# Patient Record
Sex: MALE | Race: WHITE | Employment: UNEMPLOYED | URBAN - METROPOLITAN AREA
[De-identification: names, ages, dates, MRNs, and addresses within clinical notes are randomized per-mention and may not be internally consistent; named-entity substitution may affect disease eponyms.]

---

## 2017-01-05 ENCOUNTER — TELEPHONE (OUTPATIENT)
Dept: FAMILY MEDICINE CLINIC | Age: 57
End: 2017-01-05

## 2017-01-05 ENCOUNTER — OFFICE VISIT (OUTPATIENT)
Dept: FAMILY MEDICINE CLINIC | Age: 57
End: 2017-01-05

## 2017-01-05 VITALS
DIASTOLIC BLOOD PRESSURE: 65 MMHG | HEART RATE: 61 BPM | BODY MASS INDEX: 35.49 KG/M2 | OXYGEN SATURATION: 99 % | WEIGHT: 262 LBS | HEIGHT: 72 IN | SYSTOLIC BLOOD PRESSURE: 111 MMHG | RESPIRATION RATE: 18 BRPM | TEMPERATURE: 98.3 F

## 2017-01-05 DIAGNOSIS — R73.03 PREDIABETES: ICD-10-CM

## 2017-01-05 DIAGNOSIS — F10.10 ALCOHOL ABUSE: ICD-10-CM

## 2017-01-05 DIAGNOSIS — M25.512 CHRONIC LEFT SHOULDER PAIN: ICD-10-CM

## 2017-01-05 DIAGNOSIS — G89.29 CHRONIC LEFT SHOULDER PAIN: ICD-10-CM

## 2017-01-05 DIAGNOSIS — G47.00 INSOMNIA, UNSPECIFIED TYPE: ICD-10-CM

## 2017-01-05 DIAGNOSIS — E29.1 HYPOGONADISM, MALE: ICD-10-CM

## 2017-01-05 DIAGNOSIS — I85.10 SECONDARY ESOPHAGEAL VARICES WITHOUT BLEEDING (HCC): ICD-10-CM

## 2017-01-05 DIAGNOSIS — E87.6 HYPOKALEMIA: ICD-10-CM

## 2017-01-05 DIAGNOSIS — F10.230 ALCOHOL DEPENDENCE WITH UNCOMPLICATED WITHDRAWAL (HCC): Primary | ICD-10-CM

## 2017-01-05 DIAGNOSIS — L29.9 ITCHY SKIN: ICD-10-CM

## 2017-01-05 RX ORDER — DISULFIRAM 500 MG/1
500 TABLET ORAL DAILY
Qty: 30 TAB | Refills: 0 | Status: SHIPPED | OUTPATIENT
Start: 2017-01-05 | End: 2017-04-06

## 2017-01-05 RX ORDER — TESTOSTERONE CYPIONATE 200 MG/ML
400 INJECTION INTRAMUSCULAR ONCE
Qty: 2 ML | Refills: 0
Start: 2017-01-05 | End: 2017-01-05

## 2017-01-05 RX ORDER — LACTULOSE 10 G/15ML
SOLUTION ORAL; RECTAL
COMMUNITY
Start: 2016-12-27 | End: 2017-05-17 | Stop reason: ALTCHOICE

## 2017-01-05 RX ORDER — TRAZODONE HYDROCHLORIDE 300 MG/1
300 TABLET ORAL
Qty: 90 TAB | Refills: 3 | Status: SHIPPED | OUTPATIENT
Start: 2017-01-05 | End: 2017-10-03 | Stop reason: SDUPTHER

## 2017-01-05 RX ORDER — CYCLOBENZAPRINE HCL 10 MG
TABLET ORAL
Qty: 90 TAB | Refills: 1 | Status: SHIPPED | OUTPATIENT
Start: 2017-01-05 | End: 2017-06-06 | Stop reason: SDUPTHER

## 2017-01-05 NOTE — MR AVS SNAPSHOT
Visit Information Date & Time Provider Department Dept. Phone Encounter #  
 1/5/2017  9:45 AM Treva Beard NP [facility] [phone] [id number] Follow-up Instructions Return in about 1 month (around 2/5/2017) for follow up. Upcoming Health Maintenance Date Due Pneumococcal 19-64 Highest Risk (2 of 3 - PCV13) 6/1/2012 COLONOSCOPY 6/3/2021 DTaP/Tdap/Td series (2 - Td) 9/5/2022 Allergies as of 1/5/2017  Review Complete On: 1/5/2017 By: Treva Beard NP Severity Noted Reaction Type Reactions Onion Medium 06/02/2011   Side Effect Nausea and Vomiting Statins-hmg-coa Reductase Inhibitors  07/05/2016    Unknown (comments) Current Immunizations  Reviewed on 6/28/2016 Name Date Hep A Vaccine (Adult) 6/28/2016, 12/4/2015 Hep B Vaccine (Adult) 6/28/2016, 9/8/2015, 7/8/2015 Influenza Vaccine (Quad) PF 10/24/2016 12:01 PM, 11/5/2015 Pneumococcal Vaccine (Unspecified Type) 6/1/2011  8:25 PM  
 TDAP Vaccine 9/5/2012  3:11 PM  
  
 Not reviewed this visit You Were Diagnosed With   
  
 Codes Comments Alcohol dependence with uncomplicated withdrawal ([facility]    -  Primary ICD-10-CM: O92.568 ICD-9-CM: 303.90, 291.81 Alcohol abuse     ICD-10-CM: F10.10 ICD-9-CM: 305.00 Secondary esophageal varices without bleeding (HCC)     ICD-10-CM: I85.10 ICD-9-CM: 456.21 Insomnia, unspecified type     ICD-10-CM: G47.00 ICD-9-CM: 780.52 Chronic left shoulder pain     ICD-10-CM: M25.512, G89.29 ICD-9-CM: 719.41, 338.29 Hypokalemia     ICD-10-CM: E87.6 ICD-9-CM: 276.8 Itchy skin     ICD-10-CM: L29.9 ICD-9-CM: 698.9 Prediabetes     ICD-10-CM: R73.03 
ICD-9-CM: 790.29 Vitals BP Pulse Temp Resp Height(growth percentile) Weight(growth percentile) 111/65 (BP 1 Location: Right arm, BP Patient Position: Sitting) 61 98.3 °F (36.8 °C) (Oral) 18 6' (1.829 m) 262 lb (118.8 kg) SpO2 BMI Smoking Status 99% 35.53 kg/m2 Former Smoker BMI and BSA Data Body Mass Index Body Surface Area 35.53 kg/m 2 2.46 m 2 Preferred Pharmacy Pharmacy Name Phone [facility] [address] [place] [address] - [address] [place] [address] [address] [phone] Your Updated Medication List  
  
   
This list is accurate as of: 1/5/17 10:53 AM.  Always use your most recent med list.  
  
  
  
  
 allopurinol 300 mg tablet Commonly known as:  Vasquez Better Take 1 Tab by mouth two (2) times a day. cyclobenzaprine 10 mg tablet Commonly known as:  FLEXERIL  
TAKE 1 TABLET BY MOUTH THREE TIMES DAILY as needed. furosemide 20 mg tablet Commonly known as:  LASIX Take 2 tabs by mouth in the morning and 1 tab in the evening for fluid retention. * lactulose 10 gram/15 mL solution Commonly known as:  Andrew Frost Take 10 g by mouth two (2) times a day. * lactulose 10 gram/15 mL solution Commonly known as:  Andrew Frost LORazepam 1 mg tablet Commonly known as:  ATIVAN  
TAKE 1 TABLET TWICE DAILY  ( MAXIMUM DAILY AMOUNT 2MG) omeprazole 40 mg capsule Commonly known as:  PRILOSEC Take 1 Cap by mouth two (2) times a day. potassium chloride SA 10 mEq capsule Commonly known as:  MICRO-K  
TAKE 1 CAPSULE EVERY DAY  
  
 traZODone 300 mg tablet Commonly known as:  Colton Bump Take 1 Tab by mouth nightly. venlafaxine- mg capsule Commonly known as:  EFFEXOR-XR Take 1 Cap by mouth daily. * Notice: This list has 2 medication(s) that are the same as other medications prescribed for you. Read the directions carefully, and ask your doctor or other care provider to review them with you. Prescriptions Sent to Pharmacy Refills  
 traZODone (DESYREL) 300 mg tablet 3 Sig: Take 1 Tab by mouth nightly. Class: Normal  
 Pharmacy: [address] [place] [address], [facility] Ph #: [phone]  Route: Oral  
 cyclobenzaprine (FLEXERIL) 10 mg tablet 1 Sig: TAKE 1 TABLET BY MOUTH THREE TIMES DAILY as needed. Class: Normal  
 Pharmacy: [address] [facility], [address] [facility] #: [phone] Follow-up Instructions Return in about 1 month (around 2/5/2017) for follow up. Referral Information Referral ID Referred By Referred To  
  
 [id number] Debbie HERNANDEZ Kayla Darlin [address] , [address] [place] [address] Phone: [phone] Fax: [phone] Visits Status Start Date End Date 1 New Request 1/5/17 1/5/18 If your referral has a status of pending review or denied, additional information will be sent to support the outcome of this decision. Patient Instructions When antabuse is taken concomitantly with alcohol, there is an increase in serum acetaldehyde levels. High acetaldehyde causes uncomfortable symptoms including flushing, throbbing in head and neck, nausea, vomiting, diaphoresis, thirst, palpitations, chest pain, dyspnea, hyperventilation, tachycardia, syncope, weakness, blurred vision, confusion, vertigo, and hypotension. Disulfiram (By mouth) Disulfiram (dye-SUL-fi-siomara) Used as part of a treatment plan for problem drinking. Creates an unpleasant reaction when drinking alcohol, which reduces the desire to drink. This medicine is part of a recovery program that includes medical supervision and counseling. Brand Name(s): Antabuse There may be other brand names for this medicine. When This Medicine Should Not Be Used: You should not use this medicine if you have had an allergic reaction to disulfiram or to thiuram chemicals used in pesticides and rubber manufacturing. You should not use disulfiram if you have recently used metronidazole (Flagyl®) or paraldehyde (Naraylann Doyne), or if you have severe heart disease or mental illness.  Do not start taking disulfiram for at least 12 hours after drinking alcohol or swallowing any product that contains alcohol (such as cough or cold medicines, tonics, mouthwash, food sauces, vinegar, etc). How to Use This Medicine:  
Tablet · Your doctor will tell you how much of this medicine to take and how often. Do not take more medicine or take it more often than your doctor tells you to. · Take your medicine in the morning unless your doctor tells you otherwise. If this medicine makes you sleepy, you may take it at bedtime. If a dose is missed: · If you miss a dose or forget to take your medicine, take it as soon as you can. If you are more than 12 hours late, wait until it is time for your next dose to take the medicine and skip the missed dose. · Do not use extra medicine to make up for a missed dose. How to Store and Dispose of This Medicine: · Store the medicine in its original container at room temperature, away from heat, moisture, and direct light. · Keep all medicine out of the reach of children and never share your medicine with anyone. Drugs and Foods to Avoid: Ask your doctor or pharmacist before using any other medicine, including over-the-counter medicines, vitamins, and herbal products. · DO NOT DRINK ALCOHOL WHILE YOU ARE USING THIS MEDICINE. EVEN SMALL AMOUNTS OF ALCOHOL CAN PRODUCE REACTIONS, WHICH CAN BE LIFE-THREATENING. · Make sure your doctor knows if you are also using blood thinners (Coumadin®), isoniazid (Rifamate®, Rifater®), phenytoin (Dilantin®), fosphenytoin (Cerebyx®), mephenytoin (Mesantoin®), or any medicines that make you sleepy (such as sleeping pills, cold and allergy medicine, narcotic pain killers, or sedatives). Warnings While Using This Medicine: · Make sure your doctor knows if you are pregnant or breastfeeding, or if you have heart disease, kidney disease, liver disease, diabetes, epilepsy, thyroid problems, or mental illness, or if you are allergic to rubber or latex.  
· If you consume alcohol while using disulfiram, you will have a reaction that can include: throbbing pain in the head and neck, trouble breathing, nausea and vomiting, sweating, flushing, thirst, chest pain, a fast or pounding heartbeat, lightheadedness or fainting, weakness, blurred vision, confusion, or dizziness. This reaction can last for 30 minutes to several hours. The more alcohol you consume, the worse a reaction will be. A severe reaction can result in death. · You may still have an alcohol reaction if you consume alcohol for up to 2 weeks after you stop taking disulfiram. 
· Make sure any doctor or dentist who treats you knows that you are using this medicine. You should carry an identification card or wear a medical alert bracelet to let others know you are taking disulfiram. 
· Your doctor will need to check your progress at regular visits while you are using this medicine. Be sure to keep all appointments. · This medicine may make you dizzy or drowsy. Avoid driving, using machines, or doing anything else that could be dangerous if you are not alert. Possible Side Effects While Using This Medicine:  
Call your doctor right away if you notice any of these side effects: · Blurred vision · Numbness or tingling in the hands, legs, or feet · Severe stomach pain, especially in the upper abdomen · Skin rash or itching · Yellow eyes or skin If you notice these less serious side effects, talk with your doctor: · Problems having sex · Tiredness or drowsiness · Unusual or unpleasant taste in your mouth If you notice other side effects that you think are caused by this medicine, tell your doctor. Call your doctor for medical advice about side effects. You may report side effects to FDA at 4-887-FDA-3768 © 2016 [address] is for End User's use only and may not be sold, redistributed or otherwise used for commercial purposes. The above information is an  only.  It is not intended as medical advice for individual conditions or treatments. Talk to your doctor, nurse or pharmacist before following any medical regimen to see if it is safe and effective for you. Introducing [facility]! Dear Cher Walker: Thank you for requesting a [employer] account. Our records indicate that you already have an active [employer]wak account. You can access your account anytime at [email or web address] Did you know that you can access your hospital and ER discharge instructions at any time in Dakwak? You can also review all of your test results from your hospital stay or ER visit. Additional Information If you have questions, please visit the Frequently Asked Questions section of the [employer]wak website at [email or web address] Remember, Dakwak is NOT to be used for urgent needs. For medical emergencies, dial 911. Now available from your iPhone and Android! Please provide this summary of care documentation to your next provider. Your primary care clinician is listed as Tye Santiago. If you have any questions after today's visit, please call [phone].

## 2017-01-05 NOTE — PROGRESS NOTES
Chief Complaint   Patient presents with    Shoulder Pain    Referral Request    Injection     Patient in office today for shoulder and knee pain ;would like a referral to see Manuel Whitman. Would also like to have labs drawn from hospital visit. 1. Have you been to the ER, urgent care clinic since your last visit? Hospitalized since your last visit? Yes [facility]    2. Have you seen or consulted any other health care providers outside of the [address] [place] [address] since your last visit? Include any pap smears or colon screening.  No

## 2017-01-05 NOTE — PROGRESS NOTES
Chief Complaint   Patient presents with    Shoulder Pain    Referral Request    Injection     Patient in office today for shoulder and knee pain; would like a referral to see Eli More. Pt also requesting to have labs drawn from hospital visit. 1. Have you been to the ER, urgent care clinic since your last visit? Hospitalized since your last visit? Yes Where: [facility]    2. Have you seen or consulted any other health care providers outside of the [address] [place] [address] since your last visit? Include any pap smears or colon screening. No    Pt was seen at [facility].   Received EGD and had varices tied off. Also had a paracentesis. ED recommended he see ortho for his left shoulder pain. Also having issues with his knees locking. Also c/o difficulty sleeping. Taking the trazodone nightly (150 mg). Was previously on 3 150 mg tabs nightly. Will take an extra tab nightly here and there which does help. Would like his muscle relaxer refilled for as needed muscle aches and tension. Using sparingly as needed. Has not had any etoh since his hospitalization 2 weeks ago. Wife is concerned about what will happen when she returns to work and he is not being supervised. Also c/o generalized itchiness. Will wipe his body with rubbing alcohol. Requesting monthly T injection. Denies any other concerns at this time. Chief Complaint   Patient presents with    Shoulder Pain    Referral Request    Injection     he is a 49y.o. year old male who presents for evalution. Reviewed PmHx, RxHx, FmHx, SocHx, AllgHx and updated and dated in the chart.     Review of Systems - negative except as listed above in the HPI    Objective:     Vitals:    01/05/17 1003   BP: 111/65   Pulse: 61   Resp: 18   Temp: 98.3 °F (36.8 °C)   TempSrc: Oral   SpO2: 99%   Weight: 262 lb (118.8 kg)   Height: 6' (1.829 m)     Physical Examination: General appearance - alert, well appearing, and in no distress  Eyes - pupils equal and reactive, extraocular eye movements intact  Ears - bilateral TM's and external ear canals normal  Nose - normal and patent, no erythema, discharge or polyps  Mouth - mucous membranes moist, pharynx normal without lesions  Neck - supple, no significant adenopathy  Chest - clear to auscultation, no wheezes, rales or rhonchi, symmetric air entry  Heart - normal rate, regular rhythm, normal S1, S2, no murmurs  Abdomen - soft, nontender, distended but improved compared to previous exams  Extremities - peripheral pulses normal, trace ankle edema bilaterally, no clubbing or cyanosis  Skin - normal coloration and turgor, no rashes, no suspicious skin lesions noted    Assessment/ Plan:   Mundo Ashby was seen today for shoulder pain, referral request and injection. Diagnoses and all orders for this visit:    Alcohol dependence with uncomplicated withdrawal ([address] / Alcohol abuse  -     AMMONIA  -     disulfiram (ANTABUSE) 500 mg tablet; Take 1 Tab by mouth daily. Start antabuse to help pt continue with motivation to abstain from etoh abuse. Reviewed SEs/ADRS of medication. Discussed sx that occur with ingesting etoh. Reviewed that medication can stay in system for up to 14 days. Continue to follow up with hepatologist/GI as advised. Secondary esophageal varices without bleeding (HCC)  -     CBC WITH AUTOMATED DIFF  Will deviate plan based on findings. Insomnia, unspecified type  -     traZODone (DESYREL) 300 mg tablet; Take 1 Tab by mouth nightly. Increase rx to 300 mg nightly. Chronic left shoulder pain  -     REFERRAL TO ORTHODONTICS  Referral to Dr. Martell Rock for further evaluation of sx. Hypokalemia  -     METABOLIC PANEL, COMPREHENSIVE  Will deviate plan based on findings. Itchy skin  Enc pt to continue use of OTCs like benadryl. Discouraged use of rubbing alcohol. Discussed that this sx would likely improve with abstaining from etoh consumption.    Prediabetes  -     HEMOGLOBIN A1C WITH EAG  Will notify results and deviate plan based on findings. Hypogonadism, male  -     testosterone cypionate (DEPOTESTOTERONE CYPIONATE) 200 mg/mL injection; 2 mL by IntraMUSCular route once for 1 dose. Max Daily Amount: 400 mg.  -     AK INJ TESTOSTERONE CYPIONATE () - Qty - 200  -     THER/PROPH/DIAG INJECTION, SUBCUT/IM  Given. Other orders  -     cyclobenzaprine (FLEXERIL) 10 mg tablet; TAKE 1 TABLET BY MOUTH THREE TIMES DAILY as needed. Continue using sparingly as needed. Follow-up Disposition:  Return in about 1 month (around 2/5/2017) for follow up. I have discussed the diagnosis with the patient and the intended plan as seen in the above orders. The patient has received an after-visit summary and questions were answered concerning future plans. Medication Side Effects and Warnings were discussed with patient: yes  Patient Labs were reviewed and or requested: yes  Patient Past Records were reviewed and or requested  yes  Patient / Caregiver Understanding of treatment plan was verbalized during office visit YES    JASON Pizarro    Patient Instructions   When antabuse is taken concomitantly with alcohol, there is an increase in serum acetaldehyde levels. High acetaldehyde causes uncomfortable symptoms including flushing, throbbing in head and neck, nausea, vomiting, diaphoresis, thirst, palpitations, chest pain, dyspnea, hyperventilation, tachycardia, syncope, weakness, blurred vision, confusion, vertigo, and hypotension. Disulfiram (By mouth)   Disulfiram (dye-SUL-fi-siomara)  Used as part of a treatment plan for problem drinking. Creates an unpleasant reaction when drinking alcohol, which reduces the desire to drink. This medicine is part of a recovery program that includes medical supervision and counseling. Brand Name(s): Antabuse   There may be other brand names for this medicine. When This Medicine Should Not Be Used:    You should not use this medicine if you have had an allergic reaction to disulfiram or to thiuram chemicals used in pesticides and rubber manufacturing. You should not use disulfiram if you have recently used metronidazole (Flagyl®) or paraldehyde (Michiel Anahy), or if you have severe heart disease or mental illness. Do not start taking disulfiram for at least 12 hours after drinking alcohol or swallowing any product that contains alcohol (such as cough or cold medicines, tonics, mouthwash, food sauces, vinegar, etc). How to Use This Medicine:   Tablet  · Your doctor will tell you how much of this medicine to take and how often. Do not take more medicine or take it more often than your doctor tells you to. · Take your medicine in the morning unless your doctor tells you otherwise. If this medicine makes you sleepy, you may take it at bedtime. If a dose is missed:   · If you miss a dose or forget to take your medicine, take it as soon as you can. If you are more than 12 hours late, wait until it is time for your next dose to take the medicine and skip the missed dose. · Do not use extra medicine to make up for a missed dose. How to Store and Dispose of This Medicine:   · Store the medicine in its original container at room temperature, away from heat, moisture, and direct light. · Keep all medicine out of the reach of children and never share your medicine with anyone. Drugs and Foods to Avoid:   Ask your doctor or pharmacist before using any other medicine, including over-the-counter medicines, vitamins, and herbal products. · DO NOT DRINK ALCOHOL WHILE YOU ARE USING THIS MEDICINE. EVEN SMALL AMOUNTS OF ALCOHOL CAN PRODUCE REACTIONS, WHICH CAN BE LIFE-THREATENING.   · Make sure your doctor knows if you are also using blood thinners (Coumadin®), isoniazid (Rifamate®, Rifater®), phenytoin (Dilantin®), fosphenytoin (Cerebyx®), mephenytoin (Mesantoin®), or any medicines that make you sleepy (such as sleeping pills, cold and allergy medicine, narcotic pain killers, or sedatives). Warnings While Using This Medicine:   · Make sure your doctor knows if you are pregnant or breastfeeding, or if you have heart disease, kidney disease, liver disease, diabetes, epilepsy, thyroid problems, or mental illness, or if you are allergic to rubber or latex. · If you consume alcohol while using disulfiram, you will have a reaction that can include: throbbing pain in the head and neck, trouble breathing, nausea and vomiting, sweating, flushing, thirst, chest pain, a fast or pounding heartbeat, lightheadedness or fainting, weakness, blurred vision, confusion, or dizziness. This reaction can last for 30 minutes to several hours. The more alcohol you consume, the worse a reaction will be. A severe reaction can result in death. · You may still have an alcohol reaction if you consume alcohol for up to 2 weeks after you stop taking disulfiram.  · Make sure any doctor or dentist who treats you knows that you are using this medicine. You should carry an identification card or wear a medical alert bracelet to let others know you are taking disulfiram.  · Your doctor will need to check your progress at regular visits while you are using this medicine. Be sure to keep all appointments. · This medicine may make you dizzy or drowsy. Avoid driving, using machines, or doing anything else that could be dangerous if you are not alert. Possible Side Effects While Using This Medicine:   Call your doctor right away if you notice any of these side effects:  · Blurred vision  · Numbness or tingling in the hands, legs, or feet  · Severe stomach pain, especially in the upper abdomen  · Skin rash or itching  · Yellow eyes or skin  If you notice these less serious side effects, talk with your doctor:   · Problems having sex  · Tiredness or drowsiness  · Unusual or unpleasant taste in your mouth  If you notice other side effects that you think are caused by this medicine, tell your doctor.    Call your doctor for medical advice about side effects. You may report side effects to FDA at 4-003-MVC-6085  © 2016 [address] is for End User's use only and may not be sold, redistributed or otherwise used for commercial purposes. The above information is an  only. It is not intended as medical advice for individual conditions or treatments. Talk to your doctor, nurse or pharmacist before following any medical regimen to see if it is safe and effective for you.

## 2017-01-05 NOTE — PATIENT INSTRUCTIONS
When antabuse is taken concomitantly with alcohol, there is an increase in serum acetaldehyde levels. High acetaldehyde causes uncomfortable symptoms including flushing, throbbing in head and neck, nausea, vomiting, diaphoresis, thirst, palpitations, chest pain, dyspnea, hyperventilation, tachycardia, syncope, weakness, blurred vision, confusion, vertigo, and hypotension. Disulfiram (By mouth)   Disulfiram (dye-SUL-fi-siomara)  Used as part of a treatment plan for problem drinking. Creates an unpleasant reaction when drinking alcohol, which reduces the desire to drink. This medicine is part of a recovery program that includes medical supervision and counseling. Brand Name(s): Antabuse   There may be other brand names for this medicine. When This Medicine Should Not Be Used: You should not use this medicine if you have had an allergic reaction to disulfiram or to thiuram chemicals used in pesticides and rubber manufacturing. You should not use disulfiram if you have recently used metronidazole (Flagyl®) or paraldehyde (Randalyn Cornet), or if you have severe heart disease or mental illness. Do not start taking disulfiram for at least 12 hours after drinking alcohol or swallowing any product that contains alcohol (such as cough or cold medicines, tonics, mouthwash, food sauces, vinegar, etc). How to Use This Medicine:   Tablet  · Your doctor will tell you how much of this medicine to take and how often. Do not take more medicine or take it more often than your doctor tells you to. · Take your medicine in the morning unless your doctor tells you otherwise. If this medicine makes you sleepy, you may take it at bedtime. If a dose is missed:   · If you miss a dose or forget to take your medicine, take it as soon as you can. If you are more than 12 hours late, wait until it is time for your next dose to take the medicine and skip the missed dose. · Do not use extra medicine to make up for a missed dose.   How to Store and Dispose of This Medicine:   · Store the medicine in its original container at room temperature, away from heat, moisture, and direct light. · Keep all medicine out of the reach of children and never share your medicine with anyone. Drugs and Foods to Avoid:   Ask your doctor or pharmacist before using any other medicine, including over-the-counter medicines, vitamins, and herbal products. · DO NOT DRINK ALCOHOL WHILE YOU ARE USING THIS MEDICINE. EVEN SMALL AMOUNTS OF ALCOHOL CAN PRODUCE REACTIONS, WHICH CAN BE LIFE-THREATENING. · Make sure your doctor knows if you are also using blood thinners (Coumadin®), isoniazid (Rifamate®, Rifater®), phenytoin (Dilantin®), fosphenytoin (Cerebyx®), mephenytoin (Mesantoin®), or any medicines that make you sleepy (such as sleeping pills, cold and allergy medicine, narcotic pain killers, or sedatives). Warnings While Using This Medicine:   · Make sure your doctor knows if you are pregnant or breastfeeding, or if you have heart disease, kidney disease, liver disease, diabetes, epilepsy, thyroid problems, or mental illness, or if you are allergic to rubber or latex. · If you consume alcohol while using disulfiram, you will have a reaction that can include: throbbing pain in the head and neck, trouble breathing, nausea and vomiting, sweating, flushing, thirst, chest pain, a fast or pounding heartbeat, lightheadedness or fainting, weakness, blurred vision, confusion, or dizziness. This reaction can last for 30 minutes to several hours. The more alcohol you consume, the worse a reaction will be. A severe reaction can result in death. · You may still have an alcohol reaction if you consume alcohol for up to 2 weeks after you stop taking disulfiram.  · Make sure any doctor or dentist who treats you knows that you are using this medicine.  You should carry an identification card or wear a medical alert bracelet to let others know you are taking disulfiram.  · Your doctor will need to check your progress at regular visits while you are using this medicine. Be sure to keep all appointments. · This medicine may make you dizzy or drowsy. Avoid driving, using machines, or doing anything else that could be dangerous if you are not alert. Possible Side Effects While Using This Medicine:   Call your doctor right away if you notice any of these side effects:  · Blurred vision  · Numbness or tingling in the hands, legs, or feet  · Severe stomach pain, especially in the upper abdomen  · Skin rash or itching  · Yellow eyes or skin  If you notice these less serious side effects, talk with your doctor:   · Problems having sex  · Tiredness or drowsiness  · Unusual or unpleasant taste in your mouth  If you notice other side effects that you think are caused by this medicine, tell your doctor. Call your doctor for medical advice about side effects. You may report side effects to FDA at 3-114-FDA-7905  © 2016 [address] is for End User's use only and may not be sold, redistributed or otherwise used for commercial purposes. The above information is an  only. It is not intended as medical advice for individual conditions or treatments. Talk to your doctor, nurse or pharmacist before following any medical regimen to see if it is safe and effective for you.

## 2017-01-06 LAB
ALBUMIN SERPL-MCNC: 3.3 G/DL (ref 3.5–5.5)
ALBUMIN/GLOB SERPL: 0.9 {RATIO} (ref 1.1–2.5)
ALP SERPL-CCNC: 118 IU/L (ref 39–117)
ALT SERPL-CCNC: 12 IU/L (ref 0–44)
AMMONIA PLAS-MCNC: 70 UG/DL (ref 27–102)
AST SERPL-CCNC: 33 IU/L (ref 0–40)
BASOPHILS # BLD AUTO: 0 X10E3/UL (ref 0–0.2)
BASOPHILS NFR BLD AUTO: 1 %
BILIRUB SERPL-MCNC: 1 MG/DL (ref 0–1.2)
BUN SERPL-MCNC: 6 MG/DL (ref 6–24)
BUN/CREAT SERPL: 7 (ref 9–20)
CALCIUM SERPL-MCNC: 8.6 MG/DL (ref 8.7–10.2)
CHLORIDE SERPL-SCNC: 99 MMOL/L (ref 96–106)
CO2 SERPL-SCNC: 22 MMOL/L (ref 18–29)
CREAT SERPL-MCNC: 0.89 MG/DL (ref 0.76–1.27)
EOSINOPHIL # BLD AUTO: 0.1 X10E3/UL (ref 0–0.4)
EOSINOPHIL NFR BLD AUTO: 3 %
ERYTHROCYTE [DISTWIDTH] IN BLOOD BY AUTOMATED COUNT: 20.3 % (ref 12.3–15.4)
EST. AVERAGE GLUCOSE BLD GHB EST-MCNC: 126 MG/DL
GLOBULIN SER CALC-MCNC: 3.5 G/DL (ref 1.5–4.5)
GLUCOSE SERPL-MCNC: 139 MG/DL (ref 65–99)
HBA1C MFR BLD: 6 % (ref 4.8–5.6)
HCT VFR BLD AUTO: 29.3 % (ref 37.5–51)
HGB BLD-MCNC: 8.9 G/DL (ref 12.6–17.7)
IMM GRANULOCYTES # BLD: 0 X10E3/UL (ref 0–0.1)
IMM GRANULOCYTES NFR BLD: 1 %
LYMPHOCYTES # BLD AUTO: 0.6 X10E3/UL (ref 0.7–3.1)
LYMPHOCYTES NFR BLD AUTO: 16 %
MCH RBC QN AUTO: 21.3 PG (ref 26.6–33)
MCHC RBC AUTO-ENTMCNC: 30.4 G/DL (ref 31.5–35.7)
MCV RBC AUTO: 70 FL (ref 79–97)
MONOCYTES # BLD AUTO: 0.6 X10E3/UL (ref 0.1–0.9)
MONOCYTES NFR BLD AUTO: 15 %
NEUTROPHILS # BLD AUTO: 2.5 X10E3/UL (ref 1.4–7)
NEUTROPHILS NFR BLD AUTO: 64 %
PLATELET # BLD AUTO: 144 X10E3/UL (ref 150–379)
POTASSIUM SERPL-SCNC: 3.8 MMOL/L (ref 3.5–5.2)
PROT SERPL-MCNC: 6.8 G/DL (ref 6–8.5)
RBC # BLD AUTO: 4.17 X10E6/UL (ref 4.14–5.8)
SODIUM SERPL-SCNC: 134 MMOL/L (ref 134–144)
WBC # BLD AUTO: 3.8 X10E3/UL (ref 3.4–10.8)

## 2017-01-06 NOTE — PROGRESS NOTES
Please notify pt that ammonia levels are stable. Should continue to be this way as long as he continues to abstain from etoh consumption.

## 2017-01-06 NOTE — PROGRESS NOTES
Please notify pt the followin. Prediabetes is very well controlled. Continue with efforts to follow a low carb diet. 2. Potassium is stable. 3. CBC looks good, anemia normalizing after his recent hospitalization. All in all his labs look the best they have in some time from his abstinence from alcohol. Enc pt to continue with those efforts. I recommend we repeat labs in 1 month. Ammonia results still pending, will notify when they return.

## 2017-02-14 DIAGNOSIS — R60.0 BILATERAL LOWER EXTREMITY EDEMA: ICD-10-CM

## 2017-02-14 RX ORDER — FUROSEMIDE 20 MG/1
TABLET ORAL
Qty: 270 TAB | Refills: 2 | Status: SHIPPED | OUTPATIENT
Start: 2017-02-14 | End: 2017-07-11 | Stop reason: SDUPTHER

## 2017-03-21 ENCOUNTER — TELEPHONE (OUTPATIENT)
Dept: FAMILY MEDICINE CLINIC | Age: 57
End: 2017-03-21

## 2017-03-21 NOTE — TELEPHONE ENCOUNTER
This is not something that I can order. If pt thinks he needs paracentesis, enc to go to ED for further evaluation.

## 2017-03-21 NOTE — TELEPHONE ENCOUNTER
Patient requesting an order to [facility] to have fluid drawn off stomach. He has cirrhosis.       He may be reached at [phone]

## 2017-03-22 NOTE — TELEPHONE ENCOUNTER
Patient checking on referral to have fluid removed from his abdomen. Told patient per message from Soy Ledesma NP  that would need to go ED. He may call the doctor that did it last time or go to ER.

## 2017-03-31 ENCOUNTER — TELEPHONE (OUTPATIENT)
Dept: FAMILY MEDICINE CLINIC | Age: 57
End: 2017-03-31

## 2017-03-31 ENCOUNTER — DOCUMENTATION ONLY (OUTPATIENT)
Dept: FAMILY MEDICINE CLINIC | Age: 57
End: 2017-03-31

## 2017-03-31 NOTE — TELEPHONE ENCOUNTER
= [facility].  I had Ivory Bonilla look him up and it looks like he is having a Paracentesis done and results were called into us in error.

## 2017-04-06 ENCOUNTER — OFFICE VISIT (OUTPATIENT)
Dept: FAMILY MEDICINE CLINIC | Age: 57
End: 2017-04-06

## 2017-04-06 VITALS
RESPIRATION RATE: 18 BRPM | OXYGEN SATURATION: 97 % | HEIGHT: 72 IN | TEMPERATURE: 98.2 F | WEIGHT: 294 LBS | BODY MASS INDEX: 39.82 KG/M2 | SYSTOLIC BLOOD PRESSURE: 137 MMHG | DIASTOLIC BLOOD PRESSURE: 82 MMHG | HEART RATE: 100 BPM

## 2017-04-06 DIAGNOSIS — M62.08 DIASTASIS RECTI: ICD-10-CM

## 2017-04-06 DIAGNOSIS — D50.9 IRON DEFICIENCY ANEMIA, UNSPECIFIED IRON DEFICIENCY ANEMIA TYPE: ICD-10-CM

## 2017-04-06 DIAGNOSIS — I10 ESSENTIAL HYPERTENSION: Chronic | ICD-10-CM

## 2017-04-06 DIAGNOSIS — K70.31 ALCOHOLIC CIRRHOSIS OF LIVER WITH ASCITES (HCC): ICD-10-CM

## 2017-04-06 DIAGNOSIS — Z00.00 ROUTINE GENERAL MEDICAL EXAMINATION AT A HEALTH CARE FACILITY: Primary | ICD-10-CM

## 2017-04-06 DIAGNOSIS — E87.1 HYPONATREMIA: ICD-10-CM

## 2017-04-06 DIAGNOSIS — R07.9 CHEST PAIN, EXERTIONAL: ICD-10-CM

## 2017-04-06 DIAGNOSIS — E29.1 HYPOGONADISM, MALE: ICD-10-CM

## 2017-04-06 DIAGNOSIS — Z13.31 SCREENING FOR DEPRESSION: ICD-10-CM

## 2017-04-06 DIAGNOSIS — F33.0 MILD EPISODE OF RECURRENT MAJOR DEPRESSIVE DISORDER (HCC): ICD-10-CM

## 2017-04-06 DIAGNOSIS — Z13.39 SCREENING FOR ALCOHOLISM: ICD-10-CM

## 2017-04-06 RX ORDER — SPIRONOLACTONE 25 MG/1
25 TABLET ORAL 2 TIMES DAILY
COMMUNITY
End: 2017-04-06 | Stop reason: SDUPTHER

## 2017-04-06 RX ORDER — TESTOSTERONE CYPIONATE 200 MG/ML
400 INJECTION INTRAMUSCULAR ONCE
Qty: 2 ML | Refills: 0
Start: 2017-04-06 | End: 2017-04-06

## 2017-04-06 RX ORDER — SPIRONOLACTONE 25 MG/1
25 TABLET ORAL 2 TIMES DAILY
Qty: 180 TAB | Refills: 1 | Status: SHIPPED | OUTPATIENT
Start: 2017-04-06 | End: 2017-10-29 | Stop reason: SDUPTHER

## 2017-04-06 NOTE — MR AVS SNAPSHOT
Visit Information Date & Time Provider Department Dept. Phone Encounter #  
 4/6/2017  8:00 AM Tamika Shah NP [address] [facility] [phone] [id number] Upcoming Health Maintenance Date Due  
 MEDICARE YEARLY EXAM 6/12/1980 Pneumococcal 19-64 Highest Risk (2 of 3 - PCV13) 6/1/2012 COLONOSCOPY 6/3/2021 DTaP/Tdap/Td series (2 - Td) 9/5/2022 Allergies as of 4/6/2017  Review Complete On: 4/6/2017 By: Tamika Shah NP Severity Noted Reaction Type Reactions Onion Medium 06/02/2011   Side Effect Nausea and Vomiting Statins-hmg-coa Reductase Inhibitors  07/05/2016    Unknown (comments) Current Immunizations  Reviewed on 6/28/2016 Name Date Hep A Vaccine (Adult) 6/28/2016, 12/4/2015 Hep B Vaccine (Adult) 6/28/2016, 9/8/2015, 7/8/2015 Influenza Vaccine (Quad) PF 10/24/2016 12:01 PM, 11/5/2015 Pneumococcal Vaccine (Unspecified Type) 6/1/2011  8:25 PM  
 TDAP Vaccine 9/5/2012  3:11 PM  
  
 Not reviewed this visit You Were Diagnosed With   
  
 Codes Comments Routine general medical examination at a health care facility    -  Primary ICD-10-CM: Z00.00 ICD-9-CM: V70.0 Screening for alcoholism     ICD-10-CM: Z13.89 ICD-9-CM: V79.1 Screening for depression     ICD-10-CM: Z13.89 ICD-9-CM: V79.0 Chest pain, exertional     ICD-10-CM: R07.9 ICD-9-CM: 786.50 Diastasis recti     ICD-10-CM: M62.08 
ICD-9-CM: 728.84 Alcoholic cirrhosis of liver with ascites ([facility]     ICD-10-CM: K70.31 ICD-9-CM: 571.2 Essential hypertension     ICD-10-CM: I10 
ICD-9-CM: 401.9 Mild episode of recurrent major depressive disorder (HCC)     ICD-10-CM: F33.0 ICD-9-CM: 296.31 Hyponatremia     ICD-10-CM: E87.1 ICD-9-CM: 276.1 Iron deficiency anemia, unspecified iron deficiency anemia type     ICD-10-CM: D50.9 ICD-9-CM: 280.9 Hypogonadism, male     ICD-10-CM: E29.1 ICD-9-CM: 257.2 Vitals  BP Pulse Temp Resp Height(growth percentile) Weight(growth percentile) 137/82 (BP 1 Location: Right arm, BP Patient Position: Sitting) 100 98.2 °F (36.8 °C) (Oral) 18 6' (1.829 m) 294 lb (133.4 kg) SpO2 BMI Smoking Status 97% 39.87 kg/m2 Former Smoker Vitals History BMI and BSA Data Body Mass Index Body Surface Area  
 39.87 kg/m 2 2.6 m 2 Preferred Pharmacy [facility] Name Phone [facility] [place] [id number] [address] [place] [address] [phone] Your Updated Medication List  
  
   
This list is accurate as of: 4/6/17  9:22 AM.  Always use your most recent med list.  
  
  
  
  
 allopurinol 300 mg tablet Commonly known as:  Rosalinda Ankit Take 1 Tab by mouth two (2) times a day. cyclobenzaprine 10 mg tablet Commonly known as:  FLEXERIL  
TAKE 1 TABLET BY MOUTH THREE TIMES DAILY as needed. furosemide 20 mg tablet Commonly known as:  LASIX TAKE 2 TABLETS IN THE MORNING AND TAKE 1 TABLET IN THE EVENING FOR FLUID RETENTION  
  
 * lactulose 10 gram/15 mL solution Commonly known as:  Berenice Hiss Take 10 g by mouth two (2) times a day. * lactulose 10 gram/15 mL solution Commonly known as:  Berenice Jimenezs LORazepam 1 mg tablet Commonly known as:  ATIVAN  
TAKE 1 TABLET TWICE DAILY  ( MAXIMUM DAILY AMOUNT 2MG) omeprazole 40 mg capsule Commonly known as:  PRILOSEC Take 1 Cap by mouth two (2) times a day. potassium chloride SA 10 mEq capsule Commonly known as:  MICRO-K  
TAKE 1 CAPSULE EVERY DAY  
  
 spironolactone 25 mg tablet Commonly known as:  ALDACTONE Take 1 Tab by mouth two (2) times a day. testosterone cypionate 200 mg/mL injection Commonly known as:  DEPOTESTOTERONE CYPIONATE 2 mL by IntraMUSCular route once for 1 dose. Max Daily Amount: 400 mg.  
  
 traZODone 300 mg tablet Commonly known as:  Divehi Never Take 1 Tab by mouth nightly. venlafaxine- mg capsule Commonly known as:  EFFEXOR-XR Take 1 Cap by mouth daily. * Notice: This list has 2 medication(s) that are the same as other medications prescribed for you. Read the directions carefully, and ask your doctor or other care provider to review them with you. Prescriptions Sent to Pharmacy Refills  
 spironolactone (ALDACTONE) 25 mg tablet 1 Sig: Take 1 Tab by mouth two (2) times a day. Class: Normal  
 Pharmacy: [address] [place] [address], [facility] [address] [place] [address] #: [phone] Route: Oral  
  
We Performed the Following CBC WITH AUTOMATED DIFF [57986 CPT(R)] [address] [[id number] HCPCS] IRON PROFILE [id number] CPT(R)] LIPID PANEL [50235 CPT(R)] METABOLIC PANEL, COMPREHENSIVE [44328 CPT(R)] [place] INJ TESTOSTERONE CYPIONATE [ HCPCS] [place] THER/PROPH/DIAG INJECTION, SUBCUT/IM [id number] CPT(R)] PROSTATE SPECIFIC AG (PSA) [id number] CPT(R)] REFERRAL TO CARDIOLOGY [MWH33 Custom] TESTOSTERONE, FREE & TOTAL [35389 CPT(R)] Referral Information Referral ID Referred By Referred To  
  
 [id number] Isabel Najera MD   
   [facility] [address], [address] [place] [address] Phone: [phone] Fax: [phone] Visits Status Start Date End Date 1 New Request 4/6/17 4/6/18 If your referral has a status of pending review or denied, additional information will be sent to support the outcome of this decision. Introducing [facility] HE[facility]! Dear Samantha Fierro: Thank you for requesting a [employer] account. Our records indicate that you already have an active [employer] account. You can access your account anytime at [email or web address] Did you know that you can access your hospital and ER discharge instructions at any time in [employer]? You can also review all of your test results from your hospital stay or ER visit. Additional Information If you have questions, please visit the Frequently Asked Questions section of the [employer] website at [email or web address] Remember, [employer] Information is NOT to be used for urgent needs. For medical emergencies, dial 911. Now available from your iPhone and Android! Please provide this summary of care documentation to your next provider. Your primary care clinician is listed as Nereida Maldonado. If you have any questions after today's visit, please call [phone].

## 2017-04-06 NOTE — PROGRESS NOTES
Chief Complaint   Patient presents with    Hernia (Non Specific)     Patient in office today for possible eval of hernia and referral is needed; denies pain at time of visit. 1. Have you been to the ER, urgent care clinic since your last visit? Hospitalized since your last visit? Yes [facility]    2. Have you seen or consulted any other health care providers outside of the [address] [place] [address] since your last visit? Include any pap smears or colon screening. No    When pt had last paracentesis, NP told him that he has a large hernia. Diastesis recti. Pt also reports chest pain with exertion. Denies any associated sob and dyspnea. Will last for a few seconds after he rests. Has not seen a cardiologist in many years. Had a stress test at [facility] around this time. Due for a lipid chest.   Poor endurance, has been in and out of the hospital many months. Has gained another 30 lbs since last OV. Denies any other concerns at this time. This is a Subsequent Medicare Annual Wellness Visit providing Personalized Prevention Plan Services (PPPS) (Performed 12 months after initial AWV and PPPS )    I have reviewed the patient's medical history in detail and updated the computerized patient record. History     Past Medical History:   Diagnosis Date    Alcohol abuse 6/1/2011    Anemia NEC     Cirrhosis of liver (HCC)     Gastric ulcer, unspecified as acute or chronic, without mention of hemorrhage, perforation, or obstruction     GERD (gastroesophageal reflux disease)     Hypertension     Liver disease     cirrosis    Pneumonia     PUD (peptic ulcer disease)       Past Surgical History:   Procedure Laterality Date    HX HEENT      wisdom teeth     Current Outpatient Prescriptions   Medication Sig Dispense Refill    spironolactone (ALDACTONE) 25 mg tablet Take 1 Tab by mouth two (2) times a day.  180 Tab 1    testosterone cypionate (DEPOTESTOTERONE CYPIONATE) 200 mg/mL injection 2 mL by IntraMUSCular route once for 1 dose. Max Daily Amount: 400 mg. 2 mL 0    furosemide (LASIX) 20 mg tablet TAKE 2 TABLETS IN THE MORNING AND TAKE 1 TABLET IN THE EVENING FOR FLUID RETENTION 270 Tab 2    lactulose (CHRONULAC) 10 gram/15 mL solution       traZODone (DESYREL) 300 mg tablet Take 1 Tab by mouth nightly. 90 Tab 3    cyclobenzaprine (FLEXERIL) 10 mg tablet TAKE 1 TABLET BY MOUTH THREE TIMES DAILY as needed. 90 Tab 1    LORazepam (ATIVAN) 1 mg tablet TAKE 1 TABLET TWICE DAILY  ( MAXIMUM DAILY AMOUNT 2MG) 180 Tab 1    omeprazole (PRILOSEC) 40 mg capsule Take 1 Cap by mouth two (2) times a day. 180 Cap 3    potassium chloride SA (MICRO-K) 10 mEq capsule TAKE 1 CAPSULE EVERY DAY 90 Cap 3    allopurinol (ZYLOPRIM) 300 mg tablet Take 1 Tab by mouth two (2) times a day. 180 Tab 3    venlafaxine-SR (EFFEXOR-XR) 150 mg capsule Take 1 Cap by mouth daily. 90 Cap 3    lactulose (CHRONULAC) 10 gram/15 mL solution Take 10 g by mouth two (2) times a day.        Allergies   Allergen Reactions    Onion Nausea and Vomiting    Statins-Hmg-Coa Reductase Inhibitors Unknown (comments)     Family History   Problem Relation Age of Onset    Asthma Mother     Other Father      history of fall multiple injuries    Hypertension Brother     Cancer Paternal Grandmother      uncertain if colon or stomach    Cancer Paternal Grandfather      stomach cancer- dx mid [de-identified]     Social History   Substance Use Topics    Smoking status: Former Smoker     Quit date: 6/28/1985    Smokeless tobacco: Current User     Types: Chew      Comment: 1 can per day     Alcohol use 0.0 oz/week      Comment: daily, has tried to stop a few times unsuccessfully     Patient Active Problem List   Diagnosis Code    HTN (hypertension) I10    Anemia, unspecified D64.9    Gout M10.9    GI bleed K92.2    Chest pain, unspecified R07.9    Alcohol dependence with withdrawal ([facility] F10.239    Depression F32.9    Anxiety F41.9    [facility] F33.9    Hypogonadism, male E29.1    Thrombocytopenia ([facility] D69.6    Cirrhosis ([facility] K74.60    Splenomegaly R16.1    Alcohol abuse F10.10    Prediabetes R73.03    Esophageal varices without bleeding (HCC) I85.00    Hepatic cirrhosis (HCC) K74.60    Iron deficiency anemia D50.9    Hyponatremia E87.1    Gallbladder calculus without cholecystitis K80.20       Depression Risk Factor Screening:   No flowsheet data found. Alcohol Risk Factor Screening: On any occasion during the past 3 months, have you had more than 4 drinks containing alcohol? Yes    Do you average more than 14 drinks per week? Yes    Pt has active diagnosis of alcoholism and hepatic cirrhosis. Functional Ability and Level of Safety:     Hearing Loss   none    Activities of Daily Living   Self-care. Requires assistance with: no ADLs    Fall Risk   No flowsheet data found. Abuse Screen   Patient is not abused    Review of Systems   A comprehensive review of systems was negative except for that written in the HPI. Physical Examination     Evaluation of Cognitive Function:  Mood/affect:  neutral, happy  Appearance: casually dressed and overweight  Family member/caregiver input: No caregiver present today.       Objective:     Vitals:    04/06/17 0826   BP: 137/82   Pulse: 100   Resp: 18   Temp: 98.2 °F (36.8 °C)   TempSrc: Oral   SpO2: 97%   Weight: 294 lb (133.4 kg)   Height: 6' (1.829 m)     General appearance - alert, well appearing, and in no distress  Eyes - pupils equal and reactive, extraocular eye movements intact  Ears - bilateral TM's and external ear canals normal  Nose - normal and patent, no erythema, discharge or polyps and normal nontender sinuses  Mouth - mucous membranes moist, pharynx normal without lesions  Neck - supple, no significant adenopathy, carotids upstroke normal bilaterally, no bruits, thyroid exam: thyroid is normal in size without nodules or tenderness  Chest - clear to auscultation, no wheezes, rales or rhonchi, symmetric air entry  Heart - normal rate, regular rhythm, normal S1, S2, no murmurs  Abdomen - soft, nontender, distended from ascites, prominent diastasis recti present when pt moves from laying flat to sitting up - soft, reducible with no overlying skin changes  bowel sounds normal  Extremities - peripheral pulses normal, no ankle edema, no clubbing or cyanosis  Skin - normal coloration and turgor, no rashes, no suspicious skin lesions noted    Patient Care Team:  Terrell Blizzard, NP as PCP - General (Nurse Practitioner)  Ashlyn Fowler as Ambulatory Care Navigator  Terrell Blizzard, NP as Nurse Practitioner  Amada Benitez MD (Hematology and Oncology)    Assessment/ Plan:     Alberto Desai was seen today for hernia (non specific). Diagnoses and all orders for this visit:    Routine general medical examination at a health care facility  Annual medicare wellness exam complete. Screening for alcoholism  Pt has history of alcoholism. History of multiple unsuccessful attempts at quitting. Screening for depression  -     Depression Screen Annual  Stable on meds. Chest pain, exertional  -     REFERRAL TO CARDIOLOGY  Enc pt to est care with cardiology for further evaluation. Reviewed acute / worsening s/sx that warrant more immediate medical attention and pt verbalized understanding of this. Diastasis recti  Reassurance provided that no intervention should be needed for this problem. Continue to monitor. Alcoholic cirrhosis of liver with ascites ([facility] [address]  Continue to follow up with GI as advised. Essential hypertension  -     spironolactone (ALDACTONE) 25 mg tablet; Take 1 Tab by mouth two (2) times a day. -     LIPID PANEL  Refilled rx. BP looks good today. History of hypotension at previous visits. Mild episode of recurrent major depressive disorder (HCC)  Stable on meds. Hyponatremia  -     METABOLIC PANEL, COMPREHENSIVE  Updating sodium levels.    Iron deficiency anemia, unspecified iron deficiency anemia type  -     CBC WITH AUTOMATED DIFF  -     IRON PROFILE  Will notify results and deviate plan based on findings. Hypogonadism, male  -     TESTOSTERONE, FREE & TOTAL  -     PSA - PROSTATE SPECIFIC AG  -     testosterone cypionate (DEPOTESTOTERONE CYPIONATE) 200 mg/mL injection; 2 mL by IntraMUSCular route once for 1 dose. Max Daily Amount: 400 mg.  -     CO INJ TESTOSTERONE CYPIONATE () - Qty - 200  -     THER/PROPH/DIAG INJECTION, SUBCUT/IM  Given following labs. Will notify results and deviate plan based on findings. Follow-up Disposition:  Return if symptoms worsen or fail to improve.     JASON Britt

## 2017-04-06 NOTE — PROGRESS NOTES
Chief Complaint   Patient presents with    Hernia (Non Specific)     Patient in office today for possible eval of hernia and referral is needed; denies pain at time of visit. 1. Have you been to the ER, urgent care clinic since your last visit? Hospitalized since your last visit? Yes [facility]    2. Have you seen or consulted any other health care providers outside of the [facility] since your last visit? Include any pap smears or colon screening.  No

## 2017-04-08 LAB
ALBUMIN SERPL-MCNC: 3.5 G/DL (ref 3.5–5.5)
ALBUMIN/GLOB SERPL: 0.9 {RATIO} (ref 1.2–2.2)
ALP SERPL-CCNC: 114 IU/L (ref 39–117)
ALT SERPL-CCNC: 15 IU/L (ref 0–44)
AST SERPL-CCNC: 37 IU/L (ref 0–40)
BASOPHILS # BLD AUTO: 0.1 X10E3/UL (ref 0–0.2)
BASOPHILS NFR BLD AUTO: 2 %
BILIRUB SERPL-MCNC: 1.1 MG/DL (ref 0–1.2)
BUN SERPL-MCNC: 6 MG/DL (ref 6–24)
BUN/CREAT SERPL: 9 (ref 9–20)
CALCIUM SERPL-MCNC: 8.6 MG/DL (ref 8.7–10.2)
CHLORIDE SERPL-SCNC: 100 MMOL/L (ref 96–106)
CHOLEST SERPL-MCNC: 132 MG/DL (ref 100–199)
CO2 SERPL-SCNC: 22 MMOL/L (ref 18–29)
COMMENT: ABNORMAL
CREAT SERPL-MCNC: 0.64 MG/DL (ref 0.76–1.27)
EOSINOPHIL # BLD AUTO: 0.1 X10E3/UL (ref 0–0.4)
EOSINOPHIL NFR BLD AUTO: 2 %
ERYTHROCYTE [DISTWIDTH] IN BLOOD BY AUTOMATED COUNT: 19.6 % (ref 12.3–15.4)
GLOBULIN SER CALC-MCNC: 4 G/DL (ref 1.5–4.5)
GLUCOSE SERPL-MCNC: 155 MG/DL (ref 65–99)
HCT VFR BLD AUTO: 26.4 % (ref 37.5–51)
HDLC SERPL-MCNC: 41 MG/DL
HGB BLD-MCNC: 7.5 G/DL (ref 12.6–17.7)
IMM GRANULOCYTES # BLD: 0 X10E3/UL (ref 0–0.1)
IMM GRANULOCYTES NFR BLD: 0 %
INTERPRETATION, 910389: NORMAL
IRON SATN MFR SERPL: 4 % (ref 15–55)
IRON SERPL-MCNC: 16 UG/DL (ref 38–169)
LDLC SERPL CALC-MCNC: 77 MG/DL (ref 0–99)
LYMPHOCYTES # BLD AUTO: 0.5 X10E3/UL (ref 0.7–3.1)
LYMPHOCYTES NFR BLD AUTO: 19 %
MCH RBC QN AUTO: 19.2 PG (ref 26.6–33)
MCHC RBC AUTO-ENTMCNC: 28.4 G/DL (ref 31.5–35.7)
MCV RBC AUTO: 68 FL (ref 79–97)
MONOCYTES # BLD AUTO: 0.6 X10E3/UL (ref 0.1–0.9)
MONOCYTES NFR BLD AUTO: 20 %
MORPHOLOGY BLD-IMP: ABNORMAL
NEUTROPHILS # BLD AUTO: 1.6 X10E3/UL (ref 1.4–7)
NEUTROPHILS NFR BLD AUTO: 57 %
PLATELET # BLD AUTO: 69 X10E3/UL (ref 150–379)
POTASSIUM SERPL-SCNC: 4 MMOL/L (ref 3.5–5.2)
PROT SERPL-MCNC: 7.5 G/DL (ref 6–8.5)
PSA SERPL-MCNC: <0.1 NG/ML (ref 0–4)
RBC # BLD AUTO: 3.9 X10E6/UL (ref 4.14–5.8)
SODIUM SERPL-SCNC: 137 MMOL/L (ref 134–144)
TESTOST FREE SERPL-MCNC: 6.6 PG/ML (ref 7.2–24)
TESTOST SERPL-MCNC: 213 NG/DL (ref 348–1197)
TIBC SERPL-MCNC: 381 UG/DL (ref 250–450)
TRIGL SERPL-MCNC: 72 MG/DL (ref 0–149)
UIBC SERPL-MCNC: 365 UG/DL (ref 111–343)
VLDLC SERPL CALC-MCNC: 14 MG/DL (ref 5–40)
WBC # BLD AUTO: 2.8 X10E3/UL (ref 3.4–10.8)

## 2017-04-10 NOTE — PROGRESS NOTES
Spoke with pt stated he understood; has no f/u appt with ; will call office to schedule f/u appt; pt does take iron supplement.

## 2017-04-10 NOTE — PROGRESS NOTES
Please notify pt the followin. His hemoglobin is quite low at 7.5. I would like pt to follow up this week to repeat to make sure it is no actively dropping. When is his next follow up appt with Dr. Narendra Otero. Platelets remain low. 2. Iron levels are low. Has he previously been on iron supplementation for this? 3. Cholesterol looks good. All other labs are stable for patient. Enc pt to follow up this week to repeat CBC.

## 2017-04-18 ENCOUNTER — OFFICE VISIT (OUTPATIENT)
Dept: CARDIOLOGY CLINIC | Age: 57
End: 2017-04-18

## 2017-04-18 VITALS
BODY MASS INDEX: 40.5 KG/M2 | HEIGHT: 72 IN | OXYGEN SATURATION: 94 % | SYSTOLIC BLOOD PRESSURE: 124 MMHG | DIASTOLIC BLOOD PRESSURE: 61 MMHG | WEIGHT: 299 LBS | RESPIRATION RATE: 19 BRPM | HEART RATE: 84 BPM

## 2017-04-18 DIAGNOSIS — R07.89 CHEST DISCOMFORT: ICD-10-CM

## 2017-04-18 DIAGNOSIS — I10 ESSENTIAL HYPERTENSION: Primary | Chronic | ICD-10-CM

## 2017-04-18 DIAGNOSIS — R06.02 SOB (SHORTNESS OF BREATH): ICD-10-CM

## 2017-04-18 NOTE — PROGRESS NOTES
LAST OFFICE VISIT : Visit date not found      No diagnosis found. Mukesh Washington is a 49 y.o. male new patient. Cardiac risk factors: male gender, hypertension. I have personally obtained the history from the patient. HISTORY OF PRESENTING ILLNESS      He has seen a cardiologist in the past. He states this was 14 years ago. He has noted chest discomfort with exertional actiivity. He will be SOB and will have chest discomfort.h has no noctual chest pain. He notes sxs have been going on for 2 mo. He notes the sxs are getting worse. He has no PND or orthopnea. He has not been tested for SHARONDA and will not wear CPAP.    He has been anemic with Hgb of 7.5     ACTIVE PROBLEM LIST     Patient Active Problem List    Diagnosis Date Noted    Gallbladder calculus without cholecystitis 07/21/2016    Hyponatremia 07/05/2016    Iron deficiency anemia 03/03/2016    Esophageal varices without bleeding ([address] 03/01/2016    Hepatic cirrhosis ([address] 03/01/2016    Prediabetes 01/11/2016    Thrombocytopenia ([address] 10/17/2013    Cirrhosis ([address] 10/17/2013    Splenomegaly 10/17/2013    Alcohol abuse 10/17/2013    Hypogonadism, male 10/10/2013    Recurrent major depressive disorder ([address] 08/11/2013    Alcohol dependence with withdrawal ([address] 07/25/2013    Depression 07/25/2013    Anxiety 07/25/2013    GI bleed 06/28/2011    Chest pain, unspecified 06/28/2011    Gout 06/27/2011    HTN (hypertension) 06/01/2011    Anemia, unspecified 06/01/2011           PAST MEDICAL HISTORY     Past Medical History:   Diagnosis Date    Alcohol abuse 6/1/2011    Anemia NEC     Cirrhosis of liver ([address]     Gastric ulcer, unspecified as acute or chronic, without mention of hemorrhage, perforation, or obstruction     GERD (gastroesophageal reflux disease)     Hypertension     Liver disease     cirrosis    Pneumonia     PUD (peptic ulcer disease)            PAST SURGICAL HISTORY     Past Surgical History: Procedure Laterality Date    HX HEENT      wisdom teeth          ALLERGIES     Allergies   Allergen Reactions    Onion Nausea and Vomiting    Statins-Hmg-Coa Reductase Inhibitors Unknown (comments)          FAMILY HISTORY     Family History   Problem Relation Age of Onset    Asthma Mother     Other Father      history of fall multiple injuries    Hypertension Brother     Cancer Paternal Grandmother      uncertain if colon or stomach    Cancer Paternal Grandfather      stomach cancer- dx mid [de-identified]    negative for cardiac disease       SOCIAL HISTORY     Social History     Social History    Marital status:      Spouse name: N/A    Number of children: N/A    Years of education: N/A     Occupational History    Landscaping      Social History Main Topics    Smoking status: Former Smoker     Quit date: 6/28/1985    Smokeless tobacco: Current User     Types: Chew      Comment: 1 can per day     Alcohol use 0.0 oz/week      Comment: daily, has tried to stop a few times unsuccessfully    Drug use: No    Sexual activity: Yes     Partners: Female     Other Topics Concern    Not on file     Social History Narrative         MEDICATIONS     Current Outpatient Prescriptions   Medication Sig    spironolactone (ALDACTONE) 25 mg tablet Take 1 Tab by mouth two (2) times a day.  furosemide (LASIX) 20 mg tablet TAKE 2 TABLETS IN THE MORNING AND TAKE 1 TABLET IN THE EVENING FOR FLUID RETENTION    lactulose (CHRONULAC) 10 gram/15 mL solution     traZODone (DESYREL) 300 mg tablet Take 1 Tab by mouth nightly.  cyclobenzaprine (FLEXERIL) 10 mg tablet TAKE 1 TABLET BY MOUTH THREE TIMES DAILY as needed.  LORazepam (ATIVAN) 1 mg tablet TAKE 1 TABLET TWICE DAILY  ( MAXIMUM DAILY AMOUNT 2MG)    omeprazole (PRILOSEC) 40 mg capsule Take 1 Cap by mouth two (2) times a day.     potassium chloride SA (MICRO-K) 10 mEq capsule TAKE 1 CAPSULE EVERY DAY    allopurinol (ZYLOPRIM) 300 mg tablet Take 1 Tab by mouth two (2) times a day.  venlafaxine-SR (EFFEXOR-XR) 150 mg capsule Take 1 Cap by mouth daily.  lactulose (CHRONULAC) 10 gram/15 mL solution Take 10 g by mouth two (2) times a day. No current facility-administered medications for this visit. I have reviewed the nurses notes, vitals, problem list, allergy list, medical history, family, social history and medications. REVIEW OF SYMPTOMS      General: Pt denies excessive weight gain or loss. Pt is able to conduct ADL's  HEENT: Denies blurred vision, headaches, hearing loss, epistaxis and difficulty swallowing. Respiratory: Denies cough, congestion, shortness of breath, MAZA, wheezing or stridor. Cardiovascular: Denies precordial pain, palpitations, edema or PND  Gastrointestinal: Denies poor appetite, indigestion, abdominal pain or blood in stool  Genitourinary: Denies hematuria, dysuria, increased urinary frequency  Musculoskeletal: Denies joint pain or swelling from muscles or joints  Neurologic: Denies tremor, paresthesias, headache, or sensory motor disturbance  Psychiatric: Denies confusion, insomnia, depression  Integumentray: Denies rash, itching or ulcers. Hematologic: Denies easy bruising, bleeding     PHYSICAL EXAMINATION      Visit Vitals    /61 (BP 1 Location: Left arm, BP Patient Position: Sitting)    Pulse 84    Resp 19    Ht 6' (1.829 m)    Wt 299 lb (135.6 kg)    SpO2 94%    BMI 40.55 kg/m2       There were no vitals filed for this visit. General: Well developed, in no acute distress. HEENT: No jaundice, oral mucosa moist, no oral ulcers  Neck: Supple, no stiffness, no lymphadenopathy, supple  Heart:  Normal S1/S2 negative S3 or S4. Regular, no murmur, gallop or rub, no jugular venous distention  Respiratory: Clear bilaterally x 4, no wheezing or rales  Abdomen:   protuberant with changes of ascites  Extremities:  + edema, normal cap refill, no cyanosis.   Musculoskeletal: No clubbing, no deformities  Neuro: A&Ox3, speech clear, gait stable, cooperative, no focal neurologic deficits  Skin: Skin color is normal. No rashes or lesions. Non diaphoretic, moist.          EKG:   (4/18/17): NSR with poor R wave progression. DIAGNOSTIC DATA     1. Lipids  4/6/17- , HDL 41, LDL 77, TG 72       LABORATORY DATA            Lab Results   Component Value Date/Time    WBC 2.8 04/06/2017 08:54 AM    Hemoglobin (POC) 14.3 03/13/2014 08:16 PM    HGB 7.5 04/06/2017 08:54 AM    Hematocrit (POC) 42 03/13/2014 08:16 PM    HCT 26.4 04/06/2017 08:54 AM    PLATELET 69 69/94/9810 08:54 AM    MCV 68 04/06/2017 08:54 AM      Lab Results   Component Value Date/Time    Sodium 137 04/06/2017 08:54 AM    Potassium 4.0 04/06/2017 08:54 AM    Chloride 100 04/06/2017 08:54 AM    CO2 22 04/06/2017 08:54 AM    Anion gap 8 10/18/2016 11:51 AM    Glucose 155 04/06/2017 08:54 AM    BUN 6 04/06/2017 08:54 AM    Creatinine 0.64 04/06/2017 08:54 AM    BUN/Creatinine ratio 9 04/06/2017 08:54 AM    GFR est  04/06/2017 08:54 AM    GFR est non- 04/06/2017 08:54 AM    Calcium 8.6 04/06/2017 08:54 AM    Bilirubin, total 1.1 04/06/2017 08:54 AM    AST (SGOT) 37 04/06/2017 08:54 AM    Alk. phosphatase 114 04/06/2017 08:54 AM    Protein, total 7.5 04/06/2017 08:54 AM    Albumin 3.5 04/06/2017 08:54 AM    Globulin 4.2 10/18/2016 11:51 AM    A-G Ratio 0.9 04/06/2017 08:54 AM    ALT (SGPT) 15 04/06/2017 08:54 AM           ASSESSMENT/RECOMMENDATIONS:.      1. Chest discomfort  -etiology is most likely from anemia  -will do echo  -I will not do stress testing until Hgb up to at least 9  -I will talk with Pat Avina about having him see a hematologist  2. Hx of ETOH   -he is still driniking beer  -he may have developed a CM so will order echo  -he states he does ont have SHARONDA  3. HTN  BP is good    No orders of the defined types were placed in this encounter.        Follow-up Disposition: Not on File      I have discussed the diagnosis with  Annetta Miranda. and the intended plan as seen in the above orders. Questions were answered concerning future plans. I have discussed medication side effects and warnings with the patient as well. Thank you,  Nancy Tamayo NP for involving me in the care of  Neo Valera. . Please do not hesitate to contact me for further questions/concerns. This note was written by osmin Salinas, as dictated by Lauren Hardy MD.      Vinita Mae. MD Vaishnav[place], [facility] [address]      [address]      [phone] / [phone] Fax

## 2017-04-18 NOTE — PROGRESS NOTES
Visit Vitals    /61 (BP 1 Location: Left arm, BP Patient Position: Sitting)    Pulse 84    Resp 19    Ht 6' (1.829 m)    Wt 299 lb (135.6 kg)    SpO2 94%    BMI 40.55 kg/m2               SOB   SHARP PAIN IN CHEST THAT LAST ABOUT A MINUTE OR TWO.

## 2017-05-01 ENCOUNTER — TELEPHONE (OUTPATIENT)
Dept: FAMILY MEDICINE CLINIC | Age: 57
End: 2017-05-01

## 2017-05-01 DIAGNOSIS — D64.9 ANEMIA, UNSPECIFIED: Primary | ICD-10-CM

## 2017-05-01 NOTE — TELEPHONE ENCOUNTER
----- Message from [employer] sent at 5/1/2017  1:28 PM EDT -----  Regarding: Samuel/Referral  Pts wife Magdalena Villela is requesting a referral.Pt has an appointment to see Dr Rocío Sol on 5/17/17 at 1:00pm for low hemoglobin. Ms Leni Melissa number is [phone] and Dr Ray phone number is [phone].

## 2017-05-01 NOTE — TELEPHONE ENCOUNTER
Amrik Monzon could you put a doctor to doctor referral in [facility]. I did a referral sheet and gave it to Hortensia.

## 2017-05-05 ENCOUNTER — CLINICAL SUPPORT (OUTPATIENT)
Dept: CARDIOLOGY CLINIC | Age: 57
End: 2017-05-05

## 2017-05-05 DIAGNOSIS — R06.02 SOB (SHORTNESS OF BREATH): ICD-10-CM

## 2017-05-05 DIAGNOSIS — I10 ESSENTIAL HYPERTENSION: Chronic | ICD-10-CM

## 2017-05-05 DIAGNOSIS — R07.9 CHEST PAIN, UNSPECIFIED TYPE: Primary | ICD-10-CM

## 2017-05-17 ENCOUNTER — HOSPITAL ENCOUNTER (OUTPATIENT)
Dept: INFUSION THERAPY | Age: 57
Discharge: HOME | End: 2017-05-17
Payer: MEDICARE

## 2017-05-17 ENCOUNTER — TELEPHONE (OUTPATIENT)
Dept: ONCOLOGY | Age: 57
End: 2017-05-17

## 2017-05-17 ENCOUNTER — OFFICE VISIT (OUTPATIENT)
Dept: ONCOLOGY | Age: 57
End: 2017-05-17

## 2017-05-17 VITALS
SYSTOLIC BLOOD PRESSURE: 119 MMHG | RESPIRATION RATE: 20 BRPM | HEIGHT: 72 IN | WEIGHT: 290 LBS | TEMPERATURE: 98.7 F | HEART RATE: 78 BPM | DIASTOLIC BLOOD PRESSURE: 58 MMHG | OXYGEN SATURATION: 94 % | BODY MASS INDEX: 39.28 KG/M2

## 2017-05-17 VITALS
TEMPERATURE: 98.4 F | DIASTOLIC BLOOD PRESSURE: 71 MMHG | RESPIRATION RATE: 16 BRPM | HEART RATE: 89 BPM | SYSTOLIC BLOOD PRESSURE: 126 MMHG

## 2017-05-17 DIAGNOSIS — K70.31 ALCOHOLIC CIRRHOSIS OF LIVER WITH ASCITES (HCC): ICD-10-CM

## 2017-05-17 DIAGNOSIS — D69.6 THROMBOCYTOPENIA (HCC): ICD-10-CM

## 2017-05-17 DIAGNOSIS — D64.9 ANEMIA, UNSPECIFIED TYPE: Primary | ICD-10-CM

## 2017-05-17 LAB
BASOPHILS # BLD AUTO: 0.1 K/UL (ref 0–0.1)
BASOPHILS # BLD: 3 % (ref 0–1)
DIFFERENTIAL METHOD BLD: ABNORMAL
EOSINOPHIL # BLD: 0.1 K/UL (ref 0–0.4)
EOSINOPHIL NFR BLD: 2 % (ref 0–7)
ERYTHROCYTE [DISTWIDTH] IN BLOOD BY AUTOMATED COUNT: 20.9 % (ref 11.5–14.5)
FERRITIN SERPL-MCNC: 10 NG/ML (ref 26–388)
FOLATE SERPL-MCNC: 11.7 NG/ML (ref 5–21)
HAPTOGLOB SERPL-MCNC: 83 MG/DL (ref 30–200)
HCT VFR BLD AUTO: 26.4 % (ref 36.6–50.3)
HGB BLD-MCNC: 7.3 G/DL (ref 12.1–17)
IRON SATN MFR SERPL: 3 % (ref 20–50)
IRON SERPL-MCNC: 9 UG/DL (ref 35–150)
LDH SERPL L TO P-CCNC: 172 U/L (ref 85–241)
LYMPHOCYTES # BLD AUTO: 22 % (ref 12–49)
LYMPHOCYTES # BLD: 0.7 K/UL (ref 0.8–3.5)
MCH RBC QN AUTO: 17.8 PG (ref 26–34)
MCHC RBC AUTO-ENTMCNC: 27.7 G/DL (ref 30–36.5)
MCV RBC AUTO: 64.5 FL (ref 80–99)
MONOCYTES # BLD: 0.6 K/UL (ref 0–1)
MONOCYTES NFR BLD AUTO: 19 % (ref 5–13)
NEUTS SEG # BLD: 1.7 K/UL (ref 1.8–8)
NEUTS SEG NFR BLD AUTO: 54 % (ref 32–75)
NRBC # BLD: 0.02 K/UL (ref 0–0.01)
NRBC BLD-RTO: 0.8 PER 100 WBC
PLATELET # BLD AUTO: 74 K/UL (ref 150–400)
RBC # BLD AUTO: 4.09 M/UL (ref 4.1–5.7)
RBC MORPH BLD: ABNORMAL
RETICS/RBC NFR AUTO: 1.3 % (ref 0.7–2.1)
TIBC SERPL-MCNC: 360 UG/DL (ref 250–450)
VIT B12 SERPL-MCNC: 619 PG/ML (ref 211–911)
WBC # BLD AUTO: 3.2 K/UL (ref 4.1–11.1)
WBC NRBC COR # BLD: ABNORMAL 10*3/UL

## 2017-05-17 PROCEDURE — 82746 ASSAY OF FOLIC ACID SERUM: CPT | Performed by: INTERNAL MEDICINE

## 2017-05-17 PROCEDURE — 36415 COLL VENOUS BLD VENIPUNCTURE: CPT | Performed by: INTERNAL MEDICINE

## 2017-05-17 PROCEDURE — 83540 ASSAY OF IRON: CPT | Performed by: INTERNAL MEDICINE

## 2017-05-17 PROCEDURE — 85025 COMPLETE CBC W/AUTO DIFF WBC: CPT | Performed by: INTERNAL MEDICINE

## 2017-05-17 PROCEDURE — 85045 AUTOMATED RETICULOCYTE COUNT: CPT | Performed by: INTERNAL MEDICINE

## 2017-05-17 PROCEDURE — 82728 ASSAY OF FERRITIN: CPT | Performed by: INTERNAL MEDICINE

## 2017-05-17 PROCEDURE — 83615 LACTATE (LD) (LDH) ENZYME: CPT | Performed by: INTERNAL MEDICINE

## 2017-05-17 PROCEDURE — 83010 ASSAY OF HAPTOGLOBIN QUANT: CPT | Performed by: INTERNAL MEDICINE

## 2017-05-17 PROCEDURE — 82607 VITAMIN B-12: CPT | Performed by: INTERNAL MEDICINE

## 2017-05-17 NOTE — PROGRESS NOTES
[id number] [facility] at [address] [place] [address]  [phone]    Hematology / Oncology Consult    Reason for Visit:   Neo Pino is a 49 y.o. male who is seen in consultation at the request of [facility] evaluation of anemia. History of Present Illness:   Neo Pino is a pleasant 49 y.o. male who presents today for evaluation of anemia. I saw him previously, about 4 years ago, for evaluation of thrombocytopenia which we attributed to ETOH and cirrhosis. More recently he has been having a progressive microcytic anemia. He reports that he continues to drink, about 7 beer per day. No longer drinking vodka. He follows with Dr. Ba Sarmiento. Paracentesis done last week. He denies bleeding. No melena. He does note that last year he was admitted to [facility] for variceal bleeding. He reports a fairly recent EGD in the past 6 months with varices treated again at that time. He reports severe fatigue, sleeping much of the day. He reports pica for ice. He is having some chest pain, following cardiology for this. He reports not tolerating iron pills due to GI toxicity.       Past Medical History:   Diagnosis Date    Alcohol abuse 6/1/2011    Anemia NEC     Cirrhosis of liver (HCC)     Gastric ulcer, unspecified as acute or chronic, without mention of hemorrhage, perforation, or obstruction     GERD (gastroesophageal reflux disease)     Hypertension     Liver disease     cirrosis    Pneumonia     PUD (peptic ulcer disease)       Past Surgical History:   Procedure Laterality Date    HX HEENT      wisdom teeth      Social History   Substance Use Topics    Smoking status: Former Smoker     Quit date: 6/28/1985    Smokeless tobacco: Current User     Types: Chew      Comment: 1 can per day     Alcohol use 0.0 oz/week      Comment: daily, has tried to stop a few times unsuccessfully      Family History   Problem Relation Age of Onset   Danielaganna Duverney Asthma Mother    Ev Duverney Other Father history of fall multiple injuries    Hypertension Brother     Cancer Paternal Grandmother      uncertain if colon or stomach    Cancer Paternal Grandfather      stomach cancer- dx mid [de-identified]     Current Outpatient Prescriptions   Medication Sig    spironolactone (ALDACTONE) 25 mg tablet Take 1 Tab by mouth two (2) times a day.  furosemide (LASIX) 20 mg tablet TAKE 2 TABLETS IN THE MORNING AND TAKE 1 TABLET IN THE EVENING FOR FLUID RETENTION    traZODone (DESYREL) 300 mg tablet Take 1 Tab by mouth nightly.  cyclobenzaprine (FLEXERIL) 10 mg tablet TAKE 1 TABLET BY MOUTH THREE TIMES DAILY as needed.  LORazepam (ATIVAN) 1 mg tablet TAKE 1 TABLET TWICE DAILY  ( MAXIMUM DAILY AMOUNT 2MG)    omeprazole (PRILOSEC) 40 mg capsule Take 1 Cap by mouth two (2) times a day.  potassium chloride SA (MICRO-K) 10 mEq capsule TAKE 1 CAPSULE EVERY DAY    allopurinol (ZYLOPRIM) 300 mg tablet Take 1 Tab by mouth two (2) times a day.  venlafaxine-SR (EFFEXOR-XR) 150 mg capsule Take 1 Cap by mouth daily.  lactulose (CHRONULAC) 10 gram/15 mL solution Take 10 g by mouth two (2) times a day. No current facility-administered medications for this visit. Allergies   Allergen Reactions    Onion Nausea and Vomiting    Statins-Hmg-Coa Reductase Inhibitors Unknown (comments)        Review of Systems: A complete review of systems was obtained, negative except as described above.     Physical Exam:     Visit Vitals    /58 (BP 1 Location: Right arm, BP Patient Position: Sitting)    Pulse 78    Temp 98.7 °F (37.1 °C) (Temporal)    Resp 20    Ht 6' (1.829 m)    Wt 290 lb (131.5 kg)    SpO2 94%    BMI 39.33 kg/m2     General: No distress  Eyes: PERRLA, anicteric sclerae  HENT: Atraumatic, OP clear  Neck: Supple  Lymphatic: No cervical, supraclavicular, or inguinal adenopathy  Respiratory: CTAB, normal respiratory effort  CV: Normal rate, regular rhythm, no murmurs, no peripheral edema  GI: Soft, nontender, distended with ascites, no masses, no hepatomegaly, no splenomegaly  Skin: No rashes, ecchymoses, or petechiae. Normal temperature, turgor, and texture. Psych: Alert, oriented, appropriate affect, normal judgment/insight    Results:     Lab Results   Component Value Date/Time    WBC 2.8 04/06/2017 08:54 AM    HGB 7.5 04/06/2017 08:54 AM    HCT 26.4 04/06/2017 08:54 AM    PLATELET 69 31/94/2453 08:54 AM    MCV 68 04/06/2017 08:54 AM    ABS. NEUTROPHILS 1.6 04/06/2017 08:54 AM    Hemoglobin (POC) 14.3 03/13/2014 08:16 PM    Hematocrit (POC) 42 03/13/2014 08:16 PM     Lab Results   Component Value Date/Time    Sodium 137 04/06/2017 08:54 AM    Potassium 4.0 04/06/2017 08:54 AM    Chloride 100 04/06/2017 08:54 AM    CO2 22 04/06/2017 08:54 AM    Glucose 155 04/06/2017 08:54 AM    BUN 6 04/06/2017 08:54 AM    Creatinine 0.64 04/06/2017 08:54 AM    GFR est  04/06/2017 08:54 AM    GFR est non- 04/06/2017 08:54 AM    Calcium 8.6 04/06/2017 08:54 AM    Sodium (POC) 141 03/13/2014 08:16 PM    Potassium (POC) 3.1 03/13/2014 08:16 PM    Chloride (POC) 102 03/13/2014 08:16 PM    Glucose (POC) 118 07/07/2016 07:11 AM    BUN (POC) <3 03/13/2014 08:16 PM    Creatinine (POC) 1.0 03/13/2014 08:16 PM    Calcium, ionized (POC) 1.06 03/13/2014 08:16 PM     Lab Results   Component Value Date/Time    Bilirubin, total 1.1 04/06/2017 08:54 AM    ALT (SGPT) 15 04/06/2017 08:54 AM    AST (SGOT) 37 04/06/2017 08:54 AM    Alk.  phosphatase 114 04/06/2017 08:54 AM    Protein, total 7.5 04/06/2017 08:54 AM    Albumin 3.5 04/06/2017 08:54 AM    Globulin 4.2 10/18/2016 11:51 AM     Lab Results   Component Value Date/Time    Iron % saturation 4 04/06/2017 08:54 AM    Iron 16 04/06/2017 08:54 AM    TIBC 381 04/06/2017 08:54 AM    Ferritin 34 07/05/2016 10:35 PM    Vitamin B12 637 10/16/2013 03:15 PM    Folate 7.1 10/16/2013 03:15 PM    TSH 1.520 03/01/2016 10:07 AM    OTILIO, Direct None Detected 06/02/2011 04:08 AM    Lipase 206 10/18/2016 11:51 AM    HIV 1/2 Interpretation NONREACTIVE 10/16/2013 03:15 PM     Lab Results   Component Value Date/Time    INR 1.3 10/18/2016 11:51 AM    aPTT 35 06/28/2016 12:09 PM    D-dimer 0.67 10/16/2013 03:50 PM    Fibrinogen 180 10/16/2013 03:15 PM         Assessment:   1) Anemia, microcytic  Likely iron deficiency, perhaps from occult GI bleeding given his history of cirrhosis and variceal bleeding. I will check some labs to further evaluate. He cannot tolerate iron pills, so will likely need IV iron if we confirm iron deficiency. He will need follow up with GI for consideration of repeat EGD as well. 2) Thrombocytopenia  Chronic, secondary to cirrhosis and etoh. Monitor. 3) Alcoholic cirrhosis  I counseled him on the importance of quitting etoh, which is ultimately the cause of most of his medical problems. He is not interested in quitting. Plan:     · Labs today  · Pending labs, will bring him back for IV iron  · Labs in 2 months: CBC, iron profile, ferritin    I appreciate the opportunity to participate in Mr. Micah Aranda Jr.'s care.     Signed By: Nahum Byers MD     May 17, 2017

## 2017-05-17 NOTE — MR AVS SNAPSHOT
Visit Information Date & Time Provider Department Dept. Phone Encounter #  
 5/17/2017  1:00 PM Chrys Simmonds, MD [facility] at [address] [place] [address] [id number] Follow-up Instructions Return in about 2 months (around 7/17/2017) for charles Ray fu. Your Appointments 6/16/2017  3:20 PM  
ESTABLISHED PATIENT with Marquis Femi MD  
[facility] ([facility] SCHEDULING) Appt Note: 6 week follow up  
 [address] [address]  
[phone]  
  
   
 [address] [place], [place] [id number]  
  
    
 7/18/2017  9:30 AM  
ESTABLISHED PATIENT with Chrys Simmonds, MD [facility] at [facility] Appt Note: 2 mo fu, Cory [address] [facility] [address] [facility] [address]  
[phone]  
  
   
 [address] [facility], [address] [facility] Upcoming Health Maintenance Date Due Pneumococcal 19-64 Highest Risk (2 of 3 - PCV13) 6/1/2012 INFLUENZA AGE 9 TO ADULT 8/1/2017 MEDICARE YEARLY EXAM 4/7/2018 COLONOSCOPY 6/3/2021 DTaP/Tdap/Td series (2 - Td) 9/5/2022 Allergies as of 5/17/2017  Review Complete On: 5/17/2017 By: Chrys Simmonds, MD  
  
 Severity Noted Reaction Type Reactions Onion Medium 06/02/2011   Side Effect Nausea and Vomiting Statins-hmg-coa Reductase Inhibitors  07/05/2016    Unknown (comments) Current Immunizations  Reviewed on 6/28/2016 Name Date Hep A Vaccine (Adult) 6/28/2016, 12/4/2015 Hep B Vaccine (Adult) 6/28/2016, 9/8/2015, 7/8/2015 Influenza Vaccine (Quad) PF 10/24/2016 12:01 PM, 11/5/2015 Pneumococcal Vaccine (Unspecified Type) 6/1/2011  8:25 PM  
 TDAP Vaccine 9/5/2012  3:11 PM  
  
 Not reviewed this visit You Were Diagnosed With   
  
 Codes Comments Anemia, unspecified type    -  Primary ICD-10-CM: D64.9 ICD-9-CM: 279. 9 Thrombocytopenia ([facility]     ICD-10-CM: D69.6 ICD-9-CM: 287.5 Alcoholic cirrhosis of liver with ascites ([facility]     ICD-10-CM: K70.31 ICD-9-CM: 571.2 Vitals BP Pulse Temp Resp Height(growth percentile) 119/58 (BP 1 Location: Right arm, BP Patient Position: Sitting) 78 98.7 °F (37.1 °C) (Temporal) 20 6' (1.829 m) Weight(growth percentile) SpO2 BMI Smoking Status 290 lb (131.5 kg) 94% 39.33 kg/m2 Former Smoker BMI and BSA Data Body Mass Index Body Surface Area  
 39.33 kg/m 2 2.58 m 2 Preferred Pharmacy Pharmacy Name Phone [facility] [address] [phone] Your Updated Medication List  
  
   
This list is accurate as of: 5/17/17  1:36 PM.  Always use your most recent med list.  
  
  
  
  
 allopurinol 300 mg tablet Commonly known as:  Joellyn Atul Take 1 Tab by mouth two (2) times a day. cyclobenzaprine 10 mg tablet Commonly known as:  FLEXERIL  
TAKE 1 TABLET BY MOUTH THREE TIMES DAILY as needed. furosemide 20 mg tablet Commonly known as:  LASIX TAKE 2 TABLETS IN THE MORNING AND TAKE 1 TABLET IN THE EVENING FOR FLUID RETENTION  
  
 lactulose 10 gram/15 mL solution Commonly known as:  Merlinda Guevara Take 10 g by mouth two (2) times a day. LORazepam 1 mg tablet Commonly known as:  ATIVAN  
TAKE 1 TABLET TWICE DAILY  ( MAXIMUM DAILY AMOUNT 2MG) omeprazole 40 mg capsule Commonly known as:  PRILOSEC Take 1 Cap by mouth two (2) times a day. potassium chloride SA 10 mEq capsule Commonly known as:  MICRO-K  
TAKE 1 CAPSULE EVERY DAY  
  
 spironolactone 25 mg tablet Commonly known as:  ALDACTONE Take 1 Tab by mouth two (2) times a day. traZODone 300 mg tablet Commonly known as:  Gerald North Take 1 Tab by mouth nightly. venlafaxine- mg capsule Commonly known as:  EFFEXOR-XR Take 1 Cap by mouth daily. We Performed the Following CBC W/O DIFF [08279 CPT(R)] FERRITIN [66083 CPT(R)] IRON PROFILE [id number] CPT(R)] Follow-up Instructions Return in about 2 months (around 7/17/2017) for charles Ray. Introducing [facility]! Dear Hailey Felix: Thank you for requesting a [employer] account. Our records indicate that you already have an active [employer] account. You can access your account anytime at [email or web address] Did you know that you can access your hospital and ER discharge instructions at any time in [employer]? You can also review all of your test results from your hospital stay or ER visit. Additional Information If you have questions, please visit the Frequently Asked Questions section of the [employer] website at [email or web address] Remember, [employer] is NOT to be used for urgent needs. For medical emergencies, dial 911. Now available from your iPhone and Android! Please provide this summary of care documentation to your next provider. Your primary care clinician is listed as Starla Menjivar. If you have any questions after today's visit, please call [phone].

## 2017-05-17 NOTE — PROGRESS NOTES
Blood pressure 126/71, pulse 89, temperature 98.4 °F (36.9 °C), resp. rate 16. Pt arrived at 1350,labs drawn peripherally from left [facility]. Pt tolerated well and left at 1358.

## 2017-05-18 NOTE — TELEPHONE ENCOUNTER
05/18/17- Informed patient  that labs confirm iron deficiency, as  suspected. Needs to be set up in \[facility]\"" for injectafer x2 doses. Phone call transferred to  to scheduled. No further questions or concerns.

## 2017-05-18 NOTE — TELEPHONE ENCOUNTER
[address] Arely Aden  Medical Oncology at [address] [place] [address]    Notify patient that labs confirm iron deficiency, as suspected. Get him set up in [address] [place] [address] for injectafer x2 doses.

## 2017-05-23 ENCOUNTER — TELEPHONE (OUTPATIENT)
Dept: ONCOLOGY | Age: 57
End: 2017-05-23

## 2017-05-26 RX ORDER — SODIUM CHLORIDE 9 MG/ML
25 INJECTION, SOLUTION INTRAVENOUS CONTINUOUS
Status: DISPENSED | OUTPATIENT
Start: 2017-06-01 | End: 2017-06-01

## 2017-06-01 ENCOUNTER — HOSPITAL ENCOUNTER (OUTPATIENT)
Dept: INFUSION THERAPY | Age: 57
Discharge: HOME | End: 2017-06-01
Payer: MEDICARE

## 2017-06-01 VITALS
HEART RATE: 84 BPM | RESPIRATION RATE: 16 BRPM | OXYGEN SATURATION: 94 % | TEMPERATURE: 97.6 F | DIASTOLIC BLOOD PRESSURE: 77 MMHG | SYSTOLIC BLOOD PRESSURE: 138 MMHG

## 2017-06-01 PROCEDURE — 96374 THER/PROPH/DIAG INJ IV PUSH: CPT

## 2017-06-01 PROCEDURE — 96365 THER/PROPH/DIAG IV INF INIT: CPT

## 2017-06-01 PROCEDURE — 74011250636 HC RX REV CODE- 250/636: Performed by: INTERNAL MEDICINE

## 2017-06-01 RX ORDER — SODIUM CHLORIDE 9 MG/ML
25 INJECTION, SOLUTION INTRAVENOUS AS NEEDED
Status: DISPENSED | OUTPATIENT
Start: 2017-06-01 | End: 2017-06-02

## 2017-06-01 RX ORDER — SODIUM CHLORIDE 0.9 % (FLUSH) 0.9 %
5-10 SYRINGE (ML) INJECTION AS NEEDED
Status: ACTIVE | OUTPATIENT
Start: 2017-06-01 | End: 2017-06-02

## 2017-06-01 RX ADMIN — SODIUM CHLORIDE 25 ML/HR: 900 INJECTION, SOLUTION INTRAVENOUS at 12:17

## 2017-06-01 RX ADMIN — FERRIC CARBOXYMALTOSE INJECTION 750 MG: 50 INJECTION, SOLUTION INTRAVENOUS at 12:17

## 2017-06-01 NOTE — PROGRESS NOTES
Outpatient Infusion Center Short Visit Progress Note    1100 Pt admit to [facility] for injectofer ambulatory in stable condition. Assessment completed. No new concerns voiced. Visit Vitals    /89    Pulse 88    Temp 97.4 °F (36.3 °C)    Resp 18    SpO2 94%       PIV with positive blood return. Medications:  Injectofer ivp     RN reviewed iron d/c instructions with pat, pt verbalized understanding. Pt for 30 minute post treatment observation. 1315 Pt tolerated treatment well. D/c home ambulatory in no distress.  Pt aware of next appointment scheduled for 6/9/17 @ 1 pm.

## 2017-06-01 NOTE — PROGRESS NOTES
Problem: Knowledge Deficit  Goal: *Verbalizes understanding of procedures and medications  Outcome: Progressing Towards Goal  Iron D/c instructions

## 2017-06-01 NOTE — PROGRESS NOTES
OUTPATIENT INFUSION CENTER    DISCHARGE INSTRUCTIONS FOR:    IRON INFUSIONS - INCLUDING VENOFER, FERRLECIT, AND INFED    You should continue to take your usual home medications unless otherwise instructed by your physician. Drink plenty of fluids and eat your usual diet. All medications have the potential to cause side effects. Your physician can instruct you regarding any necessary treatment for side effects. Some possible side effects of iron infusions may include the following:     - Urinary changes;   - Mild muscle cramping;   - Mild nausea, stomach pain, diarrhea or constipation;   - Mild skin itching, mild pain at IV site. Signs/Symptoms of an allergic reaction may require immediate medical attention. These may include one or more of the following:       Skin redness, itching, swelling, blistering, weeping, crusting, rash or hives. Wheezing, chest tightness, cough, or shortness of breath;   Swelling of the face, eyelids, lips, tongue, or throat;  Severe headache, seizures or tremor;  Stuffy nose, runny nose, sneezing;   Red (bloodshot), itchy, swollen, or watery eyes;  Stomach  pain, nausea, vomiting, diarrhea or bloody diarrhea; Chest pain or tightness, increased heart rate, palpitations, changes in blood pressure which can cause dizziness, unusual feelings of weakness or fatigue;  Back ache or pain around waist;  Painful urination, increase or decrease in amount of urine, blood in urine. Contact your physicians office with any questions or concerns regarding your treatment.     Jayesh Self., Signature: _____________________________________ 6/1/2017  Zoe Elizabeth RN

## 2017-06-06 DIAGNOSIS — M10.9 GOUT: ICD-10-CM

## 2017-06-06 DIAGNOSIS — F32.A DEPRESSION, UNSPECIFIED DEPRESSION TYPE: ICD-10-CM

## 2017-06-06 DIAGNOSIS — F41.9 ANXIETY: ICD-10-CM

## 2017-06-06 RX ORDER — VENLAFAXINE HYDROCHLORIDE 150 MG/1
CAPSULE, EXTENDED RELEASE ORAL
Qty: 90 CAP | Refills: 3 | Status: SHIPPED | OUTPATIENT
Start: 2017-06-06 | End: 2018-04-06 | Stop reason: SDUPTHER

## 2017-06-06 RX ORDER — ALLOPURINOL 300 MG/1
TABLET ORAL
Qty: 180 TAB | Refills: 3 | Status: SHIPPED | OUTPATIENT
Start: 2017-06-06 | End: 2018-04-06 | Stop reason: SDUPTHER

## 2017-06-06 RX ORDER — SODIUM CHLORIDE 9 MG/ML
25 INJECTION, SOLUTION INTRAVENOUS CONTINUOUS
Status: DISPENSED | OUTPATIENT
Start: 2017-06-09 | End: 2017-06-10

## 2017-06-06 RX ORDER — CYCLOBENZAPRINE HCL 10 MG
TABLET ORAL
Qty: 90 TAB | Refills: 1 | Status: SHIPPED | OUTPATIENT
Start: 2017-06-06 | End: 2018-04-27 | Stop reason: SDUPTHER

## 2017-06-08 ENCOUNTER — APPOINTMENT (OUTPATIENT)
Dept: INFUSION THERAPY | Age: 57
End: 2017-06-08
Payer: MEDICARE

## 2017-06-08 ENCOUNTER — TELEPHONE (OUTPATIENT)
Dept: FAMILY MEDICINE CLINIC | Age: 57
End: 2017-06-08

## 2017-06-08 RX ORDER — LORAZEPAM 1 MG/1
TABLET ORAL
Qty: 180 TAB | Refills: 0 | Status: SHIPPED | OUTPATIENT
Start: 2017-06-08 | End: 2017-08-15 | Stop reason: SDUPTHER

## 2017-06-08 NOTE — TELEPHONE ENCOUNTER
Pt's wife Vimal Das called requesting refill ativan be sent into [facility] mail order  Pt has apox a week worth left  YK#[phone]

## 2017-06-09 ENCOUNTER — HOSPITAL ENCOUNTER (OUTPATIENT)
Dept: INFUSION THERAPY | Age: 57
Discharge: HOME | End: 2017-06-09
Payer: MEDICARE

## 2017-06-09 VITALS
WEIGHT: 283 LBS | RESPIRATION RATE: 16 BRPM | BODY MASS INDEX: 38.38 KG/M2 | TEMPERATURE: 97.1 F | DIASTOLIC BLOOD PRESSURE: 85 MMHG | SYSTOLIC BLOOD PRESSURE: 163 MMHG | HEART RATE: 90 BPM

## 2017-06-09 PROCEDURE — 74011250636 HC RX REV CODE- 250/636: Performed by: INTERNAL MEDICINE

## 2017-06-09 PROCEDURE — 96365 THER/PROPH/DIAG IV INF INIT: CPT

## 2017-06-09 RX ADMIN — SODIUM CHLORIDE 25 ML/HR: 900 INJECTION, SOLUTION INTRAVENOUS at 13:00

## 2017-06-09 RX ADMIN — FERRIC CARBOXYMALTOSE INJECTION 750 MG: 50 INJECTION, SOLUTION INTRAVENOUS at 13:19

## 2017-06-09 NOTE — PROGRESS NOTES
[facility] VISIT NOTE    2292  Pt arrived at [facility] ambulatory and in no distress for Injectafer. Assessment completed, pt has no concern. with no difficulty. Positive blood return noted and labs drawn. Medications received: Injectafer    Tolerated treatment well, no adverse reaction noted. Port de-accessed and flushed per protocol. Positive blood return noted. Patient Vitals for the past 12 hrs:   Temp Pulse Resp BP   06/09/17 1410 97.1 °F (36.2 °C) 90 16 163/85   06/09/17 1340 97.1 °F (36.2 °C) 86 16 143/80   06/09/17 1320 97.8 °F (36.6 °C) 90 16 129/81   06/09/17 1248 98.1 °F (36.7 °C) 67 16 148/86   1410  D/C'd from [facility] ambulatory and in no distress accompanied by wife.

## 2017-07-10 ENCOUNTER — TELEPHONE (OUTPATIENT)
Dept: ONCOLOGY | Age: 57
End: 2017-07-10

## 2017-07-10 NOTE — TELEPHONE ENCOUNTER
[employer]  Medical Oncology at [facility]    07/10/17- Phone call placed to pt to remind pt to have labs drawn prior to his follow up appointment with . Pt verbalized understanding.

## 2017-07-11 ENCOUNTER — OFFICE VISIT (OUTPATIENT)
Dept: FAMILY MEDICINE CLINIC | Age: 57
End: 2017-07-11

## 2017-07-11 VITALS
BODY MASS INDEX: 35.62 KG/M2 | HEIGHT: 72 IN | WEIGHT: 263 LBS | DIASTOLIC BLOOD PRESSURE: 72 MMHG | HEART RATE: 80 BPM | SYSTOLIC BLOOD PRESSURE: 134 MMHG | RESPIRATION RATE: 18 BRPM | TEMPERATURE: 98.1 F | OXYGEN SATURATION: 98 %

## 2017-07-11 DIAGNOSIS — E29.1 MALE HYPOGONADISM: ICD-10-CM

## 2017-07-11 DIAGNOSIS — K70.31 ALCOHOLIC CIRRHOSIS OF LIVER WITH ASCITES (HCC): Primary | ICD-10-CM

## 2017-07-11 DIAGNOSIS — I85.10 ESOPHAGEAL VARICES IN ALCOHOLIC CIRRHOSIS (HCC): ICD-10-CM

## 2017-07-11 DIAGNOSIS — K70.30 ESOPHAGEAL VARICES IN ALCOHOLIC CIRRHOSIS (HCC): ICD-10-CM

## 2017-07-11 DIAGNOSIS — D64.9 ANEMIA, UNSPECIFIED: ICD-10-CM

## 2017-07-11 DIAGNOSIS — R60.0 BILATERAL LOWER EXTREMITY EDEMA: ICD-10-CM

## 2017-07-11 DIAGNOSIS — D69.6 THROMBOCYTOPENIA (HCC): ICD-10-CM

## 2017-07-11 RX ORDER — TESTOSTERONE CYPIONATE 200 MG/ML
400 INJECTION INTRAMUSCULAR ONCE
Qty: 2 ML | Refills: 0
Start: 2017-07-11 | End: 2017-07-11

## 2017-07-11 RX ORDER — FUROSEMIDE 20 MG/1
TABLET ORAL
Qty: 360 TAB | Refills: 3
Start: 2017-07-11 | End: 2017-08-15 | Stop reason: SDUPTHER

## 2017-07-11 NOTE — PROGRESS NOTES
Chief Complaint   Patient presents with    Injection     T injection    Labs     Patient in office today for T injection and labs;pt needs PT/INR,PTT, Activated,AFP,Tumor Marker,and Comp metabolic panel drawn for Dr. Litzy Chaidez, need cbc w/o diff., iron profile, and ferritin drawn for . 1. Have you been to the ER, urgent care clinic since your last visit? Hospitalized since your last visit? No    2. Have you seen or consulted any other health care providers outside of the [address] [place] [address] since your last visit? Include any pap smears or colon screening.  No

## 2017-07-11 NOTE — PROGRESS NOTES
Chief Complaint   Patient presents with    Injection     T injection    Labs     1. Have you been to the ER, urgent care clinic since your last visit? Hospitalized since your last visit? No    2. Have you seen or consulted any other health care providers outside of the [address] [place] [address] since your last visit? Include any pap smears or colon screening. No    Patient in office today for T injection and labs; Pt needs PT/INR, PTT, Activated, AFP, Tumor Marker, and Comp metabolic panel drawn for Dr. Romell Heimlich. Pt needs cbc w/o diff., iron profile, and ferritin drawn for . There are concerns that pt could have small varices that could be slowly bleeding. Has received 2 iron infusion. Pt has been without etoh for the last 2 weeks. Pt weaned himself off slowly. Pt is scheduled to have an endoscopy and colonoscopy later this week. Denies any cp, sob, and dyspnea. Denies ha or dizziness. Denies n/v/c/d. Denies any urinary sx. BP looks good. Pt has not had a paracentesis in some time. Pt has been taking 2 lasix in the morning 1 in the afternoon and 1 at night. This has helped with fluid retention. Electrolytes have been stable even with increasing dose of medication. Denies any other concerns at this time. Chief Complaint   Patient presents with    Injection     T injection    Labs     he is a 56y.o. year old male who presents for evalution. Reviewed PmHx, RxHx, FmHx, SocHx, AllgHx and updated and dated in the chart.     Review of Systems - negative except as listed above in the HPI    Objective:     Vitals:    07/11/17 0917   BP: 134/72   Pulse: 80   Resp: 18   Temp: 98.1 °F (36.7 °C)   TempSrc: Oral   SpO2: 98%   Weight: 263 lb (119.3 kg)   Height: 6' (1.829 m)     Physical Examination: General appearance - alert, well appearing, and in no distress  Mental status - alert, oriented to person, place, and time, normal mood, behavior, speech, dress, motor activity, and thought processes; jittery, restless leg but redirectable  Eyes - pupils equal and reactive, extraocular eye movements intact  Ears - bilateral TM's and external ear canals normal  Nose - normal and patent, no erythema, discharge or polyps and normal nontender sinuses  Mouth - mucous membranes moist, pharynx normal without lesions  Neck - supple, no significant adenopathy, carotids upstroke normal bilaterally, no bruits, thyroid exam: thyroid is normal in size without nodules or tenderness  Chest - clear to auscultation, no wheezes, rales or rhonchi, symmetric air entry  Heart - normal rate, regular rhythm, normal S1, S2, no murmurs  Abdomen - distended from ascites but nontender  Extremities - peripheral pulses normal, no ankle edema, no clubbing or cyanosis  Skin - normal coloration and turgor, no rashes, no suspicious skin lesions noted    Assessment/ Plan:   Jennifer Tobias was seen today for injection and labs. Diagnoses and all orders for this visit:    Alcoholic cirrhosis of liver with ascites (HCC)  -     PROTHROMBIN TIME + INR  -     PTT  -     AFP, TUMOR MARKER  -     METABOLIC PANEL, COMPREHENSIVE  -     furosemide (LASIX) 20 mg tablet; Take 2 tabs in the morning, 1 tab in the afternoon, and one tab in the evening for fluid retention. Will forward results to Dr. Nazanin Pandey for review. Commended pt on sobriety! Esophageal varices in alcoholic cirrhosis (HCC)  Continue with plan to have EGD and colonoscopy later this week. Anemia, unspecified / Thrombocytopenia (HCC)  -     CBC W/O DIFF  -     IRON PROFILE  -     FERRITIN  Will forward results to Dr. Mark Oquendo for review. Male hypogonadism  -     testosterone cypionate (DEPOTESTOTERONE CYPIONATE) 200 mg/mL injection; 2 mL by IntraMUSCular route once for 1 dose. Max Daily Amount: 400 mg.  -     OR INJ TESTOSTERONE CYPIONATE () - Qty - 200  -     THER/PROPH/DIAG INJECTION, SUBCUT/IM  Given.  Last T and PSA in April were normal.   Bilateral lower extremity edema  -     furosemide (LASIX) 20 mg tablet; Take 2 tabs in the morning, 1 tab in the afternoon, and one tab in the evening for fluid retention. Continue taking as directed. Updating lytes today. Follow-up Disposition:  Return in about 3 months (around 10/11/2017). I have discussed the diagnosis with the patient and the intended plan as seen in the above orders. The patient has received an after-visit summary and questions were answered concerning future plans. Medication Side Effects and Warnings were discussed with patient: yes  Patient Labs were reviewed and or requested: yes  Patient Past Records were reviewed and or requested  yes  Patient / Caregiver Understanding of treatment plan was verbalized during office visit YES    JASON Goode    There are no Patient Instructions on file for this visit.

## 2017-07-11 NOTE — MR AVS SNAPSHOT
Visit Information Date & Time Provider Department Dept. Phone Encounter #  
 7/11/2017  9:15 AM Collette Libel, NP [facility] [phone] [id number] Follow-up Instructions Return in about 3 months (around 10/11/2017). Your Appointments 7/18/2017  9:30 AM  
[facility] [address] [place] [address] Appt Note: 2 mo kavon Natancarolyne [address] [facility], [address] [place] [address] 99 [id number]  
[phone]  
  
   
 [address] [place] [address], [address] [place] [address] [address] Upcoming Health Maintenance Date Due INFLUENZA AGE 9 TO ADULT 8/1/2017 MEDICARE YEARLY EXAM 4/7/2018 COLONOSCOPY 6/3/2021 DTaP/Tdap/Td series (2 - Td) 9/5/2022 Allergies as of 7/11/2017  Review Complete On: 7/11/2017 By: Collette Libel, NP Severity Noted Reaction Type Reactions Onion Medium 06/02/2011   Side Effect Nausea and Vomiting Statins-hmg-coa Reductase Inhibitors  07/05/2016    Unknown (comments) Current Immunizations  Reviewed on 6/1/2017 Name Date Hep A Vaccine (Adult) 6/28/2016, 12/4/2015 Hep B Vaccine (Adult) 6/28/2016, 9/8/2015, 7/8/2015 Influenza Vaccine (Quad) PF 10/24/2016 12:01 PM, 11/5/2015 Pneumococcal Vaccine (Unspecified Type) 6/1/2011  8:25 PM  
 TDAP Vaccine 9/5/2012  3:11 PM  
  
 Not reviewed this visit You Were Diagnosed With   
  
 Codes Comments Alcoholic cirrhosis of liver with ascites ([facility]    -  Primary ICD-10-CM: K70.31 ICD-9-CM: 571.2 Esophageal varices in alcoholic cirrhosis (HCC)     ICD-10-CM: K70.30, I85.10 ICD-9-CM: 571.2, 456.21 Anemia, unspecified     ICD-10-CM: D64.9 ICD-9-CM: 099. 9 Thrombocytopenia ([facility]     ICD-10-CM: D69.6 ICD-9-CM: 287.5 Male hypogonadism     ICD-10-CM: E29.1 ICD-9-CM: 257.2 Bilateral lower extremity edema     ICD-10-CM: R60.0 ICD-9-CM: 821. 3 Vitals  BP Pulse Temp Resp Height(growth percentile) Weight(growth percentile) 134/72 (BP 1 Location: Right arm, BP Patient Position: Sitting) 80 98.1 °F (36.7 °C) (Oral) 18 6' (1.829 m) 263 lb (119.3 kg) SpO2 BMI Smoking Status 98% 35.67 kg/m2 Former Smoker Vitals History BMI and BSA Data Body Mass Index Body Surface Area  
 35.67 kg/m 2 2.46 m 2 Preferred Pharmacy Pharmacy Name Phone [facility] [address] [place] [address] 1631 [facility] [address] [place] [address] [phone] Your Updated Medication List  
  
   
This list is accurate as of: 7/11/17  9:49 AM.  Always use your most recent med list.  
  
  
  
  
 allopurinol 300 mg tablet Commonly known as:  ZYLOPRIM  
TAKE 1 TABLET TWICE DAILY  
  
 cyclobenzaprine 10 mg tablet Commonly known as:  FLEXERIL  
TAKE 1 TABLET THREE TIMES DAILY AS NEEDED  
  
 furosemide 20 mg tablet Commonly known as:  LASIX Take 2 tabs in the morning, 1 tab in the afternoon, and one tab in the evening for fluid retention. lactulose 10 gram/15 mL solution Commonly known as:  Loletha Dapper Take 10 g by mouth two (2) times a day. LORazepam 1 mg tablet Commonly known as:  ATIVAN  
TAKE 1 TABLET TWICE DAILY  ( MAXIMUM DAILY AMOUNT 2MG) omeprazole 40 mg capsule Commonly known as:  PRILOSEC Take 1 Cap by mouth two (2) times a day. potassium chloride SA 10 mEq capsule Commonly known as:  MICRO-K  
TAKE 1 CAPSULE EVERY DAY  
  
 spironolactone 25 mg tablet Commonly known as:  ALDACTONE Take 1 Tab by mouth two (2) times a day. testosterone cypionate 200 mg/mL injection Commonly known as:  DEPOTESTOTERONE CYPIONATE 2 mL by IntraMUSCular route once for 1 dose. Max Daily Amount: 400 mg.  
  
 traZODone 300 mg tablet Commonly known as:  [place] Take 1 Tab by mouth nightly. venlafaxine- mg capsule Commonly known as:  EFFEXOR-XR  
TAKE 1 CAPSULE EVERY DAY We Performed the Following   
 AFP, TUMOR MARKER [51178 CPT(R)] CBC W/O DIFF [08769 CPT(R)] FERRITIN [65379 CPT(R)] IRON PROFILE [id number] CPT(R)] METABOLIC PANEL, COMPREHENSIVE [02966 CPT(R)] NM INJ TESTOSTERONE CYPIONATE [ [facility]] NM THER/PROPH/DIAG INJECTION, SUBCUT/IM [id number] CPT(R)] PROTHROMBIN TIME + INR [15846 CPT(R)] PTT [id number] CPT(R)] Follow-up Instructions Return in about 3 months (around 10/11/2017). Introducing [facility] SERVICES! Dear Wilfrid Loges: Thank you for requesting a Biophytis account. Our records indicate that you already have an active Biophytis account. You can access your account anytime at [email or web address] Did you know that you can access your hospital and ER discharge instructions at any time in Biophytis? You can also review all of your test results from your hospital stay or ER visit. Additional Information If you have questions, please visit the Frequently Asked Questions section of the Biophytis website at [email or web address] Remember, Biophytis is NOT to be used for urgent needs. For medical emergencies, dial 911. Now available from your iPhone and Android! Please provide this summary of care documentation to your next provider. Your primary care clinician is listed as Faby Combs. If you have any questions after today's visit, please call [phone].

## 2017-07-12 LAB
AFP-TM SERPL-MCNC: 5.3 NG/ML (ref 0–8.3)
ALBUMIN SERPL-MCNC: 3.5 G/DL (ref 3.5–5.5)
ALBUMIN/GLOB SERPL: 0.9 {RATIO} (ref 1.2–2.2)
ALP SERPL-CCNC: 106 IU/L (ref 39–117)
ALT SERPL-CCNC: 15 IU/L (ref 0–44)
APTT PPP: 33 SEC (ref 24–33)
AST SERPL-CCNC: 40 IU/L (ref 0–40)
BILIRUB SERPL-MCNC: 1.3 MG/DL (ref 0–1.2)
BUN SERPL-MCNC: 3 MG/DL (ref 6–24)
BUN/CREAT SERPL: 4 (ref 9–20)
CALCIUM SERPL-MCNC: 8.8 MG/DL (ref 8.7–10.2)
CHLORIDE SERPL-SCNC: 101 MMOL/L (ref 96–106)
CO2 SERPL-SCNC: 25 MMOL/L (ref 18–29)
CREAT SERPL-MCNC: 0.68 MG/DL (ref 0.76–1.27)
ERYTHROCYTE [DISTWIDTH] IN BLOOD BY AUTOMATED COUNT: 27.2 % (ref 12.3–15.4)
FERRITIN SERPL-MCNC: 96 NG/ML (ref 30–400)
GLOBULIN SER CALC-MCNC: 3.9 G/DL (ref 1.5–4.5)
GLUCOSE SERPL-MCNC: 111 MG/DL (ref 65–99)
HCT VFR BLD AUTO: 39.8 % (ref 37.5–51)
HGB BLD-MCNC: 12.2 G/DL (ref 12.6–17.7)
INR PPP: 1.3 (ref 0.8–1.2)
IRON SATN MFR SERPL: 18 % (ref 15–55)
IRON SERPL-MCNC: 51 UG/DL (ref 38–169)
MCH RBC QN AUTO: 23.4 PG (ref 26.6–33)
MCHC RBC AUTO-ENTMCNC: 30.7 G/DL (ref 31.5–35.7)
MCV RBC AUTO: 76 FL (ref 79–97)
MORPHOLOGY BLD-IMP: ABNORMAL
PLATELET # BLD AUTO: 95 X10E3/UL (ref 150–379)
POTASSIUM SERPL-SCNC: 4.3 MMOL/L (ref 3.5–5.2)
PROT SERPL-MCNC: 7.4 G/DL (ref 6–8.5)
PROTHROMBIN TIME: 13.7 SEC (ref 9.1–12)
RBC # BLD AUTO: 5.22 X10E6/UL (ref 4.14–5.8)
SODIUM SERPL-SCNC: 137 MMOL/L (ref 134–144)
TIBC SERPL-MCNC: 279 UG/DL (ref 250–450)
UIBC SERPL-MCNC: 228 UG/DL (ref 111–343)
WBC # BLD AUTO: 4.4 X10E3/UL (ref 3.4–10.8)

## 2017-07-13 NOTE — PROGRESS NOTES
Good morning Dr. Racheal Gutierrez,     Attached are the results of some blood work you ordered for mutual patient Dee Garcia. There is additional blood work included that was ordered by his GI doctor Dr. Hawa Joya. He is scheduled to have endoscopy and colonoscopy sometime in the next week. Please let me know if you have any questions or concerns regarding these results.      Garrison Hamm

## 2017-07-13 NOTE — PROGRESS NOTES
Please notify pt that lab results have been forwarded to Dr. Aamir Howard and will be faxed to Dr. Marguerite Ratliff for review. I will defer to them for interpretation. Hgb and iron levels are stable. Enc to continue with efforts to abstain from drinking etoh.

## 2017-07-18 ENCOUNTER — OFFICE VISIT (OUTPATIENT)
Dept: ONCOLOGY | Age: 57
End: 2017-07-18

## 2017-07-18 VITALS
DIASTOLIC BLOOD PRESSURE: 80 MMHG | OXYGEN SATURATION: 98 % | RESPIRATION RATE: 20 BRPM | SYSTOLIC BLOOD PRESSURE: 143 MMHG | BODY MASS INDEX: 35.65 KG/M2 | HEIGHT: 72 IN | HEART RATE: 78 BPM | WEIGHT: 263.2 LBS

## 2017-07-18 DIAGNOSIS — D50.9 IRON DEFICIENCY ANEMIA, UNSPECIFIED IRON DEFICIENCY ANEMIA TYPE: Primary | ICD-10-CM

## 2017-07-18 DIAGNOSIS — R16.1 SPLENOMEGALY: ICD-10-CM

## 2017-07-18 DIAGNOSIS — D69.6 THROMBOCYTOPENIA (HCC): ICD-10-CM

## 2017-07-18 NOTE — MR AVS SNAPSHOT
Visit Information Date & Time Provider Department Dept. Phone Encounter #  
 7/18/2017  9:30 AM Jaguar Salazar MD [facility] at [address] [place] [address] Rd [id number] Follow-up Instructions Return in about 6 months (around 1/18/2018) for meredith Ray deficiency fu. Upcoming Health Maintenance Date Due INFLUENZA AGE 9 TO ADULT 8/1/2017 MEDICARE YEARLY EXAM 4/7/2018 COLONOSCOPY 6/3/2021 DTaP/Tdap/Td series (2 - Td) 9/5/2022 Allergies as of 7/18/2017  Review Complete On: 7/18/2017 By: Nikhil Patella, LPN Severity Noted Reaction Type Reactions Onion Medium 06/02/2011   Side Effect Nausea and Vomiting Statins-hmg-coa Reductase Inhibitors  07/05/2016    Unknown (comments) Current Immunizations  Reviewed on 6/1/2017 Name Date Hep A Vaccine (Adult) 6/28/2016, 12/4/2015 Hep B Vaccine (Adult) 6/28/2016, 9/8/2015, 7/8/2015 Influenza Vaccine (Quad) PF 10/24/2016 12:01 PM, 11/5/2015 Pneumococcal Vaccine (Unspecified Type) 6/1/2011  8:25 PM  
 TDAP Vaccine 9/5/2012  3:11 PM  
  
 Not reviewed this visit You Were Diagnosed With   
  
 Codes Comments Iron deficiency anemia, unspecified iron deficiency anemia type    -  Primary ICD-10-CM: D50.9 ICD-9-CM: 280.9 Splenomegaly     ICD-10-CM: R16.1 ICD-9-CM: 393. 2 Thrombocytopenia ([facility]     ICD-10-CM: D69.6 ICD-9-CM: 287.5 Vitals BP Pulse Resp Height(growth percentile) Weight(growth percentile) SpO2  
 143/80 (BP 1 Location: Right arm, BP Patient Position: Sitting) 78 20 6' (1.829 m) 263 lb 3.2 oz (119.4 kg) 98% BMI Smoking Status 35.7 kg/m2 Former Smoker Vitals History BMI and BSA Data Body Mass Index Body Surface Area 35.7 kg/m 2 2.46 m 2 Preferred Pharmacy Pharmacy Name Phone [facility] [address] [facility] [address] [phone] Your Updated Medication List  
  
   
This list is accurate as of: 7/18/17 10:20 AM.  Always use your most recent med list.  
  
  
  
  
 allopurinol 300 mg tablet Commonly known as:  ZYLOPRIM  
TAKE 1 TABLET TWICE DAILY  
  
 cyclobenzaprine 10 mg tablet Commonly known as:  FLEXERIL  
TAKE 1 TABLET THREE TIMES DAILY AS NEEDED  
  
 furosemide 20 mg tablet Commonly known as:  LASIX Take 2 tabs in the morning, 1 tab in the afternoon, and one tab in the evening for fluid retention. lactulose 10 gram/15 mL solution Commonly known as:  [place] Take 10 g by mouth two (2) times a day. LORazepam 1 mg tablet Commonly known as:  ATIVAN  
TAKE 1 TABLET TWICE DAILY  ( MAXIMUM DAILY AMOUNT 2MG) omeprazole 40 mg capsule Commonly known as:  PRILOSEC Take 1 Cap by mouth two (2) times a day. potassium chloride SA 10 mEq capsule Commonly known as:  MICRO-K  
TAKE 1 CAPSULE EVERY DAY  
  
 spironolactone 25 mg tablet Commonly known as:  ALDACTONE Take 1 Tab by mouth two (2) times a day. traZODone 300 mg tablet Commonly known as:  Rayma Medal Take 1 Tab by mouth nightly. venlafaxine- mg capsule Commonly known as:  EFFEXOR-XR  
TAKE 1 CAPSULE EVERY DAY We Performed the Following CBC W/O DIFF [13991 CPT(R)] FERRITIN [23516 CPT(R)] IRON PROFILE [id number] CPT(R)] Follow-up Instructions Return in about 6 months (around 1/18/2018) for Cory iron deficiency fu. Patient Instructions Iron-Rich Diet: Care Instructions Your Care Instructions Your body needs iron to make hemoglobin. Hemoglobin is a substance in red blood cells that carries oxygen from the lungs to cells all through your body. If you do not get enough iron, your body makes fewer and smaller red blood cells. As a result, your body's cells may not get enough oxygen. Adult men need 8 milligrams of iron a day; adult women need 18 milligrams of iron a day.  After menopause, women need 8 milligrams of iron a day. A pregnant woman needs 27 milligrams of iron a day. Infants and young children have higher iron needs relative to their size than other age groups. People who have lost blood because of ulcers or heavy menstrual periods may become very low in iron and may develop anemia. Most people can get the iron their bodies need by eating enough of certain iron-rich foods. Your doctor may recommend that you take an iron supplement along with eating an iron-rich diet. Follow-up care is a key part of your treatment and safety. Be sure to make and go to all appointments, and call your doctor if you are having problems. Its also a good idea to know your test results and keep a list of the medicines you take. How can you care for yourself at home? · Make iron-rich foods a part of your daily diet. Iron-rich foods include: ¨ All meats, such as chicken, beef, lamb, pork, fish, and shellfish. Liver is especially high in iron. ¨ Leafy green vegetables. ¨ Raisins, peas, beans, lentils, barley, and eggs. ¨ Iron-fortified breakfast cereals. · Eat foods with vitamin C along with iron-rich foods. Vitamin C helps you absorb more iron from food. Drink a glass of orange juice or another citrus juice with your food. · Eat meat and vegetables or grains together. The iron in meat helps your body absorb the iron in other foods. Where can you learn more? Go to http://bhavana-rosio.info/. Enter [id number] in the search box to learn more about \"Iron-Rich Diet: Care Instructions. \" Current as of: July 26, 2016 Content Version: 11.3 © 4059-5333 [employer]. Care instructions adapted under license by [employer] (which disclaims liability or warranty for this information). If you have questions about a medical condition or this instruction, always ask your healthcare professional. [address] any warranty or liability for your use of this information.  
 
 
 
  
Introducing NetTalon! Dear Jennifer Yungty: Thank you for requesting a Leatt account. Our records indicate that you already have an active Leatt account. You can access your account anytime at [email or web address] Did you know that you can access your hospital and ER discharge instructions at any time in Leatt? You can also review all of your test results from your hospital stay or ER visit. Additional Information If you have questions, please visit the Frequently Asked Questions section of the Leatt website at [email or web address]/. Remember, Leatt is NOT to be used for urgent needs. For medical emergencies, dial 911. Now available from your iPhone and Android! Please provide this summary of care documentation to your next provider. Your primary care clinician is listed as Keiry Busch. If you have any questions after today's visit, please call [phone].

## 2017-07-18 NOTE — PROGRESS NOTES
[address] [facility] ([place], [place]) Oncology at [facility]  [phone]    Hematology / Oncology Followup    Reason for Visit:   Rena Conte is a 56 y.o. male who is seen for follow up of anemia. History of Present Illness:   He reports feeling much better after IV iron. He has also quit drinking ETOH which he feels has helped as well. Improved energy. No bleeding. He saw Dr. Lara Song last week and had a colonoscopy and EGD. PAST HISTORY: The following sections were reviewed and updated in the EMR as appropriate: PMH, SH, FH, Medications, Allergies. Allergies   Allergen Reactions    Onion Nausea and Vomiting    Statins-Hmg-Coa Reductase Inhibitors Unknown (comments)      Review of Systems: A complete review of systems was obtained, reviewed, and scanned into the EMR. Pertinent findings reviewed above. Physical Exam:     Visit Vitals    /80 (BP 1 Location: Right arm, BP Patient Position: Sitting)    Pulse 78    Resp 20    Ht 6' (1.829 m)    Wt 263 lb 3.2 oz (119.4 kg)    SpO2 98%    BMI 35.7 kg/m2     General: No distress  Eyes: PERRLA, anicteric sclerae  HENT: Atraumatic, OP clear  Neck: Supple  Lymphatic: No cervical, supraclavicular, or inguinal adenopathy  Respiratory: CTAB, normal respiratory effort  CV: Normal rate, regular rhythm, no murmurs, no peripheral edema  GI: Soft, nontender, distended with ascites, no masses, no hepatomegaly, no splenomegaly  Skin: No rashes, ecchymoses, or petechiae. Normal temperature, turgor, and texture. Psych: Alert, oriented, appropriate affect, normal judgment/insight    Results:     Lab Results   Component Value Date/Time    WBC 4.4 07/11/2017 09:45 AM    HGB 12.2 07/11/2017 09:45 AM    HCT 39.8 07/11/2017 09:45 AM    PLATELET 95 85/35/8232 09:45 AM    MCV 76 07/11/2017 09:45 AM    ABS.  NEUTROPHILS 1.7 05/17/2017 01:54 PM    Hemoglobin (POC) 14.3 03/13/2014 08:16 PM    Hematocrit (POC) 42 03/13/2014 08:16 PM     Lab Results Component Value Date/Time    Sodium 137 07/11/2017 09:45 AM    Potassium 4.3 07/11/2017 09:45 AM    Chloride 101 07/11/2017 09:45 AM    CO2 25 07/11/2017 09:45 AM    Glucose 111 07/11/2017 09:45 AM    BUN 3 07/11/2017 09:45 AM    Creatinine 0.68 07/11/2017 09:45 AM    GFR est  07/11/2017 09:45 AM    GFR est non- 07/11/2017 09:45 AM    Calcium 8.8 07/11/2017 09:45 AM    Sodium (POC) 141 03/13/2014 08:16 PM    Potassium (POC) 3.1 03/13/2014 08:16 PM    Chloride (POC) 102 03/13/2014 08:16 PM    Glucose (POC) 118 07/07/2016 07:11 AM    BUN (POC) <3 03/13/2014 08:16 PM    Creatinine (POC) 1.0 03/13/2014 08:16 PM    Calcium, ionized (POC) 1.06 03/13/2014 08:16 PM     Lab Results   Component Value Date/Time    Bilirubin, total 1.3 07/11/2017 09:45 AM    ALT (SGPT) 15 07/11/2017 09:45 AM    AST (SGOT) 40 07/11/2017 09:45 AM    Alk.  phosphatase 106 07/11/2017 09:45 AM    Protein, total 7.4 07/11/2017 09:45 AM    Albumin 3.5 07/11/2017 09:45 AM    Globulin 4.2 10/18/2016 11:51 AM     Lab Results   Component Value Date/Time    Reticulocyte count 1.3 05/17/2017 01:54 PM    Iron % saturation 18 07/11/2017 09:45 AM    TIBC 279 07/11/2017 09:45 AM    Ferritin 96 07/11/2017 09:45 AM    Vitamin B12 619 05/17/2017 01:54 PM    Folate 11.7 05/17/2017 01:54 PM    Haptoglobin 83 05/17/2017 01:54 PM     05/17/2017 01:54 PM    TSH 1.520 03/01/2016 10:07 AM    OTILIO, Direct None Detected 06/02/2011 04:08 AM    Lipase 206 10/18/2016 11:51 AM    HIV 1/2 Interpretation NONREACTIVE 10/16/2013 03:15 PM     Lab Results   Component Value Date/Time    INR 1.3 07/11/2017 09:45 AM    aPTT 33 07/11/2017 09:45 AM    D-dimer 0.67 10/16/2013 03:50 PM    Fibrinogen 180 10/16/2013 03:15 PM     HGB (g/dL)   Date Value   07/11/2017 12.2 (L)   05/17/2017 7.3 (L)   04/06/2017 7.5 (L)   01/05/2017 8.9 (L)   10/18/2016 8.2 (L)   07/21/2016 9.7 (L)   07/14/2016 9.1 (L)   07/07/2016 10.3 (L)   07/06/2016 9.1 (L)   07/05/2016 9.3 (L)   06/28/2016 10.8 (L)   03/01/2016 10.7 (L)   01/07/2016 10.7 (L)   09/08/2015 12.0 (L)   07/08/2015 12.7   08/14/2014 14.1   07/16/2014 12.9   07/02/2014 13.5   05/16/2014 11.4 (L)   04/16/2014 13.5   03/13/2014 13.3   01/03/2014 13.6   10/16/2013 14.0   10/10/2013 12.9   09/30/2013 13.6   08/09/2013 14.7   07/24/2013 15.1   02/18/2013 16.1   04/18/2012 15.6   06/30/2011 8.6 (L)   06/29/2011 8.7 (L)   06/29/2011 7.2 (L)   06/28/2011 6.3 (LL)   06/27/2011 6.2 (LL)   06/03/2011 9.3 (L)   06/02/2011 10.2 (L)   06/01/2011 10.9 (L)           Assessment:   1) Iron deficiency anemia  He is now s/p injectafer x2 doses with improvement in his HGB and iron levels. He could not tolerate oral iron previously. I provided him with information on a high iron diet. We will monitor his levels and consider additional IV iron in the future if needed. The cause of his iron deficiency is presumably intermittent variceal bleeding related to his cirrhosis. He had a recent EGD with Dr. Samra Miranda (7/2017). 2) Thrombocytopenia  Chronic, secondary to cirrhosis and etoh. Monitor. 3) Alcoholic cirrhosis  He has quite drinking etoh for the past several weeks. I congratulated him on this.     Plan:     · Follow up with GI as scheduled  · Labs in 6 months: CBC, iron profile, ferritin  · Return to see me in 6 months      Signed By: Kristin Borden MD     July 18, 2017

## 2017-07-18 NOTE — PATIENT INSTRUCTIONS
Iron-Rich Diet: Care Instructions  Your Care Instructions  Your body needs iron to make hemoglobin. Hemoglobin is a substance in red blood cells that carries oxygen from the lungs to cells all through your body. If you do not get enough iron, your body makes fewer and smaller red blood cells. As a result, your body's cells may not get enough oxygen. Adult men need 8 milligrams of iron a day; adult women need 18 milligrams of iron a day. After menopause, women need 8 milligrams of iron a day. A pregnant woman needs 27 milligrams of iron a day. Infants and young children have higher iron needs relative to their size than other age groups. People who have lost blood because of ulcers or heavy menstrual periods may become very low in iron and may develop anemia. Most people can get the iron their bodies need by eating enough of certain iron-rich foods. Your doctor may recommend that you take an iron supplement along with eating an iron-rich diet. Follow-up care is a key part of your treatment and safety. Be sure to make and go to all appointments, and call your doctor if you are having problems. Its also a good idea to know your test results and keep a list of the medicines you take. How can you care for yourself at home? · Make iron-rich foods a part of your daily diet. Iron-rich foods include:  ¨ All meats, such as chicken, beef, lamb, pork, fish, and shellfish. Liver is especially high in iron. ¨ Leafy green vegetables. ¨ Raisins, peas, beans, lentils, barley, and eggs. ¨ Iron-fortified breakfast cereals. · Eat foods with vitamin C along with iron-rich foods. Vitamin C helps you absorb more iron from food. Drink a glass of orange juice or another citrus juice with your food. · Eat meat and vegetables or grains together. The iron in meat helps your body absorb the iron in other foods. Where can you learn more? Go to http://bhavana-rosio.info/.   Enter [id number] in the search box to learn more about \"Iron-Rich Diet: Care Instructions. \"  Current as of: July 26, 2016  Content Version: 11.3  © 3892-9063 [employer]. Care instructions adapted under license by [employer] (which disclaims liability or warranty for this information). If you have questions about a medical condition or this instruction, always ask your healthcare professional. [address] any warranty or liability for your use of this information.

## 2017-08-15 DIAGNOSIS — R60.0 BILATERAL LOWER EXTREMITY EDEMA: ICD-10-CM

## 2017-08-15 DIAGNOSIS — K70.31 ALCOHOLIC CIRRHOSIS OF LIVER WITH ASCITES (HCC): ICD-10-CM

## 2017-08-15 RX ORDER — FUROSEMIDE 20 MG/1
TABLET ORAL
Qty: 270 TAB | Refills: 2 | Status: SHIPPED | OUTPATIENT
Start: 2017-08-15 | End: 2018-03-30 | Stop reason: DRUGHIGH

## 2017-08-15 RX ORDER — POTASSIUM CHLORIDE 750 MG/1
CAPSULE, EXTENDED RELEASE ORAL
Qty: 90 CAP | Refills: 3 | Status: SHIPPED | OUTPATIENT
Start: 2017-08-15 | End: 2018-01-01 | Stop reason: SDUPTHER

## 2017-08-15 RX ORDER — LORAZEPAM 1 MG/1
TABLET ORAL
Qty: 180 TAB | Refills: 0 | Status: SHIPPED | OUTPATIENT
Start: 2017-08-15 | End: 2017-10-03 | Stop reason: SDUPTHER

## 2017-08-15 NOTE — TELEPHONE ENCOUNTER
Can you please fax order for Ativan to [facility]?

## 2017-09-08 ENCOUNTER — OFFICE VISIT (OUTPATIENT)
Dept: FAMILY MEDICINE CLINIC | Age: 57
End: 2017-09-08

## 2017-09-08 VITALS
RESPIRATION RATE: 18 BRPM | DIASTOLIC BLOOD PRESSURE: 87 MMHG | HEIGHT: 72 IN | HEART RATE: 87 BPM | WEIGHT: 273 LBS | OXYGEN SATURATION: 98 % | SYSTOLIC BLOOD PRESSURE: 145 MMHG | TEMPERATURE: 98.5 F | BODY MASS INDEX: 36.98 KG/M2

## 2017-09-08 DIAGNOSIS — F33.9 RECURRENT MAJOR DEPRESSIVE DISORDER, REMISSION STATUS UNSPECIFIED (HCC): ICD-10-CM

## 2017-09-08 DIAGNOSIS — E29.1 HYPOGONADISM, MALE: ICD-10-CM

## 2017-09-08 DIAGNOSIS — R73.03 PREDIABETES: ICD-10-CM

## 2017-09-08 DIAGNOSIS — K70.31 ALCOHOLIC CIRRHOSIS OF LIVER WITH ASCITES (HCC): Primary | ICD-10-CM

## 2017-09-08 DIAGNOSIS — E53.8 VITAMIN B12 DEFICIENCY: ICD-10-CM

## 2017-09-08 DIAGNOSIS — I10 ESSENTIAL HYPERTENSION: Chronic | ICD-10-CM

## 2017-09-08 DIAGNOSIS — D69.6 THROMBOCYTOPENIA (HCC): ICD-10-CM

## 2017-09-08 DIAGNOSIS — D50.9 IRON DEFICIENCY ANEMIA, UNSPECIFIED IRON DEFICIENCY ANEMIA TYPE: ICD-10-CM

## 2017-09-08 DIAGNOSIS — Z23 ENCOUNTER FOR IMMUNIZATION: ICD-10-CM

## 2017-09-08 DIAGNOSIS — R73.9 BLOOD GLUCOSE ELEVATED: ICD-10-CM

## 2017-09-08 DIAGNOSIS — E55.9 VITAMIN D DEFICIENCY: ICD-10-CM

## 2017-09-08 RX ORDER — TESTOSTERONE CYPIONATE 200 MG/ML
400 INJECTION INTRAMUSCULAR ONCE
Qty: 2 ML | Refills: 0
Start: 2017-09-08 | End: 2017-09-08

## 2017-09-08 NOTE — PROGRESS NOTES
Chief Complaint   Patient presents with    Hypertension    Cirrhosis Of Liver    Injection     Testosterone     Labs     Patient in office today for f/u and labs; would like flu and T injection. 1. Have you been to the ER, urgent care clinic since your last visit? Hospitalized since your last visit? No    2. Have you seen or consulted any other health care providers outside of the [address] [place] [address] since your last visit? Include any pap smears or colon screening. No    Pt has been off of etoh for the last 4 months. Has good days and bad days. Drinking non alcoholic beer on occasion. Denies any cp, sob, and dyspnea. Pt has been having problems with increased weakness. Falling more. Pt states that he has felt off balance. Feels like equilibrium is off. Pt also c/o feeling tired all the time. Pt had 2 iron infusions this summer. Felt a lot better after receiving. Taking his fluids pills daily as prescribed. Has not had a paracentesis since stopping etoh. Has a follow up appt with Dr. Karen Grhaam in December. Health Maintenance Due   Topic Date Due    INFLUENZA AGE 9 TO ADULT  08/01/2017     Denies any other concerns at this time. Chief Complaint   Patient presents with    Hypertension    Cirrhosis Of Liver    Injection     Testosterone     Labs     he is a 56y.o. year old male who presents for evalution. Reviewed PmHx, RxHx, FmHx, SocHx, AllgHx and updated and dated in the chart.     Review of Systems - negative except as listed above in the HPI    Objective:     Vitals:    09/08/17 0908   BP: 145/87   Pulse: 87   Resp: 18   Temp: 98.5 °F (36.9 °C)   TempSrc: Oral   SpO2: 98%   Weight: 273 lb (123.8 kg)   Height: 6' (1.829 m)     Physical Examination: General appearance - alert, well appearing, and in no distress  Mental status - alert, oriented to person, place, and time, normal mood, behavior, speech, dress, motor activity, and thought processes  Eyes - pupils equal and reactive, extraocular eye movements intact  Ears - bilateral TM's and external ear canals normal  Nose - normal and patent, no erythema, discharge or polyps  Mouth - mucous membranes moist, pharynx normal without lesions  Neck - supple, no significant adenopathy, carotids upstroke normal bilaterally, no bruits, thyroid exam: thyroid is normal in size without nodules or tenderness  Chest - clear to auscultation, no wheezes, rales or rhonchi, symmetric air entry  Heart - normal rate, regular rhythm, normal S1, S2, no murmurs  Abdomen - soft, nontender, nondistended, no masses or organomegaly  bowel sounds normal  Extremities - peripheral pulses normal, no ankle edema, no clubbing or cyanosis  Skin - normal coloration and turgor, no rashes, no suspicious skin lesions noted    Assessment/ Plan:   Diagnoses and all orders for this visit:    1. Alcoholic cirrhosis of liver with ascites ([facility] [address]  Continue with efforts to abstain from etoh and continue to follow up with GI as advised. 2. Essential hypertension  -     LIPID PANEL  -     METABOLIC PANEL, COMPREHENSIVE  BP at goal on med regimen. 3. Recurrent major depressive disorder, remission status unspecified (HCC)  Stable on med regimen. 4. Hypogonadism, male  -     testosterone cypionate (DEPOTESTOTERONE CYPIONATE) 200 mg/mL injection; 2 mL by IntraMUSCular route once for 1 dose. Max Daily Amount: 400 mg.  -     [place] INJ TESTOSTERONE CYPIONATE () - Qty - 200  -     THER/PROPH/DIAG INJECTION, SUBCUT/IM  -     TSH 3RD GENERATION  -     TESTOSTERONE, FREE & TOTAL  -     PROSTATE SPECIFIC AG  Given. Continue to follow up monthly for injection. 5. Thrombocytopenia ([facility] [address]    6. Prediabetes / 7. Blood glucose elevated  -     LIPID PANEL  -     HEMOGLOBIN A1C WITH EAG  Will notify results and deviate plan based on findings. Continue med regimen as prescribed and continue with efforts to follow a low carb diet.    8. Iron deficiency anemia, unspecified iron deficiency anemia type  -     CBC WITH AUTOMATED DIFF  -     IRON PROFILE  -     FERRITIN  Updating iron and CBC. Not currently taking an iron supplement at this time. 9. Vitamin D deficiency  -     VITAMIN D, 25 HYDROXY  Will notify results and deviate plan based on findings. 10. Vitamin B12 deficiency  -     VITAMIN B12  Will notify results and deviate plan based on findings. 11. Encounter for immunization  -     Influenza virus vaccine (QUADRIVALENT PRES FREE SYRINGE) IM ([id number])  Given. Follow-up Disposition:  Return in about 1 month (around 10/8/2017). I have discussed the diagnosis with the patient and the intended plan as seen in the above orders. The patient has received an after-visit summary and questions were answered concerning future plans. Medication Side Effects and Warnings were discussed with patient: yes  Patient Labs were reviewed and or requested: yes  Patient Past Records were reviewed and or requested  yes  Patient / Caregiver Understanding of treatment plan was verbalized during office visit YES    JASON Devlin    There are no Patient Instructions on file for this visit.

## 2017-09-08 NOTE — MR AVS SNAPSHOT
Visit Information Date & Time Provider Department Dept. Phone Encounter #  
 9/8/2017  9:00 AM Prieto Cartagena NP [address] [facility] [phone] [id number] Follow-up Instructions Return in about 1 month (around 10/8/2017). Your Appointments 1/18/2018  1:30 PM  
ESTABLISHED PATIENT with Johnnie Marks MD  
[place] [facility] at [address] [facility]) Appt Note: [place], iron deficiency fu.  
 [address] [facility], [address] [facility] [address]  
[phone]  
  
   
 [facility], [address] Upcoming Health Maintenance Date Due INFLUENZA AGE 9 TO ADULT 8/1/2017 MEDICARE YEARLY EXAM 4/7/2018 COLONOSCOPY 7/13/2022 DTaP/Tdap/Td series (2 - Td) 9/5/2022 Allergies as of 9/8/2017  Review Complete On: 9/8/2017 By: Prieto Cartagena NP Severity Noted Reaction Type Reactions Onion Medium 06/02/2011   Side Effect Nausea and Vomiting Statins-hmg-coa Reductase Inhibitors  07/05/2016    Unknown (comments) Current Immunizations  Reviewed on 6/1/2017 Name Date Hep A Vaccine (Adult) 6/28/2016, 12/4/2015 Hep B Vaccine (Adult) 6/28/2016, 9/8/2015, 7/8/2015 Influenza Vaccine (Quad) PF 9/8/2017  9:46 AM, 10/24/2016 12:01 PM, 11/5/2015 TDAP Vaccine 9/5/2012  3:11 PM  
 ZZZ-RETIRED (DO NOT USE) Pneumococcal Vaccine (Unspecified Type) 6/1/2011  8:25 PM  
  
 Not reviewed this visit You Were Diagnosed With   
  
 Codes Comments Encounter for immunization    -  Primary ICD-10-CM: Y84 ICD-9-CM: V03.89 Essential hypertension     ICD-10-CM: I10 
ICD-9-CM: 401.9 Recurrent major depressive disorder, remission status unspecified [facility]     ICD-10-CM: F33.9 ICD-9-CM: 296.30 Hypogonadism, male     ICD-10-CM: E29.1 ICD-9-CM: 257.2 Thrombocytopenia ([address]     ICD-10-CM: D69.6 ICD-9-CM: 287.5  Alcoholic cirrhosis of liver with ascites ([facility]     ICD-10-CM: K70.31 ICD-9-CM: 571.2 Prediabetes     ICD-10-CM: R73.03 
ICD-9-CM: 790.29 Blood glucose elevated     ICD-10-CM: R73.9 ICD-9-CM: 790.29 Iron deficiency anemia, unspecified iron deficiency anemia type     ICD-10-CM: D50.9 ICD-9-CM: 280.9 Vitamin D deficiency     ICD-10-CM: E55.9 ICD-9-CM: 268.9 Vitamin B12 deficiency     ICD-10-CM: E53.8 ICD-9-CM: 266.2 Vitals BP Pulse Temp Resp Height(growth percentile) Weight(growth percentile) 145/87 (BP 1 Location: Right arm, BP Patient Position: Sitting) 87 98.5 °F (36.9 °C) (Oral) 18 6' (1.829 m) 273 lb (123.8 kg) SpO2 BMI Smoking Status 98% 37.03 kg/m2 Former Smoker Vitals History BMI and BSA Data Body Mass Index Body Surface Area  
 37.03 kg/m 2 2.51 m 2 Preferred Pharmacy Pharmacy Name Phone [address] [phone] Your Updated Medication List  
  
   
This list is accurate as of: 9/8/17 10:00 AM.  Always use your most recent med list.  
  
  
  
  
 allopurinol 300 mg tablet Commonly known as:  ZYLOPRIM  
TAKE 1 TABLET TWICE DAILY  
  
 cyclobenzaprine 10 mg tablet Commonly known as:  FLEXERIL  
TAKE 1 TABLET THREE TIMES DAILY AS NEEDED  
  
 furosemide 20 mg tablet Commonly known as:  LASIX TAKE 2 TABLETS IN THE MORNING AND TAKE 1 TABLET IN THE EVENING FOR FLUID RETENTION  
  
 lactulose 10 gram/15 mL solution Commonly known as:  Venda Quill Take 10 g by mouth two (2) times a day. LORazepam 1 mg tablet Commonly known as:  ATIVAN  
TAKE 1 TABLET TWICE DAILY  (MAXIMUM DAILY DOSE  2MG) omeprazole 40 mg capsule Commonly known as:  PRILOSEC Take 1 Cap by mouth two (2) times a day. potassium chloride SA 10 mEq capsule Commonly known as:  MICRO-K  
TAKE 1 CAPSULE EVERY DAY  
  
 spironolactone 25 mg tablet Commonly known as:  ALDACTONE Take 1 Tab by mouth two (2) times a day.   
  
 testosterone cypionate 200 mg/mL injection Commonly known as:  DEPOTESTOTERONE CYPIONATE 2 mL by IntraMUSCular route once for 1 dose. Max Daily Amount: 400 mg.  
  
 traZODone 300 mg tablet Commonly known as:  Luis Ort Take 1 Tab by mouth nightly. venlafaxine- mg capsule Commonly known as:  EFFEXOR-XR  
TAKE 1 CAPSULE EVERY DAY We Performed the Following CBC WITH AUTOMATED DIFF [56803 CPT(R)] FERRITIN [57479 CPT(R)] HEMOGLOBIN A1C WITH EAG [83231 CPT(R)] INFLUENZA VIRUS VAC QUAD,SPLIT,PRESV FREE SYRINGE IM [id number] CPT(R)] IRON PROFILE [id number] CPT(R)] LIPID PANEL [30175 CPT(R)] METABOLIC PANEL, COMPREHENSIVE [91888 CPT(R)] FL INJ TESTOSTERONE CYPIONATE [ HCPCS] FL THER/PROPH/DIAG INJECTION, SUBCUT/IM [id number] CPT(R)] PSA, DIAGNOSTIC (PROSTATE SPECIFIC AG) C077746 CPT(R)] TESTOSTERONE, FREE & TOTAL [66451 CPT(R)] TSH 3RD GENERATION [94785 CPT(R)] VITAMIN B12 [id number] CPT(R)] VITAMIN D, 25 HYDROXY [id number] CPT(R)] Follow-up Instructions Return in about 1 month (around 10/8/2017). Introducing [facility]! Dear Conner Otoole: Thank you for requesting a [employer] account. Our records indicate that you already have an active [employer] account. You can access your account anytime at [email or web address]. [email or web address] Did you know that you can access your hospital and ER discharge instructions at any time in [employer]? You can also review all of your test results from your hospital stay or ER visit. Additional Information If you have questions, please visit the Frequently Asked Questions section of the [employer] website at [email or web address] Remember, [employer] is NOT to be used for urgent needs. For medical emergencies, dial 911. Now available from your iPhone and Android! Please provide this summary of care documentation to your next provider.  
  
  
 Your primary care clinician is listed as Alejandro Pearson. If you have any questions after today's visit, please call [phone].

## 2017-09-08 NOTE — PROGRESS NOTES
Chief Complaint   Patient presents with    Hypertension    Cirrhosis Of Liver    Injection     Testosterone     Labs     Patient in office today for f/u and labs; would like flu and T injection. 1. Have you been to the ER, urgent care clinic since your last visit? Hospitalized since your last visit? No    2. Have you seen or consulted any other health care providers outside of the [address] [place] [address] since your last visit? Include any pap smears or colon screening.  No

## 2017-09-09 LAB
25(OH)D3+25(OH)D2 SERPL-MCNC: 29 NG/ML (ref 30–100)
ALBUMIN SERPL-MCNC: 3.4 G/DL (ref 3.5–5.5)
ALBUMIN/GLOB SERPL: 0.9 {RATIO} (ref 1.2–2.2)
ALP SERPL-CCNC: 92 IU/L (ref 39–117)
ALT SERPL-CCNC: 18 IU/L (ref 0–44)
AST SERPL-CCNC: 28 IU/L (ref 0–40)
BASOPHILS # BLD AUTO: 0 X10E3/UL (ref 0–0.2)
BASOPHILS NFR BLD AUTO: 1 %
BILIRUB SERPL-MCNC: 1.1 MG/DL (ref 0–1.2)
BUN SERPL-MCNC: 5 MG/DL (ref 6–24)
BUN/CREAT SERPL: 8 (ref 9–20)
CALCIUM SERPL-MCNC: 8.5 MG/DL (ref 8.7–10.2)
CHLORIDE SERPL-SCNC: 101 MMOL/L (ref 96–106)
CHOLEST SERPL-MCNC: 116 MG/DL (ref 100–199)
CO2 SERPL-SCNC: 24 MMOL/L (ref 18–29)
CREAT SERPL-MCNC: 0.66 MG/DL (ref 0.76–1.27)
EOSINOPHIL # BLD AUTO: 0.1 X10E3/UL (ref 0–0.4)
EOSINOPHIL NFR BLD AUTO: 2 %
ERYTHROCYTE [DISTWIDTH] IN BLOOD BY AUTOMATED COUNT: 21.8 % (ref 12.3–15.4)
EST. AVERAGE GLUCOSE BLD GHB EST-MCNC: 111 MG/DL
FERRITIN SERPL-MCNC: 43 NG/ML (ref 30–400)
GLOBULIN SER CALC-MCNC: 3.6 G/DL (ref 1.5–4.5)
GLUCOSE SERPL-MCNC: 86 MG/DL (ref 65–99)
HBA1C MFR BLD: 5.5 % (ref 4.8–5.6)
HCT VFR BLD AUTO: 32.8 % (ref 37.5–51)
HDLC SERPL-MCNC: 40 MG/DL
HGB BLD-MCNC: 11.3 G/DL (ref 12.6–17.7)
IMM GRANULOCYTES # BLD: 0 X10E3/UL (ref 0–0.1)
IMM GRANULOCYTES NFR BLD: 0 %
INTERPRETATION, 910389: NORMAL
IRON SATN MFR SERPL: 14 % (ref 15–55)
IRON SERPL-MCNC: 37 UG/DL (ref 38–169)
LDLC SERPL CALC-MCNC: 64 MG/DL (ref 0–99)
LYMPHOCYTES # BLD AUTO: 0.8 X10E3/UL (ref 0.7–3.1)
LYMPHOCYTES NFR BLD AUTO: 18 %
MCH RBC QN AUTO: 27 PG (ref 26.6–33)
MCHC RBC AUTO-ENTMCNC: 34.5 G/DL (ref 31.5–35.7)
MCV RBC AUTO: 79 FL (ref 79–97)
MONOCYTES # BLD AUTO: 0.6 X10E3/UL (ref 0.1–0.9)
MONOCYTES NFR BLD AUTO: 13 %
MORPHOLOGY BLD-IMP: ABNORMAL
NEUTROPHILS # BLD AUTO: 3 X10E3/UL (ref 1.4–7)
NEUTROPHILS NFR BLD AUTO: 66 %
PLATELET # BLD AUTO: 86 X10E3/UL (ref 150–379)
POTASSIUM SERPL-SCNC: 4.2 MMOL/L (ref 3.5–5.2)
PROT SERPL-MCNC: 7 G/DL (ref 6–8.5)
PSA SERPL-MCNC: <0.1 NG/ML (ref 0–4)
RBC # BLD AUTO: 4.18 X10E6/UL (ref 4.14–5.8)
SODIUM SERPL-SCNC: 140 MMOL/L (ref 134–144)
TESTOST FREE SERPL-MCNC: 17.1 PG/ML (ref 7.2–24)
TESTOST SERPL-MCNC: 381 NG/DL (ref 264–916)
TIBC SERPL-MCNC: 272 UG/DL (ref 250–450)
TRIGL SERPL-MCNC: 61 MG/DL (ref 0–149)
TSH SERPL DL<=0.005 MIU/L-ACNC: 2.49 UIU/ML (ref 0.45–4.5)
UIBC SERPL-MCNC: 235 UG/DL (ref 111–343)
VIT B12 SERPL-MCNC: 601 PG/ML (ref 211–946)
VLDLC SERPL CALC-MCNC: 12 MG/DL (ref 5–40)
WBC # BLD AUTO: 4.6 X10E3/UL (ref 3.4–10.8)

## 2017-09-11 DIAGNOSIS — E55.9 VITAMIN D DEFICIENCY: Primary | ICD-10-CM

## 2017-09-11 RX ORDER — ERGOCALCIFEROL 1.25 MG/1
50000 CAPSULE ORAL
Qty: 12 CAP | Refills: 1 | Status: SHIPPED | OUTPATIENT
Start: 2017-09-11 | End: 2018-03-30

## 2017-09-11 NOTE — PROGRESS NOTES
Please notify pt the followin. Cholesterol looks great, continue to follow a heart healthy diet. 2. Prediabetes has resolved with his etoh cessation and other diet changes. 3. Liver and kidney function looks good. 4. Anemia is stable. Platelets remain low at 86. Iron levels are low, I recommend pt resume iron supplementation if he has not been taking it. If he needs a refill, let me know. 5. Vitamin d levels are low. Will send in rx for weekly vitamin D for pt to take. 6. Testosterone and prostate levels are stable. All other labs are normal. Continue plan of care. Continue with etoh cessation. Start weekly vitamin D. Let me know what he says about iron supplement. Follow up in 1 month for next T injection, sooner if needed.

## 2017-09-12 DIAGNOSIS — D50.9 IRON DEFICIENCY ANEMIA, UNSPECIFIED IRON DEFICIENCY ANEMIA TYPE: Primary | ICD-10-CM

## 2017-09-12 RX ORDER — FERROUS SULFATE 325(65) MG
325 TABLET, DELAYED RELEASE (ENTERIC COATED) ORAL DAILY
Qty: 90 TAB | Refills: 1 | Status: SHIPPED | OUTPATIENT
Start: 2017-09-12 | End: 2018-01-29

## 2017-09-25 ENCOUNTER — TELEPHONE (OUTPATIENT)
Dept: FAMILY MEDICINE CLINIC | Age: 57
End: 2017-09-25

## 2017-09-25 NOTE — TELEPHONE ENCOUNTER
I recommend he call his hematologist Dr. Audrey Gamble to see if he is a candidate for iron infusions.

## 2017-09-25 NOTE — TELEPHONE ENCOUNTER
Pt states iron supplements that were given is lov; states supplements are causing constipation and severe abdominal pain. Would like to know if iron tranfusions can be done or further recommendations. Pt states if tranfusions can be done would need a referral from provider. Cb # [phone].

## 2017-10-02 ENCOUNTER — TELEPHONE (OUTPATIENT)
Dept: ONCOLOGY | Age: 57
End: 2017-10-02

## 2017-10-02 NOTE — TELEPHONE ENCOUNTER
Pts wife sent an email request stating Arvind Shelley at [address] [facility] did blood work on my , Kanchan Walsh., recently. She started him on Vit. D and iron pills. You should be able to up his test results since [facility] in within the [facility] system. He is unable to tolerate the iron pills. Mary Jane Salazar asked that we contact your office to have Dr. Charan De Luna check his results to see if he would benefit from another infusion. His phone number is [phone].

## 2017-10-03 DIAGNOSIS — G47.00 INSOMNIA, UNSPECIFIED TYPE: ICD-10-CM

## 2017-10-03 RX ORDER — LORAZEPAM 1 MG/1
TABLET ORAL
Qty: 180 TAB | Refills: 1 | Status: SHIPPED | OUTPATIENT
Start: 2017-10-03 | End: 2018-04-06 | Stop reason: SDUPTHER

## 2017-10-03 RX ORDER — TRAZODONE HYDROCHLORIDE 300 MG/1
TABLET ORAL
Qty: 90 TAB | Refills: 3 | Status: SHIPPED | OUTPATIENT
Start: 2017-10-03 | End: 2018-01-29

## 2017-10-03 NOTE — TELEPHONE ENCOUNTER
[facility]  Medical Oncology at [address] [place] [address]    10/03/17- Informed patient that Dr. Yan Taylor is out of the office this week, but the NP will discuss lab results with him and whether or not patient would qualify for IV iron. We will call patient back next week regarding Dr. Jackie Alonso recommendation. He verbalized understanding, no further questions or concerns. 10/09/17- Per Dr. Yan Taylor, okay to order 2 more doses of injectafer. Patient needs to be seen by Dr. Yan Taylor prior to the first dose. He verbalized understanding. Will have the  schedule these appointments and contact patient. He requested for his wife to be contacted on her cell phone for scheduling. No further questions or concerns.

## 2017-10-05 DIAGNOSIS — K21.9 GASTROESOPHAGEAL REFLUX DISEASE WITHOUT ESOPHAGITIS: ICD-10-CM

## 2017-10-06 RX ORDER — OMEPRAZOLE 40 MG/1
CAPSULE, DELAYED RELEASE ORAL
Qty: 180 CAP | Refills: 3 | Status: SHIPPED | OUTPATIENT
Start: 2017-10-06 | End: 2018-04-06 | Stop reason: SDUPTHER

## 2017-10-10 ENCOUNTER — TELEPHONE (OUTPATIENT)
Dept: ONCOLOGY | Age: 57
End: 2017-10-10

## 2017-10-10 NOTE — TELEPHONE ENCOUNTER
Called patients wife, Jennifer Morris, to inform her of patients upcoming appointments. 10/18 at 2:45 with Dr. Tonia Mcclain and 3:00 in the infusion center. 10/25 at 2:00 in the infusion center.  Patients wife verbalized understanding and denied any further questions/concerns

## 2017-10-10 NOTE — TELEPHONE ENCOUNTER
Pts wife called and left a voicemail stating someone was supposed to call her with appointment information for pts appointments in the infusion center.  Call back number for Froy Caro is [phone]

## 2017-10-18 ENCOUNTER — HOSPITAL ENCOUNTER (OUTPATIENT)
Dept: INFUSION THERAPY | Age: 57
Discharge: HOME | End: 2017-10-18
Payer: MEDICARE

## 2017-10-18 ENCOUNTER — OFFICE VISIT (OUTPATIENT)
Dept: ONCOLOGY | Age: 57
End: 2017-10-18

## 2017-10-18 VITALS
HEIGHT: 72 IN | RESPIRATION RATE: 20 BRPM | HEART RATE: 87 BPM | SYSTOLIC BLOOD PRESSURE: 146 MMHG | BODY MASS INDEX: 38.33 KG/M2 | TEMPERATURE: 97.8 F | WEIGHT: 283 LBS | OXYGEN SATURATION: 98 % | DIASTOLIC BLOOD PRESSURE: 77 MMHG

## 2017-10-18 VITALS
HEART RATE: 85 BPM | TEMPERATURE: 98 F | SYSTOLIC BLOOD PRESSURE: 122 MMHG | RESPIRATION RATE: 18 BRPM | DIASTOLIC BLOOD PRESSURE: 67 MMHG

## 2017-10-18 DIAGNOSIS — D69.6 THROMBOCYTOPENIA (HCC): ICD-10-CM

## 2017-10-18 DIAGNOSIS — K70.31 ALCOHOLIC CIRRHOSIS OF LIVER WITH ASCITES (HCC): ICD-10-CM

## 2017-10-18 DIAGNOSIS — I85.10 SECONDARY ESOPHAGEAL VARICES WITHOUT BLEEDING (HCC): ICD-10-CM

## 2017-10-18 DIAGNOSIS — F10.11 HISTORY OF ALCOHOL ABUSE: ICD-10-CM

## 2017-10-18 DIAGNOSIS — D50.9 IRON DEFICIENCY ANEMIA, UNSPECIFIED IRON DEFICIENCY ANEMIA TYPE: Primary | ICD-10-CM

## 2017-10-18 PROCEDURE — 96374 THER/PROPH/DIAG INJ IV PUSH: CPT

## 2017-10-18 PROCEDURE — 74011250636 HC RX REV CODE- 250/636: Performed by: INTERNAL MEDICINE

## 2017-10-18 RX ADMIN — FERRIC CARBOXYMALTOSE INJECTION 750 MG: 50 INJECTION, SOLUTION INTRAVENOUS at 16:36

## 2017-10-18 NOTE — PROGRESS NOTES
[facility] VISIT NOTE    2690  Pt arrived at [facility] ambulatory and in no distress for Injectafer D1. Assessment completed, pt c/o back pain 5/10. PIV LAC 24 G  Positive blood return noted     Medications received: Injectafer IV    Tolerated treatment well, no adverse reaction noted. PIV removed gauze and band aid placed. noted.   Patient Vitals for the past 12 hrs:   Temp Pulse Resp BP   10/18/17 1700 98 °F (36.7 °C) 85 18 122/67   10/18/17 1611 98.8 °F (37.1 °C) 86 18 121/68     1700  D/C'd from [facility] ambulatory and in no distress accompanied by wife Next appointment is 10/25/17 at 200pm.

## 2017-10-18 NOTE — PATIENT INSTRUCTIONS
Iron-Rich Diet: Care Instructions  Your Care Instructions  Your body needs iron to make hemoglobin. Hemoglobin is a substance in red blood cells that carries oxygen from the lungs to cells all through your body. If you do not get enough iron, your body makes fewer and smaller red blood cells. As a result, your body's cells may not get enough oxygen. Adult men need 8 milligrams of iron a day; adult women need 18 milligrams of iron a day. After menopause, women need 8 milligrams of iron a day. A pregnant woman needs 27 milligrams of iron a day. Infants and young children have higher iron needs relative to their size than other age groups. People who have lost blood because of ulcers or heavy menstrual periods may become very low in iron and may develop anemia. Most people can get the iron their bodies need by eating enough of certain iron-rich foods. Your doctor may recommend that you take an iron supplement along with eating an iron-rich diet. Follow-up care is a key part of your treatment and safety. Be sure to make and go to all appointments, and call your doctor if you are having problems. Its also a good idea to know your test results and keep a list of the medicines you take. How can you care for yourself at home? · Make iron-rich foods a part of your daily diet. Iron-rich foods include:  ¨ All meats, such as chicken, beef, lamb, pork, fish, and shellfish. Liver is especially high in iron. ¨ Leafy green vegetables. ¨ Raisins, peas, beans, lentils, barley, and eggs. ¨ Iron-fortified breakfast cereals. · Eat foods with vitamin C along with iron-rich foods. Vitamin C helps you absorb more iron from food. Drink a glass of orange juice or another citrus juice with your food. · Eat meat and vegetables or grains together. The iron in meat helps your body absorb the iron in other foods. Where can you learn more? Go to http://bhavana-rosio.info/.   Enter [id number] in the search box to learn more about \"Iron-Rich Diet: Care Instructions. \"  Current as of: July 26, 2016  Content Version: 11.3  © 7631-2205 [employer]. Care instructions adapted under license by Xtone (which disclaims liability or warranty for this information). If you have questions about a medical condition or this instruction, always ask your healthcare professional. [address] any warranty or liability for your use of this information.

## 2017-10-18 NOTE — MR AVS SNAPSHOT
Visit Information Date & Time Provider Department Dept. Phone Encounter #  
 10/18/2017  2:45 PM [facility] at [address] [place] [address] [id number] Follow-up Instructions Return in 6 months (on 4/18/2018) for Raddin - anemia, thrombocytopenia f/u. Your Appointments 1/18/2018  1:30 PM  
ESTABLISHED PATIENT with MD Joao [facility] at [address]) Appt Note: Raddin, iron deficiency fu.  
 [address] [facility], [address] [facility] [address]  
[phone]  
  
   
 [address] [facility], [address] [place] [address]  
  
    
 4/18/2018  1:30 PM  
ESTABLISHED PATIENT with MD Joao [facility] at [address]) Appt Note: Raddin - anemia, thrombocytopenia f/u.  
 [address] [place] [address], [address]  
[phone] Upcoming Health Maintenance Date Due  
 MEDICARE YEARLY EXAM 4/7/2018 COLONOSCOPY 7/13/2022 DTaP/Tdap/Td series (2 - Td) 9/5/2022 Allergies as of 10/18/2017  Review Complete On: 10/18/2017 By: Timmy Mata NP Severity Noted Reaction Type Reactions Onion Medium 06/02/2011   Side Effect Nausea and Vomiting Statins-hmg-coa Reductase Inhibitors  07/05/2016    Unknown (comments) Current Immunizations  Reviewed on 6/1/2017 Name Date Hep A Vaccine (Adult) 6/28/2016, 12/4/2015 Hep B Vaccine (Adult) 6/28/2016, 9/8/2015, 7/8/2015 Influenza Vaccine (Quad) PF 9/8/2017  9:46 AM, 10/24/2016 12:01 PM, 11/5/2015 TDAP Vaccine 9/5/2012  3:11 PM  
 ZZZ-RETIRED (DO NOT USE) Pneumococcal Vaccine (Unspecified Type) 6/1/2011  8:25 PM  
  
 Not reviewed this visit You Were Diagnosed With   
  
 Codes Comments Th[facility]ocyt[facility]    -  Primary ICD-10-CM: D69.6 ICD-9-CM: 287.5 Alcoholic cirrhosis of liver with ascites ([address]     ICD-10-CM: K70.31 ICD-9-CM: 571.2 Iron deficiency anemia, unspecified iron deficiency anemia type     ICD-10-CM: D50.9 ICD-9-CM: 280.9 Vitals BP Pulse Temp Resp Height(growth percentile) 146/77 (BP 1 Location: Left arm, BP Patient Position: Sitting) 87 97.8 °F (36.6 °C) (Temporal) 20 6' (1.829 m) Weight(growth percentile) SpO2 BMI Smoking Status 283 lb (128.4 kg) 98% 38.38 kg/m2 Former Smoker Vitals History BMI and BSA Data Body Mass Index Body Surface Area  
 38.38 kg/m 2 2.55 m 2 Preferred Pharmacy Pharmacy Name Phone Mary  [address] [phone] Your Updated Medication List  
  
   
This list is accurate as of: 10/18/17  3:53 PM.  Always use your most recent med list.  
  
  
  
  
 allopurinol 300 mg tablet Commonly known as:  ZYLOPRIM  
TAKE 1 TABLET TWICE DAILY  
  
 cyclobenzaprine 10 mg tablet Commonly known as:  FLEXERIL  
TAKE 1 TABLET THREE TIMES DAILY AS NEEDED  
  
 ergocalciferol 50,000 unit capsule Commonly known as:  ERGOCALCIFEROL Take 1 Cap by mouth every seven (7) days. ferrous sulfate 325 mg (65 mg iron) EC tablet Commonly known as:  IRON Take 1 Tab by mouth daily. furosemide 20 mg tablet Commonly known as:  LASIX TAKE 2 TABLETS IN THE MORNING AND TAKE 1 TABLET IN THE EVENING FOR FLUID RETENTION  
  
 lactulose 10 gram/15 mL solution Commonly known as:  [address] [employer] Take 10 g by mouth two (2) times a day. LORazepam 1 mg tablet Commonly known as:  ATIVAN  
TAKE 1 TABLET TWICE DAILY (MAXIMUM DAILY AMOUNT 2MG) omeprazole 40 mg capsule Commonly known as:  PRILOSEC  
TAKE 1 CAPSULE TWICE DAILY potassium chloride SA 10 mEq capsule Commonly known as:  MICRO-K  
TAKE 1 CAPSULE EVERY DAY  
  
 spironolactone 25 mg tablet Commonly known as:  ALDACTONE Take 1 Tab by mouth two (2) times a day. traZODone 300 mg tablet Commonly known as:  DESYREL  
TAKE 1 TABLET EVERY NIGHT  
  
 venlafaxine- mg capsule Commonly known as:  EFFEXOR-XR  
TAKE 1 CAPSULE EVERY DAY We Performed the Following CBC WITH AUTOMATED DIFF [55231 CPT(R)] FERRITIN [41673 CPT(R)] IRON PROFILE [id number] CPT(R)] Follow-up Instructions Return in 6 months (on 4/18/2018) for Raddin - anemia, thrombocytopenia f/u. To-Do List   
 10/25/2017 2:00 PM  
  Appointment with Saray Soriano [address] at [place] [address] ([phone]) Patient Instructions Iron-Rich Diet: Care Instructions Your Care Instructions Your body needs iron to make hemoglobin. Hemoglobin is a substance in red blood cells that carries oxygen from the lungs to cells all through your body. If you do not get enough iron, your body makes fewer and smaller red blood cells. As a result, your body's cells may not get enough oxygen. Adult men need 8 milligrams of iron a day; adult women need 18 milligrams of iron a day. After menopause, women need 8 milligrams of iron a day. A pregnant woman needs 27 milligrams of iron a day. Infants and young children have higher iron needs relative to their size than other age groups. People who have lost blood because of ulcers or heavy menstrual periods may become very low in iron and may develop anemia. Most people can get the iron their bodies need by eating enough of certain iron-rich foods. Your doctor may recommend that you take an iron supplement along with eating an iron-rich diet. Follow-up care is a key part of your treatment and safety. Be sure to make and go to all appointments, and call your doctor if you are having problems. Its also a good idea to know your test results and keep a list of the medicines you take. How can you care for yourself at home? · Make iron-rich foods a part of your daily diet. Iron-rich foods include: ¨ All meats, such as chicken, beef, lamb, pork, fish, and shellfish. Liver is especially high in iron.  
¨ Leafy green vegetables. ¨ Raisins, peas, beans, lentils, barley, and eggs. ¨ Iron-fortified breakfast cereals. · Eat foods with vitamin C along with iron-rich foods. Vitamin C helps you absorb more iron from food. Drink a glass of orange juice or another citrus juice with your food. · Eat meat and vegetables or grains together. The iron in meat helps your body absorb the iron in other foods. Where can you learn more? Go to http://bhavana-rosio.info/. Enter [id number] in the search box to learn more about \"Iron-Rich Diet: Care Instructions. \" Current as of: July 26, 2016 Content Version: 11.3 © 6797-6710 [employer]. Care instructions adapted under license by Ginger Software (which disclaims liability or warranty for this information). If you have questions about a medical condition or this instruction, always ask your healthcare professional. [place] [address] any warranty or liability for your use of this information. Introducing [facility] & HEALTH SERVICES! Dear Melania Richter: Thank you for requesting a InfoReach account. Our records indicate that you have previously registered for a InfoReach account but its currently inactive. Please call our InfoReach support line at [phone]. Additional Information If you have questions, please visit the Frequently Asked Questions section of the InfoReach website at [email or web address]. [email or web address] Group/. Remember, InfoReach is NOT to be used for urgent needs. For medical emergencies, dial 911. Now available from your iPhone and Android! Please provide this summary of care documentation to your next provider. Your primary care clinician is listed as Smita Fowler. If you have any questions after today's visit, please call [phone].

## 2017-10-25 ENCOUNTER — HOSPITAL ENCOUNTER (OUTPATIENT)
Dept: INFUSION THERAPY | Age: 57
Discharge: HOME | End: 2017-10-25
Payer: MEDICARE

## 2017-10-25 VITALS
TEMPERATURE: 97.1 F | DIASTOLIC BLOOD PRESSURE: 78 MMHG | HEART RATE: 79 BPM | OXYGEN SATURATION: 99 % | RESPIRATION RATE: 18 BRPM | SYSTOLIC BLOOD PRESSURE: 140 MMHG

## 2017-10-25 PROCEDURE — 96365 THER/PROPH/DIAG IV INF INIT: CPT

## 2017-10-25 PROCEDURE — 74011250636 HC RX REV CODE- 250/636: Performed by: INTERNAL MEDICINE

## 2017-10-25 RX ORDER — SODIUM CHLORIDE 9 MG/ML
25 INJECTION, SOLUTION INTRAVENOUS AS NEEDED
Status: DISPENSED | OUTPATIENT
Start: 2017-10-25 | End: 2017-10-26

## 2017-10-25 RX ORDER — SODIUM CHLORIDE 0.9 % (FLUSH) 0.9 %
5-10 SYRINGE (ML) INJECTION AS NEEDED
Status: ACTIVE | OUTPATIENT
Start: 2017-10-25 | End: 2017-10-26

## 2017-10-25 RX ADMIN — Medication 10 ML: at 14:27

## 2017-10-25 RX ADMIN — FERRIC CARBOXYMALTOSE INJECTION 750 MG: 50 INJECTION, SOLUTION INTRAVENOUS at 15:03

## 2017-10-25 RX ADMIN — SODIUM CHLORIDE 25 ML/HR: 900 INJECTION, SOLUTION INTRAVENOUS at 14:27

## 2017-10-25 NOTE — PROGRESS NOTES
[facility] VISIT NOTE    1425  Pt arrived at [facility] ambulatory and in no distress for Injectafer Dose 2. Assessment completed, no new complaints voiced. No acute issues at present. PIV RAC 22 G  Positive blood return noted     Medications received:  NS IV  Injectafer IV    Tolerated treatment well, no adverse reaction noted. PIV removed gauze and coban placed. Patient Vitals for the past 12 hrs:   Temp Pulse Resp BP SpO2   10/25/17 1555 97.1 °F (36.2 °C) 79 18 140/78 99 %   10/25/17 1508 - 79 - 143/84 -   10/25/17 1427 97.8 °F (36.6 °C) 79 18 152/88 98 %     1555  Discharge instructions given along with when to seek medical attention and patient verbalizes understanding. D/C'd from [facility] ambulatory and in no distress. Patient to follow up with MD as scheduled.

## 2017-10-29 DIAGNOSIS — I10 ESSENTIAL HYPERTENSION: Chronic | ICD-10-CM

## 2017-10-30 RX ORDER — SPIRONOLACTONE 25 MG/1
TABLET ORAL
Qty: 180 TAB | Refills: 1 | Status: SHIPPED | OUTPATIENT
Start: 2017-10-30 | End: 2018-02-24 | Stop reason: SDUPTHER

## 2017-12-06 ENCOUNTER — OFFICE VISIT (OUTPATIENT)
Dept: FAMILY MEDICINE CLINIC | Age: 57
End: 2017-12-06

## 2017-12-06 VITALS
WEIGHT: 290 LBS | SYSTOLIC BLOOD PRESSURE: 138 MMHG | BODY MASS INDEX: 39.28 KG/M2 | OXYGEN SATURATION: 98 % | DIASTOLIC BLOOD PRESSURE: 86 MMHG | TEMPERATURE: 99.2 F | HEIGHT: 72 IN | RESPIRATION RATE: 20 BRPM | HEART RATE: 74 BPM

## 2017-12-06 DIAGNOSIS — N45.1 EPIDIDYMITIS: ICD-10-CM

## 2017-12-06 DIAGNOSIS — R19.05 PERIUMBILICAL MASS: Primary | ICD-10-CM

## 2017-12-06 DIAGNOSIS — N50.82 SCROTAL PAIN: ICD-10-CM

## 2017-12-06 DIAGNOSIS — K70.31 ALCOHOLIC CIRRHOSIS OF LIVER WITH ASCITES (HCC): ICD-10-CM

## 2017-12-06 LAB
INR BLD: 1.3
PT POC: 15.9 SECONDS
VALID INTERNAL CONTROL?: YES

## 2017-12-06 RX ORDER — LEVOFLOXACIN 500 MG/1
500 TABLET, FILM COATED ORAL DAILY
Qty: 10 TAB | Refills: 0 | Status: SHIPPED | OUTPATIENT
Start: 2017-12-06 | End: 2017-12-16

## 2017-12-06 NOTE — PROGRESS NOTES
Thomas Crump. is a 56 y.o. male   Chief Complaint   Patient presents with    Testicle Pain    Abdominal Pain    Pt states that yest morning when he woke up started doing his normal routine and the more he stood and walked around his testicles were hurting and sore. Pt states after sleeping and not having them hang states is feeling better. Pt wears boxers. Pt also reports having an umbilical hernia which does not bother him at all. Pt states yesterday he noticed a knot above his umbilical hernia that he can feel and this was the first time he noticed it but does not hurt. Pt reports that he has his abd drained every few months and feels like he needs to have this done again. Will order therapeutic paracentesis with IR given symptoms and hx.  he is a 56y.o. year old male who presents for evalution. Reviewed PmHx, RxHx, FmHx, SocHx, AllgHx and updated and dated in the chart. Review of Systems - negative except as listed above in the HPI    Objective:     Vitals:    12/06/17 1009   BP: 138/86   Pulse: 74   Resp: 20   Temp: 99.2 °F (37.3 °C)   TempSrc: Oral   SpO2: 98%   Weight: 290 lb (131.5 kg)   Height: 6' (1.829 m)       Current Outpatient Prescriptions   Medication Sig    levoFLOXacin (LEVAQUIN) 500 mg tablet Take 1 Tab by mouth daily for 10 days.  spironolactone (ALDACTONE) 25 mg tablet TAKE 1 TABLET TWICE DAILY    omeprazole (PRILOSEC) 40 mg capsule TAKE 1 CAPSULE TWICE DAILY    LORazepam (ATIVAN) 1 mg tablet TAKE 1 TABLET TWICE DAILY (MAXIMUM DAILY AMOUNT 2MG)    traZODone (DESYREL) 300 mg tablet TAKE 1 TABLET EVERY NIGHT    ferrous sulfate (IRON) 325 mg (65 mg iron) EC tablet Take 1 Tab by mouth daily.  ergocalciferol (ERGOCALCIFEROL) 50,000 unit capsule Take 1 Cap by mouth every seven (7) days.     potassium chloride SA (MICRO-K) 10 mEq capsule TAKE 1 CAPSULE EVERY DAY    furosemide (LASIX) 20 mg tablet TAKE 2 TABLETS IN THE MORNING AND TAKE 1 TABLET IN THE EVENING FOR FLUID RETENTION    cyclobenzaprine (FLEXERIL) 10 mg tablet TAKE 1 TABLET THREE TIMES DAILY AS NEEDED    venlafaxine-SR (EFFEXOR-XR) 150 mg capsule TAKE 1 CAPSULE EVERY DAY    allopurinol (ZYLOPRIM) 300 mg tablet TAKE 1 TABLET TWICE DAILY    lactulose (CHRONULAC) 10 gram/15 mL solution Take 10 g by mouth two (2) times a day. No current facility-administered medications for this visit. Physical Examination: General appearance - alert, well appearing, and in no distress  Eyes - pupils equal and reactive, extraocular eye movements intact  Chest - clear to auscultation, no wheezes, rales or rhonchi, symmetric air entry  Heart - normal rate, regular rhythm, normal S1, S2, no murmurs, rubs, clicks or gallops  Abdomen - palpable mass just superior to his umbilicus, + fluid wave and large abd   Male - ttp over b/l epidydmis, no palpable masses    Assessment/ Plan:   Diagnoses and all orders for this visit:    1. Periumbilical mass  -     US ABD LTD; Future    2. Epididymitis  -     levoFLOXacin (LEVAQUIN) 500 mg tablet; Take 1 Tab by mouth daily for 10 days. Supportive underwear  3. Scrotal pain  -     US SCROTUM/TESTICLES; Future    4. Alcoholic cirrhosis of liver with ascites (HCC)  -     US PARACENTESIS ABD W IMAGING; Future  -     AMB POC PT/INR       Follow-up Disposition:  Return if symptoms worsen or fail to improve. I have discussed the diagnosis with the patient and the intended plan as seen in the above orders. The patient has received an after-visit summary and questions were answered concerning future plans. Pt conveyed understanding of plan.     Medication Side Effects and Warnings were discussed with patient      Joaquina Packer DO

## 2017-12-06 NOTE — MR AVS SNAPSHOT
Visit Information Date & Time Provider Department Dept. Phone Encounter #  
 12/6/2017  9:50 AM Miriam Mcgraw, Meghann Dave [phone] [id number] Follow-up Instructions Return if symptoms worsen or fail to improve. Your Appointments 4/18/2018  1:30 PM  
ESTABLISHED PATIENT with Annamarie Mathews MD  
[facility] at [facility]) Appt Note: Raddin - anemia, thrombocytopenia f/u.  
 [address] [facility], [address] [address]  
[phone]  
  
   
 [address] [facility], [address] [facility] Upcoming Health Maintenance Date Due  
 MEDICARE YEARLY EXAM 4/7/2018 COLONOSCOPY 7/13/2022 DTaP/Tdap/Td series (2 - Td) 9/5/2022 Allergies as of 12/6/2017  Review Complete On: 12/6/2017 By: Adelfo Rosales LPN Severity Noted Reaction Type Reactions Onion Medium 06/02/2011   Side Effect Nausea and Vomiting Statins-hmg-coa Reductase Inhibitors  07/05/2016    Unknown (comments) Current Immunizations  Reviewed on 10/18/2017 Name Date Hep A Vaccine (Adult) 6/28/2016, 12/4/2015 Hep B Vaccine (Adult) 6/28/2016, 9/8/2015, 7/8/2015 Influenza Vaccine (Quad) PF 9/8/2017  9:46 AM, 10/24/2016 12:01 PM, 11/5/2015 TDAP Vaccine 9/5/2012  3:11 PM  
 ZZZ-RETIRED (DO NOT USE) Pneumococcal Vaccine (Unspecified Type) 6/1/2011  8:25 PM  
  
 Not reviewed this visit You Were Diagnosed With   
  
 Codes Comments Periumbilical mass    -  Primary ICD-10-CM: R19.05 
ICD-9-CM: 789.35 Epididymitis     ICD-10-CM: N45.1 ICD-9-CM: 604.90 Scrotal pain     ICD-10-CM: N50.82 ICD-9-CM: 608.9 Alcoholic cirrhosis of liver with ascites ([facility]     ICD-10-CM: K70.31 ICD-9-CM: 571.2 Vitals BP Pulse Temp Resp Height(growth percentile) Weight(growth percentile) 138/86 74 99.2 °F (37.3 °C) (Oral) 20 6' (1.829 m) 290 lb (131.5 kg) SpO2 BMI Smoking Status  98% 39.33 kg/m2 Former Smoker Vitals History BMI and BSA Data Body Mass Index Body Surface Area  
 39.33 kg/m 2 2.58 m 2 Preferred Pharmacy Pharmacy Name Phone [facility] [address] [facility] [phone] Your Updated Medication List  
  
   
This list is accurate as of: 12/6/17 10:40 AM.  Always use your most recent med list.  
  
  
  
  
 allopurinol 300 mg tablet Commonly known as:  ZYLOPRIM  
TAKE 1 TABLET TWICE DAILY  
  
 cyclobenzaprine 10 mg tablet Commonly known as:  FLEXERIL  
TAKE 1 TABLET THREE TIMES DAILY AS NEEDED  
  
 ergocalciferol 50,000 unit capsule Commonly known as:  ERGOCALCIFEROL Take 1 Cap by mouth every seven (7) days. ferrous sulfate 325 mg (65 mg iron) EC tablet Commonly known as:  IRON Take 1 Tab by mouth daily. furosemide 20 mg tablet Commonly known as:  LASIX TAKE 2 TABLETS IN THE MORNING AND TAKE 1 TABLET IN THE EVENING FOR FLUID RETENTION  
  
 lactulose 10 gram/15 mL solution Commonly known as:  Christie Anayeli Take 10 g by mouth two (2) times a day. levoFLOXacin 500 mg tablet Commonly known as:  Finas Jw Take 1 Tab by mouth daily for 10 days. LORazepam 1 mg tablet Commonly known as:  ATIVAN  
TAKE 1 TABLET TWICE DAILY (MAXIMUM DAILY AMOUNT 2MG) omeprazole 40 mg capsule Commonly known as:  PRILOSEC  
TAKE 1 CAPSULE TWICE DAILY potassium chloride SA 10 mEq capsule Commonly known as:  MICRO-K  
TAKE 1 CAPSULE EVERY DAY  
  
 spironolactone 25 mg tablet Commonly known as:  ALDACTONE  
TAKE 1 TABLET TWICE DAILY  
  
 traZODone 300 mg tablet Commonly known as:  DESYREL  
TAKE 1 TABLET EVERY NIGHT  
  
 venlafaxine- mg capsule Commonly known as:  EFFEXOR-XR  
TAKE 1 CAPSULE EVERY DAY Prescriptions Sent to Pharmacy Refills  
 levoFLOXacin (LEVAQUIN) 500 mg tablet 0  Sig: Take 1 Tab by mouth daily for 10 days. Class: Normal  
 Pharmacy: [facility], [address] [place] [address] [address] #: [phone] Route: Oral  
  
Follow-up Instructions Return if symptoms worsen or fail to improve. To-Do List   
 12/06/2017 Imaging:  US ABD LTD   
  
 12/06/2017 Imaging:  US PARACENTESIS ABD W IMAGING   
  
 12/06/2017 Imaging:  US SCROTUM/TESTICLES Patient Instructions Ascites: Care Instructions Your Care Instructions Ascites (say \"uh-SY-teez\") is a buildup of extra fluid in the belly. It can cause your belly to swell. It can also make it hard for you to breathe. Many diseases can cause ascites. But most people who get it have a liver problem. When the liver gets damaged, it can cause fluid to back up from the liver or from blood vessels. Then this fluid builds up in the belly. Your doctor may take a sample of the fluid in your belly with a thin needle. The sample is then tested to help find out the cause of the ascites. Treatment may include medicine. It may also include changing the way you eat so you don't eat a lot of salt. If your ascites is very bad and hard to treat, your doctor may need to use a needle to take out the fluid. Follow-up care is a key part of your treatment and safety. Be sure to make and go to all appointments, and call your doctor if you are having problems. It's also a good idea to know your test results and keep a list of the medicines you take. How can you care for yourself at home? · Be safe with medicines. Take your medicines exactly as prescribed. Call your doctor if you have any problems with your medicine. You will get more details on the specific medicines your doctor prescribes. · Eat low-salt foods, and don't add salt to your food. If you eat a lot of salt, it's harder to get rid of the extra fluid. Salt is in many prepared foods.  These include crow, canned foods, snack foods, sauces, and soups. Look for products that are low-sodium or have reduced salt. · Do not drink any alcohol until you are all better. Alcohol will damage the liver more. If your liver disease is caused by drinking alcohol, do not drink alcohol at all. Tell your doctor if you need help to quit. Counseling, support groups, and sometimes medicines can help you stay sober. · Talk to your doctor before you take any other medicines. These include over-the-counter medicines, vitamins, and herbal products. · Make sure your doctor knows all the medicines you take. Some medicines, such as acetaminophen (Tylenol), can make liver problems worse. When should you call for help? Call 911 anytime you think you may need emergency care. For example, call if: 
? · You have trouble breathing. ? · You vomit blood or what looks like coffee grounds. ?Call your doctor now or seek immediate medical care if: 
? · You have new or worse belly pain. ? · You have a fever. ? Watch closely for changes in your health, and be sure to contact your doctor if: 
? · You have any problems. ? · Your belly is getting bigger. ? · You are gaining weight. Where can you learn more? Go to http://bhavana-rosio.info/. Enter B970 in the search box to learn more about \"Ascites: Care Instructions. \" Current as of: May 12, 2017 Content Version: 11.4 © 3670-0456 Veloxum Corporation. Care instructions adapted under license by Fileboard (which disclaims liability or warranty for this information). If you have questions about a medical condition or this instruction, always ask your healthcare professional. [address] any warranty or liability for your use of this information. Introducing [facility] & HEALTH SERVICES! Dear Keisha Everett: Thank you for requesting a [employer] account. Our records indicate that you have previously registered for a [employer] account but its currently inactive.   Please call our Good Travel Software support line at [phone]. Additional Information If you have questions, please visit the Frequently Asked Questions section of the Good Travel Software website at [email or web address]. The Noun Project. [email or web address] (formerly [email or web address] Remember, Good Travel Software is NOT to be used for urgent needs. For medical emergencies, dial 911. Now available from your iPhone and Android! Please provide this summary of care documentation to your next provider. Your primary care clinician is listed as Anthony Clarke. If you have any questions after today's visit, please call [phone].

## 2017-12-06 NOTE — PATIENT INSTRUCTIONS
Ascites: Care Instructions  Your Care Instructions  Ascites (say \"uh-SY-teez\") is a buildup of extra fluid in the belly. It can cause your belly to swell. It can also make it hard for you to breathe. Many diseases can cause ascites. But most people who get it have a liver problem. When the liver gets damaged, it can cause fluid to back up from the liver or from blood vessels. Then this fluid builds up in the belly. Your doctor may take a sample of the fluid in your belly with a thin needle. The sample is then tested to help find out the cause of the ascites. Treatment may include medicine. It may also include changing the way you eat so you don't eat a lot of salt. If your ascites is very bad and hard to treat, your doctor may need to use a needle to take out the fluid. Follow-up care is a key part of your treatment and safety. Be sure to make and go to all appointments, and call your doctor if you are having problems. It's also a good idea to know your test results and keep a list of the medicines you take. How can you care for yourself at home? · Be safe with medicines. Take your medicines exactly as prescribed. Call your doctor if you have any problems with your medicine. You will get more details on the specific medicines your doctor prescribes. · Eat low-salt foods, and don't add salt to your food. If you eat a lot of salt, it's harder to get rid of the extra fluid. Salt is in many prepared foods. These include crow, canned foods, snack foods, sauces, and soups. Look for products that are low-sodium or have reduced salt. · Do not drink any alcohol until you are all better. Alcohol will damage the liver more. If your liver disease is caused by drinking alcohol, do not drink alcohol at all. Tell your doctor if you need help to quit. Counseling, support groups, and sometimes medicines can help you stay sober. · Talk to your doctor before you take any other medicines.  These include over-the-counter medicines, vitamins, and herbal products. · Make sure your doctor knows all the medicines you take. Some medicines, such as acetaminophen (Tylenol), can make liver problems worse. When should you call for help? Call 911 anytime you think you may need emergency care. For example, call if:  ? · You have trouble breathing. ? · You vomit blood or what looks like coffee grounds. ?Call your doctor now or seek immediate medical care if:  ? · You have new or worse belly pain. ? · You have a fever. ? Watch closely for changes in your health, and be sure to contact your doctor if:  ? · You have any problems. ? · Your belly is getting bigger. ? · You are gaining weight. Where can you learn more? Go to http://bhavana-rosio.info/. Enter B970 in the search box to learn more about \"Ascites: Care Instructions. \"  Current as of: May 12, 2017  Content Version: 11.4  © 2035-7063 [employer]. Care instructions adapted under license by [employer] (which disclaims liability or warranty for this information). If you have questions about a medical condition or this instruction, always ask your healthcare professional. [address] any warranty or liability for your use of this information.

## 2017-12-18 ENCOUNTER — HOSPITAL ENCOUNTER (OUTPATIENT)
Dept: ULTRASOUND IMAGING | Age: 57
Discharge: HOME | End: 2017-12-18
Attending: FAMILY MEDICINE
Payer: MEDICARE

## 2017-12-18 DIAGNOSIS — K70.31 ALCOHOLIC CIRRHOSIS OF LIVER WITH ASCITES (HCC): ICD-10-CM

## 2017-12-18 DIAGNOSIS — N50.82 SCROTAL PAIN: ICD-10-CM

## 2017-12-18 DIAGNOSIS — R19.05 PERIUMBILICAL MASS: ICD-10-CM

## 2017-12-18 PROCEDURE — 76870 US EXAM SCROTUM: CPT

## 2017-12-18 PROCEDURE — 76705 ECHO EXAM OF ABDOMEN: CPT

## 2017-12-18 NOTE — PROGRESS NOTES
0830 Patient brought to [facility] from waiting area and placed on stretcher and I.V #20 placed in right f/a. Dr. Linda Zepeda notifiied of of patient's readiness and ultrasound of patient's abdomen and testicles ordered. Testicular ultrasound done 1st. 2596 Patient brought to ultrasound and placed in a gown and placed on stretcher for procedure. Marisel,RRT performing ultrasound.

## 2017-12-18 NOTE — PROGRESS NOTES
1630 Patient completed ultrasound of abdomen and testicles and not enough fluid present in abdomen for paracentesis. Patient placed back in clothes and wife,Coby contacted via phone for return back to [address] [place] [address] Avenue for transport back home. Patient's wife made aware of lack of fluid present for scheduled U/S paracentesis. Patient escorted back to waiting area for ride. Patient smelled of alcohol with inappropriate questions, statements and actions during procedure.

## 2017-12-19 DIAGNOSIS — R19.00 ABDOMINAL MASS, UNSPECIFIED ABDOMINAL LOCATION: Primary | ICD-10-CM

## 2017-12-19 NOTE — PROGRESS NOTES
Not enough ascites to warrant drainage.   Pt does need a CT to further characterize lump by umbilicus, this has been ordered please have him schedule with

## 2017-12-28 ENCOUNTER — HOSPITAL ENCOUNTER (OUTPATIENT)
Dept: CT IMAGING | Age: 57
Discharge: HOME | End: 2017-12-28
Attending: FAMILY MEDICINE
Payer: MEDICARE

## 2017-12-28 DIAGNOSIS — C22.0 HEPATOCELLULAR CARCINOMA (HCC): Primary | ICD-10-CM

## 2017-12-28 DIAGNOSIS — R19.00 ABDOMINAL MASS, UNSPECIFIED ABDOMINAL LOCATION: ICD-10-CM

## 2017-12-28 PROCEDURE — 74011636320 HC RX REV CODE- 636/320: Performed by: RADIOLOGY

## 2017-12-28 PROCEDURE — 74177 CT ABD & PELVIS W/CONTRAST: CPT

## 2017-12-28 RX ADMIN — IOPAMIDOL 95 ML: 755 INJECTION, SOLUTION INTRAVENOUS at 08:58

## 2017-12-28 NOTE — PROGRESS NOTES
Umbilical mass is a small hernia. Pt does have a liver lesion that requires further imaging, I have ordered an MRI of the liver for him to get done.   Please have him call central scheduling to make an appt to have this done

## 2018-01-01 ENCOUNTER — TELEPHONE (OUTPATIENT)
Dept: FAMILY MEDICINE CLINIC | Age: 58
End: 2018-01-01

## 2018-01-01 ENCOUNTER — DOCUMENTATION ONLY (OUTPATIENT)
Dept: FAMILY MEDICINE CLINIC | Age: 58
End: 2018-01-01

## 2018-01-01 ENCOUNTER — HOSPITAL ENCOUNTER (INPATIENT)
Age: 58
LOS: 2 days | Discharge: HOME | DRG: 897 | End: 2018-12-01
Attending: EMERGENCY MEDICINE | Admitting: FAMILY MEDICINE
Payer: MEDICARE

## 2018-01-01 ENCOUNTER — OFFICE VISIT (OUTPATIENT)
Dept: FAMILY MEDICINE CLINIC | Age: 58
End: 2018-01-01

## 2018-01-01 ENCOUNTER — TELEPHONE (OUTPATIENT)
Dept: ONCOLOGY | Age: 58
End: 2018-01-01

## 2018-01-01 ENCOUNTER — OFFICE VISIT (OUTPATIENT)
Dept: ONCOLOGY | Age: 58
End: 2018-01-01

## 2018-01-01 VITALS
OXYGEN SATURATION: 94 % | HEART RATE: 97 BPM | HEIGHT: 72 IN | RESPIRATION RATE: 18 BRPM | DIASTOLIC BLOOD PRESSURE: 63 MMHG | SYSTOLIC BLOOD PRESSURE: 100 MMHG | BODY MASS INDEX: 37.52 KG/M2 | WEIGHT: 277 LBS | TEMPERATURE: 98.3 F

## 2018-01-01 VITALS
BODY MASS INDEX: 37.63 KG/M2 | SYSTOLIC BLOOD PRESSURE: 126 MMHG | DIASTOLIC BLOOD PRESSURE: 82 MMHG | WEIGHT: 277.8 LBS | OXYGEN SATURATION: 91 % | RESPIRATION RATE: 18 BRPM | HEART RATE: 75 BPM | HEIGHT: 72 IN | TEMPERATURE: 98.6 F

## 2018-01-01 VITALS
HEIGHT: 72 IN | SYSTOLIC BLOOD PRESSURE: 125 MMHG | WEIGHT: 260.2 LBS | RESPIRATION RATE: 18 BRPM | HEART RATE: 65 BPM | TEMPERATURE: 98.6 F | BODY MASS INDEX: 35.24 KG/M2 | DIASTOLIC BLOOD PRESSURE: 80 MMHG | OXYGEN SATURATION: 96 %

## 2018-01-01 VITALS
DIASTOLIC BLOOD PRESSURE: 78 MMHG | RESPIRATION RATE: 18 BRPM | OXYGEN SATURATION: 95 % | WEIGHT: 266 LBS | SYSTOLIC BLOOD PRESSURE: 118 MMHG | TEMPERATURE: 97.6 F | HEIGHT: 72 IN | HEART RATE: 86 BPM | BODY MASS INDEX: 36.03 KG/M2

## 2018-01-01 DIAGNOSIS — E29.1 HYPOGONADISM, MALE: ICD-10-CM

## 2018-01-01 DIAGNOSIS — K70.30 ESOPHAGEAL VARICES IN ALCOHOLIC CIRRHOSIS (HCC): ICD-10-CM

## 2018-01-01 DIAGNOSIS — D50.9 IRON DEFICIENCY ANEMIA, UNSPECIFIED IRON DEFICIENCY ANEMIA TYPE: ICD-10-CM

## 2018-01-01 DIAGNOSIS — D69.6 THROMBOCYTOPENIA (HCC): ICD-10-CM

## 2018-01-01 DIAGNOSIS — F10.230 ALCOHOL DEPENDENCE WITH UNCOMPLICATED WITHDRAWAL (HCC): ICD-10-CM

## 2018-01-01 DIAGNOSIS — N52.01 ERECTILE DYSFUNCTION DUE TO ARTERIAL INSUFFICIENCY: Primary | ICD-10-CM

## 2018-01-01 DIAGNOSIS — F10.930 ALCOHOL WITHDRAWAL SYNDROME WITHOUT COMPLICATION (HCC): Primary | ICD-10-CM

## 2018-01-01 DIAGNOSIS — K21.9 GASTROESOPHAGEAL REFLUX DISEASE WITHOUT ESOPHAGITIS: ICD-10-CM

## 2018-01-01 DIAGNOSIS — F41.9 ANXIETY AND DEPRESSION: Primary | ICD-10-CM

## 2018-01-01 DIAGNOSIS — F32.A ANXIETY AND DEPRESSION: Primary | ICD-10-CM

## 2018-01-01 DIAGNOSIS — I85.10 ESOPHAGEAL VARICES IN ALCOHOLIC CIRRHOSIS (HCC): ICD-10-CM

## 2018-01-01 DIAGNOSIS — D69.6 THROMBOCYTOPENIA (HCC): Primary | ICD-10-CM

## 2018-01-01 DIAGNOSIS — R26.9 GAIT DISTURBANCE: ICD-10-CM

## 2018-01-01 LAB
ALBUMIN SERPL-MCNC: 2.1 G/DL (ref 3.5–5)
ALBUMIN SERPL-MCNC: 2.2 G/DL (ref 3.5–5)
ALBUMIN SERPL-MCNC: 2.3 G/DL (ref 3.5–5)
ALBUMIN SERPL-MCNC: 2.9 G/DL (ref 3.5–5)
ALBUMIN/GLOB SERPL: 0.5 {RATIO} (ref 1.1–2.2)
ALP SERPL-CCNC: 118 U/L (ref 45–117)
ALP SERPL-CCNC: 123 U/L (ref 45–117)
ALP SERPL-CCNC: 128 U/L (ref 45–117)
ALP SERPL-CCNC: 161 U/L (ref 45–117)
ALT SERPL-CCNC: 29 U/L (ref 12–78)
ALT SERPL-CCNC: 31 U/L (ref 12–78)
ALT SERPL-CCNC: 33 U/L (ref 12–78)
ALT SERPL-CCNC: 38 U/L (ref 12–78)
AMMONIA PLAS-SCNC: 45 UMOL/L
AMPHET UR QL SCN: NEGATIVE
ANION GAP SERPL CALC-SCNC: 14 MMOL/L (ref 5–15)
ANION GAP SERPL CALC-SCNC: 6 MMOL/L (ref 5–15)
ANION GAP SERPL CALC-SCNC: 8 MMOL/L (ref 5–15)
ANION GAP SERPL CALC-SCNC: 9 MMOL/L (ref 5–15)
ARTERIAL PATENCY WRIST A: YES
AST SERPL-CCNC: 102 U/L (ref 15–37)
AST SERPL-CCNC: 106 U/L (ref 15–37)
AST SERPL-CCNC: 137 U/L (ref 15–37)
AST SERPL-CCNC: 90 U/L (ref 15–37)
ATRIAL RATE: 100 BPM
BARBITURATES UR QL SCN: NEGATIVE
BASE EXCESS BLDA CALC-SCNC: 3.1 MMOL/L
BASOPHILS # BLD: 0 K/UL (ref 0–0.1)
BASOPHILS NFR BLD: 1 % (ref 0–1)
BDY SITE: ABNORMAL
BENZODIAZ UR QL: NEGATIVE
BILIRUB SERPL-MCNC: 3.4 MG/DL (ref 0.2–1)
BILIRUB SERPL-MCNC: 3.7 MG/DL (ref 0.2–1)
BILIRUB SERPL-MCNC: 4.4 MG/DL (ref 0.2–1)
BILIRUB SERPL-MCNC: 5.4 MG/DL (ref 0.2–1)
BUN SERPL-MCNC: 4 MG/DL (ref 6–20)
BUN SERPL-MCNC: 4 MG/DL (ref 6–20)
BUN SERPL-MCNC: 5 MG/DL (ref 6–20)
BUN SERPL-MCNC: 7 MG/DL (ref 6–20)
BUN/CREAT SERPL: 10 (ref 12–20)
BUN/CREAT SERPL: 4 (ref 12–20)
BUN/CREAT SERPL: 5 (ref 12–20)
BUN/CREAT SERPL: 6 (ref 12–20)
CALCIUM SERPL-MCNC: 7.1 MG/DL (ref 8.5–10.1)
CALCIUM SERPL-MCNC: 7.2 MG/DL (ref 8.5–10.1)
CALCIUM SERPL-MCNC: 7.3 MG/DL (ref 8.5–10.1)
CALCIUM SERPL-MCNC: 8.2 MG/DL (ref 8.5–10.1)
CALCULATED P AXIS, ECG09: 26 DEGREES
CALCULATED R AXIS, ECG10: -34 DEGREES
CALCULATED T AXIS, ECG11: 42 DEGREES
CANNABINOIDS UR QL SCN: NEGATIVE
CHLORIDE SERPL-SCNC: 100 MMOL/L (ref 97–108)
CHLORIDE SERPL-SCNC: 101 MMOL/L (ref 97–108)
CHLORIDE SERPL-SCNC: 103 MMOL/L (ref 97–108)
CHLORIDE SERPL-SCNC: 98 MMOL/L (ref 97–108)
CO2 SERPL-SCNC: 27 MMOL/L (ref 21–32)
CO2 SERPL-SCNC: 27 MMOL/L (ref 21–32)
CO2 SERPL-SCNC: 28 MMOL/L (ref 21–32)
CO2 SERPL-SCNC: 30 MMOL/L (ref 21–32)
COCAINE UR QL SCN: NEGATIVE
COMMENT, HOLDF: NORMAL
COMMENT, HOLDF: NORMAL
CREAT SERPL-MCNC: 0.71 MG/DL (ref 0.7–1.3)
CREAT SERPL-MCNC: 0.76 MG/DL (ref 0.7–1.3)
CREAT SERPL-MCNC: 0.85 MG/DL (ref 0.7–1.3)
CREAT SERPL-MCNC: 0.92 MG/DL (ref 0.7–1.3)
DIAGNOSIS, 93000: NORMAL
DIFFERENTIAL METHOD BLD: ABNORMAL
DRUG SCRN COMMENT,DRGCM: NORMAL
EOSINOPHIL # BLD: 0 K/UL (ref 0–0.4)
EOSINOPHIL NFR BLD: 0 % (ref 0–7)
ERYTHROCYTE [DISTWIDTH] IN BLOOD BY AUTOMATED COUNT: 17.1 % (ref 11.5–14.5)
ERYTHROCYTE [DISTWIDTH] IN BLOOD BY AUTOMATED COUNT: 17.3 % (ref 11.5–14.5)
ERYTHROCYTE [DISTWIDTH] IN BLOOD BY AUTOMATED COUNT: 17.4 % (ref 11.5–14.5)
ERYTHROCYTE [DISTWIDTH] IN BLOOD BY AUTOMATED COUNT: 17.5 % (ref 11.5–14.5)
ERYTHROCYTE [DISTWIDTH] IN BLOOD BY AUTOMATED COUNT: 17.6 % (ref 11.5–14.5)
ETHANOL SERPL-MCNC: 10 MG/DL
FIO2 ON VENT: 21 %
FOLATE SERPL-MCNC: 4.9 NG/ML (ref 5–21)
GLOBULIN SER CALC-MCNC: 4.2 G/DL (ref 2–4)
GLOBULIN SER CALC-MCNC: 4.3 G/DL (ref 2–4)
GLOBULIN SER CALC-MCNC: 4.6 G/DL (ref 2–4)
GLOBULIN SER CALC-MCNC: 5.3 G/DL (ref 2–4)
GLUCOSE SERPL-MCNC: 104 MG/DL (ref 65–100)
GLUCOSE SERPL-MCNC: 146 MG/DL (ref 65–100)
GLUCOSE SERPL-MCNC: 174 MG/DL (ref 65–100)
GLUCOSE SERPL-MCNC: 91 MG/DL (ref 65–100)
HCO3 BLDA-SCNC: 24 MMOL/L (ref 22–26)
HCT VFR BLD AUTO: 35 % (ref 36.6–50.3)
HCT VFR BLD AUTO: 35 % (ref 36.6–50.3)
HCT VFR BLD AUTO: 37 % (ref 36.6–50.3)
HCT VFR BLD AUTO: 39.1 % (ref 36.6–50.3)
HCT VFR BLD AUTO: 44 % (ref 36.6–50.3)
HEMOCCULT STL QL: NEGATIVE
HGB BLD-MCNC: 11.7 G/DL (ref 12.1–17)
HGB BLD-MCNC: 11.8 G/DL (ref 12.1–17)
HGB BLD-MCNC: 12.4 G/DL (ref 12.1–17)
HGB BLD-MCNC: 12.9 G/DL (ref 12.1–17)
HGB BLD-MCNC: 14.6 G/DL (ref 12.1–17)
IMM GRANULOCYTES # BLD: 0 K/UL
IMM GRANULOCYTES NFR BLD AUTO: 0 %
INR PPP: 1.7 (ref 0.9–1.1)
LACTATE SERPL-SCNC: 1.4 MMOL/L (ref 0.4–2)
LDH SERPL L TO P-CCNC: 333 U/L (ref 85–241)
LIPASE SERPL-CCNC: 153 U/L (ref 73–393)
LYMPHOCYTES # BLD: 0.4 K/UL (ref 0.8–3.5)
LYMPHOCYTES NFR BLD: 9 % (ref 12–49)
MAGNESIUM SERPL-MCNC: 1.5 MG/DL (ref 1.6–2.4)
MAGNESIUM SERPL-MCNC: 1.6 MG/DL (ref 1.6–2.4)
MAGNESIUM SERPL-MCNC: 1.7 MG/DL (ref 1.6–2.4)
MCH RBC QN AUTO: 28.2 PG (ref 26–34)
MCH RBC QN AUTO: 28.9 PG (ref 26–34)
MCH RBC QN AUTO: 29.3 PG (ref 26–34)
MCH RBC QN AUTO: 29.4 PG (ref 26–34)
MCH RBC QN AUTO: 29.5 PG (ref 26–34)
MCHC RBC AUTO-ENTMCNC: 33 G/DL (ref 30–36.5)
MCHC RBC AUTO-ENTMCNC: 33.2 G/DL (ref 30–36.5)
MCHC RBC AUTO-ENTMCNC: 33.4 G/DL (ref 30–36.5)
MCHC RBC AUTO-ENTMCNC: 33.5 G/DL (ref 30–36.5)
MCHC RBC AUTO-ENTMCNC: 33.7 G/DL (ref 30–36.5)
MCV RBC AUTO: 84.9 FL (ref 80–99)
MCV RBC AUTO: 86.4 FL (ref 80–99)
MCV RBC AUTO: 87.1 FL (ref 80–99)
MCV RBC AUTO: 88.1 FL (ref 80–99)
MCV RBC AUTO: 88.7 FL (ref 80–99)
METAMYELOCYTES NFR BLD MANUAL: 1 %
METHADONE UR QL: NEGATIVE
MONOCYTES # BLD: 0.4 K/UL (ref 0–1)
MONOCYTES NFR BLD: 10 % (ref 5–13)
NEUTS BAND NFR BLD MANUAL: 3 % (ref 0–6)
NEUTS SEG # BLD: 3.2 K/UL (ref 1.8–8)
NEUTS SEG NFR BLD: 76 % (ref 32–75)
NRBC # BLD: 0 K/UL (ref 0–0.01)
NRBC BLD-RTO: 0 PER 100 WBC
OPIATES UR QL: NEGATIVE
P-R INTERVAL, ECG05: 142 MS
PCO2 BLDA: 27 MMHG (ref 35–45)
PCP UR QL: NEGATIVE
PH BLDA: 7.57 [PH] (ref 7.35–7.45)
PHOSPHATE SERPL-MCNC: 1.9 MG/DL (ref 2.6–4.7)
PHOSPHATE SERPL-MCNC: 1.9 MG/DL (ref 2.6–4.7)
PHOSPHATE SERPL-MCNC: 2.1 MG/DL (ref 2.6–4.7)
PLATELET # BLD AUTO: 36 K/UL (ref 150–400)
PLATELET # BLD AUTO: 39 K/UL (ref 150–400)
PLATELET # BLD AUTO: 43 K/UL (ref 150–400)
PLATELET # BLD AUTO: 45 K/UL (ref 150–400)
PLATELET # BLD AUTO: 58 K/UL (ref 150–400)
PMV BLD AUTO: 10.3 FL (ref 8.9–12.9)
PMV BLD AUTO: 10.8 FL (ref 8.9–12.9)
PMV BLD AUTO: 11.4 FL (ref 8.9–12.9)
PMV BLD AUTO: ABNORMAL FL (ref 8.9–12.9)
PO2 BLDA: 77 MMHG (ref 80–100)
POTASSIUM SERPL-SCNC: 2.6 MMOL/L (ref 3.5–5.1)
POTASSIUM SERPL-SCNC: 3 MMOL/L (ref 3.5–5.1)
POTASSIUM SERPL-SCNC: 3 MMOL/L (ref 3.5–5.1)
POTASSIUM SERPL-SCNC: 3.3 MMOL/L (ref 3.5–5.1)
PROT SERPL-MCNC: 6.3 G/DL (ref 6.4–8.2)
PROT SERPL-MCNC: 6.5 G/DL (ref 6.4–8.2)
PROT SERPL-MCNC: 6.9 G/DL (ref 6.4–8.2)
PROT SERPL-MCNC: 8.2 G/DL (ref 6.4–8.2)
PROTHROMBIN TIME: 17 SEC (ref 9–11.1)
Q-T INTERVAL, ECG07: 420 MS
QRS DURATION, ECG06: 102 MS
QTC CALCULATION (BEZET), ECG08: 541 MS
RBC # BLD AUTO: 4.02 M/UL (ref 4.1–5.7)
RBC # BLD AUTO: 4.05 M/UL (ref 4.1–5.7)
RBC # BLD AUTO: 4.2 M/UL (ref 4.1–5.7)
RBC # BLD AUTO: 4.41 M/UL (ref 4.1–5.7)
RBC # BLD AUTO: 5.18 M/UL (ref 4.1–5.7)
RBC MORPH BLD: ABNORMAL
SALICYLATES SERPL-MCNC: <1.7 MG/DL (ref 2.8–20)
SAMPLES BEING HELD,HOLD: NORMAL
SAMPLES BEING HELD,HOLD: NORMAL
SAO2 % BLD: 97 % (ref 92–97)
SAO2% DEVICE SAO2% SENSOR NAME: ABNORMAL
SERVICE CMNT-IMP: ABNORMAL
SODIUM SERPL-SCNC: 136 MMOL/L (ref 136–145)
SODIUM SERPL-SCNC: 137 MMOL/L (ref 136–145)
SODIUM SERPL-SCNC: 139 MMOL/L (ref 136–145)
SODIUM SERPL-SCNC: 139 MMOL/L (ref 136–145)
SPECIMEN SITE: ABNORMAL
TROPONIN I SERPL-MCNC: <0.05 NG/ML
VENTRICULAR RATE, ECG03: 100 BPM
VIT B12 SERPL-MCNC: 1150 PG/ML (ref 193–986)
WBC # BLD AUTO: 3.2 K/UL (ref 4.1–11.1)
WBC # BLD AUTO: 3.4 K/UL (ref 4.1–11.1)
WBC # BLD AUTO: 4 K/UL (ref 4.1–11.1)
WBC # BLD AUTO: 4.1 K/UL (ref 4.1–11.1)
WBC # BLD AUTO: 4.7 K/UL (ref 4.1–11.1)
WBC MORPH BLD: ABNORMAL

## 2018-01-01 PROCEDURE — 74011250637 HC RX REV CODE- 250/637: Performed by: STUDENT IN AN ORGANIZED HEALTH CARE EDUCATION/TRAINING PROGRAM

## 2018-01-01 PROCEDURE — 74011000250 HC RX REV CODE- 250: Performed by: FAMILY MEDICINE

## 2018-01-01 PROCEDURE — 80053 COMPREHEN METABOLIC PANEL: CPT

## 2018-01-01 PROCEDURE — 36415 COLL VENOUS BLD VENIPUNCTURE: CPT

## 2018-01-01 PROCEDURE — 74011250636 HC RX REV CODE- 250/636: Performed by: FAMILY MEDICINE

## 2018-01-01 PROCEDURE — C9113 INJ PANTOPRAZOLE SODIUM, VIA: HCPCS | Performed by: STUDENT IN AN ORGANIZED HEALTH CARE EDUCATION/TRAINING PROGRAM

## 2018-01-01 PROCEDURE — 74011250637 HC RX REV CODE- 250/637: Performed by: FAMILY MEDICINE

## 2018-01-01 PROCEDURE — 77030032490 HC SLV COMPR SCD KNE COVD -B

## 2018-01-01 PROCEDURE — 90686 IIV4 VACC NO PRSV 0.5 ML IM: CPT | Performed by: FAMILY MEDICINE

## 2018-01-01 PROCEDURE — 80307 DRUG TEST PRSMV CHEM ANLYZR: CPT

## 2018-01-01 PROCEDURE — 82272 OCCULT BLD FECES 1-3 TESTS: CPT

## 2018-01-01 PROCEDURE — 74011250636 HC RX REV CODE- 250/636: Performed by: STUDENT IN AN ORGANIZED HEALTH CARE EDUCATION/TRAINING PROGRAM

## 2018-01-01 PROCEDURE — 65660000000 HC RM CCU STEPDOWN

## 2018-01-01 PROCEDURE — 96375 TX/PRO/DX INJ NEW DRUG ADDON: CPT

## 2018-01-01 PROCEDURE — 83735 ASSAY OF MAGNESIUM: CPT

## 2018-01-01 PROCEDURE — 99285 EMERGENCY DEPT VISIT HI MDM: CPT

## 2018-01-01 PROCEDURE — 83605 ASSAY OF LACTIC ACID: CPT

## 2018-01-01 PROCEDURE — 90471 IMMUNIZATION ADMIN: CPT

## 2018-01-01 PROCEDURE — 82607 VITAMIN B-12: CPT

## 2018-01-01 PROCEDURE — 93005 ELECTROCARDIOGRAM TRACING: CPT

## 2018-01-01 PROCEDURE — 84484 ASSAY OF TROPONIN QUANT: CPT

## 2018-01-01 PROCEDURE — 74011000250 HC RX REV CODE- 250: Performed by: STUDENT IN AN ORGANIZED HEALTH CARE EDUCATION/TRAINING PROGRAM

## 2018-01-01 PROCEDURE — 84100 ASSAY OF PHOSPHORUS: CPT

## 2018-01-01 PROCEDURE — 96361 HYDRATE IV INFUSION ADD-ON: CPT

## 2018-01-01 PROCEDURE — 36600 WITHDRAWAL OF ARTERIAL BLOOD: CPT

## 2018-01-01 PROCEDURE — 85027 COMPLETE CBC AUTOMATED: CPT

## 2018-01-01 PROCEDURE — 85610 PROTHROMBIN TIME: CPT

## 2018-01-01 PROCEDURE — 82803 BLOOD GASES ANY COMBINATION: CPT

## 2018-01-01 PROCEDURE — 82746 ASSAY OF FOLIC ACID SERUM: CPT

## 2018-01-01 PROCEDURE — 82140 ASSAY OF AMMONIA: CPT

## 2018-01-01 PROCEDURE — 96374 THER/PROPH/DIAG INJ IV PUSH: CPT

## 2018-01-01 PROCEDURE — 83690 ASSAY OF LIPASE: CPT

## 2018-01-01 PROCEDURE — 85025 COMPLETE CBC W/AUTO DIFF WBC: CPT

## 2018-01-01 PROCEDURE — 83615 LACTATE (LD) (LDH) ENZYME: CPT

## 2018-01-01 RX ORDER — LACTULOSE 10 G/15ML
10 SOLUTION ORAL 2 TIMES DAILY
Status: DISCONTINUED | OUTPATIENT
Start: 2018-01-01 | End: 2018-01-01 | Stop reason: HOSPADM

## 2018-01-01 RX ORDER — LORAZEPAM 2 MG/ML
2 INJECTION INTRAMUSCULAR
Status: DISCONTINUED | OUTPATIENT
Start: 2018-01-01 | End: 2018-01-01 | Stop reason: HOSPADM

## 2018-01-01 RX ORDER — DIAZEPAM 5 MG/1
5 TABLET ORAL
Status: COMPLETED | OUTPATIENT
Start: 2018-01-01 | End: 2018-01-01

## 2018-01-01 RX ORDER — SODIUM CHLORIDE 9 MG/ML
150 INJECTION, SOLUTION INTRAVENOUS CONTINUOUS
Status: DISCONTINUED | OUTPATIENT
Start: 2018-01-01 | End: 2018-01-01 | Stop reason: HOSPADM

## 2018-01-01 RX ORDER — ONDANSETRON 2 MG/ML
4 INJECTION INTRAMUSCULAR; INTRAVENOUS
Status: COMPLETED | OUTPATIENT
Start: 2018-01-01 | End: 2018-01-01

## 2018-01-01 RX ORDER — SODIUM CHLORIDE 0.9 % (FLUSH) 0.9 %
5-10 SYRINGE (ML) INJECTION EVERY 8 HOURS
Status: DISCONTINUED | OUTPATIENT
Start: 2018-01-01 | End: 2018-01-01 | Stop reason: HOSPADM

## 2018-01-01 RX ORDER — SODIUM CHLORIDE 9 MG/ML
150 INJECTION, SOLUTION INTRAVENOUS CONTINUOUS
Status: DISCONTINUED | OUTPATIENT
Start: 2018-01-01 | End: 2018-01-01

## 2018-01-01 RX ORDER — OMEPRAZOLE 40 MG/1
CAPSULE, DELAYED RELEASE ORAL
Qty: 180 CAP | Refills: 3 | Status: SHIPPED | OUTPATIENT
Start: 2018-01-01

## 2018-01-01 RX ORDER — ENOXAPARIN SODIUM 100 MG/ML
40 INJECTION SUBCUTANEOUS EVERY 24 HOURS
Status: DISCONTINUED | OUTPATIENT
Start: 2018-01-01 | End: 2018-01-01

## 2018-01-01 RX ORDER — POTASSIUM CHLORIDE 750 MG/1
10 CAPSULE, EXTENDED RELEASE ORAL DAILY
Qty: 90 CAP | Refills: 3 | Status: SHIPPED | OUTPATIENT
Start: 2018-01-01 | End: 2019-01-01

## 2018-01-01 RX ORDER — MULTIVITAMIN WITH IRON
1 TABLET ORAL DAILY
Status: DISCONTINUED | OUTPATIENT
Start: 2018-01-01 | End: 2018-01-01 | Stop reason: SDUPTHER

## 2018-01-01 RX ORDER — SODIUM CHLORIDE 0.9 % (FLUSH) 0.9 %
5-10 SYRINGE (ML) INJECTION AS NEEDED
Status: DISCONTINUED | OUTPATIENT
Start: 2018-01-01 | End: 2018-01-01 | Stop reason: HOSPADM

## 2018-01-01 RX ORDER — OMEPRAZOLE 40 MG/1
CAPSULE, DELAYED RELEASE ORAL
Qty: 180 CAP | Refills: 3 | Status: SHIPPED | OUTPATIENT
Start: 2018-01-01 | End: 2018-01-01 | Stop reason: SDUPTHER

## 2018-01-01 RX ORDER — LORAZEPAM 0.5 MG/1
TABLET ORAL
Qty: 90 TAB | Refills: 0 | Status: SHIPPED | OUTPATIENT
Start: 2018-01-01 | End: 2018-01-01 | Stop reason: SDUPTHER

## 2018-01-01 RX ORDER — POTASSIUM CHLORIDE 1.5 G/1.77G
40 POWDER, FOR SOLUTION ORAL ONCE
Status: COMPLETED | OUTPATIENT
Start: 2018-01-01 | End: 2018-01-01

## 2018-01-01 RX ORDER — ALLOPURINOL 300 MG/1
300 TABLET ORAL 2 TIMES DAILY
Status: DISCONTINUED | OUTPATIENT
Start: 2018-01-01 | End: 2018-01-01 | Stop reason: HOSPADM

## 2018-01-01 RX ORDER — ASPIRIN 325 MG/1
100 TABLET, FILM COATED ORAL DAILY
Status: DISCONTINUED | OUTPATIENT
Start: 2018-01-01 | End: 2018-01-01

## 2018-01-01 RX ORDER — DIAZEPAM 5 MG/1
5 TABLET ORAL ONCE
Status: COMPLETED | OUTPATIENT
Start: 2018-01-01 | End: 2018-01-01

## 2018-01-01 RX ORDER — VENLAFAXINE HYDROCHLORIDE 150 MG/1
150 TABLET, EXTENDED RELEASE ORAL DAILY
COMMUNITY
End: 2019-01-01 | Stop reason: SDUPTHER

## 2018-01-01 RX ORDER — POTASSIUM CHLORIDE 7.45 MG/ML
10 INJECTION INTRAVENOUS
Status: COMPLETED | OUTPATIENT
Start: 2018-01-01 | End: 2018-01-01

## 2018-01-01 RX ORDER — MAGNESIUM SULFATE HEPTAHYDRATE 40 MG/ML
2 INJECTION, SOLUTION INTRAVENOUS ONCE
Status: COMPLETED | OUTPATIENT
Start: 2018-01-01 | End: 2018-01-01

## 2018-01-01 RX ORDER — HYDROXYZINE PAMOATE 25 MG/1
25 CAPSULE ORAL
Qty: 270 CAP | Refills: 2 | Status: SHIPPED | OUTPATIENT
Start: 2018-01-01 | End: 2018-01-01

## 2018-01-01 RX ORDER — VENLAFAXINE HYDROCHLORIDE 150 MG/1
150 CAPSULE, EXTENDED RELEASE ORAL
Status: DISCONTINUED | OUTPATIENT
Start: 2018-01-01 | End: 2018-01-01 | Stop reason: HOSPADM

## 2018-01-01 RX ORDER — LORAZEPAM 1 MG/1
2 TABLET ORAL
Status: DISCONTINUED | OUTPATIENT
Start: 2018-01-01 | End: 2018-01-01

## 2018-01-01 RX ORDER — PROCHLORPERAZINE MALEATE 5 MG
5 TABLET ORAL
Qty: 30 TAB | Refills: 1 | Status: SHIPPED | OUTPATIENT
Start: 2018-01-01 | End: 2018-01-01 | Stop reason: SDUPTHER

## 2018-01-01 RX ORDER — TESTOSTERONE CYPIONATE 200 MG/ML
400 INJECTION INTRAMUSCULAR ONCE
Qty: 2 ML | Refills: 0
Start: 2018-01-01 | End: 2018-01-01

## 2018-01-01 RX ORDER — POTASSIUM CHLORIDE 750 MG/1
10 CAPSULE, EXTENDED RELEASE ORAL DAILY
Qty: 90 CAP | Refills: 3 | Status: SHIPPED | OUTPATIENT
Start: 2018-01-01 | End: 2018-01-01 | Stop reason: SDUPTHER

## 2018-01-01 RX ORDER — POTASSIUM CHLORIDE 1.5 G/1.77G
40 POWDER, FOR SOLUTION ORAL
Status: COMPLETED | OUTPATIENT
Start: 2018-01-01 | End: 2018-01-01

## 2018-01-01 RX ORDER — IBUPROFEN 200 MG
1 TABLET ORAL DAILY
Status: DISCONTINUED | OUTPATIENT
Start: 2018-01-01 | End: 2018-01-01 | Stop reason: HOSPADM

## 2018-01-01 RX ORDER — FUROSEMIDE 40 MG/1
40 TABLET ORAL DAILY
Status: DISCONTINUED | OUTPATIENT
Start: 2018-01-01 | End: 2018-01-01 | Stop reason: HOSPADM

## 2018-01-01 RX ORDER — FOLIC ACID 1 MG/1
1 TABLET ORAL DAILY
Status: DISCONTINUED | OUTPATIENT
Start: 2018-01-01 | End: 2018-01-01 | Stop reason: HOSPADM

## 2018-01-01 RX ORDER — OMEPRAZOLE 20 MG/1
40 CAPSULE, DELAYED RELEASE ORAL DAILY
Status: DISCONTINUED | OUTPATIENT
Start: 2018-01-01 | End: 2018-01-01 | Stop reason: SDUPTHER

## 2018-01-01 RX ORDER — TRAZODONE HYDROCHLORIDE 300 MG/1
300 TABLET ORAL
COMMUNITY
End: 2018-01-01

## 2018-01-01 RX ORDER — LORAZEPAM 0.5 MG/1
TABLET ORAL
Qty: 21 TAB | Refills: 0 | Status: SHIPPED | OUTPATIENT
Start: 2018-01-01 | End: 2018-01-01

## 2018-01-01 RX ORDER — LACTULOSE 10 G/15ML
10 SOLUTION ORAL
Status: DISCONTINUED | OUTPATIENT
Start: 2018-01-01 | End: 2018-01-01

## 2018-01-01 RX ORDER — HYDROXYZINE 25 MG/1
25 TABLET, FILM COATED ORAL 2 TIMES DAILY
COMMUNITY

## 2018-01-01 RX ORDER — LORAZEPAM 2 MG/ML
4 INJECTION INTRAMUSCULAR
Status: DISCONTINUED | OUTPATIENT
Start: 2018-01-01 | End: 2018-01-01 | Stop reason: HOSPADM

## 2018-01-01 RX ORDER — PROCHLORPERAZINE MALEATE 5 MG
TABLET ORAL
Qty: 385 TAB | Refills: 1 | Status: SHIPPED | OUTPATIENT
Start: 2018-01-01 | End: 2019-01-01

## 2018-01-01 RX ORDER — LACTULOSE 10 G/15ML
3 SOLUTION ORAL; RECTAL
COMMUNITY
End: 2019-01-01

## 2018-01-01 RX ORDER — CARVEDILOL 3.12 MG/1
3.12 TABLET ORAL 2 TIMES DAILY WITH MEALS
Status: DISCONTINUED | OUTPATIENT
Start: 2018-01-01 | End: 2018-01-01 | Stop reason: HOSPADM

## 2018-01-01 RX ADMIN — POTASSIUM CHLORIDE 10 MEQ: 7.46 INJECTION, SOLUTION INTRAVENOUS at 21:36

## 2018-01-01 RX ADMIN — CARVEDILOL 3.12 MG: 3.12 TABLET, FILM COATED ORAL at 18:27

## 2018-01-01 RX ADMIN — FUROSEMIDE 40 MG: 40 TABLET ORAL at 08:58

## 2018-01-01 RX ADMIN — PANTOPRAZOLE SODIUM 40 MG: 40 INJECTION, POWDER, FOR SOLUTION INTRAVENOUS at 08:57

## 2018-01-01 RX ADMIN — LACTULOSE 10 G: 20 SOLUTION ORAL at 08:57

## 2018-01-01 RX ADMIN — Medication 10 ML: at 19:13

## 2018-01-01 RX ADMIN — SODIUM CHLORIDE 1000 ML: 900 INJECTION, SOLUTION INTRAVENOUS at 15:06

## 2018-01-01 RX ADMIN — POTASSIUM CHLORIDE 10 MEQ: 7.46 INJECTION, SOLUTION INTRAVENOUS at 08:37

## 2018-01-01 RX ADMIN — ONDANSETRON 4 MG: 2 INJECTION INTRAMUSCULAR; INTRAVENOUS at 17:11

## 2018-01-01 RX ADMIN — POTASSIUM CHLORIDE 40 MEQ: 1.5 POWDER, FOR SOLUTION ORAL at 17:16

## 2018-01-01 RX ADMIN — POTASSIUM CHLORIDE 10 MEQ: 7.46 INJECTION, SOLUTION INTRAVENOUS at 07:39

## 2018-01-01 RX ADMIN — FOLIC ACID: 5 INJECTION, SOLUTION INTRAMUSCULAR; INTRAVENOUS; SUBCUTANEOUS at 17:16

## 2018-01-01 RX ADMIN — Medication 10 ML: at 23:01

## 2018-01-01 RX ADMIN — SODIUM CHLORIDE 150 ML/HR: 900 INJECTION, SOLUTION INTRAVENOUS at 20:04

## 2018-01-01 RX ADMIN — LACTULOSE 10 G: 20 SOLUTION ORAL at 18:27

## 2018-01-01 RX ADMIN — MAGNESIUM SULFATE HEPTAHYDRATE 2 G: 40 INJECTION, SOLUTION INTRAVENOUS at 05:46

## 2018-01-01 RX ADMIN — POTASSIUM CHLORIDE 10 MEQ: 7.46 INJECTION, SOLUTION INTRAVENOUS at 20:24

## 2018-01-01 RX ADMIN — SODIUM CHLORIDE 150 ML/HR: 900 INJECTION, SOLUTION INTRAVENOUS at 19:12

## 2018-01-01 RX ADMIN — ALLOPURINOL 300 MG: 300 TABLET ORAL at 08:58

## 2018-01-01 RX ADMIN — LORAZEPAM 2 MG: 2 INJECTION INTRAMUSCULAR; INTRAVENOUS at 04:44

## 2018-01-01 RX ADMIN — FOLIC ACID 1 MG: 1 TABLET ORAL at 07:44

## 2018-01-01 RX ADMIN — ALLOPURINOL 300 MG: 300 TABLET ORAL at 20:32

## 2018-01-01 RX ADMIN — SODIUM CHLORIDE 150 ML/HR: 900 INJECTION, SOLUTION INTRAVENOUS at 02:01

## 2018-01-01 RX ADMIN — DIAZEPAM 5 MG: 5 TABLET ORAL at 15:12

## 2018-01-01 RX ADMIN — ALLOPURINOL 300 MG: 300 TABLET ORAL at 21:46

## 2018-01-01 RX ADMIN — POTASSIUM CHLORIDE 10 MEQ: 7.46 INJECTION, SOLUTION INTRAVENOUS at 22:32

## 2018-01-01 RX ADMIN — LORAZEPAM 2 MG: 2 INJECTION INTRAMUSCULAR; INTRAVENOUS at 00:47

## 2018-01-01 RX ADMIN — FUROSEMIDE 40 MG: 40 TABLET ORAL at 07:44

## 2018-01-01 RX ADMIN — FOLIC ACID: 5 INJECTION, SOLUTION INTRAMUSCULAR; INTRAVENOUS; SUBCUTANEOUS at 20:04

## 2018-01-01 RX ADMIN — SODIUM CHLORIDE 150 ML/HR: 900 INJECTION, SOLUTION INTRAVENOUS at 04:28

## 2018-01-01 RX ADMIN — DIAZEPAM 5 MG: 5 TABLET ORAL at 17:14

## 2018-01-01 RX ADMIN — Medication 10 ML: at 13:23

## 2018-01-01 RX ADMIN — SODIUM CHLORIDE: 900 INJECTION, SOLUTION INTRAVENOUS at 14:24

## 2018-01-01 RX ADMIN — LORAZEPAM 4 MG: 2 INJECTION INTRAMUSCULAR; INTRAVENOUS at 20:39

## 2018-01-01 RX ADMIN — LACTULOSE 10 G: 20 SOLUTION ORAL at 07:43

## 2018-01-01 RX ADMIN — CARVEDILOL 3.12 MG: 3.12 TABLET, FILM COATED ORAL at 07:43

## 2018-01-01 RX ADMIN — INFLUENZA VIRUS VACCINE 0.5 ML: 15; 15; 15; 15 SUSPENSION INTRAMUSCULAR at 12:57

## 2018-01-01 RX ADMIN — CARVEDILOL 3.12 MG: 3.12 TABLET, FILM COATED ORAL at 18:58

## 2018-01-01 RX ADMIN — VENLAFAXINE HYDROCHLORIDE 150 MG: 150 CAPSULE, EXTENDED RELEASE ORAL at 08:58

## 2018-01-01 RX ADMIN — Medication 10 ML: at 04:45

## 2018-01-01 RX ADMIN — SODIUM CHLORIDE 150 ML/HR: 900 INJECTION, SOLUTION INTRAVENOUS at 08:11

## 2018-01-01 RX ADMIN — LORAZEPAM 2 MG: 1 TABLET ORAL at 18:58

## 2018-01-01 RX ADMIN — PANTOPRAZOLE SODIUM 40 MG: 40 INJECTION, POWDER, FOR SOLUTION INTRAVENOUS at 19:06

## 2018-01-01 RX ADMIN — FOLIC ACID 1 MG: 1 TABLET ORAL at 08:58

## 2018-01-01 RX ADMIN — CARVEDILOL 3.12 MG: 3.12 TABLET, FILM COATED ORAL at 08:58

## 2018-01-01 RX ADMIN — SODIUM CHLORIDE 150 ML/HR: 900 INJECTION, SOLUTION INTRAVENOUS at 14:30

## 2018-01-01 RX ADMIN — ALLOPURINOL 300 MG: 300 TABLET ORAL at 07:43

## 2018-01-01 RX ADMIN — POTASSIUM CHLORIDE 10 MEQ: 7.46 INJECTION, SOLUTION INTRAVENOUS at 19:12

## 2018-01-01 RX ADMIN — VENLAFAXINE HYDROCHLORIDE 150 MG: 150 CAPSULE, EXTENDED RELEASE ORAL at 07:44

## 2018-01-01 RX ADMIN — POTASSIUM CHLORIDE 10 MEQ: 7.46 INJECTION, SOLUTION INTRAVENOUS at 10:53

## 2018-01-01 RX ADMIN — PANTOPRAZOLE SODIUM 40 MG: 40 INJECTION, POWDER, FOR SOLUTION INTRAVENOUS at 07:43

## 2018-01-01 RX ADMIN — POTASSIUM CHLORIDE 40 MEQ: 1.5 POWDER, FOR SOLUTION ORAL at 05:46

## 2018-01-01 RX ADMIN — POTASSIUM CHLORIDE 10 MEQ: 7.46 INJECTION, SOLUTION INTRAVENOUS at 09:57

## 2018-01-16 ENCOUNTER — HOSPITAL ENCOUNTER (OUTPATIENT)
Dept: MRI IMAGING | Age: 58
Discharge: HOME | End: 2018-01-13
Attending: FAMILY MEDICINE
Payer: MEDICARE

## 2018-01-16 ENCOUNTER — HOSPITAL ENCOUNTER (OUTPATIENT)
Dept: MRI IMAGING | Age: 58
Discharge: HOME | End: 2018-01-16
Attending: FAMILY MEDICINE
Payer: MEDICARE

## 2018-01-16 VITALS — WEIGHT: 280 LBS | BODY MASS INDEX: 37.97 KG/M2

## 2018-01-16 DIAGNOSIS — C22.0 HEPATOCELLULAR CARCINOMA (HCC): ICD-10-CM

## 2018-01-16 LAB — CREAT BLD-MCNC: 0.6 MG/DL (ref 0.6–1.3)

## 2018-01-16 PROCEDURE — 74011250636 HC RX REV CODE- 250/636: Performed by: FAMILY MEDICINE

## 2018-01-16 PROCEDURE — 77030021566

## 2018-01-16 PROCEDURE — A9577 INJ MULTIHANCE: HCPCS | Performed by: FAMILY MEDICINE

## 2018-01-16 PROCEDURE — 74183 MRI ABD W/O CNTR FLWD CNTR: CPT

## 2018-01-16 PROCEDURE — 82565 ASSAY OF CREATININE: CPT

## 2018-01-16 RX ADMIN — GADOBENATE DIMEGLUMINE 20 ML: 529 INJECTION, SOLUTION INTRAVENOUS at 08:49

## 2018-01-19 ENCOUNTER — TELEPHONE (OUTPATIENT)
Dept: FAMILY MEDICINE CLINIC | Age: 58
End: 2018-01-19

## 2018-01-19 DIAGNOSIS — K40.90 UNILATERAL INGUINAL HERNIA WITHOUT OBSTRUCTION OR GANGRENE, RECURRENCE NOT SPECIFIED: Primary | ICD-10-CM

## 2018-01-19 NOTE — TELEPHONE ENCOUNTER
----- Message from Ansley Astudillo sent at 1/18/2018  4:44 PM EST -----  Regarding: Dr. Criss Turk, Wife, is requesting a referral for Dr. Justice Tobar, at [facility], on January 24 at 1:45 PM. (v)[phone]. Pt's best contact number [phone].

## 2018-01-24 ENCOUNTER — OFFICE VISIT (OUTPATIENT)
Dept: SURGERY | Age: 58
End: 2018-01-24

## 2018-01-24 VITALS
BODY MASS INDEX: 38.74 KG/M2 | WEIGHT: 286 LBS | HEIGHT: 72 IN | RESPIRATION RATE: 14 BRPM | OXYGEN SATURATION: 97 % | DIASTOLIC BLOOD PRESSURE: 75 MMHG | TEMPERATURE: 98.6 F | HEART RATE: 85 BPM | SYSTOLIC BLOOD PRESSURE: 130 MMHG

## 2018-01-24 DIAGNOSIS — K42.0 INCARCERATED UMBILICAL HERNIA: ICD-10-CM

## 2018-01-24 NOTE — PROGRESS NOTES
1. Have you been to the ER, urgent care clinic since your last visit? Hospitalized since your last visit?no    2. Have you seen or consulted any other health care providers outside of the [address] [place] [address] since your last visit? Include any pap smears or colon screening.  no

## 2018-01-24 NOTE — PROGRESS NOTES
Surgery History and Physical    Subjective:      Jen Rush is a 56 y.o. white male who presents for evaluation of an umbilical hernia. Mr. Devin Hawkins has had a bulge at his umbilicus for at least the past couple of years. The bulge has increased in size and is causing mild discomfort. He has been able to reduce it. He is eating fine and moving his bowels normally. He denies any previous hernia repairs or abdominal surgery. He has alcohol cirrhosis and has had paracentesis once for ascites. Last month, his CT revealed an incarcerated umbilical hernia with a small amount of ascites, but not enough to drain. Past Medical History:   Diagnosis Date    Alcohol abuse 6/1/2011    Anemia NEC     Cirrhosis of liver ([facility] [address]     Depression     Gastric ulcer, unspecified as acute or chronic, without mention of hemorrhage, perforation, or obstruction     GERD (gastroesophageal reflux disease)     Gout     Hypertension     Liver disease     cirrosis    Obesity, Class II, BMI 35-39.9     Pneumonia     PUD (peptic ulcer disease)      Past Surgical History:   Procedure Laterality Date    HX HEENT      wisdom teeth      Family History   Problem Relation Age of Onset    Asthma Mother     Other Father      history of fall multiple injuries    Hypertension Brother     Cancer Paternal Grandmother      uncertain if colon or stomach    Cancer Paternal Grandfather      stomach cancer- dx mid [de-identified]     Social History   Substance Use Topics    Smoking status: Former Smoker     Quit date: 6/28/1985    Smokeless tobacco: Current User     Types: Chew      Comment: 1 can per day     Alcohol use 0.0 oz/week      Comment: daily, has tried to stop a few times unsuccessfully      Prior to Admission medications    Medication Sig Start Date End Date Taking?  Authorizing Provider   spironolactone (ALDACTONE) 25 mg tablet TAKE 1 TABLET TWICE DAILY 10/30/17  Yes Sharlene Borrego NP   omeprazole (PRILOSEC) 40 mg capsule TAKE 1 CAPSULE TWICE DAILY 10/6/17  Yes Lambert Valentine NP   LORazepam (ATIVAN) 1 mg tablet TAKE 1 TABLET TWICE DAILY (MAXIMUM DAILY AMOUNT 2MG) 10/3/17  Yes Lambert Valentine NP   traZODone (DESYREL) 300 mg tablet TAKE 1 TABLET EVERY NIGHT 10/3/17  Yes Lambert Valentine NP   ferrous sulfate (IRON) 325 mg (65 mg iron) EC tablet Take 1 Tab by mouth daily. 9/12/17  Yes Lambert Valentine NP   ergocalciferol (ERGOCALCIFEROL) 50,000 unit capsule Take 1 Cap by mouth every seven (7) days. 9/11/17  Yes Lambert Valentine NP   potassium chloride SA (MICRO-K) 10 mEq capsule TAKE 1 CAPSULE EVERY DAY 8/15/17  Yes Brigid Elizabeth MD   furosemide (LASIX) 20 mg tablet TAKE 2 TABLETS IN THE MORNING AND TAKE 1 TABLET IN THE EVENING FOR FLUID RETENTION 8/15/17  Yes Lambert Valentine NP   cyclobenzaprine (FLEXERIL) 10 mg tablet TAKE 1 TABLET THREE TIMES DAILY AS NEEDED 6/6/17  Yes Lambert Valentine NP   venlafaxine-SR [facility] 150 mg capsule TAKE 1 CAPSULE EVERY DAY 6/6/17  Yes Lambert Valentine NP   allopurinol (ZYLOPRIM) 300 mg tablet TAKE 1 TABLET TWICE DAILY 6/6/17  Yes Kisha Elizabeth MD   lactulose (CHRONULAC) 10 gram/15 mL solution Take 10 g by mouth two (2) times a day. Yes Historical Provider      Allergies   Allergen Reactions    Onion Nausea and Vomiting    Statins-Hmg-Coa Reductase Inhibitors Unknown (comments)       Review of Systems:  A comprehensive review of systems was negative except for that written in the History of Present Illness. Objective:      Physical Exam:  GENERAL: alert, cooperative, no distress, appears stated age, EYE: negative findings: anicteric sclera, LYMPHATIC: Cervical, supraclavicular nodes normal. , THROAT & NECK: neck supple and symmetrical.  The thyroid is grossly normal., LUNG: clear to auscultation bilaterally, HEART: regular rate and rhythm, ABDOMEN: Soft, obese, NT, ND.   There is a partially reducible umbilical hernia., EXTREMITIES:  edema mild nonpitting b/l., SKIN: Small scratch a few cm above the umbilicus., NEUROLOGIC: negative, PSYCHIATRIC: non focal    Assessment:     Incarcerated umbilical hernia without gangrene or obstruction. Plan:     Mr. Rylie Aggarwal would like to have his hernia repaired on 2/5. I discussed the risks of the procedure including bleeding, infection, wound healing problems, recurrence of the hernia, seroma, injury to the bowel, blood clots, and reaction to the prep or local and general anesthetic. He understands the risks; any and all questions were answered to his satisfaction. Mr. Rylie Aggarwal will be scheduled for an elective outpatient robotic-assisted laparoscopic umbilical herniorrhaphy with mesh, possible open under general anesthesia. I personally reviewed the CT film.     Signed By: Viridiana Elias MD     January 24, 2018

## 2018-01-29 ENCOUNTER — HOSPITAL ENCOUNTER (OUTPATIENT)
Dept: PREADMISSION TESTING | Age: 58
Discharge: HOME | End: 2018-01-29
Payer: MEDICARE

## 2018-01-29 VITALS
SYSTOLIC BLOOD PRESSURE: 154 MMHG | BODY MASS INDEX: 39.06 KG/M2 | TEMPERATURE: 98.8 F | OXYGEN SATURATION: 96 % | RESPIRATION RATE: 20 BRPM | HEIGHT: 72 IN | DIASTOLIC BLOOD PRESSURE: 83 MMHG | WEIGHT: 288.36 LBS | HEART RATE: 84 BPM

## 2018-01-29 LAB
ALBUMIN SERPL-MCNC: 3.4 G/DL (ref 3.5–5)
ALBUMIN/GLOB SERPL: 0.8 {RATIO} (ref 1.1–2.2)
ALP SERPL-CCNC: 151 U/L (ref 45–117)
ALT SERPL-CCNC: 27 U/L (ref 12–78)
ANION GAP SERPL CALC-SCNC: 7 MMOL/L (ref 5–15)
APTT PPP: 33.7 SEC (ref 22.1–32.5)
AST SERPL-CCNC: 39 U/L (ref 15–37)
ATRIAL RATE: 77 BPM
BASOPHILS # BLD: 0 K/UL (ref 0–0.1)
BASOPHILS NFR BLD: 1 % (ref 0–1)
BILIRUB SERPL-MCNC: 1.5 MG/DL (ref 0.2–1)
BUN SERPL-MCNC: 3 MG/DL (ref 6–20)
BUN/CREAT SERPL: 4 (ref 12–20)
CALCIUM SERPL-MCNC: 8.3 MG/DL (ref 8.5–10.1)
CALCULATED P AXIS, ECG09: 5 DEGREES
CALCULATED R AXIS, ECG10: -34 DEGREES
CALCULATED T AXIS, ECG11: 47 DEGREES
CHLORIDE SERPL-SCNC: 102 MMOL/L (ref 97–108)
CO2 SERPL-SCNC: 29 MMOL/L (ref 21–32)
CREAT SERPL-MCNC: 0.69 MG/DL (ref 0.7–1.3)
DIAGNOSIS, 93000: NORMAL
DIFFERENTIAL METHOD BLD: ABNORMAL
EOSINOPHIL # BLD: 0.1 K/UL (ref 0–0.4)
EOSINOPHIL NFR BLD: 3 % (ref 0–7)
ERYTHROCYTE [DISTWIDTH] IN BLOOD BY AUTOMATED COUNT: 19.8 % (ref 11.5–14.5)
GLOBULIN SER CALC-MCNC: 4.3 G/DL (ref 2–4)
GLUCOSE SERPL-MCNC: 139 MG/DL (ref 65–100)
HCT VFR BLD AUTO: 30.8 % (ref 36.6–50.3)
HGB BLD-MCNC: 9 G/DL (ref 12.1–17)
IMM GRANULOCYTES # BLD: 0 K/UL (ref 0–0.04)
IMM GRANULOCYTES NFR BLD AUTO: 0 % (ref 0–0.5)
INR PPP: 1.4 (ref 0.9–1.1)
LYMPHOCYTES # BLD: 0.9 K/UL (ref 0.8–3.5)
LYMPHOCYTES NFR BLD: 24 % (ref 12–49)
MCH RBC QN AUTO: 20.5 PG (ref 26–34)
MCHC RBC AUTO-ENTMCNC: 29.2 G/DL (ref 30–36.5)
MCV RBC AUTO: 70.2 FL (ref 80–99)
MONOCYTES # BLD: 0.6 K/UL (ref 0–1)
MONOCYTES NFR BLD: 16 % (ref 5–13)
NEUTS SEG # BLD: 2.3 K/UL (ref 1.8–8)
NEUTS SEG NFR BLD: 56 % (ref 32–75)
NRBC # BLD: 0 K/UL (ref 0–0.01)
NRBC BLD-RTO: 0 PER 100 WBC
P-R INTERVAL, ECG05: 150 MS
PLATELET # BLD AUTO: 89 K/UL (ref 150–400)
PMV BLD AUTO: ABNORMAL FL (ref 8.9–12.9)
POTASSIUM SERPL-SCNC: 3.8 MMOL/L (ref 3.5–5.1)
PROT SERPL-MCNC: 7.7 G/DL (ref 6.4–8.2)
PROTHROMBIN TIME: 14.7 SEC (ref 9–11.1)
Q-T INTERVAL, ECG07: 408 MS
QRS DURATION, ECG06: 110 MS
QTC CALCULATION (BEZET), ECG08: 461 MS
RBC # BLD AUTO: 4.39 M/UL (ref 4.1–5.7)
RBC MORPH BLD: ABNORMAL
SODIUM SERPL-SCNC: 138 MMOL/L (ref 136–145)
THERAPEUTIC RANGE,PTTT: ABNORMAL SECS (ref 58–77)
VENTRICULAR RATE, ECG03: 77 BPM
WBC # BLD AUTO: 3.9 K/UL (ref 4.1–11.1)

## 2018-01-29 PROCEDURE — 85730 THROMBOPLASTIN TIME PARTIAL: CPT | Performed by: SURGERY

## 2018-01-29 PROCEDURE — 80053 COMPREHEN METABOLIC PANEL: CPT | Performed by: SURGERY

## 2018-01-29 PROCEDURE — 36415 COLL VENOUS BLD VENIPUNCTURE: CPT | Performed by: SURGERY

## 2018-01-29 PROCEDURE — 93005 ELECTROCARDIOGRAM TRACING: CPT

## 2018-01-29 PROCEDURE — 85025 COMPLETE CBC W/AUTO DIFF WBC: CPT | Performed by: SURGERY

## 2018-01-29 PROCEDURE — 85610 PROTHROMBIN TIME: CPT | Performed by: SURGERY

## 2018-01-29 NOTE — PERIOP NOTES
1440 - Call placed to [place] [phone] for medication verification    [address] form faxed to Dr. Hannah Wesley and email sent to HERMILO Sellers RN    5726 - Medical release faxed to Dr. Erma Fishman office, last office visit note requested

## 2018-01-29 NOTE — PERIOP NOTES
[facility]  PREOPERATIVE INSTRUCTIONS    Surgery Date:   Monday 2/5/18      Surgery arrival time given by surgeon: NO  (If [facility] staff will call you between 3pm - 7pm the day before surgery with your arrival time. If your surgery is on a Monday, we will call you the preceding Friday. Please call [phone] after 7pm if you did not receive your arrival time.)  1. Report  to the 2nd Floor Admitting Desk on the day of your surgery. Bring your insurance card, photo identification, and any copayment (if applicable). 2. You must have a responsible adult to drive you home and stay with you the first 24 hours after surgery if you are going home the same day of your surgery. 3. Nothing to eat or drink after midnight the night before surgery. This means NO water, gum, mints, coffee, juice, etc.    4. MEDICATIONS TO TAKE THE MORNING OF SURGERY WITH A SIP OF WATER:ativan if needed, prilosec, lasix, spironolactone, effexor. HOLD lactulose on the day of surgery. STOP vitamins today. 5. No alcoholic beverages 24 hours before and after your surgery. 6. If you are being admitted to the hospital,please leave personal belongings/luggage in your car until you have an assigned hospital room number. ( The hospital discharge time is 12 PM NOON. Your adult  should be at the hospital prior to the noon discharge time unless otherwise instructed.)   7. STOP Aspirin and/or any non-steroidal anti-inflammatory drugs (i.e. Ibuprofen, Naproxen, Advil, Aleve) as directed by your surgeon. You may take Tylenol. Stop herbal supplements 1 week prior to  surgery. 8. If you are currently taking Plavix, Coumadin,or any other blood-thinning/ anticoagulant medication contact your surgeon for instructions. 9. Wear comfortable clothes. Wear your glasses instead of contacts. Please leave all money, jewelry and valuables at home. No make up, particularly mascara, the day of surgery.    10.  REMOVE ALL body piercings, rings,and jewelry and leave at home.  Wear your hair loose or down, no pony-tails, buns, or any metal hair clips. 11. If you shower the morning of surgery, please do not apply any lotions, powders, or deodorants afterwards. Do not shave any body area within 24 hours of your surgery. 12. Please follow all instructions to avoid any potential surgical cancellation. 13. Should your physical condition change, (i.e. fever, cold, flu, etc.) please notify your surgeon as soon as possible. 14. It is important to be on time. If a situation occurs where you may be delayed, please call:  [phone] / [phone] on the day of surgery. 15. The Preadmission Testing staff can be reached at [phone]. 16. Special instructions: free  parking 7am-5pm  17. The patient was contacted  in person. He  verbalize  understanding of all instructions does not  need reinforcement.

## 2018-01-31 ENCOUNTER — DOCUMENTATION ONLY (OUTPATIENT)
Dept: SURGERY | Age: 58
End: 2018-01-31

## 2018-01-31 ENCOUNTER — TELEPHONE (OUTPATIENT)
Dept: SURGERY | Age: 58
End: 2018-01-31

## 2018-01-31 NOTE — PERIOP NOTES
Vadim @ Dr. Villatoro [facility]' office was notified of requested cardiac clearance from Dr. Debbie Pacheco.

## 2018-01-31 NOTE — PROGRESS NOTES
1/31/18 - 1141 AM    I spoke to Mrs. Yovani Pereira. I notified her that her 's labs were slightly abnormal and suggested that he be seen and cleared by his liver specialist prior to his elective hernia repair. She will try to get him in to see Dr. Avinash Siddiqui and have the information sent back to me. His procedure for 2/5/18 will be cancelled for now.

## 2018-01-31 NOTE — TELEPHONE ENCOUNTER
Received call from [facility] with PAT regarding pt lab work. Confirmed receipt of fax. I will make Dr. Gill Rodriguez aware of the results.

## 2018-01-31 NOTE — TELEPHONE ENCOUNTER
Returned call to Mrs. Darrell Titus. Per Mrs. Darrell Titus someone from this office called her back.

## 2018-01-31 NOTE — TELEPHONE ENCOUNTER
Please notify pt that after further investigation, I have found that there is in fact an increased risk for reaction to antabuse with even applying rubbing alcohol to the skin. Therefore he should not apply rubbing alcohol, and I would be more than happy to prescribe him something for itching instead. Discuss with pt and wife that he should avoid all disguised forms of alcohol including tonics, mouthwashes, cough mixtures, sauces, vinegars, aftershave lotions, and back rubs. Let me know if they have any questions!
Spoke with patient wife(HIPPA) in regards to NP recommendations;stated she understood.
Abdomen soft, non-tender, no guarding. nd

## 2018-01-31 NOTE — PERIOP NOTES
Discussed pt. Status and METs level, as well as medical history with Dr. Linh Day. Requested cardiac clearance due to CP with walking or bending over.

## 2018-01-31 NOTE — PERIOP NOTES
Spoke with Dominic at Dr. Lorrie Melo office to assure had received lab results and aware of change in labs.

## 2018-02-06 ENCOUNTER — TELEPHONE (OUTPATIENT)
Dept: CARDIOLOGY CLINIC | Age: 58
End: 2018-02-06

## 2018-02-06 ENCOUNTER — TELEPHONE (OUTPATIENT)
Dept: SURGERY | Age: 58
End: 2018-02-06

## 2018-02-06 NOTE — TELEPHONE ENCOUNTER
Pt's wife, Raul Castro, called to schedule an appointment for surgical clearance for the pt. Pt was scheduled for an umbilical hernia surgery on 2/19 and it has been cancelled due to the pt needed cardiac clearance. Please give her a call back @ [phone]. She said it's okay to leave a message. Thanks!   Tod Deutsch

## 2018-02-06 NOTE — TELEPHONE ENCOUNTER
Spoke with patients wife informed her patient needs cardiac clearance in order to proceed with surgery based on his response to questions asked during PAT testing. She stated patient seen Dr Brina Underwood at [facility] back in the summer time. [phone] she will contact the office and schedule an appointment she is aware patient will not get surgery until he is cleared by a cardiologist. She did not voice any further questions or concerns. Will call office if she needs any further assitance.

## 2018-02-14 ENCOUNTER — TELEPHONE (OUTPATIENT)
Dept: SURGERY | Age: 58
End: 2018-02-14

## 2018-02-14 NOTE — TELEPHONE ENCOUNTER
Called to follow up with patient regarding patient getting cardiac clearance. Spoke with patients wife, she said she did call to schedule cardiac clearance for him but since than the patient has decided to withdraw from having surgery. She states he is not in any pain and his parents talked him out of it, they dont feel he should get the surgery because he will be put to sleep.  Will call office if anything changes, I have informed Dr Juan Carlos Aquino of patient canceling surgery

## 2018-02-24 DIAGNOSIS — I10 ESSENTIAL HYPERTENSION: Chronic | ICD-10-CM

## 2018-02-24 RX ORDER — SPIRONOLACTONE 25 MG/1
TABLET ORAL
Qty: 180 TAB | Refills: 1 | Status: SHIPPED | OUTPATIENT
Start: 2018-02-24 | End: 2018-04-06 | Stop reason: SDUPTHER

## 2018-03-24 ENCOUNTER — OP HISTORICAL/CONVERTED ENCOUNTER (OUTPATIENT)
Dept: OTHER | Age: 58
End: 2018-03-24

## 2018-03-30 ENCOUNTER — OFFICE VISIT (OUTPATIENT)
Dept: CARDIOLOGY CLINIC | Age: 58
End: 2018-03-30

## 2018-03-30 ENCOUNTER — TELEPHONE (OUTPATIENT)
Dept: HEMATOLOGY | Age: 58
End: 2018-03-30

## 2018-03-30 VITALS
BODY MASS INDEX: 38.74 KG/M2 | HEART RATE: 68 BPM | HEIGHT: 72 IN | WEIGHT: 286 LBS | DIASTOLIC BLOOD PRESSURE: 60 MMHG | SYSTOLIC BLOOD PRESSURE: 100 MMHG

## 2018-03-30 DIAGNOSIS — I85.00 IDIOPATHIC ESOPHAGEAL VARICES WITHOUT BLEEDING (HCC): ICD-10-CM

## 2018-03-30 DIAGNOSIS — R07.9 CHEST PAIN, UNSPECIFIED TYPE: Primary | ICD-10-CM

## 2018-03-30 DIAGNOSIS — K74.69 OTHER CIRRHOSIS OF LIVER (HCC): ICD-10-CM

## 2018-03-30 DIAGNOSIS — R06.02 SOB (SHORTNESS OF BREATH): ICD-10-CM

## 2018-03-30 DIAGNOSIS — I10 ESSENTIAL HYPERTENSION: ICD-10-CM

## 2018-03-30 RX ORDER — TRAZODONE HYDROCHLORIDE 300 MG/1
300 TABLET ORAL
COMMUNITY
End: 2018-04-06 | Stop reason: SDUPTHER

## 2018-03-30 RX ORDER — CARVEDILOL 3.12 MG/1
TABLET ORAL 2 TIMES DAILY WITH MEALS
COMMUNITY
End: 2018-04-06 | Stop reason: SDUPTHER

## 2018-03-30 RX ORDER — ISOSORBIDE MONONITRATE 30 MG/1
TABLET, EXTENDED RELEASE ORAL DAILY
COMMUNITY
End: 2018-01-01

## 2018-03-30 RX ORDER — FUROSEMIDE 40 MG/1
TABLET ORAL DAILY
COMMUNITY
End: 2018-04-06 | Stop reason: SDUPTHER

## 2018-03-30 NOTE — PROGRESS NOTES
LAST OFFICE VISIT : 5/5/2017        ICD-10-CM ICD-9-CM   1. Chest pain, unspecified type R07.9 786.50   2. Essential hypertension I10 401.9   3. SOB (shortness of breath) R06.02 786.05   4. Idiopathic esophageal varices without bleeding (HCC) I85.00 456.1   5. Other cirrhosis of liver [facility]) K74.69 571.5            Elijah Hernandez. is a 56 y.o. male with hypertension referred for 6 month follow up. Cardiac risk factors: male gender, hypertension  I have personally obtained the history from the patient. HISTORY OF PRESENTING ILLNESS     Overall the pt states he is doing well. The pt went to the ED due to chest pain and rectal bleeding. He was also found to be anemic at that time. The pt states that he is not drinking as much as he used to, he is only drinking a beer a day. He states that he got an echo and a nuclear stress test while he was in the ED. The pt reports that he is getting chest pain when he is exerting himself. This pain has been going on for the last few years but has worsened within the last year. He states that he has been struggling with fluid retention in his abdomen, he is on lasix and spironolactone for this. The pt was told that he is in the beginning stages or cirrhosis. He was told that this pressure in his chest is due to his liver disease and the fluid retention. The pt states that his HGB in the hospital was 7.9 and he did not receive any transfusions. He reports that he isn't being followed by a hematologist and last got a iron transfusion a year ago. The pt states that he has made adjustments in his diet and has lost 18 lbs doing this. The patient denies orthopnea, PND, palpitations, syncope, presyncope or fatigue.          ACTIVE PROBLEM LIST     Patient Active Problem List    Diagnosis Date Noted    Chest pain 03/30/2018    Incarcerated umbilical hernia 65/83/8651    Gallbladder calculus without cholecystitis 07/21/2016    Hyponatremia 07/05/2016    Iron deficiency anemia 03/03/2016    Esophageal varices without bleeding ([facility] 03/01/2016    [facility] cirrhosis [facility] 03/01/2016    Prediabetes 01/11/2016    Thrombocytopenia ([facility] 10/17/2013    Cirrhosis ([facility] 10/17/2013    Splenomegaly 10/17/2013    History of alcohol abuse 10/17/2013    Hypogonadism, male 10/10/2013    Recurrent major depressive disorder ([facility] 08/11/2013    Alcohol dependence with withdrawal ([facility] 07/25/2013    Depression 07/25/2013    Anxiety 07/25/2013    GI bleed 06/28/2011    Chest pain, unspecified 06/28/2011    Gout 06/27/2011    HTN (hypertension) 06/01/2011           PAST MEDICAL HISTORY     Past Medical History:   Diagnosis Date    Adverse effect of anesthesia     pt reports waking up during colonoscopy/endoscopy    Alcohol abuse 6/1/2011    Anemia NEC     Anxiety     Ascites     has to have fluid drained occasionally    Cirrhosis of liver (HCC)     Depression     Gastric ulcer, unspecified as acute or chronic, without mention of hemorrhage, perforation, or obstruction     GERD (gastroesophageal reflux disease)     Gout     Hypertension     Liver disease     cirrhosis    Obesity, Class II, BMI 35-39.9     Pneumonia     PUD (peptic ulcer disease)     Seizures ([facility] 2013    from alcohol withdrawal    Stroke ([facility] 2013    per pt, possible mini stroke from alcohol withdrawal (same time as seizure)           PAST SURGICAL HISTORY     Past Surgical History:   Procedure Laterality Date    HX ENDOSCOPY      also colonoscopy    HX GI  1998    rectal abscess surgery    HX GI  1998    rectal abscess surgery    HX HEENT  1980    wisdom teeth          ALLERGIES     Allergies   Allergen Reactions    Onion Nausea and Vomiting    Statins-Hmg-Coa Reductase Inhibitors Unknown (comments)          FAMILY HISTORY     Family History   Problem Relation Age of Onset    Asthma Mother     Other Father      history of fall multiple injuries    Hypertension Brother     Cancer Paternal Grandmother      uncertain if colon or stomach    Cancer Paternal Grandfather      stomach cancer- dx mid [de-identified]    negative for cardiac disease       SOCIAL HISTORY     Social History     Social History    Marital status:      Spouse name: N/A    Number of children: N/A    Years of education: N/A     Occupational History    Landscaping      Social History Main Topics    Smoking status: Never Smoker    Smokeless tobacco: Current User     Types: Chew    Alcohol use 14.4 oz/week     24 Cans of beer per week    Drug use: No    Sexual activity: Yes     Partners: Female     Other Topics Concern    None     Social History Narrative         MEDICATIONS     Current Outpatient Prescriptions   Medication Sig    traZODone (DESYREL) 300 mg tablet Take 300 mg by mouth nightly.  isosorbide mononitrate ER (IMDUR) 30 mg tablet Take  by mouth daily.  carvedilol (COREG) 3.125 mg tablet Take  by mouth two (2) times daily (with meals).  furosemide (LASIX) 40 mg tablet Take  by mouth daily.  spironolactone (ALDACTONE) 25 mg tablet TAKE 1 TABLET TWICE DAILY    omeprazole (PRILOSEC) 40 mg capsule TAKE 1 CAPSULE TWICE DAILY    LORazepam (ATIVAN) 1 mg tablet TAKE 1 TABLET TWICE DAILY (MAXIMUM DAILY AMOUNT 2MG) (Patient taking differently: TAKE 1 TABLET TWICE DAILY (MAXIMUM DAILY AMOUNT 2MG))    potassium chloride SA (MICRO-K) 10 mEq capsule TAKE 1 CAPSULE EVERY DAY (Patient taking differently: TAKE 1 CAPSULE EVERY DAY - QHS)    cyclobenzaprine (FLEXERIL) 10 mg tablet TAKE 1 TABLET THREE TIMES DAILY AS NEEDED    venlafaxine-SR (EFFEXOR-XR) 150 mg capsule TAKE 1 CAPSULE EVERY DAY (Patient taking differently: TAKE 1 CAPSULE EVERY DAY - 7am)    allopurinol (ZYLOPRIM) 300 mg tablet TAKE 1 TABLET TWICE DAILY    lactulose (CHRONULAC) 10 gram/15 mL solution Take 10 g by mouth two (2) times a day. No current facility-administered medications for this visit.         I have reviewed the nurses notes, vitals, problem list, allergy list, medical history, family, social history and medications. REVIEW OF SYMPTOMS      General: Pt denies excessive weight gain or loss. Pt is able to conduct ADL's  HEENT: Denies blurred vision, headaches, hearing loss, epistaxis and difficulty swallowing. Respiratory: Denies cough, congestion, shortness of breath, MAZA, wheezing or stridor. Cardiovascular: Denies precordial pain, palpitations, edema or PND  Gastrointestinal: Denies poor appetite, indigestion, abdominal pain or blood in stool  Genitourinary: Denies hematuria, dysuria, increased urinary frequency  Musculoskeletal: Denies joint pain or swelling from muscles or joints  Neurologic: Denies tremor, paresthesias, headache, or sensory motor disturbance  Psychiatric: Denies confusion, insomnia, depression  Integumentray: Denies rash, itching or ulcers. Hematologic: Denies easy bruising, bleeding     PHYSICAL EXAMINATION      Vitals:    03/30/18 0922   BP: 100/60   Pulse: 68   Weight: 286 lb (129.7 kg)   Height: 6' (1.829 m)     General: Well developed, in no acute distress. HEENT: No jaundice, oral mucosa moist, no oral ulcers  Neck: Supple, no stiffness, no lymphadenopathy, supple  Heart:  Normal S1/S2 negative S3 or S4. Regular, no murmur, gallop or rub, no jugular venous distention  Respiratory: Clear bilaterally x 4, no wheezing or rales    Extremities:  No edema, normal cap refill, no cyanosis. Musculoskeletal: No clubbing, no deformities  Neuro: A&Ox3, speech clear, gait stable, cooperative, no focal neurologic deficits  Skin: Skin color is normal. No rashes or lesions. Non diaphoretic, moist.       DIAGNOSTIC DATA     1. Lipids  4/6/17- , HDL 41, LDL 77, TG 72  (9/8/17) , HDL 40, LDL 64, TG 61    2. Echo  (5/5/17) - LVEF 50% akinesis of the apical septal walls. LA mildly dilated.  AV leaflets sclerosed         LABORATORY DATA            Lab Results   Component Value Date/Time    WBC 3.9 (L) 01/29/2018 02:54 PM    Hemoglobin (POC) 14.3 03/13/2014 08:16 PM    HGB 9.0 (L) 01/29/2018 02:54 PM    Hematocrit (POC) 42 03/13/2014 08:16 PM    HCT 30.8 (L) 01/29/2018 02:54 PM    PLATELET 89 (L) 16/94/4666 02:54 PM    MCV 70.2 (L) 01/29/2018 02:54 PM      Lab Results   Component Value Date/Time    Sodium 138 01/29/2018 02:54 PM    Potassium 3.8 01/29/2018 02:54 PM    Chloride 102 01/29/2018 02:54 PM    CO2 29 01/29/2018 02:54 PM    Anion gap 7 01/29/2018 02:54 PM    Glucose 139 (H) 01/29/2018 02:54 PM    BUN 3 (L) 01/29/2018 02:54 PM    Creatinine 0.69 (L) 01/29/2018 02:54 PM    BUN/Creatinine ratio 4 (L) 01/29/2018 02:54 PM    GFR est AA >60 01/29/2018 02:54 PM    GFR est non-AA >60 01/29/2018 02:54 PM    Calcium 8.3 (L) 01/29/2018 02:54 PM    Bilirubin, total 1.5 (H) 01/29/2018 02:54 PM    AST (SGOT) 39 (H) 01/29/2018 02:54 PM    Alk. phosphatase 151 (H) 01/29/2018 02:54 PM    Protein, total 7.7 01/29/2018 02:54 PM    Albumin 3.4 (L) 01/29/2018 02:54 PM    Globulin 4.3 (H) 01/29/2018 02:54 PM    A-G Ratio 0.8 (L) 01/29/2018 02:54 PM    ALT (SGPT) 27 01/29/2018 02:54 PM           ASSESSMENT/RECOMMENDATIONS:.      1. Chest discomfort  - he has had all his cardiac testing done at [facility] and was told that he has scar on his heart and that his function is reduced by 10%. I am unclear whether or not his symptoms are related to ascites, increased abdominal girth from his underlying liver disease or his anemia. although he is clearly inactive, we discussed an exercise program as well. - Today since he is somewhat bradycardic I am going to ask him to hold his imdur over the weeked. 2. Hx of ETOH/ esophageal varices without bleeding and hepatic cirrhosis  - Will ask Dr. Lena Dinero to see because he wants to stay in the [facility]. 3. Hypertension  - he is actually hypotensive today so I will have him hold his imdur. 4. Anemia   - He is scheduled to see Dr. Kanchan Winters on the 18th  5.  Return in 3 months or PRN. Orders Placed This Encounter   [address] Hepatology ref [facility]     Referral Priority:   Routine     Referral Type:   Consultation     Referral Reason:   Specialty Services Required     Referred to Provider:   Dayami Martinez MD    traZODone (DESYREL) 300 mg tablet     Sig: Take 300 mg by mouth nightly.  isosorbide mononitrate ER (IMDUR) 30 mg tablet     Sig: Take  by mouth daily.  carvedilol (COREG) 3.125 mg tablet     Sig: Take  by mouth two (2) times daily (with meals).  furosemide (LASIX) 40 mg tablet     Sig: Take  by mouth daily. Follow-up Disposition:  Return in about 6 months (around 9/30/2018). I have discussed the diagnosis with  Ashlyn Patiño. and the intended plan as seen in the above orders. Questions were answered concerning future plans. I have discussed medication side effects and warnings with the patient as well. Thank you,  Fior Bergeron NP for involving me in the care of  Ashlyn Patiño. . Please do not hesitate to contact me for further questions/concerns. Written by Ar Back, as dictated by Valerie Bergeron MD.     Yanet Joyce MD, [facility]    Patient Care Team:  Fior Bergeron NP as PCP - General (Nurse Practitioner)  Dionte Gannon as [address] [place] [address] MD Yue (Hematology and Oncology)  Corby Oakes MD (Gastroenterology)  Wing Olga MD (Surgery)  Valerie Bergeron MD (Cardiology)    [address]     [address]      [phone] / [phone] Fax

## 2018-03-30 NOTE — MR AVS SNAPSHOT
[address] [id number] [phone] Patient: Allyssa Arriaga. MRN: [id number] :1960 Visit Information Date & Time Provider Department Dept. Phone Encounter #  
 3/30/2018  9:20 AM Patrice Tucker MD [facility] Bailey Arciniega [phone] [id number] Follow-up Instructions Return in about 6 months (around 2018). Your Appointments 2018  8:00 AM  
Complete Physical with Omar Keith DO [address] ([facility]) Appt Note: cpe with fasting labs N [address] [place] [address] [address] [address] [phone]  
  
   
 [address] [place] [address] [address] [address]  
  
    
 2018  1:30 PM  
ESTABLISHED PATIENT with Kasey White MD  
[facility]) Appt Note: [place] - anemia, thrombocytopenia f/u.  
 [address] [place] [address], [facility] [address]  
[phone]  
  
   
 [address] [place] [address], Critical access hospital [address] Upcoming Health Maintenance Date Due Pneumococcal 19-64 Highest Risk (2 of 3 - PCV13) 2012 MEDICARE YEARLY EXAM 2018 COLONOSCOPY 2022 DTaP/Tdap/Td series (2 - Td) 2022 Allergies as of 3/30/2018  Review Complete On: 3/30/2018 By: Patrice Tucker MD  
  
 Severity Noted Reaction Type Reactions Onion Medium 2011   Side Effect Nausea and Vomiting Statins-hmg-coa Reductase Inhibitors  2016    Unknown (comments) Current Immunizations  Reviewed on 10/18/2017 Name Date Hep A Vaccine (Adult) 2016, 2015 Hep B Vaccine (Adult) 2016, 2015, 2015 Influenza Vaccine (Quad) PF 2017  9:46 AM, 10/24/2016 12:01 PM, 2015 TDAP Vaccine 2012  3:11 PM  
 ZZZ-RETIRED (DO NOT USE) Pneumococcal Vaccine (Unspecified Type) 2011  8:25 PM  
  
 Not reviewed this visit You Were Diagnosed With   
  
 Codes Comments Chest pain, unspecified type    -  Primary ICD-10-CM: R07.9 ICD-9-CM: 786.50 Essential hypertension     ICD-10-CM: I10 
ICD-9-CM: 401.9 SOB (shortness of breath)     ICD-10-CM: R06.02 
ICD-9-CM: 786.05 Vitals BP Pulse Height(growth percentile) Weight(growth percentile) BMI Smoking Status 100/60 68 6' (1.829 m) 286 lb (129.7 kg) 38.79 kg/m2 Never Smoker Vitals History BMI and BSA Data Body Mass Index Body Surface Area 38.79 kg/m 2 2.57 m 2 Preferred Pharmacy Pharmacy Name Phone [address] [place] [address] [phone] Your Updated Medication List  
  
   
This list is accurate as of 3/30/18  9:56 AM.  Always use your most recent med list.  
  
  
  
  
 allopurinol 300 mg tablet Commonly known as:  ZYLOPRIM  
TAKE 1 TABLET TWICE DAILY COREG 3.125 mg tablet Generic drug:  carvedilol Take  by mouth two (2) times daily (with meals). cyclobenzaprine 10 mg tablet Commonly known as:  FLEXERIL  
TAKE 1 TABLET THREE TIMES DAILY AS NEEDED  
  
 isosorbide mononitrate ER 30 mg tablet Commonly known as:  IMDUR Take  by mouth daily. lactulose 10 gram/15 mL solution Commonly known as:  Ivin Stalker Take 10 g by mouth two (2) times a day. LASIX 40 mg tablet Generic drug:  furosemide Take  by mouth daily. LORazepam 1 mg tablet Commonly known as:  ATIVAN  
TAKE 1 TABLET TWICE DAILY (MAXIMUM DAILY AMOUNT 2MG) omeprazole 40 mg capsule Commonly known as:  PRILOSEC  
TAKE 1 CAPSULE TWICE DAILY potassium chloride SA 10 mEq capsule Commonly known as:  MICRO-K  
TAKE 1 CAPSULE EVERY DAY  
  
 spironolactone 25 mg tablet Commonly known as:  ALDACTONE  
TAKE 1 TABLET TWICE DAILY  
  
 traZODone 300 mg tablet Commonly known as:  Tere Taylor Take 300 mg by mouth nightly. venlafaxine- mg capsule Commonly known as:  EFFEXOR-XR  
TAKE 1 CAPSULE EVERY DAY Follow-up Instructions Return in about 6 months (around 9/30/2018). Introducing \[facility]\"" & HEALTH SERVICES! [facility] introduces [employer] patient portal. Now you can access parts of your medical record, email your doctor's office, and request medication refills online. 1. In your internet browser, go to [email or web address] 2. Click on the First Time User? Click Here link in the Sign In box. You will see the New Member Sign Up page. 3. Enter your CardStar Access Code exactly as it appears below. You will not need to use this code after youve completed the sign-up process. If you do not sign up before the expiration date, you must request a new code. · Card[employer] Access Code: [email or web address] Expires: 6/28/2018  9:37 AM 
 
4. Enter the last four digits of your Social Security Number (xxxx) and Date of Birth (mm/dd/yyyy) as indicated and click Submit. You will be taken to the next sign-up page. 5. Create a CardStar ID. This will be your CardStar login ID and cannot be changed, so think of one that is secure and easy to remember. 6. Create a CardStar password. You can change your password at any time. 7. Enter your Password Reset Question and Answer. This can be used at a later time if you forget your password. 8. Enter your e-mail address. You will receive e-mail notification when new information is available in [address]. 9. Click Sign Up. You can now view and download portions of your medical record. 10. Click the Download Summary menu link to download a portable copy of your medical information. If you have questions, please visit the Frequently Asked Questions section of the CardStar website. Remember, CardStar is NOT to be used for urgent needs. For medical emergencies, dial 911. Now available from your iPhone and Android! Please provide this summary of care documentation to your next provider.  
  
  
 Your primary care clinician is listed as Everardo Precise. If you have any questions after today's visit, please call [phone].

## 2018-03-30 NOTE — PROGRESS NOTES
Visit Vitals    /60    Pulse 68    Ht 6' (1.829 m)    Wt 286 lb (129.7 kg)    BMI 38.79 kg/m2     Medication changes for this OV VO Dr Néstor Champion

## 2018-03-30 NOTE — TELEPHONE ENCOUNTER
Called the pt to schedule NPT appointment for Cirrhosis.  Records Received from Dr. Jen Manrique. No answer LVM in full detail to adv the pt to call back to schedule

## 2018-04-06 ENCOUNTER — OFFICE VISIT (OUTPATIENT)
Dept: FAMILY MEDICINE CLINIC | Age: 58
End: 2018-04-06

## 2018-04-06 VITALS
BODY MASS INDEX: 39.33 KG/M2 | HEART RATE: 70 BPM | SYSTOLIC BLOOD PRESSURE: 105 MMHG | RESPIRATION RATE: 16 BRPM | DIASTOLIC BLOOD PRESSURE: 65 MMHG | TEMPERATURE: 98.6 F | WEIGHT: 290.4 LBS | OXYGEN SATURATION: 95 % | HEIGHT: 72 IN

## 2018-04-06 DIAGNOSIS — D64.9 ANEMIA, UNSPECIFIED TYPE: ICD-10-CM

## 2018-04-06 DIAGNOSIS — I10 ESSENTIAL HYPERTENSION: Chronic | ICD-10-CM

## 2018-04-06 DIAGNOSIS — F10.230 ALCOHOL DEPENDENCE WITH UNCOMPLICATED WITHDRAWAL (HCC): ICD-10-CM

## 2018-04-06 DIAGNOSIS — D69.6 THROMBOCYTOPENIA (HCC): ICD-10-CM

## 2018-04-06 DIAGNOSIS — F41.9 ANXIETY: ICD-10-CM

## 2018-04-06 DIAGNOSIS — M10.9 GOUT, UNSPECIFIED CAUSE, UNSPECIFIED CHRONICITY, UNSPECIFIED SITE: ICD-10-CM

## 2018-04-06 DIAGNOSIS — F32.A DEPRESSION, UNSPECIFIED DEPRESSION TYPE: ICD-10-CM

## 2018-04-06 DIAGNOSIS — K21.9 GASTROESOPHAGEAL REFLUX DISEASE WITHOUT ESOPHAGITIS: ICD-10-CM

## 2018-04-06 DIAGNOSIS — E29.1 HYPOGONADISM, MALE: ICD-10-CM

## 2018-04-06 DIAGNOSIS — K70.30 ALCOHOLIC CIRRHOSIS, UNSPECIFIED WHETHER ASCITES PRESENT (HCC): Primary | ICD-10-CM

## 2018-04-06 RX ORDER — TESTOSTERONE CYPIONATE 200 MG/ML
400 INJECTION INTRAMUSCULAR ONCE
Qty: 2 ML | Refills: 0
Start: 2018-04-06 | End: 2018-04-06

## 2018-04-06 RX ORDER — CARVEDILOL 3.12 MG/1
3.12 TABLET ORAL 2 TIMES DAILY WITH MEALS
Qty: 180 TAB | Refills: 3 | Status: SHIPPED | OUTPATIENT
Start: 2018-04-06 | End: 2019-01-01

## 2018-04-06 RX ORDER — LORAZEPAM 1 MG/1
TABLET ORAL
Qty: 180 TAB | Refills: 1 | Status: SHIPPED | OUTPATIENT
Start: 2018-04-06 | End: 2018-01-01 | Stop reason: SDUPTHER

## 2018-04-06 RX ORDER — VENLAFAXINE HYDROCHLORIDE 150 MG/1
CAPSULE, EXTENDED RELEASE ORAL
Qty: 90 CAP | Refills: 3 | Status: SHIPPED | OUTPATIENT
Start: 2018-04-06 | End: 2018-06-20 | Stop reason: SDUPTHER

## 2018-04-06 RX ORDER — SPIRONOLACTONE 25 MG/1
TABLET ORAL
Qty: 180 TAB | Refills: 3 | Status: SHIPPED | OUTPATIENT
Start: 2018-04-06 | End: 2019-01-01

## 2018-04-06 RX ORDER — ALLOPURINOL 300 MG/1
TABLET ORAL
Qty: 180 TAB | Refills: 3 | Status: SHIPPED | OUTPATIENT
Start: 2018-04-06

## 2018-04-06 RX ORDER — FUROSEMIDE 40 MG/1
40 TABLET ORAL DAILY
Qty: 90 TAB | Refills: 3 | Status: SHIPPED | OUTPATIENT
Start: 2018-04-06 | End: 2019-01-01

## 2018-04-06 RX ORDER — OMEPRAZOLE 40 MG/1
CAPSULE, DELAYED RELEASE ORAL
Qty: 180 CAP | Refills: 3 | Status: SHIPPED | OUTPATIENT
Start: 2018-04-06 | End: 2018-01-01 | Stop reason: SDUPTHER

## 2018-04-06 RX ORDER — TRAZODONE HYDROCHLORIDE 300 MG/1
300 TABLET ORAL
Qty: 90 TAB | Refills: 3 | Status: SHIPPED | OUTPATIENT
Start: 2018-04-06 | End: 2018-01-01

## 2018-04-06 NOTE — PATIENT INSTRUCTIONS
Anemia: Care Instructions  Your Care Instructions    Anemia is a low level of red blood cells, which carry oxygen throughout your body. Many things can cause anemia. Lack of iron is one of the most common causes. Your body needs iron to make hemoglobin, a substance in red blood cells that carries oxygen from the lungs to your body's cells. Without enough iron, the body produces fewer and smaller red blood cells. As a result, your body's cells do not get enough oxygen, and you feel tired and weak. And you may have trouble concentrating. Bleeding is the most common cause of a lack of iron. You may have heavy menstrual bleeding or bleeding caused by conditions such as ulcers, hemorrhoids, or cancer. Regular use of aspirin or other anti-inflammatory medicines (such as ibuprofen) also can cause bleeding in some people. A lack of iron in your diet also can cause anemia, especially at times when the body needs more iron, such as during pregnancy, infancy, and the teen years. Your doctor may have prescribed iron pills. It may take several months of treatment for your iron levels to return to normal. Your doctor also may suggest that you eat foods that are rich in iron, such as meat and beans. There are many other causes of anemia. It is not always due to a lack of iron. Finding the specific cause of your anemia will help your doctor find the right treatment for you. Follow-up care is a key part of your treatment and safety. Be sure to make and go to all appointments, and call your doctor if you are having problems. It's also a good idea to know your test results and keep a list of the medicines you take. How can you care for yourself at home? · Take your medicines exactly as prescribed. Call your doctor if you think you are having a problem with your medicine. · If your doctor recommends iron pills, take them as directed:  ¨ Try to take the pills on an empty stomach about 1 hour before or 2 hours after meals. But you may need to take iron with food to avoid an upset stomach. ¨ Do not take antacids or drink milk or caffeine drinks (such as coffee, tea, or cola) at the same time or within 2 hours of the time that you take your iron. They can make it hard for your body to absorb the iron. ¨ Vitamin C (from food or supplements) helps your body absorb iron. Try taking iron pills with a glass of orange juice or some other food that is high in vitamin C, such as citrus fruits. ¨ Iron pills may cause stomach problems, such as heartburn, nausea, diarrhea, constipation, and cramps. Be sure to drink plenty of fluids, and include fruits, vegetables, and fiber in your diet each day. Iron pills often make your bowel movements dark or green. ¨ If you forget to take an iron pill, do not take a double dose of iron the next time you take a pill. ¨ Keep iron pills out of the reach of small children. An overdose of iron can be very dangerous. · Follow your doctor's advice about eating iron-rich foods. These include red meat, shellfish, poultry, eggs, beans, raisins, whole-grain bread, and leafy green vegetables. · Steam vegetables to help them keep their iron content. When should you call for help? Call 911 anytime you think you may need emergency care. For example, call if:  ? · You have symptoms of a heart attack. These may include:  ¨ Chest pain or pressure, or a strange feeling in the chest.  ¨ Sweating. ¨ Shortness of breath. ¨ Nausea or vomiting. ¨ Pain, pressure, or a strange feeling in the back, neck, jaw, or upper belly or in one or both shoulders or arms. ¨ Lightheadedness or sudden weakness. ¨ A fast or irregular heartbeat. After you call 911, the  may tell you to chew 1 adult-strength or 2 to 4 low-dose aspirin. Wait for an ambulance. Do not try to drive yourself. ? · You passed out (lost consciousness).    ?Call your doctor now or seek immediate medical care if:  ? · You have new or increased shortness of breath. ? · You are dizzy or lightheaded, or you feel like you may faint. ? · Your fatigue and weakness continue or get worse. ? · You have any abnormal bleeding, such as:  ¨ Nosebleeds. ¨ Vaginal bleeding that is different (heavier, more frequent, at a different time of the month) than what you are used to. ¨ Bloody or black stools, or rectal bleeding. ¨ Bloody or pink urine. ? Watch closely for changes in your health, and be sure to contact your doctor if:  ? · You do not get better as expected. Where can you learn more? Go to http://bhavana-rosio.info/. Enter R301 in the search box to learn more about \"Anemia: Care Instructions. \"  Current as of: October 13, 2016  Content Version: 11.4  © 8464-6070 [employer]. Care instructions adapted under license by [employer] (which disclaims liability or warranty for this information). If you have questions about a medical condition or this instruction, always ask your healthcare professional. [place] [address] any warranty or liability for your use of this information.

## 2018-04-06 NOTE — PROGRESS NOTES
Abhijeet Dillon. is a 56 y.o. male   Chief Complaint   Patient presents with    Labs     Pt reports did not have breakfast this morning.  Medication Refill    pt was in hosp 24-26 Of march at [facility] and had a nuclear stress and an echo for CP. Pt echo shows an EF of 59 with mod eccentric LV hypertrophy but overall normal global function, normal diastolic function. Pt has an extremely poor exercise tolerance but this seems more related to his chronic anemia secondary to liver disease from etoh. Pt does have records and will also bring these to the cardiologist for review since system scanning can take a while  Up to 2months sometimes. Pt now feels fine but exercise tolerance is nil. Has labs from oncology to be drawn and will order these for his anemia. In hospital hgb was 7.9. Meds refilled today. Pt also with anxiety and will refill his ativan which has been working well for this bid. Will call into [employer]. Pt would also like to restart his T injections and advised he needs to have these done monthly or we will not continue since his injections have eduardo sporadic.      he is a 56y.o. year old male who presents for evalution. Reviewed PmHx, RxHx, FmHx, SocHx, AllgHx and updated and dated in the chart. Review of Systems - negative except as listed above in the HPI    Objective:     Vitals:    04/06/18 0814   BP: 105/65   Pulse: 70   Resp: 16   Temp: 98.6 °F (37 °C)   TempSrc: Oral   SpO2: 95%   Weight: 290 lb 6.4 oz (131.7 kg)   Height: 6' (1.829 m)       Current Outpatient Prescriptions   Medication Sig    allopurinol (ZYLOPRIM) 300 mg tablet TAKE 1 TABLET TWICE DAILY    carvedilol (COREG) 3.125 mg tablet Take 1 Tab by mouth two (2) times daily (with meals).  furosemide (LASIX) 40 mg tablet Take 1 Tab by mouth daily.  lactulose (CHRONULAC) 10 gram/15 mL solution Take 15 mL by mouth two (2) times a day.     LORazepam (ATIVAN) 1 mg tablet TAKE 1 TABLET TWICE DAILY (MAXIMUM DAILY AMOUNT 2MG)    omeprazole (PRILOSEC) 40 mg capsule TAKE 1 CAPSULE TWICE DAILY    spironolactone (ALDACTONE) 25 mg tablet TAKE 1 TABLET TWICE DAILY    traZODone (DESYREL) 300 mg tablet Take 1 Tab by mouth nightly.  venlafaxine-SR (EFFEXOR-XR) 150 mg capsule TAKE 1 CAPSULE EVERY DAY - 7am    testosterone cypionate (DEPOTESTOTERONE CYPIONATE) 200 mg/mL injection 2 mL by IntraMUSCular route once for 1 dose. Max Daily Amount: 400 mg.  potassium chloride SA (MICRO-K) 10 mEq capsule TAKE 1 CAPSULE EVERY DAY (Patient taking differently: TAKE 1 CAPSULE EVERY DAY - QHS)    cyclobenzaprine (FLEXERIL) 10 mg tablet TAKE 1 TABLET THREE TIMES DAILY AS NEEDED    isosorbide mononitrate ER (IMDUR) 30 mg tablet Take  by mouth daily. No current facility-administered medications for this visit. Physical Examination: General appearance - alert, well appearing, and in no distress  Eyes - pupils equal and reactive, extraocular eye movements intact  Chest - clear to auscultation, no wheezes, rales or rhonchi, symmetric air entry  Heart - normal rate, regular rhythm, normal S1, S2, no murmurs, rubs, clicks or gallops  Abdomen - soft, nontender, nondistended, no masses or organomegaly  Extremities - pedal edema trace b/l     Assessment/ Plan:   Diagnoses and all orders for this visit:    1. Alcoholic cirrhosis, unspecified whether ascites present (HCC)  -     furosemide (LASIX) 40 mg tablet; Take 1 Tab by mouth daily. -     lactulose (CHRONULAC) 10 gram/15 mL solution; Take 15 mL by mouth two (2) times a day. -     IRON PROFILE  -     CBC WITH AUTOMATED DIFF  -     FERRITIN    2.  Gout, unspecified cause, unspecified chronicity, unspecified site  -     allopurinol (ZYLOPRIM) 300 mg tablet; TAKE 1 TABLET TWICE DAILY    3. Gastroesophageal reflux disease without esophagitis  -     omeprazole (PRILOSEC) 40 mg capsule; TAKE 1 CAPSULE TWICE DAILY    4. Essential hypertension  -     spironolactone (ALDACTONE) 25 mg tablet; TAKE 1 TABLET TWICE DAILY    5. Depression, unspecified depression type  -     venlafaxine-SR (EFFEXOR-XR) 150 mg capsule; TAKE 1 CAPSULE EVERY DAY - 7am    6. Anxiety  -     venlafaxine-SR (EFFEXOR-XR) 150 mg capsule; TAKE 1 CAPSULE EVERY DAY - 7am    7. Alcohol dependence with uncomplicated withdrawal (HCC)  -     LORazepam (ATIVAN) 1 mg tablet; TAKE 1 TABLET TWICE DAILY (MAXIMUM DAILY AMOUNT 2MG)    8. Thrombocytopenia (HCC)  -     IRON PROFILE  -     CBC WITH AUTOMATED DIFF  -     FERRITIN    9. Anemia, unspecified type  -     IRON PROFILE  -     CBC WITH AUTOMATED DIFF  -     FERRITIN    10. Hypogonadism, male  -     TESTOSTERONE, FREE & TOTAL  -     testosterone cypionate (DEPOTESTOTERONE CYPIONATE) 200 mg/mL injection; 2 mL by IntraMUSCular route once for 1 dose. Max Daily Amount: 400 mg.  -     [place] INJ TESTOSTERONE CYPIONATE () - Qty - 200  -     THER/PROPH/DIAG INJECTION, SUBCUT/IM    Other orders  -     carvedilol (COREG) 3.125 mg tablet; Take 1 Tab by mouth two (2) times daily (with meals). -     traZODone (DESYREL) 300 mg tablet; Take 1 Tab by mouth nightly. Follow-up Disposition:  Return in about 1 month (around 5/6/2018), or if symptoms worsen or fail to improve. I have discussed the diagnosis with the patient and the intended plan as seen in the above orders. The patient has received an after-visit summary and questions were answered concerning future plans. Pt conveyed understanding of plan.     Medication Side Effects and Warnings were discussed with patient      Garret Kee DO

## 2018-04-06 NOTE — PROGRESS NOTES
1. Have you been to the ER, urgent care clinic since your last visit? Hospitalized since your last visit? 24 March-26 March 2018 [place] chest pain. 2. Have you seen or consulted any other health care providers outside of the [address] [place] [address] since your last visit? Include any pap smears or colon screening. No    Chief Complaint   Patient presents with    Labs     Pt reports did not have breakfast this morning.      Medication Refill

## 2018-04-06 NOTE — MR AVS SNAPSHOT
[address] [address] 
[phone] Patient: Abhijeet Dillon. MRN: [id number] :1960 Visit Information Date & Time Provider Department Dept. Phone Encounter #  
 2018  8:00 AM Jody Villareal, [address] [phone] [id number] Follow-up Instructions Return in about 1 month (around 2018), or if symptoms worsen or fail to improve. Your Appointments 2018  1:30 PM  
ESTABLISHED PATIENT with Piotr Freeman MD  
[facility]) Appt Note: [place] - anemia, thrombocytopenia f/u.  
 [address] [place] [address]  
[phone]  
  
   
 [address] [place] [address]  
  
    
 2018 10:40 AM  
ESTABLISHED PATIENT with Duane Sparks MD  
[facility]) Appt Note: 3 mo fu appt [id number] [address] [address]  
[phone]  
  
   
 [address] [address]  
  
    
 2018  1:00 PM  
New Patient with Abelino Strickland MD  
[address] ([facility]) Appt Note: New Pt/ Hx Liver Cirrhosis/ Referring Provider: Dr. Duane Sparks w/ [facility] I#[phone]// tlt 2018 [address] [place] [address] [phone] [facility] [address]  
59 [facility] [address] Upcoming Health Maintenance Date Due Pneumococcal 19-64 Highest Risk (2 of 3 - PCV13) 2012 MEDICARE YEARLY EXAM 2018 COLONOSCOPY 2022 DTaP/Tdap/Td series (2 - Td) 2022 Allergies as of 2018  Review Complete On: 2018 By: Sultana Morales Severity Noted Reaction Type Reactions Onion Medium 2011   Side Effect Nausea and Vomiting Statins-hmg-coa Reductase Inhibitors  2016    Unknown (comments) Current Immunizations  Reviewed on 10/18/2017 Name Date Hep A Vaccine (Adult) 6/28/2016, 12/4/2015 Hep B Vaccine (Adult) 6/28/2016, 9/8/2015, 7/8/2015 Influenza Vaccine (Quad) PF 9/8/2017  9:46 AM, 10/24/2016 12:01 PM, 11/5/2015 TDAP Vaccine 9/5/2012  3:11 PM  
 ZZZ-RETIRED (DO NOT USE) Pneumococcal Vaccine (Unspecified Type) 6/1/2011  8:25 PM  
  
 Not reviewed this visit You Were Diagnosed With   
  
 Codes Comments Alcoholic cirrhosis, unspecified whether ascites present ([facility]    -  Primary ICD-10-CM: K70.30 ICD-9-CM: 571.2 Gout, unspecified cause, unspecified chronicity, unspecified site     ICD-10-CM: M10.9 ICD-9-CM: 274.9 Gastroesophageal reflux disease without esophagitis     ICD-10-CM: K21.9 ICD-9-CM: 530.81 Essential hypertension     ICD-10-CM: I10 
ICD-9-CM: 401.9 Depression, unspecified depression type     ICD-10-CM: F32.9 ICD-9-CM: 073 Anxiety     ICD-10-CM: F41.9 ICD-9-CM: 300.00 Alcohol dependence with uncomplicated withdrawal ([facility]     ICD-10-CM: H65.297 ICD-9-CM: 303.90, 291.81 Thrombocytopenia ([facility]     ICD-10-CM: D69.6 ICD-9-CM: 287.5 Anemia, unspecified type     ICD-10-CM: D64.9 ICD-9-CM: 285.9 Hypogonadism, male     ICD-10-CM: E29.1 ICD-9-CM: 257.2 Vitals BP Pulse Temp Resp Height(growth percentile) Weight(growth percentile) 105/65 70 98.6 °F (37 °C) (Oral) 16 6' (1.829 m) 290 lb 6.4 oz (131.7 kg) SpO2 BMI Smoking Status 95% 39.39 kg/m2 Never Smoker BMI and BSA Data Body Mass Index Body Surface Area  
 39.39 kg/m 2 2.59 m 2 Preferred Pharmacy Pharmacy Name Phone [address] [facility] [address] [place] [address] [phone] Your Updated Medication List  
  
   
This list is accurate as of 4/6/18  8:40 AM.  Always use your most recent med list.  
  
  
  
  
 allopurinol 300 mg tablet Commonly known as:  ZYLOPRIM  
TAKE 1 TABLET TWICE DAILY carvedilol 3.125 mg tablet Commonly known as:  David Bueno Take 1 Tab by mouth two (2) times daily (with meals). cyclobenzaprine 10 mg tablet Commonly known as:  FLEXERIL  
TAKE 1 TABLET THREE TIMES DAILY AS NEEDED  
  
 furosemide 40 mg tablet Commonly known as:  LASIX Take 1 Tab by mouth daily. isosorbide mononitrate ER 30 mg tablet Commonly known as:  IMDUR Take  by mouth daily. lactulose 10 gram/15 mL solution Commonly known as:  Viri Chiquito Take 15 mL by mouth two (2) times a day. LORazepam 1 mg tablet Commonly known as:  ATIVAN  
TAKE 1 TABLET TWICE DAILY (MAXIMUM DAILY AMOUNT 2MG) omeprazole 40 mg capsule Commonly known as:  PRILOSEC  
TAKE 1 CAPSULE TWICE DAILY potassium chloride SA 10 mEq capsule Commonly known as:  MICRO-K  
TAKE 1 CAPSULE EVERY DAY  
  
 spironolactone 25 mg tablet Commonly known as:  ALDACTONE  
TAKE 1 TABLET TWICE DAILY  
  
 testosterone cypionate 200 mg/mL injection Commonly known as:  DEPOTESTOTERONE CYPIONATE 2 mL by IntraMUSCular route once for 1 dose. Max Daily Amount: 400 mg.  
  
 traZODone 300 mg tablet Commonly known as:  Noreene Luciano Take 1 Tab by mouth nightly. venlafaxine- mg capsule Commonly known as:  EFFEXOR-XR  
TAKE 1 CAPSULE EVERY DAY - 7am  
  
  
  
  
Prescriptions Printed Refills  
 lactulose (CHRONULAC) 10 gram/15 mL solution 3 Sig: Take 15 mL by mouth two (2) times a day. Class: Print Route: Oral  
 LORazepam (ATIVAN) 1 mg tablet 1 Sig: TAKE 1 TABLET TWICE DAILY (MAXIMUM DAILY AMOUNT 2MG) Class: Print Prescriptions Sent to Pharmacy Refills  
 allopurinol (ZYLOPRIM) 300 mg tablet 3 Sig: TAKE 1 TABLET TWICE DAILY Class: Normal  
 Pharmacy: [address] [place] [address], [facility] Ph #: [phone]  
 carvedilol (COREG) 3.125 mg tablet 3 Sig: Take 1 Tab by mouth two (2) times daily (with meals).   
 Class: Normal  
 Pharmacy: [address] [place] [address] Ph #: [phone] Route: Oral  
 furosemide (LASIX) 40 mg tablet 3 Sig: Take 1 Tab by mouth daily. Class: Normal  
 Pharmacy: [address] [place] [address] Ph #: [phone] Route: Oral  
 omeprazole (PRILOSEC) 40 mg capsule 3 Sig: TAKE 1 CAPSULE TWICE DAILY Class: Normal  
 Pharmacy: [address] [place] [address] Ph #: [phone]  
 spironolactone (ALDACTONE) 25 mg tablet 3 Sig: TAKE 1 TABLET TWICE DAILY Class: Normal  
 Pharmacy: [address] [place] [address] Ph #: [phone]  
 traZODone (DESYREL) 300 mg tablet 3 Sig: Take 1 Tab by mouth nightly. Class: Normal  
 Pharmacy: [address] [place] [address] Ph #: [phone] Route: Oral  
 venlafaxine-SR (EFFEXOR-XR) 150 mg capsule 3 Sig: TAKE 1 CAPSULE EVERY DAY - 7am  
 Class: Normal  
 Pharmacy: [address] [place] [address] Ph #: [phone] We Performed the Following CBC WITH AUTOMATED DIFF [94864 CPT(R)] FERRITIN [27415 CPT(R)] IRON PROFILE [id number] CPT(R)] [place] INJ TESTOSTERONE CYPIONATE [ HCPCS] MN THER/PROPH/DIAG INJECTION, SUBCUT/IM [id number] CPT(R)] TESTOSTERONE, FREE & TOTAL [07889 CPT(R)] Follow-up Instructions Return in about 1 month (around 5/6/2018), or if symptoms worsen or fail to improve. Patient Instructions Anemia: Care Instructions Your Care Instructions Anemia is a low level of red blood cells, which carry oxygen throughout your body. Many things can cause anemia. Lack of iron is one of the most common causes. Your body needs iron to make hemoglobin, a substance in red blood cells that carries oxygen from the lungs to your body's cells.  Without enough iron, the body produces fewer and smaller red blood cells. As a result, your body's cells do not get enough oxygen, and you feel tired and weak. And you may have trouble concentrating. Bleeding is the most common cause of a lack of iron. You may have heavy menstrual bleeding or bleeding caused by conditions such as ulcers, hemorrhoids, or cancer. Regular use of aspirin or other anti-inflammatory medicines (such as ibuprofen) also can cause bleeding in some people. A lack of iron in your diet also can cause anemia, especially at times when the body needs more iron, such as during pregnancy, infancy, and the teen years. Your doctor may have prescribed iron pills. It may take several months of treatment for your iron levels to return to normal. Your doctor also may suggest that you eat foods that are rich in iron, such as meat and beans. There are many other causes of anemia. It is not always due to a lack of iron. Finding the specific cause of your anemia will help your doctor find the right treatment for you. Follow-up care is a key part of your treatment and safety. Be sure to make and go to all appointments, and call your doctor if you are having problems. It's also a good idea to know your test results and keep a list of the medicines you take. How can you care for yourself at home? · Take your medicines exactly as prescribed. Call your doctor if you think you are having a problem with your medicine. · If your doctor recommends iron pills, take them as directed: ¨ Try to take the pills on an empty stomach about 1 hour before or 2 hours after meals. But you may need to take iron with food to avoid an upset stomach. ¨ Do not take antacids or drink milk or caffeine drinks (such as coffee, tea, or cola) at the same time or within 2 hours of the time that you take your iron. They can make it hard for your body to absorb the iron. ¨ Vitamin C (from food or supplements) helps your body absorb iron.  Try taking iron pills with a glass of orange juice or some other food that is high in vitamin C, such as citrus fruits. ¨ Iron pills may cause stomach problems, such as heartburn, nausea, diarrhea, constipation, and cramps. Be sure to drink plenty of fluids, and include fruits, vegetables, and fiber in your diet each day. Iron pills often make your bowel movements dark or green. ¨ If you forget to take an iron pill, do not take a double dose of iron the next time you take a pill. ¨ Keep iron pills out of the reach of small children. An overdose of iron can be very dangerous. · Follow your doctor's advice about eating iron-rich foods. These include red meat, shellfish, poultry, eggs, beans, raisins, whole-grain bread, and leafy green vegetables. · Steam vegetables to help them keep their iron content. When should you call for help? Call 911 anytime you think you may need emergency care. For example, call if: 
? · You have symptoms of a heart attack. These may include: ¨ Chest pain or pressure, or a strange feeling in the chest. 
¨ Sweating. ¨ Shortness of breath. ¨ Nausea or vomiting. ¨ Pain, pressure, or a strange feeling in the back, neck, jaw, or upper belly or in one or both shoulders or arms. ¨ Lightheadedness or sudden weakness. ¨ A fast or irregular heartbeat. After you call 911, the  may tell you to chew 1 adult-strength or 2 to 4 low-dose aspirin. Wait for an ambulance. Do not try to drive yourself. ? · You passed out (lost consciousness). ?Call your doctor now or seek immediate medical care if: 
? · You have new or increased shortness of breath. ? · You are dizzy or lightheaded, or you feel like you may faint. ? · Your fatigue and weakness continue or get worse. ? · You have any abnormal bleeding, such as: 
¨ Nosebleeds. ¨ Vaginal bleeding that is different (heavier, more frequent, at a different time of the month) than what you are used to. ¨ Bloody or black stools, or rectal bleeding. ¨ Bloody or pink urine. ? Watch closely for changes in your health, and be sure to contact your doctor if: 
? · You do not get better as expected. Where can you learn more? Go to http://bhavana-rosio.info/. Enter R301 in the search box to learn more about \"Anemia: Care Instructions. \" Current as of: October 13, 2016 Content Version: 11.4 © 9534-4706 [employer]. Care instructions adapted under license by [employer] (which disclaims liability or warranty for this information). If you have questions about a medical condition or this instruction, always ask your healthcare professional. [place] [address] any warranty or liability for your use of this information. Introducing [facility]! [employer] introduces [employer] patient portal. Now you can access parts of your medical record, email your doctor's office, and request medication refills online. 1. In your internet browser, go to [email or web address] 2. Click on the First Time User? Click Here link in the Sign In box. You will see the New Member Sign Up page. 3. Enter your [employer] Access Code exactly as it appears below. You will not need to use this code after youve completed the sign-up process. If you do not sign up before the expiration date, you must request a new code. · [employer] Access Code: [email or web address] Expires: 6/28/2018  9:37 AM 
 
4. Enter the last four digits of your Social Security Number (xxxx) and Date of Birth (mm/dd/yyyy) as indicated and click Submit. You will be taken to the next sign-up page. 5. Create a [employer] ID. This will be your [employer] login ID and cannot be changed, so think of one that is secure and easy to remember. 6. Create a [employer] password. You can change your password at any time. 7. Enter your Password Reset Question and Answer. This can be used at a later time if you forget your password. 8. Enter your e-mail address.  You will receive e-mail notification when new information is available in [address] 9. Click Sign Up. You can now view and download portions of your medical record. 10. Click the Download Summary menu link to download a portable copy of your medical information. If you have questions, please visit the Frequently Asked Questions section of the [email or web address] website. Remember, [email or web address] is NOT to be used for urgent needs. For medical emergencies, dial 911. Now available from your iPhone and Android! Please provide this summary of care documentation to your next provider. Your primary care clinician is listed as Aminah Aldrich. If you have any questions after today's visit, please call [phone].

## 2018-04-08 LAB
BASOPHILS # BLD AUTO: 0 X10E3/UL (ref 0–0.2)
BASOPHILS NFR BLD AUTO: 1 %
EOSINOPHIL # BLD AUTO: 0.1 X10E3/UL (ref 0–0.4)
EOSINOPHIL NFR BLD AUTO: 2 %
ERYTHROCYTE [DISTWIDTH] IN BLOOD BY AUTOMATED COUNT: 19.2 % (ref 12.3–15.4)
FERRITIN SERPL-MCNC: 14 NG/ML (ref 30–400)
HCT VFR BLD AUTO: 28 % (ref 37.5–51)
HGB BLD-MCNC: 8.1 G/DL (ref 13–17.7)
IMM GRANULOCYTES # BLD: 0 X10E3/UL (ref 0–0.1)
IMM GRANULOCYTES NFR BLD: 0 %
IRON SATN MFR SERPL: 7 % (ref 15–55)
IRON SERPL-MCNC: 23 UG/DL (ref 38–169)
LYMPHOCYTES # BLD AUTO: 0.8 X10E3/UL (ref 0.7–3.1)
LYMPHOCYTES NFR BLD AUTO: 21 %
MCH RBC QN AUTO: 19.1 PG (ref 26.6–33)
MCHC RBC AUTO-ENTMCNC: 28.9 G/DL (ref 31.5–35.7)
MCV RBC AUTO: 66 FL (ref 79–97)
MONOCYTES # BLD AUTO: 0.6 X10E3/UL (ref 0.1–0.9)
MONOCYTES NFR BLD AUTO: 16 %
MORPHOLOGY BLD-IMP: ABNORMAL
NEUTROPHILS # BLD AUTO: 2.2 X10E3/UL (ref 1.4–7)
NEUTROPHILS NFR BLD AUTO: 60 %
PLATELET # BLD AUTO: 82 X10E3/UL (ref 150–379)
RBC # BLD AUTO: 4.23 X10E6/UL (ref 4.14–5.8)
TESTOST FREE SERPL-MCNC: 14 PG/ML (ref 7.2–24)
TESTOST SERPL-MCNC: 292 NG/DL (ref 264–916)
TIBC SERPL-MCNC: 325 UG/DL (ref 250–450)
UIBC SERPL-MCNC: 302 UG/DL (ref 111–343)
WBC # BLD AUTO: 3.6 X10E3/UL (ref 3.4–10.8)

## 2018-04-09 NOTE — PROGRESS NOTES
T level is normal. Iron levels are all low and these results have been forwarded to his hematologist

## 2018-04-18 ENCOUNTER — OFFICE VISIT (OUTPATIENT)
Dept: ONCOLOGY | Age: 58
End: 2018-04-18

## 2018-04-18 VITALS
HEIGHT: 72 IN | WEIGHT: 288 LBS | OXYGEN SATURATION: 94 % | DIASTOLIC BLOOD PRESSURE: 54 MMHG | RESPIRATION RATE: 20 BRPM | TEMPERATURE: 97.1 F | HEART RATE: 78 BPM | SYSTOLIC BLOOD PRESSURE: 117 MMHG | BODY MASS INDEX: 39.01 KG/M2

## 2018-04-18 DIAGNOSIS — D69.6 THROMBOCYTOPENIA (HCC): ICD-10-CM

## 2018-04-18 DIAGNOSIS — I85.00 IDIOPATHIC ESOPHAGEAL VARICES WITHOUT BLEEDING (HCC): ICD-10-CM

## 2018-04-18 DIAGNOSIS — K70.30 ALCOHOLIC CIRRHOSIS, UNSPECIFIED WHETHER ASCITES PRESENT (HCC): ICD-10-CM

## 2018-04-18 DIAGNOSIS — D50.9 IRON DEFICIENCY ANEMIA, UNSPECIFIED IRON DEFICIENCY ANEMIA TYPE: Primary | ICD-10-CM

## 2018-04-18 NOTE — PROGRESS NOTES
[facility] [place] at [address], [address] [place] [address]  Dayan Rutledge: [phone]  F: [phone]      Reason for Visit:   Albaro Silva is a 56 y.o. male who is seen for follow up of anemia. History of Present Illness:   He returns today for follow up, having received additional IV iron in October. He reports tolerating this well, felt better for awhile, but has since started to feel worse. He reports going to [facility] several weeks ago with chest pain. He was noted to have worsening anemia then, though did not receive a transfusion. He denies any rectal bleeding. Occasional black stools, but he feels this is related to the foods he eats. He cannot recall when he last saw GI. PAST HISTORY: The following sections were reviewed and updated in the EMR as appropriate: PMH, SH, FH, Medications, Allergies. Allergies   Allergen Reactions    Onion Nausea and Vomiting    Statins-Hmg-Coa Reductase Inhibitors Unknown (comments)      Review of Systems: A complete review of systems was obtained, reviewed, and scanned into the EMR. Pertinent findings reviewed above. Physical Exam:     Visit Vitals    /54 (BP 1 Location: Right arm, BP Patient Position: Sitting)  Comment: .  Pulse 78    Temp 97.1 °F (36.2 °C) (Oral)    Resp 20    Ht 6' (1.829 m)    Wt 288 lb (130.6 kg)    SpO2 94%    BMI 39.06 kg/m2     General: No distress  Eyes: PERRLA, anicteric sclerae  HENT: Atraumatic, OP clear  Neck: Supple  Lymphatic: No cervical, supraclavicular, or inguinal adenopathy  Respiratory: CTAB, normal respiratory effort  CV: Normal rate, regular rhythm, no murmurs, no peripheral edema  GI: Soft, nontender, distended with ascites, no masses, no hepatomegaly, no splenomegaly  Skin: No rashes, ecchymoses, or petechiae. Normal temperature, turgor, and texture.   Psych: Alert, oriented, appropriate affect, normal judgment/insight    Results: Lab Results   Component Value Date/Time    WBC 3.6 04/06/2018 08:36 AM    HGB 8.1 (L) 04/06/2018 08:36 AM    HCT 28.0 (L) 04/06/2018 08:36 AM    PLATELET 82 (LL) 50/77/2954 08:36 AM    MCV 66 (L) 04/06/2018 08:36 AM    ABS. NEUTROPHILS 2.2 04/06/2018 08:36 AM    Hemoglobin (POC) 14.3 03/13/2014 08:16 PM    Hematocrit (POC) 42 03/13/2014 08:16 PM     Lab Results   Component Value Date/Time    Sodium 138 01/29/2018 02:54 PM    Potassium 3.8 01/29/2018 02:54 PM    Chloride 102 01/29/2018 02:54 PM    CO2 29 01/29/2018 02:54 PM    Glucose 139 (H) 01/29/2018 02:54 PM    BUN 3 (L) 01/29/2018 02:54 PM    Creatinine 0.69 (L) 01/29/2018 02:54 PM    GFR est AA >60 01/29/2018 02:54 PM    GFR est non-AA >60 01/29/2018 02:54 PM    Calcium 8.3 (L) 01/29/2018 02:54 PM    Sodium (POC) 141 03/13/2014 08:16 PM    Potassium (POC) 3.1 (L) 03/13/2014 08:16 PM    Chloride (POC) 102 03/13/2014 08:16 PM    Glucose (POC) 118 (H) 07/07/2016 07:11 AM    BUN (POC) <3 (L) 03/13/2014 08:16 PM    Creatinine (POC) 0.6 01/16/2018 08:17 AM    Calcium, ionized (POC) 1.06 (L) 03/13/2014 08:16 PM     Lab Results   Component Value Date/Time    Bilirubin, total 1.5 (H) 01/29/2018 02:54 PM    ALT (SGPT) 27 01/29/2018 02:54 PM    AST (SGOT) 39 (H) 01/29/2018 02:54 PM    Alk.  phosphatase 151 (H) 01/29/2018 02:54 PM    Protein, total 7.7 01/29/2018 02:54 PM    Albumin 3.4 (L) 01/29/2018 02:54 PM    Globulin 4.3 (H) 01/29/2018 02:54 PM     Lab Results   Component Value Date/Time    Reticulocyte count 1.3 05/17/2017 01:54 PM    Iron % saturation 7 (LL) 04/06/2018 08:36 AM    TIBC 325 04/06/2018 08:36 AM    Ferritin 14 (L) 04/06/2018 08:36 AM    Vitamin B12 601 09/08/2017 09:48 AM    Folate 11.7 05/17/2017 01:54 PM    Haptoglobin 83 05/17/2017 01:54 PM     05/17/2017 01:54 PM    TSH 2.490 09/08/2017 09:48 AM    OTILIO, Direct None Detected 06/02/2011 04:08 AM    Lipase 206 10/18/2016 11:51 AM    HIV 1/2 Interpretation NONREACTIVE 10/16/2013 03:15 PM Lab Results   Component Value Date/Time    INR 1.4 (H) 01/29/2018 02:54 PM    aPTT 33.7 (H) 01/29/2018 02:54 PM    D-dimer 0.67 (H) 10/16/2013 03:50 PM    Fibrinogen 180 (L) 10/16/2013 03:15 PM     HGB (g/dL)   Date Value   04/06/2018 8.1 (L)   01/29/2018 9.0 (L)   09/08/2017 11.3 (L)   07/11/2017 12.2 (L)   05/17/2017 7.3 (L)   04/06/2017 7.5 (L)   01/05/2017 8.9 (L)   10/18/2016 8.2 (L)   07/21/2016 9.7 (L)   07/14/2016 9.1 (L)   07/07/2016 10.3 (L)   07/06/2016 9.1 (L)   07/05/2016 9.3 (L)   06/28/2016 10.8 (L)   03/01/2016 10.7 (L)   01/07/2016 10.7 (L)   09/08/2015 12.0 (L)   07/08/2015 12.7   08/14/2014 14.1   07/16/2014 12.9   07/02/2014 13.5   05/16/2014 11.4 (L)   04/16/2014 13.5   03/13/2014 13.3   01/03/2014 13.6   10/16/2013 14.0   10/10/2013 12.9   09/30/2013 13.6   08/09/2013 14.7   07/24/2013 15.1   02/18/2013 16.1   04/18/2012 15.6   06/30/2011 8.6 (L)   06/29/2011 8.7 (L)   06/29/2011 7.2 (L)   06/28/2011 6.3 (LL)   06/27/2011 6.2 (LL)   06/03/2011 9.3 (L)   06/02/2011 10.2 (L)   06/01/2011 10.9 (L)           Assessment:   1) Iron deficiency anemia  S/p injectafer 6/2017 and again 10/2017. HGB initially improved after therapy last summer, but has not improved with his most recent infusion. Labs show persistent iron deficiency, suggesting ongoing blood loss. I will set him up for additional IV iron. Plan on closer follow up to ensure improvement. The cause of his iron deficiency is presumably intermittent variceal bleeding related to his cirrhosis. He had a recent EGD with Dr. Lashanda Bahena (7/2017). He believes he is due to follow up with them in June, but plans to switch to another gastroenterologist that comes to [facility]. He needs to move up this appointment up to be seen sooner, as I am quite concerned about ongoing blood loss. 2) Thrombocytopenia  Chronic, secondary to cirrhosis and etoh. Monitor. 3) Alcoholic cirrhosis  He continues to drink beer.   I advised him of the importance of stopping. He has a new patient appointment with Dr. Sophia Prado in July.     Plan:     · Injectafer x 2  · Follow up with GI ASAP  · Labs in 2 months: CBC, iron profile, ferritin ([facility])  · Return to see me in 2 months      Signed By: Ihsan Brown MD

## 2018-04-19 RX ORDER — SODIUM CHLORIDE 9 MG/ML
25 INJECTION, SOLUTION INTRAVENOUS CONTINUOUS
Status: DISPENSED | OUTPATIENT
Start: 2018-04-24 | End: 2018-04-24

## 2018-04-24 ENCOUNTER — HOSPITAL ENCOUNTER (OUTPATIENT)
Dept: INFUSION THERAPY | Age: 58
Discharge: HOME | End: 2018-04-24
Payer: MEDICARE

## 2018-04-24 VITALS
SYSTOLIC BLOOD PRESSURE: 108 MMHG | DIASTOLIC BLOOD PRESSURE: 62 MMHG | HEART RATE: 72 BPM | TEMPERATURE: 97.1 F | RESPIRATION RATE: 16 BRPM

## 2018-04-24 PROCEDURE — 74011250636 HC RX REV CODE- 250/636: Performed by: INTERNAL MEDICINE

## 2018-04-24 PROCEDURE — 96365 THER/PROPH/DIAG IV INF INIT: CPT

## 2018-04-24 RX ORDER — SODIUM CHLORIDE 9 MG/ML
25 INJECTION, SOLUTION INTRAVENOUS CONTINUOUS
Status: DISPENSED | OUTPATIENT
Start: 2018-05-01 | End: 2018-05-01

## 2018-04-24 RX ADMIN — FERRIC CARBOXYMALTOSE INJECTION 750 MG: 50 INJECTION, SOLUTION INTRAVENOUS at 16:46

## 2018-04-24 NOTE — PROGRESS NOTES
Outpatient Infusion Center Short Visit Progress Note    5487 Pt admit to [facility] for Injectafer IV ambulatory in stable condition. Assessment completed. Pt experiencing fatigue due to low HGB. Pt has received iron before with no issues. Visit Vitals    /62    Pulse 72    Temp 97.1 °F (36.2 °C)    Resp 16     R arm iV with positive blood return. Medications:  Injectafer IV    9215 Pt tolerated treatment well. D/c home ambulatory in no distress. No adverse reaction, pt refused 30 min wait period. Pt aware of next appointment scheduled for 5/1/18 at 1600.     Lucius Vitale RN

## 2018-04-24 NOTE — PROGRESS NOTES
OUTPATIENT INFUSION CENTER    DISCHARGE INSTRUCTIONS FOR:    IRON INFUSIONS - INCLUDING VENOFER, FERRLECIT, AND INFED    You should continue to take your usual home medications unless otherwise instructed by your physician. Drink plenty of fluids and eat your usual diet. All medications have the potential to cause side effects. Your physician can instruct you regarding any necessary treatment for side effects. Some possible side effects of iron infusions may include the following:     - Urinary changes;   - Mild muscle cramping;   - Mild nausea, stomach pain, diarrhea or constipation;   - Mild skin itching, mild pain at IV site. Signs/Symptoms of an allergic reaction may require immediate medical attention. These may include one or more of the following:       Skin redness, itching, swelling, blistering, weeping, crusting, rash or hives. Wheezing, chest tightness, cough, or shortness of breath;   Swelling of the face, eyelids, lips, tongue, or throat;  Severe headache, seizures or tremor;  Stuffy nose, runny nose, sneezing;   Red (bloodshot), itchy, swollen, or watery eyes;  Stomach  pain, nausea, vomiting, diarrhea or bloody diarrhea; Chest pain or tightness, increased heart rate, palpitations, changes in blood pressure which can cause dizziness, unusual feelings of weakness or fatigue;  Back ache or pain around waist;  Painful urination, increase or decrease in amount of urine, blood in urine. Contact your physicians office with any questions or concerns regarding your treatment.     Nurys Mcginnis., Signature: _____________________________________ 4/24/2018  Jilda Essex

## 2018-04-27 RX ORDER — CYCLOBENZAPRINE HCL 10 MG
TABLET ORAL
Qty: 90 TAB | Refills: 1 | Status: SHIPPED | OUTPATIENT
Start: 2018-04-27 | End: 2019-01-01 | Stop reason: SDUPTHER

## 2018-05-01 ENCOUNTER — HOSPITAL ENCOUNTER (OUTPATIENT)
Dept: INFUSION THERAPY | Age: 58
Discharge: HOME | End: 2018-05-01
Payer: MEDICARE

## 2018-05-01 VITALS
RESPIRATION RATE: 16 BRPM | DIASTOLIC BLOOD PRESSURE: 78 MMHG | TEMPERATURE: 97.2 F | SYSTOLIC BLOOD PRESSURE: 136 MMHG | HEART RATE: 74 BPM

## 2018-05-01 PROCEDURE — 96374 THER/PROPH/DIAG INJ IV PUSH: CPT

## 2018-05-01 PROCEDURE — 74011250636 HC RX REV CODE- 250/636: Performed by: INTERNAL MEDICINE

## 2018-05-01 RX ADMIN — FERRIC CARBOXYMALTOSE INJECTION 750 MG: 50 INJECTION, SOLUTION INTRAVENOUS at 16:34

## 2018-05-01 NOTE — PROGRESS NOTES
Outpatient Infusion Center Progress Note    1600 Pt admit to [facility] for iron infusion ambulatory in stable condition. Assessment completed. No new concerns voiced. Iv to right FA #24 established. Visit Vitals    /78    Pulse 74    Temp 97.2 °F (36.2 °C)    Resp 16       Medications:  IV Fereheme  IV NS    1700 Pt tolerated treatment well. IV to right FA flushed and removed prior to DC. D/c home ambulatory in no distress. Pt aware of next appointment scheduled for 5/9 @ 0830.

## 2018-05-18 ENCOUNTER — OFFICE VISIT (OUTPATIENT)
Dept: FAMILY MEDICINE CLINIC | Age: 58
End: 2018-05-18

## 2018-05-18 VITALS
TEMPERATURE: 98.5 F | DIASTOLIC BLOOD PRESSURE: 72 MMHG | OXYGEN SATURATION: 94 % | HEIGHT: 72 IN | WEIGHT: 287 LBS | BODY MASS INDEX: 38.87 KG/M2 | HEART RATE: 72 BPM | RESPIRATION RATE: 18 BRPM | SYSTOLIC BLOOD PRESSURE: 119 MMHG

## 2018-05-18 DIAGNOSIS — I10 ESSENTIAL HYPERTENSION: Chronic | ICD-10-CM

## 2018-05-18 DIAGNOSIS — K70.30 ALCOHOLIC CIRRHOSIS, UNSPECIFIED WHETHER ASCITES PRESENT (HCC): ICD-10-CM

## 2018-05-18 DIAGNOSIS — M10.00 IDIOPATHIC GOUT, UNSPECIFIED CHRONICITY, UNSPECIFIED SITE: ICD-10-CM

## 2018-05-18 DIAGNOSIS — Z12.5 SPECIAL SCREENING FOR MALIGNANT NEOPLASM OF PROSTATE: ICD-10-CM

## 2018-05-18 DIAGNOSIS — E29.1 HYPOGONADISM MALE: ICD-10-CM

## 2018-05-18 DIAGNOSIS — Z00.00 MEDICARE ANNUAL WELLNESS VISIT, SUBSEQUENT: ICD-10-CM

## 2018-05-18 DIAGNOSIS — D50.0 IRON DEFICIENCY ANEMIA DUE TO CHRONIC BLOOD LOSS: ICD-10-CM

## 2018-05-18 DIAGNOSIS — F10.230 ALCOHOL DEPENDENCE WITH UNCOMPLICATED WITHDRAWAL (HCC): Primary | ICD-10-CM

## 2018-05-18 RX ORDER — TESTOSTERONE CYPIONATE 200 MG/ML
400 INJECTION INTRAMUSCULAR ONCE
Qty: 2 ML | Refills: 0
Start: 2018-05-18 | End: 2018-05-18

## 2018-05-18 NOTE — PROGRESS NOTES
Jessica Aviles. is a 56 y.o. male   Chief Complaint   Patient presents with    Complete Physical     fasting    pt here for his medicare wellness visit. Pt has also started infusions for his anemia and is feeling much better with this. Last level was at 8.1. Pt is having a colonoscopy on July 2 since there is the belief that he is having bleeding somewhere in his GI tract. Pt will be having an EGD and colonoscopy and if nothing shown will have a capsule endoscopy. Pt does continue to drink and has no plan to stop states he drinks [place] [employer] everyday 2 cans. Pt also with gout and is taking his allopurinol will check his uric acid level today. Pt also due for T injection and these have been making him feel much better. he is a 56y.o. year old male who presents for evalution. Reviewed PmHx, RxHx, FmHx, SocHx, AllgHx and updated and dated in the chart. Review of Systems - negative except as listed above in the HPI    Objective:     Vitals:    05/18/18 0834   BP: 119/72   Pulse: 72   Resp: 18   Temp: 98.5 °F (36.9 °C)   SpO2: 94%   Weight: 287 lb (130.2 kg)   Height: 6' (1.829 m)       Current Outpatient Prescriptions   Medication Sig    testosterone cypionate (DEPOTESTOTERONE CYPIONATE) 200 mg/mL injection 2 mL by IntraMUSCular route once for 1 dose. Max Daily Amount: 400 mg.  cyclobenzaprine (FLEXERIL) 10 mg tablet TAKE 1 TABLET THREE TIMES DAILY AS NEEDED    allopurinol (ZYLOPRIM) 300 mg tablet TAKE 1 TABLET TWICE DAILY    carvedilol (COREG) 3.125 mg tablet Take 1 Tab by mouth two (2) times daily (with meals).  furosemide (LASIX) 40 mg tablet Take 1 Tab by mouth daily.  lactulose (CHRONULAC) 10 gram/15 mL solution Take 15 mL by mouth two (2) times a day.     LORazepam (ATIVAN) 1 mg tablet TAKE 1 TABLET TWICE DAILY (MAXIMUM DAILY AMOUNT 2MG)    omeprazole (PRILOSEC) 40 mg capsule TAKE 1 CAPSULE TWICE DAILY    spironolactone (ALDACTONE) 25 mg tablet TAKE 1 TABLET TWICE DAILY    traZODone (DESYREL) 300 mg tablet Take 1 Tab by mouth nightly.  venlafaxine-SR (EFFEXOR-XR) 150 mg capsule TAKE 1 CAPSULE EVERY DAY - 7am    isosorbide mononitrate ER (IMDUR) 30 mg tablet Take  by mouth daily.  potassium chloride SA (MICRO-K) 10 mEq capsule TAKE 1 CAPSULE EVERY DAY (Patient taking differently: TAKE 1 CAPSULE EVERY DAY - QHS)     No current facility-administered medications for this visit. Physical Examination: General appearance - alert, well appearing, and in no distress  Eyes - pupils equal and reactive, extraocular eye movements intact  Chest - clear to auscultation, no wheezes, rales or rhonchi, symmetric air entry  Heart - normal rate, regular rhythm, normal S1, S2, no murmurs, rubs, clicks or gallops  Abdomen - obese, nontender, no fluid wave, liver not palpable but pt also obese so difficult to assess. Musculoskeletal - no joint tenderness, deformity or swelling  Extremities - peripheral pulses normal, no pedal edema, no clubbing or cyanosis      Assessment/ Plan:   Diagnoses and all orders for this visit:    1. Alcohol dependence with uncomplicated withdrawal ([facility] [address]  Pt has no plan to stop drinking  2. Alcoholic cirrhosis, unspecified whether ascites present ([address]    3. Essential hypertension  -     METABOLIC PANEL, BASIC    4. Idiopathic gout, unspecified chronicity, unspecified site  -     URIC ACID  C/w allopurinol  5. Iron deficiency anemia due to chronic blood loss  -     CBC WITH AUTOMATED DIFF  -     FERRITIN  -     IRON PROFILE  Followed by hematology  6. Medicare annual wellness visit, subsequent    7. Special screening for malignant neoplasm of prostate  -     PSA Screening ([id number])       Follow-up Disposition:  Return if symptoms worsen or fail to improve. I have discussed the diagnosis with the patient and the intended plan as seen in the above orders. The patient has received an after-visit summary and questions were answered concerning future plans. Pt conveyed understanding of plan. Medication Side Effects and Warnings were discussed with patient      Luis Alfredo Mcginnis, DO       This is the Subsequent Medicare Annual Wellness Exam, performed 12 months or more after the Initial AWV or the last Subsequent AWV    I have reviewed the patient's medical history in detail and updated the computerized patient record. History     Past Medical History:   Diagnosis Date    Adverse effect of anesthesia     pt reports waking up during colonoscopy/endoscopy    Alcohol abuse 6/1/2011    Anemia NEC     Anxiety     Ascites     has to have fluid drained occasionally    Cirrhosis of liver (HCC)     Depression     Gastric ulcer, unspecified as acute or chronic, without mention of hemorrhage, perforation, or obstruction     GERD (gastroesophageal reflux disease)     Gout     Hypertension     Liver disease     cirrhosis    Obesity, Class II, BMI 35-39.9     Pneumonia     PUD (peptic ulcer disease)     Seizures ([facility] [address] 2013    from alcohol withdrawal    Stroke ([facility] [address] 2013    per pt, possible mini stroke from alcohol withdrawal (same time as seizure)      Past Surgical History:   Procedure Laterality Date    HX ENDOSCOPY      also colonoscopy    HX GI  1998    rectal abscess surgery    HX GI  1998    rectal abscess surgery    HX HEENT  1980    wisdom teeth     Current Outpatient Prescriptions   Medication Sig Dispense Refill    testosterone cypionate (DEPOTESTOTERONE CYPIONATE) 200 mg/mL injection 2 mL by IntraMUSCular route once for 1 dose. Max Daily Amount: 400 mg. 2 mL 0    cyclobenzaprine (FLEXERIL) 10 mg tablet TAKE 1 TABLET THREE TIMES DAILY AS NEEDED 90 Tab 1    allopurinol (ZYLOPRIM) 300 mg tablet TAKE 1 TABLET TWICE DAILY 180 Tab 3    carvedilol (COREG) 3.125 mg tablet Take 1 Tab by mouth two (2) times daily (with meals). 180 Tab 3    furosemide (LASIX) 40 mg tablet Take 1 Tab by mouth daily.  90 Tab 3    lactulose (CHRONULAC) 10 gram/15 mL solution Take 15 mL by mouth two (2) times a day. 2700 mL 3    LORazepam (ATIVAN) 1 mg tablet TAKE 1 TABLET TWICE DAILY (MAXIMUM DAILY AMOUNT 2MG) 180 Tab 1    omeprazole (PRILOSEC) 40 mg capsule TAKE 1 CAPSULE TWICE DAILY 180 Cap 3    spironolactone (ALDACTONE) 25 mg tablet TAKE 1 TABLET TWICE DAILY 180 Tab 3    traZODone (DESYREL) 300 mg tablet Take 1 Tab by mouth nightly. 90 Tab 3    venlafaxine-SR (EFFEXOR-XR) 150 mg capsule TAKE 1 CAPSULE EVERY DAY - 7am 90 Cap 3    isosorbide mononitrate ER (IMDUR) 30 mg tablet Take  by mouth daily.       potassium chloride SA (MICRO-K) 10 mEq capsule TAKE 1 CAPSULE EVERY DAY (Patient taking differently: TAKE 1 CAPSULE EVERY DAY - QHS) 90 Cap 3     Allergies   Allergen Reactions    Onion Nausea and Vomiting    Statins-Hmg-Coa Reductase Inhibitors Unknown (comments)     Family History   Problem Relation Age of Onset    Asthma Mother     Other Father      history of fall multiple injuries    Hypertension Brother     Cancer Paternal Grandmother      uncertain if colon or stomach    Cancer Paternal Grandfather      stomach cancer- dx mid [de-identified]     Social History   Substance Use Topics    Smoking status: Never Smoker    Smokeless tobacco: Current User     Types: Chew    Alcohol use 14.4 oz/week     24 Cans of beer per week     Patient Active Problem List   Diagnosis Code    HTN (hypertension) I10    Gout M10.9    GI bleed K92.2    Chest pain, unspecified R07.9    Alcohol dependence with withdrawal (HCC) F10.239    Depression F32.9    Anxiety F41.9    Recurrent major depressive disorder ([facility] [address] F33.9    Hypogonadism, male E29.1    Thrombocytopenia (HCC) D69.6    Splenomegaly R16.1    History of alcohol abuse Z87.898    Prediabetes R73.03    Esophageal varices without bleeding (HCC) I85.00    Iron deficiency anemia D50.9    Hyponatremia E87.1    Gallbladder calculus without cholecystitis K80.20    Incarcerated umbilical hernia I85.9    Chest pain R07.9    Alcoholic cirrhosis (HCC) P85.65       Depression Risk Factor Screening:   No flowsheet data found. Alcohol Risk Factor Screening: You average more than 14 drinks a week. Functional Ability and Level of Safety:   Hearing Loss  Hearing is good. Activities of Daily Living  The home contains: no safety equipment. Patient does total self care    Fall Risk  Fall Risk Assessment, last 12 mths 5/18/2018   Able to walk? Yes   Fall in past 12 months? No       Abuse Screen  Patient is not abused    Cognitive Screening   Evaluation of Cognitive Function:  Has your family/caregiver stated any concerns about your memory: no  Normal    Patient Care Team   Patient Care Team:  Tahmina Sams DO as PCP - General (Family Practice)  [facility] as [address]  Filemon Carroll MD (Hematology and Oncology)  Fabián Turcios MD (Gastroenterology)  Mila Kraft MD (Surgery)  James Seaman MD (Cardiology)    Assessment/Plan   Education and counseling provided:  Are appropriate based on today's review and evaluation  End-of-Life planning (with patient's consent)    Diagnoses and all orders for this visit:    1. Alcohol dependence with uncomplicated withdrawal ([facility] [address]    2. Alcoholic cirrhosis, unspecified whether ascites present ([facility] [address]    3. Essential hypertension  -     METABOLIC PANEL, BASIC    4. Idiopathic gout, unspecified chronicity, unspecified site  -     URIC ACID    5. Iron deficiency anemia due to chronic blood loss  -     CBC WITH AUTOMATED DIFF  -     FERRITIN  -     IRON PROFILE    6. Medicare annual wellness visit, subsequent    7. Special screening for malignant neoplasm of prostate  -     PSA Screening ([id number])        Health Maintenance Due   Topic Date Due    Pneumococcal 19-64 Highest Risk (2 of 3 - PCV13) 06/01/2012    MEDICARE YEARLY EXAM  04/07/2018     I have discussed the diagnosis with the patient and the intended plan as seen in the above orders.   The patient has received an after-visit summary and questions were answered concerning future plans. Pt conveyed understanding of plan.       Dr Alicia Almaraz

## 2018-05-18 NOTE — PROGRESS NOTES
Patient here for cpe, fasting labs. 1. Have you been to the ER, urgent care clinic since your last visit? Hospitalized since your last visit? No    2. Have you seen or consulted any other health care providers outside of the [address] [place] [address] since your last visit? Include any pap smears or colon screening.  No

## 2018-05-18 NOTE — MR AVS SNAPSHOT
[address] [id number] 
[phone] Patient: Amberly Avery. MRN: [id number] :1960 Visit Information Date & Time Provider Department Dept. Phone Encounter #  
 2018  8:30 AM Moustapha Chapman, [address] [phone] [id number] Follow-up Instructions Return if symptoms worsen or fail to improve. Your Appointments 2018  8:30 AM  
ESTABLISHED PATIENT with Lukas Abrams MD  
[facility] at [address] [place] [address] [facility]: 2mo iron deficiency fu,  Cory [address] [place] [address], [address] [place] [address]  
[phone]  
  
   
 [address] [place] [address], [address] [place] [address]  
  
    
 2018 10:40 AM  
ESTABLISHED PATIENT with Silver Khan MD  
[facility] ([facility]) Appt Note: 3 mo fu appt [id number] [address] [address]  
[phone]  
  
   
 [address] [address]  
  
    
 2018  1:00 PM  
New Patient with Sallie Pollack MD  
[address] [facility] App[facility]: New Pt/ Hx Liver Cirrhosis/ Referring Provider: Dr. Silver Khan w/ [facility] Y#[phone]// tlt 2018 [address] [facility] [phone] [facility] [address]  
59 [facility] [place] [address] Upcoming Health Maintenance Date Due Pneumococcal 19-64 Highest Risk (2 of 3 - PCV13) 2012 MEDICARE YEARLY EXAM 2018 Influenza Age 5 to Adult 2018 COLONOSCOPY 2022 DTaP/Tdap/Td series (2 - Td) 2022 Allergies as of 2018  Review Complete On: 2018 By: Moustapha Chapman DO Severity Noted Reaction Type Reactions Onion Medium 2011   Side Effect Nausea and Vomiting Statins-hmg-coa Reductase Inhibitors  2016    Unknown (comments) Current Immunizations  Reviewed on 5/1/2018 Name Date Hep A Vaccine (Adult) 6/28/2016, 12/4/2015 Hep B Vaccine (Adult) 6/28/2016, 9/8/2015, 7/8/2015 Influenza Vaccine (Quad) PF 9/8/2017  9:46 AM, 10/24/2016 12:01 PM, 11/5/2015 TDAP Vaccine 9/5/2012  3:11 PM  
 ZZZ-RETIRED (DO NOT USE) Pneumococcal Vaccine (Unspecified Type) 6/1/2011  8:25 PM  
  
 Not reviewed this visit You Were Diagnosed With   
  
 Codes Comments Alcohol dependence with uncomplicated withdrawal ([facility]    -  Primary ICD-10-CM: T67.137 ICD-9-CM: 303.90, 291.81 Alcoholic cirrhosis, unspecified whether ascites present ([facility].)     ICD-10-CM: K70.30 ICD-9-CM: 571.2 Essential hypertension     ICD-10-CM: I10 
ICD-9-CM: 401.9 Idiopathic gout, unspecified chronicity, unspecified site     ICD-10-CM: M10.00 ICD-9-CM: 274.9 Iron deficiency anemia due to chronic blood loss     ICD-10-CM: D50.0 ICD-9-CM: 280.0 Medicare annual wellness visit, subsequent     ICD-10-CM: Z00.00 ICD-9-CM: V70.0 Special screening for malignant neoplasm of prostate     ICD-10-CM: Z12.5 ICD-9-CM: V76.44 Vitals BP Pulse Temp Resp Height(growth percentile) Weight(growth percentile) 119/72 72 98.5 °F (36.9 °C) 18 6' (1.829 m) 287 lb (130.2 kg) SpO2 BMI Smoking Status 94% 38.92 kg/m2 Never Smoker Vitals History BMI and BSA Data Body Mass Index Body Surface Area  
 38.92 kg/m 2 2.57 m 2 Preferred Pharmacy Pharmacy Name Phone [facility] [address] [facility] [phone] Your Updated Medication List  
  
   
This list is accurate as of 5/18/18  9:06 AM.  Always use your most recent med list.  
  
  
  
  
 allopurinol 300 mg tablet Commonly known as:  ZYLOPRIM  
TAKE 1 TABLET TWICE DAILY  
  
 carvedilol 3.125 mg tablet Commonly known as:  Viv Solid Take 1 Tab by mouth two (2) times daily (with meals). cyclobenzaprine 10 mg tablet Commonly known as:  FLEXERIL  
TAKE 1 TABLET THREE TIMES DAILY AS NEEDED  
  
 furosemide 40 mg tablet Commonly known as:  LASIX Take 1 Tab by mouth daily. isosorbide mononitrate ER 30 mg tablet Commonly known as:  IMDUR Take  by mouth daily. lactulose 10 gram/15 mL solution Commonly known as:  [place] Take 15 mL by mouth two (2) times a day. LORazepam 1 mg tablet Commonly known as:  ATIVAN  
TAKE 1 TABLET TWICE DAILY (MAXIMUM DAILY AMOUNT 2MG) omeprazole 40 mg capsule Commonly known as:  PRILOSEC  
TAKE 1 CAPSULE TWICE DAILY potassium chloride SA 10 mEq capsule Commonly known as:  MICRO-K  
TAKE 1 CAPSULE EVERY DAY  
  
 spironolactone 25 mg tablet Commonly known as:  ALDACTONE  
TAKE 1 TABLET TWICE DAILY  
  
 traZODone 300 mg tablet Commonly known as:  [place]lette [place] Take 1 Tab by mouth nightly. venlafaxine- mg capsule Commonly known as:  EFFEXOR-XR  
TAKE 1 CAPSULE EVERY DAY - 7am  
  
  
  
  
We Performed the Following CBC WITH AUTOMATED DIFF [11106 CPT(R)] FERRITIN [64305 CPT(R)] IRON PROFILE U8253242 CPT(R)] METABOLIC PANEL, BASIC [54717 CPT(R)] PSA SCREENING (SCREENING) [ HCPCS] URIC ACID U0313495 CPT(R)] Follow-up Instructions Return if symptoms worsen or fail to improve. Patient Instructions Medicare Wellness Visit, Male The best way to live healthy is to have a lifestyle where you eat a well-balanced diet, exercise regularly, limit alcohol use, and quit all forms of tobacco/nicotine, if applicable. Regular preventive services are another way to keep healthy. Preventive services (vaccines, screening tests, monitoring & exams) can help personalize your care plan, which helps you manage your own care. Screening tests can find health problems at the earliest stages, when they are easiest to treat.  
  
[facility] follows the current, evidence-based guidelines published by the [facility] (USPSTF) when recommending preventive services for our patients. Because we follow these guidelines, sometimes recommendations change over time as research supports it. (For example, a prostate screening blood test is no longer routinely recommended for men with no symptoms.) Of course, you and your provider may decide to screen more often for some diseases, based on your risk and co-morbidities (chronic disease you are already diagnosed with). Preventive services for you include: - Medicare offers their members a free annual wellness visit, which is time for you and your primary care provider to discuss and plan for your preventive service needs. Take advantage of this benefit every year! 
 
-All people over age 72 should receive the recommended pneumonia vaccines. Current USPSTF guidelines recommend a series of two vaccines for the best pneumonia protection.  
 
-All adults should have a yearly flu vaccine and a tetanus vaccine every 10 years. All adults age 61 years should receive a shingles vaccine once in their lifetime.   
 
-All adults age 38-68 years who are overweight should have a diabetes screening test once every three years.  
 
-Other screening tests & preventive services for persons with diabetes include: an eye exam to screen for diabetic retinopathy, a kidney function test, a foot exam, and stricter control over your cholesterol.  
 
-Cardiovascular screening for adults with routine risk involves an electrocardiogram (ECG) at intervals determined by the provider.  
 
-Colorectal cancer screenings should be done for adults age 54-65 years with normal risk. There are a number of acceptable methods of screening for this type of cancer. Each test has its own benefits and drawbacks. Discuss with your provider what is most appropriate for you during your annual wellness visit.  The different tests include: colonoscopy (considered the best screening method), a fecal occult blood test, a fecal DNA test, and sigmoidoscopy. 
 
-All adults born between [facility] should be screened once for Hepatitis C. 
 
-An Abdominal Aortic Aneurysm (AAA) Screening is recommended for men age 73-68 who has ever smoked in their lifetime. Here is a list of your current Health Maintenance items (your personalized list of preventive services) with a due date: 
Health Maintenance Due Topic Date Due  Pneumococcal Vaccine (2 of 3 - PCV13) 06/01/2012 Cassi Mckeon Annual Well Visit  04/07/2018 Introducing [facility]! [employer] introduces Next audience patient portal. Now you can access parts of your medical record, email your doctor's office, and request medication refills online. 1. In your internet browser, go to [email or web address] 2. Click on the First Time User? Click Here link in the Sign In box. You will see the New Member Sign Up page. 3. Enter your Next audience Access Code exactly as it appears below. You will not need to use this code after youve completed the sign-up process. If you do not sign up before the expiration date, you must request a new code. · Next audience Access Code: [email or web address] Expires: 6/28/2018  9:37 AM 
 
4. Enter the last four digits of your Social Security Number (xxxx) and Date of Birth (mm/dd/yyyy) as indicated and click Submit. You will be taken to the next sign-up page. 5. Create a Next audience ID. This will be your Next audience login ID and cannot be changed, so think of one that is secure and easy to remember. 6. Create a Next audience password. You can change your password at any time. 7. Enter your Password Reset Question and Answer. This can be used at a later time if you forget your password. 8. Enter your e-mail address. You will receive e-mail notification when new information is available in [address] 9. Click Sign Up. You can now view and download portions of your medical record.  
10. Click the [employer] link to download a portable copy of your medical information. If you have questions, please visit the Frequently Asked Questions section of the [employer] website. Remember, [employer] is NOT to be used for urgent needs. For medical emergencies, dial 911. Now available from your iPhone and Android! Please provide this summary of care documentation to your next provider. Your primary care clinician is listed as Ke Vásquez. If you have any questions after today's visit, please call [phone].

## 2018-05-18 NOTE — LETTER
5/21/2018 2:18 PM 
 
Mr. Marshall Albright. 
[address] [facility] [address] Dear Marshall Albright.: 
 
Please find your most recent results below. Resulted Orders URIC ACID Result Value Ref Range Uric acid 3.1 (L) 3.7 - 8.6 mg/dL Comment:  
              Therapeutic target for gout patients: <6.0 Narrative Performed at:  [address]  [id number] : José Paul MD, Phone:  [phone] METABOLIC PANEL, BASIC Result Value Ref Range Glucose 121 (H) 65 - 99 mg/dL BUN 3 (L) 6 - 24 mg/dL Creatinine 0.59 (L) 0.76 - 1.27 mg/dL GFR est non- >59 mL/min/1.73 GFR est  >59 mL/min/1.73  
 BUN/Creatinine ratio 5 (L) 9 - 20 Sodium 138 134 - 144 mmol/L Potassium 3.7 3.5 - 5.2 mmol/L Chloride 101 96 - 106 mmol/L  
 CO2 20 18 - 29 mmol/L Calcium 8.2 (L) 8.7 - 10.2 mg/dL Narrative Performed at:  [address]  [id number] : José Paul MD, Phone:  [phone] CBC WITH AUTOMATED DIFF Result Value Ref Range WBC 5.4 3.4 - 10.8 x10E3/uL  
 RBC 5.00 4.14 - 5.80 x10E6/uL HGB 11.3 (L) 13.0 - 17.7 g/dL HCT 38.5 37.5 - 51.0 % MCV 77 (L) 79 - 97 fL  
 MCH 22.6 (L) 26.6 - 33.0 pg  
 MCHC 29.4 (L) 31.5 - 35.7 g/dL RDW 28.2 (H) 12.3 - 15.4 % PLATELET 851 (L) 488 - 379 x10E3/uL NEUTROPHILS 60 Not Estab. % Lymphocytes 24 Not Estab. % MONOCYTES 12 Not Estab. % EOSINOPHILS 3 Not Estab. % BASOPHILS 1 Not Estab. %  
 ABS. NEUTROPHILS 3.2 1.4 - 7.0 x10E3/uL Abs Lymphocytes 1.3 0.7 - 3.1 x10E3/uL  
 ABS. MONOCYTES 0.7 0.1 - 0.9 x10E3/uL  
 ABS. EOSINOPHILS 0.2 0.0 - 0.4 x10E3/uL  
 ABS. BASOPHILS 0.1 0.0 - 0.2 x10E3/uL IMMATURE GRANULOCYTES 0 Not Estab. %  
 ABS. IMM. GRANS. 0.0 0.0 - 0.1 x10E3/uL Hematology comments: Note:   
   Comment:  
   Verified by microscopic examination. Narrative Performed at:  [address]  [id number] : Teodora Brennan MD, Phone:  [phone] FERRITIN Result Value Ref Range Ferritin 140 30 - 400 ng/mL Narrative Performed at:  [address]  [id number] : Teodora Brennan MD, Phone:  [phone] IRON PROFILE Result Value Ref Range TIBC 295 250 - 450 ug/dL UIBC 255 111 - 343 ug/dL Iron 40 38 - 169 ug/dL Iron % saturation 14 (L) 15 - 55 % Narrative Performed at:  [address]  [id number] : Teodora Brennan MD, Phone:  [phone] PSA SCREENING (SCREENING) Result Value Ref Range Prostate Specific Ag <0.1 0.0 - 4.0 ng/mL Comment:  
   Roche ECLIA methodology. According to the American Urological Association, Serum PSA should 
decrease and remain at undetectable levels after radical 
prostatectomy. The AUA defines biochemical recurrence as an initial 
PSA value 0.2 ng/mL or greater followed by a subsequent confirmatory PSA value 0.2 ng/mL or greater. Values obtained with different assay methods or kits cannot be used 
interchangeably. Results cannot be interpreted as absolute evidence 
of the presence or absence of malignant disease. Narrative Performed at:  [address]  [id number] : Teodora Brennan MD, Phone:  [phone] RECOMMENDATIONS: 
 
Your uic acid level is low which is fine. Your iron levels are much improved current hemoglobin is 11.3 up from 8.1 and your platelets are stable. Your prostate level remains undetectable. 
    
   
 
 
Please call me if you have any questions: [phone] Sincerely, Chemo Bold, DO

## 2018-05-19 LAB
BASOPHILS # BLD AUTO: 0.1 X10E3/UL (ref 0–0.2)
BASOPHILS NFR BLD AUTO: 1 %
BUN SERPL-MCNC: 3 MG/DL (ref 6–24)
BUN/CREAT SERPL: 5 (ref 9–20)
CALCIUM SERPL-MCNC: 8.2 MG/DL (ref 8.7–10.2)
CHLORIDE SERPL-SCNC: 101 MMOL/L (ref 96–106)
CO2 SERPL-SCNC: 20 MMOL/L (ref 18–29)
CREAT SERPL-MCNC: 0.59 MG/DL (ref 0.76–1.27)
EOSINOPHIL # BLD AUTO: 0.2 X10E3/UL (ref 0–0.4)
EOSINOPHIL NFR BLD AUTO: 3 %
ERYTHROCYTE [DISTWIDTH] IN BLOOD BY AUTOMATED COUNT: 28.2 % (ref 12.3–15.4)
FERRITIN SERPL-MCNC: 140 NG/ML (ref 30–400)
GFR SERPLBLD CREATININE-BSD FMLA CKD-EPI: 114 ML/MIN/1.73
GFR SERPLBLD CREATININE-BSD FMLA CKD-EPI: 132 ML/MIN/1.73
GLUCOSE SERPL-MCNC: 121 MG/DL (ref 65–99)
HCT VFR BLD AUTO: 38.5 % (ref 37.5–51)
HGB BLD-MCNC: 11.3 G/DL (ref 13–17.7)
IMM GRANULOCYTES # BLD: 0 X10E3/UL (ref 0–0.1)
IMM GRANULOCYTES NFR BLD: 0 %
IRON SATN MFR SERPL: 14 % (ref 15–55)
IRON SERPL-MCNC: 40 UG/DL (ref 38–169)
LYMPHOCYTES # BLD AUTO: 1.3 X10E3/UL (ref 0.7–3.1)
LYMPHOCYTES NFR BLD AUTO: 24 %
MCH RBC QN AUTO: 22.6 PG (ref 26.6–33)
MCHC RBC AUTO-ENTMCNC: 29.4 G/DL (ref 31.5–35.7)
MCV RBC AUTO: 77 FL (ref 79–97)
MONOCYTES # BLD AUTO: 0.7 X10E3/UL (ref 0.1–0.9)
MONOCYTES NFR BLD AUTO: 12 %
MORPHOLOGY BLD-IMP: ABNORMAL
NEUTROPHILS # BLD AUTO: 3.2 X10E3/UL (ref 1.4–7)
NEUTROPHILS NFR BLD AUTO: 60 %
PLATELET # BLD AUTO: 124 X10E3/UL (ref 150–379)
POTASSIUM SERPL-SCNC: 3.7 MMOL/L (ref 3.5–5.2)
PSA SERPL-MCNC: <0.1 NG/ML (ref 0–4)
RBC # BLD AUTO: 5 X10E6/UL (ref 4.14–5.8)
SODIUM SERPL-SCNC: 138 MMOL/L (ref 134–144)
TIBC SERPL-MCNC: 295 UG/DL (ref 250–450)
UIBC SERPL-MCNC: 255 UG/DL (ref 111–343)
URATE SERPL-MCNC: 3.1 MG/DL (ref 3.7–8.6)
WBC # BLD AUTO: 5.4 X10E3/UL (ref 3.4–10.8)

## 2018-05-21 NOTE — PROGRESS NOTES
Uric acid level is low which is fine. Iron levels are much improved current hemoglobin is 11.3 up from 8.1 and his platelets are stable.   Prostate level remains undetectable

## 2018-06-12 NOTE — H&P
Patient Name: Selma Sosa. Account #: [id number]    Gender: Male    (age): 1960 (56)       Provider:     Lida Marcelino MD        Referring Physician:     Eulalio Pacheco MD  [facility] [address] [facility], [address] [place] [address]  [phone] (phone)  [phone] (fax)        Chief Complaint: anemia           History of Present Illness:          Multiple hospital admits and ER visits with chest pains, fatigue. Recently seen [facility] ER where hb 7.8 and admit for history of MI. Seen Dr Sandria Moritz 3/30 for chest pain follow up and thought symptoms combination anemia and gi pathology. Hb then 8.1 with ferritin 14; hb up to 9.0 with iron infusions. AST 39; alb 3.4        No use ASA, ibuprofen, etc.     Past Medical History      Medical Conditions: Anemia  Arthritis  Cirrhosis or liver problems  High blood pressure  Ischemic Heart Disease  Stroke/ CVA   Surgical Procedures: Rectal Abcesses,    Dx Studies: CAT Scan, Dec. 2014  Colonoscopy, Dec. 2014  Endo Posting, 2018  Endo Posting, 2018  Endo Posting, 2017  Endoscopy  Other________, Ascites drained Dec. 2014   Medications: allopurinol 300 mg BID  carvedilol 3.125 mg  cyclobenzaprine 10 mg  furosemide 20 mg bid  lorazepam 1 mg Take 1 tablet by mouth twice a day as needed  omeprazole 40 mg Take 1 capsule by mouth twice a day  potassium chloride 10 mEq  spironolactone 25 mg Take 1 tablet by mouth twice a day  trazodone 300 mg Take 1 tablet by mouth at bedtime  venlafaxine 150 mg QD   Allergies: Alleric to onions  Statins-Hmg-Coa Reductase Inhibitors   Immunizations: Flu vaccine, 2014 (Approx.)  zoster      Social History      Alcohol: Alcohol consumption: Daily; eight beers a day. Tobacco: chewing tobacco 8 times a day. Drugs: None   Exercise: None   Caffeine: 2-3 glasses of ice tea a day. Coffee or Tea # of cups per day:. Daily.      Family History No history of Colon Polyps, Esophogeal Cancer, GI Cancers, IBD (Crohn's or UC), Liver disease  Other: Diagnosed with Family history of colon cancer;       Review of Systems:   Cardiovascular: Denies chest pain, irregular heart beat, palpitations, peripheral edema, syncope, Sweats. Constitutional: Presents suffers from weight gain. Denies fatigue, fever, loss of appetite, weight loss. ENMT: Denies nose bleeds, sore throat, hearing loss. Endocrine: Denies excessive thirst, heat intolerance. Eyes: Denies loss of vision. Gastrointestinal: Presents suffers from abdominal pain. Denies abdominal swelling, change in bowel habits, constipation, diarrhea, Bloating/gas, heartburn, jaundice, nausea, rectal bleeding, stomach cramps, vomiting, dysphagia, rectal pain, Stool incontinence, hematemesis. Genitourinary: Denies dark urine, dysuria, frequent urination, hematuria, incontinence. Hematologic/Lymphatic: Presents suffers from easy bruising. Denies prolonged bleeding. Integumentary: Denies itching, rashes, sun sensitivity. Musculoskeletal: Denies arthritis, back pain, gout, joint pain, muscle weakness, stiffness. Neurological: Denies dizziness, fainting, frequent headaches, memory loss. Psychiatric: Presents suffers from anxiety, depression, panic attacks. Denies difficulty sleeping, hallucinations, nervousness, paranoia. Respiratory: Denies cough, dyspnea, wheezing. Vital Signs: see nursing notes     Physical Exam:   Constitutional:      Appearance: No distress, appears comfortable. Skin:      Inspection: No rash, no jaundice. Head/face: Inspection: Normacephalic, atraumatic. Eyes:      Conjunctivae/lids: Normal.   ENMT:      External: Normal.   Neck:      Neck: Normal appearance, trachea midline. Respiratory:      Effort: Normal respiratory effort, comfortable, speaks in complete sentences. Auscultation: normal breath sounds, no rubs, wheezes or rhonchi. Cardiovascular: Auscultation: normal, S1 and S2, no gallops , no rubs or murmurs . Gastrointestinal/Abdomen:      Abdomen: non-distended, nontender. Liver/Spleen: normal, normal size, Liver size and consistency normal, spleen is non-palpable. Musculoskeletal:      Gait/station: normal.   Muscles: normal strength and tone, no atrophy or abnormal movements. Psychiatric:      Judgment/insight: Normal, normal judgement, normal insight. Mood and affect: Normal mood, affect full, no evidence of depression, anxiety or agitation. Lymphatic:      Neck: No lymphadenopathy in the cervical or supraclavicular chain.         Lab Results:      Test 6/2/2017 5/12/2017 3/31/2017 2/8/2017 1/19/2015 Units Limits   AFP with AFP-L3%          AFP, Serum     9.5 ng/mL 0 - 8   AFP-L3%, Serum     10.2 % 0 - 9.9   CBC W/O DIFF          ANISOCYTOSIS 2+     /100xOIF NORMAL   HEMATOCRIT 26.0     % 38.5-48.5   HEMOGLOBIN 7.0     g/dL 12.6-17.3   HYPOCHROMIA 2+     /100xOIF NORMAL   IMMATURE PLATELET FRACTION 8.6     % 0.0-8.0   MEAN CELL HGB 17.0     pg 26.6-32.7   MEAN CELL HGB CONCENTRATION 26.9     g/dL 32.4-35.6   MEAN CELL VOLUME 63.3     fL 80.8-97.7   MEAN PLATELET VOLUME Test not performed     fL 6.8-10.2   MICROCYTOSIS 2+     /100xOIF NORMAL   NUCLEATED RBC# 0.03     10*3/uL    NUCLEATED RBC% 1.2     /100 WBC NONE SEEN   PLATELET COUNT 53     10*3/uL 151-324   RDW CV 21.8     % 12.4-15.6   RED BLOOD CELL 4.11     10*6/uL 4.19-5.52   RETIC HGB EQUIVALENT 14.7     pg 29.0-35.0   SLDSCAN SMEAR REVIEWED         WHITE BLOOD CELL 2.41     10*3/uL 4.30-10.30   Hep A Ab, Total          Hep A Ab, Total     Positive  Negative   Hep B Surface Ab          Hep B Surface Ab, Qual     Non Reactive     PROTHROMBIN TIME          INTERNATIONAL NORMAL RATIO 1.4 1.4 1.3    0.9-1.1   PROTHROMBIN TIME PATIENT 14.7 14.4 13.4   SECONDS 9.2-12.0   PLATELET COUNT          PLATELET COUNT  55    10*3/uL 151-324   PARTIAL THROMBOPLASTIN TIME          PTT PATIENT    25.0  SECONDS 22.0-32.0     Impressions: Iron deficiency anemia  Alcoholic cirrhosis of liver without ascites     Plan: Ultrasound Complete Abdomen  I've discussed EGD, colonoscopy possible biopsy, band ligation,polypectomy, cautery, injection, alternatives, complications including but not limited to pain, cardiopulmonary event, bleeding, perforation requiring additional blood transfusion or operative repair; all questions answered.

## 2018-06-13 ENCOUNTER — OFFICE VISIT (OUTPATIENT)
Dept: ONCOLOGY | Age: 58
End: 2018-06-13

## 2018-06-13 VITALS
WEIGHT: 291 LBS | OXYGEN SATURATION: 96 % | RESPIRATION RATE: 18 BRPM | TEMPERATURE: 97.8 F | DIASTOLIC BLOOD PRESSURE: 68 MMHG | BODY MASS INDEX: 39.42 KG/M2 | HEART RATE: 88 BPM | SYSTOLIC BLOOD PRESSURE: 143 MMHG | HEIGHT: 72 IN

## 2018-06-13 DIAGNOSIS — K70.30 ALCOHOLIC CIRRHOSIS, UNSPECIFIED WHETHER ASCITES PRESENT (HCC): ICD-10-CM

## 2018-06-13 DIAGNOSIS — D69.6 THROMBOCYTOPENIA (HCC): ICD-10-CM

## 2018-06-13 DIAGNOSIS — D50.0 IRON DEFICIENCY ANEMIA DUE TO CHRONIC BLOOD LOSS: Primary | ICD-10-CM

## 2018-06-13 NOTE — PROGRESS NOTES
[facility] at [address], [address] [place] [address]  Baldemar Smith: [phone]  F: [phone]      Reason for Visit:   Viviana Benavides is a 66 y.o. male who is seen for follow up of anemia. History of Present Illness:   He had additional IV iron in April. He reports significant improvement in energy after this, though energy starting to fade again. No bleeding. PAST HISTORY: The following sections were reviewed and updated in the EMR as appropriate: PMH, SH, FH, Medications, Allergies. Allergies   Allergen Reactions    Onion Nausea and Vomiting    Statins-Hmg-Coa Reductase Inhibitors Unknown (comments)      Review of Systems: A complete review of systems was obtained, reviewed, and scanned into the EMR. Pertinent findings reviewed above. Physical Exam:     Visit Vitals    /68 (BP 1 Location: Left arm, BP Patient Position: Sitting)  Comment: .  Pulse 88    Temp 97.8 °F (36.6 °C) (Temporal)    Resp 18    Ht 6' (1.829 m)    Wt 291 lb (132 kg)    SpO2 96%    BMI 39.47 kg/m2     General: No distress  Eyes: PERRLA, anicteric sclerae  HENT: Atraumatic, OP clear  Neck: Supple  Lymphatic: No cervical, supraclavicular, or inguinal adenopathy  Respiratory: CTAB, normal respiratory effort  CV: Normal rate, regular rhythm, no murmurs, no peripheral edema  GI: Soft, nontender, distended with ascites, no masses, no hepatomegaly, no splenomegaly  Skin: No rashes, ecchymoses, or petechiae. Normal temperature, turgor, and texture. Psych: Alert, oriented, appropriate affect, normal judgment/insight    Results:     Lab Results   Component Value Date/Time    WBC 5.4 05/18/2018 09:01 AM    HGB 11.3 (L) 05/18/2018 09:01 AM    HCT 38.5 05/18/2018 09:01 AM    PLATELET 437 (L) 50/14/8788 09:01 AM    MCV 77 (L) 05/18/2018 09:01 AM    ABS.  NEUTROPHILS 3.2 05/18/2018 09:01 AM    Hemoglobin (POC) 14.3 03/13/2014 08:16 PM    Hematocrit (POC) 42 03/13/2014 08:16 PM     Lab Results   Component Value Date/Time    Sodium 138 05/18/2018 09:01 AM    Potassium 3.7 05/18/2018 09:01 AM    Chloride 101 05/18/2018 09:01 AM    CO2 20 05/18/2018 09:01 AM    Glucose 121 (H) 05/18/2018 09:01 AM    BUN 3 (L) 05/18/2018 09:01 AM    Creatinine 0.59 (L) 05/18/2018 09:01 AM    GFR est  05/18/2018 09:01 AM    GFR est non- 05/18/2018 09:01 AM    Calcium 8.2 (L) 05/18/2018 09:01 AM    Sodium (POC) 141 03/13/2014 08:16 PM    Potassium (POC) 3.1 (L) 03/13/2014 08:16 PM    Chloride (POC) 102 03/13/2014 08:16 PM    Glucose (POC) 118 (H) 07/07/2016 07:11 AM    BUN (POC) <3 (L) 03/13/2014 08:16 PM    Creatinine (POC) 0.6 01/16/2018 08:17 AM    Calcium, ionized (POC) 1.06 (L) 03/13/2014 08:16 PM     Lab Results   Component Value Date/Time    Bilirubin, total 1.5 (H) 01/29/2018 02:54 PM    ALT (SGPT) 27 01/29/2018 02:54 PM    AST (SGOT) 39 (H) 01/29/2018 02:54 PM    Alk.  phosphatase 151 (H) 01/29/2018 02:54 PM    Protein, total 7.7 01/29/2018 02:54 PM    Albumin 3.4 (L) 01/29/2018 02:54 PM    Globulin 4.3 (H) 01/29/2018 02:54 PM     Lab Results   Component Value Date/Time    Reticulocyte count 1.3 05/17/2017 01:54 PM    Iron % saturation 14 (L) 05/18/2018 09:01 AM    TIBC 295 05/18/2018 09:01 AM    Ferritin 140 05/18/2018 09:01 AM    Vitamin B12 601 09/08/2017 09:48 AM    Folate 11.7 05/17/2017 01:54 PM    Haptoglobin 83 05/17/2017 01:54 PM     05/17/2017 01:54 PM    TSH 2.490 09/08/2017 09:48 AM    OTILIO, Direct None Detected 06/02/2011 04:08 AM    Lipase 206 10/18/2016 11:51 AM    HIV 1/2 Interpretation NONREACTIVE 10/16/2013 03:15 PM     Lab Results   Component Value Date/Time    INR 1.4 (H) 01/29/2018 02:54 PM    aPTT 33.7 (H) 01/29/2018 02:54 PM    D-dimer 0.67 (H) 10/16/2013 03:50 PM    Fibrinogen 180 (L) 10/16/2013 03:15 PM     HGB (g/dL)   Date Value   05/18/2018 11.3 (L)   04/06/2018 8.1 (L)   01/29/2018 9.0 (L)   09/08/2017 11.3 (L)   07/11/2017 12.2 (L)   05/17/2017 7.3 (L)   04/06/2017 7.5 (L)   01/05/2017 8.9 (L)   10/18/2016 8.2 (L)   07/21/2016 9.7 (L)   07/14/2016 9.1 (L)   07/07/2016 10.3 (L)   07/06/2016 9.1 (L)   07/05/2016 9.3 (L)   06/28/2016 10.8 (L)   03/01/2016 10.7 (L)   01/07/2016 10.7 (L)   09/08/2015 12.0 (L)   07/08/2015 12.7   08/14/2014 14.1   07/16/2014 12.9   07/02/2014 13.5   05/16/2014 11.4 (L)   04/16/2014 13.5   03/13/2014 13.3   01/03/2014 13.6   10/16/2013 14.0   10/10/2013 12.9   09/30/2013 13.6   08/09/2013 14.7   07/24/2013 15.1   02/18/2013 16.1   04/18/2012 15.6   06/30/2011 8.6 (L)   06/29/2011 8.7 (L)   06/29/2011 7.2 (L)   06/28/2011 6.3 (LL)   06/27/2011 6.2 (LL)   06/03/2011 9.3 (L)   06/02/2011 10.2 (L)   06/01/2011 10.9 (L)           Assessment:   1) Iron deficiency anemia  S/p injectafer 6/2017, 10/2017, and 4/2018. HGB improved after most recent dose, though not completely normalized. I remain concerned for ongoing blood loss. Monitor closely for now. I encouraged him to try to see if he can tolerate some oral iron at least a few times a week, and to try different OTC preparations to see if there is one that he can tolerate. The cause of his iron deficiency is presumably intermittent variceal bleeding related to his cirrhosis. He had a recent EGD with Dr. Lashanda Bahena (7/2017). He is scheduled for repeat endoscopy with Dr. Godfrey Mulligan in a few weeks. 2) Thrombocytopenia  Chronic, secondary to cirrhosis and etoh. Monitor. 3) Alcoholic cirrhosis  He continues to drink beer. I advised him of the importance of stopping. He has a new patient appointment with Dr. Mir Rico in July.     Plan:     · Try oral iron as tolerated  · Follow up with GI as scheduled  · Labs in 3 months: CBC, iron profile, ferritin ([facility])  · Return to see me in 3 months      Signed By: Aline Thapa MD

## 2018-06-20 DIAGNOSIS — F32.A DEPRESSION, UNSPECIFIED DEPRESSION TYPE: ICD-10-CM

## 2018-06-20 DIAGNOSIS — F41.9 ANXIETY: ICD-10-CM

## 2018-06-20 RX ORDER — VENLAFAXINE HYDROCHLORIDE 150 MG/1
CAPSULE, EXTENDED RELEASE ORAL
Qty: 90 CAP | Refills: 3 | Status: SHIPPED | OUTPATIENT
Start: 2018-06-20 | End: 2018-01-01

## 2018-06-29 ENCOUNTER — OFFICE VISIT (OUTPATIENT)
Dept: CARDIOLOGY CLINIC | Age: 58
End: 2018-06-29

## 2018-06-29 ENCOUNTER — OFFICE VISIT (OUTPATIENT)
Dept: FAMILY MEDICINE CLINIC | Age: 58
End: 2018-06-29

## 2018-06-29 VITALS
BODY MASS INDEX: 39.28 KG/M2 | HEART RATE: 86 BPM | DIASTOLIC BLOOD PRESSURE: 78 MMHG | OXYGEN SATURATION: 96 % | WEIGHT: 290 LBS | SYSTOLIC BLOOD PRESSURE: 138 MMHG | HEIGHT: 72 IN

## 2018-06-29 VITALS
SYSTOLIC BLOOD PRESSURE: 123 MMHG | WEIGHT: 290 LBS | BODY MASS INDEX: 39.28 KG/M2 | HEIGHT: 72 IN | HEART RATE: 83 BPM | TEMPERATURE: 99.3 F | DIASTOLIC BLOOD PRESSURE: 74 MMHG | OXYGEN SATURATION: 92 % | RESPIRATION RATE: 18 BRPM

## 2018-06-29 DIAGNOSIS — R07.9 CHEST PAIN, UNSPECIFIED TYPE: ICD-10-CM

## 2018-06-29 DIAGNOSIS — E29.1 HYPOGONADISM, MALE: ICD-10-CM

## 2018-06-29 DIAGNOSIS — L98.9 SKIN LESION: Primary | ICD-10-CM

## 2018-06-29 DIAGNOSIS — R06.02 SOB (SHORTNESS OF BREATH): Primary | ICD-10-CM

## 2018-06-29 DIAGNOSIS — I10 ESSENTIAL HYPERTENSION: ICD-10-CM
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
## 2018-06-29 RX ORDER — TESTOSTERONE CYPIONATE 200 MG/ML
400 INJECTION INTRAMUSCULAR ONCE
Qty: 2 ML | Refills: 0
Start: 2018-06-29 | End: 2018-06-29

## 2018-06-29 RX ORDER — LACTULOSE 10 G/15ML
SOLUTION ORAL; RECTAL
Status: ON HOLD | COMMUNITY
Start: 2018-06-16 | End: 2018-07-02 | Stop reason: SDUPTHER

## 2018-06-29 RX ORDER — DOXYCYCLINE 100 MG/1
TABLET ORAL
Qty: 2 TAB | Refills: 0 | Status: SHIPPED | OUTPATIENT
Start: 2018-06-29 | End: 2018-07-03

## 2018-06-29 RX ORDER — FERROUS SULFATE 325(65) MG
TABLET, DELAYED RELEASE (ENTERIC COATED) ORAL
COMMUNITY
Start: 2018-06-22 | End: 2018-01-01

## 2018-06-29 NOTE — PROGRESS NOTES
Mickie Cervantes. is a 66 y.o. male   Chief Complaint   Patient presents with    Injection     Testosterone    Skin Problem     mole on lower right side    Labs    pt states he has a skin lesion o nhis R side of abd and he picks at it quite a bit to try and make it go away. It is currently scabbed over but is papular without any surrounding erythema. Does not itch or bother him. he is a 66y.o. year old male who presents for evalution. Reviewed PmHx, RxHx, FmHx, SocHx, AllgHx and updated and dated in the chart. Review of Systems - negative except as listed above in the HPI    Objective:     Vitals:    06/29/18 0857   BP: 123/74   Pulse: 83   Resp: 18   Temp: 99.3 °F (37.4 °C)   TempSrc: Oral   SpO2: 92%   Weight: 290 lb (131.5 kg)   Height: 6' (1.829 m)       Current Outpatient Prescriptions   Medication Sig    testosterone cypionate (DEPOTESTOTERONE CYPIONATE) 200 mg/mL injection 2 mL by IntraMUSCular route once for 1 dose. Max Daily Amount: 400 mg.    doxycycline (ADOXA) 100 mg tablet 2 tabs PO x 1 dose    venlafaxine-SR (EFFEXOR-XR) 150 mg capsule TAKE 1 CAPSULE EVERY DAY    cyclobenzaprine (FLEXERIL) 10 mg tablet TAKE 1 TABLET THREE TIMES DAILY AS NEEDED    allopurinol (ZYLOPRIM) 300 mg tablet TAKE 1 TABLET TWICE DAILY    carvedilol (COREG) 3.125 mg tablet Take 1 Tab by mouth two (2) times daily (with meals). (Patient taking differently: Take 3.125 mg by mouth daily.)    furosemide (LASIX) 40 mg tablet Take 1 Tab by mouth daily.  lactulose (CHRONULAC) 10 gram/15 mL solution Take 15 mL by mouth two (2) times a day.  LORazepam (ATIVAN) 1 mg tablet TAKE 1 TABLET TWICE DAILY (MAXIMUM DAILY AMOUNT 2MG)    omeprazole (PRILOSEC) 40 mg capsule TAKE 1 CAPSULE TWICE DAILY    spironolactone (ALDACTONE) 25 mg tablet TAKE 1 TABLET TWICE DAILY    traZODone (DESYREL) 300 mg tablet Take 1 Tab by mouth nightly.     potassium chloride SA (MICRO-K) 10 mEq capsule TAKE 1 CAPSULE EVERY DAY (Patient taking differently: TAKE 1 CAPSULE EVERY DAY - QHS)    ferrous sulfate (IRON) 325 mg (65 mg iron) EC tablet     lactulose (CHRONULAC) 10 gram/15 mL solution     isosorbide mononitrate ER (IMDUR) 30 mg tablet Take  by mouth daily. No current facility-administered medications for this visit. Physical Examination: General appearance - alert, well appearing, and in no distress  Skin - pt has an excrioated papular lesion of his R abd wall smaller than a pencil head. Pt has hx of large amount of sun exposure with out sunscreen  Hen nurse went to administer test a tick was found engorged at top of pt gluteal cleft. I removed this with tweezers and cleaned the area  Assessment/ Plan:   Diagnoses and all orders for this visit:    1. Skin lesion  -     REFERRAL TO DERMATOLOGY  Pt advised he eneds to wear sunscreen and to get an annual skin check or as determined by derm. Pt urged to f/u with derm for further eval  2. Hypogonadism, male  -     testosterone cypionate (DEPOTESTOTERONE CYPIONATE) 200 mg/mL injection; 2 mL by IntraMUSCular route once for 1 dose. Max Daily Amount: 400 mg.  -     [place] INJ TESTOSTERONE CYPIONATE () - Qty - 200  -     THER/PROPH/DIAG INJECTION, SUBCUT/IM  3. Tick Bite  Removed using tweezers and no portion of tick remained within skin. Pt tolerated this well without any complication and wound was cleaned and bacitracin placed and care d/w pt  Doxy 200mg x 1 dose PO     Follow-up Disposition:  Return in about 1 month (around 7/29/2018), or if symptoms worsen or fail to improve. I have discussed the diagnosis with the patient and the intended plan as seen in the above orders. The patient has received an after-visit summary and questions were answered concerning future plans. Pt conveyed understanding of plan.     Medication Side Effects and Warnings were discussed with patient      Jody Villareal DO

## 2018-06-29 NOTE — PATIENT INSTRUCTIONS

## 2018-06-29 NOTE — PROGRESS NOTES
LAST OFFICE VISIT : 3/30/2018        ICD-10-CM ICD-9-CM   1. SOB (shortness of breath) R06.02 786.05   2. Chest pain, unspecified type R07.9 786.50   3. Essential hypertension I10 401.9            Christoper Dakins. is a 66 y.o. male with hypertension referred for 3 month follow up. Cardiac risk factors: male gender, hypertension  I have personally obtained the history from the patient. HISTORY OF PRESENTING ILLNESS     Overall the pt states he is doing well. He states that he is trying to eat healthy. The pt states that he had an iron transfusion and this alleviated his chest pain. He reports that as long as his Hgb is stable he is not having chest pain. He notes that he is getting and EGD and colonoscopy done Monday because he has a \"bleeder\". The pt states that he has been fatigued lately and is concerned that his Hgb has dropped again. He reports that he does snore at night but states that he does not want a sleep evaluation because he states he won't wear the mask. The patient denies shortness of breath, orthopnea, PND, LE edema, palpitations, syncope, presyncope or fatigue.          ACTIVE PROBLEM LIST     Patient Active Problem List    Diagnosis Date Noted    Alcoholic cirrhosis ([facility] 59/06/8734    Chest pain 03/30/2018    Incarcerated umbilical hernia 29/51/0411    Gallbladder calculus without cholecystitis 07/21/2016    Hyponatremia 07/05/2016    Iron deficiency anemia 03/03/2016    Esophageal varices without bleeding ([facility] 03/01/2016    Prediabetes 01/11/2016    Thrombocytopenia ([facility] 10/17/2013    Splenomegaly 10/17/2013    History of alcohol abuse 10/17/2013    Hypogonadism, male 10/10/2013    Recurrent major depressive disorder ([facility] 08/11/2013    Alcohol dependence with withdrawal ([facility] 07/25/2013    Depression 07/25/2013    Anxiety 07/25/2013    GI bleed 06/28/2011    Chest pain, unspecified 06/28/2011    Gout 06/27/2011    HTN (hypertension) 06/01/2011 PAST MEDICAL HISTORY     Past Medical History:   Diagnosis Date    Adverse effect of anesthesia     pt reports waking up during colonoscopy/endoscopy    Alcohol abuse 6/1/2011    Anemia NEC     Anxiety     Ascites     has to have fluid drained occasionally    Cirrhosis of liver (HCC)     Depression     Gastric ulcer, unspecified as acute or chronic, without mention of hemorrhage, perforation, or obstruction     GERD (gastroesophageal reflux disease)     Gout     Hypertension     Liver disease     cirrhosis    Obesity, Class II, BMI 35-39.9     Pneumonia     PUD (peptic ulcer disease)     Seizures ([facility] 2013    from alcohol withdrawal    Stroke ([facility] 2013    per pt, possible mini stroke from alcohol withdrawal (same time as seizure)           PAST SURGICAL HISTORY     Past Surgical History:   Procedure Laterality Date    HX ENDOSCOPY      also colonoscopy    HX GI  1998    rectal abscess surgery    HX GI  1998    rectal abscess surgery    HX HEENT  1980    wisdom teeth          ALLERGIES     Allergies   Allergen Reactions    Onion Nausea and Vomiting    Statins-Hmg-Coa Reductase Inhibitors Unknown (comments)          FAMILY HISTORY     Family History   Problem Relation Age of Onset    Asthma Mother     Other Father      history of fall multiple injuries    Hypertension Brother     Cancer Paternal Grandmother      uncertain if colon or stomach    Cancer Paternal Grandfather      stomach cancer- dx mid [de-identified]    negative for cardiac disease       SOCIAL HISTORY     Social History     Social History    Marital status:      Spouse name: N/A    Number of children: N/A    Years of education: N/A     Occupational History    Landscaping      Social History Main Topics    Smoking status: Never Smoker    Smokeless tobacco: Current User     Types: Chew    Alcohol use 14.4 oz/week     24 Cans of beer per week    Drug use: No    Sexual activity: Yes     Partners: Female     Other Topics Concern    None     Social History Narrative         MEDICATIONS     Current Outpatient Prescriptions   Medication Sig    lactulose (CHRONULAC) 10 gram/15 mL solution     testosterone cypionate (DEPOTESTOTERONE CYPIONATE) 200 mg/mL injection 2 mL by IntraMUSCular route once for 1 dose. Max Daily Amount: 400 mg.    venlafaxine-SR (EFFEXOR-XR) 150 mg capsule TAKE 1 CAPSULE EVERY DAY    cyclobenzaprine (FLEXERIL) 10 mg tablet TAKE 1 TABLET THREE TIMES DAILY AS NEEDED    allopurinol (ZYLOPRIM) 300 mg tablet TAKE 1 TABLET TWICE DAILY    carvedilol (COREG) 3.125 mg tablet Take 1 Tab by mouth two (2) times daily (with meals). (Patient taking differently: Take 3.125 mg by mouth daily.)    furosemide (LASIX) 40 mg tablet Take 1 Tab by mouth daily. (Patient taking differently: Take 40 mg by mouth two (2) times a day.)    lactulose (CHRONULAC) 10 gram/15 mL solution Take 15 mL by mouth two (2) times a day.  LORazepam (ATIVAN) 1 mg tablet TAKE 1 TABLET TWICE DAILY (MAXIMUM DAILY AMOUNT 2MG)    omeprazole (PRILOSEC) 40 mg capsule TAKE 1 CAPSULE TWICE DAILY    spironolactone (ALDACTONE) 25 mg tablet TAKE 1 TABLET TWICE DAILY    traZODone (DESYREL) 300 mg tablet Take 1 Tab by mouth nightly.  isosorbide mononitrate ER (IMDUR) 30 mg tablet Take  by mouth daily.  potassium chloride SA (MICRO-K) 10 mEq capsule TAKE 1 CAPSULE EVERY DAY (Patient taking differently: TAKE 1 CAPSULE EVERY DAY - QHS)    ferrous sulfate (IRON) 325 mg (65 mg iron) EC tablet     doxycycline (ADOXA) 100 mg tablet 2 tabs PO x 1 dose     No current facility-administered medications for this visit. I have reviewed the nurses notes, vitals, problem list, allergy list, medical history, family, social history and medications. REVIEW OF SYMPTOMS      General: Pt denies excessive weight gain or loss. Pt is able to conduct ADL's  HEENT: Denies blurred vision, headaches, hearing loss, epistaxis and difficulty swallowing.   Respiratory: Denies cough, congestion, shortness of breath, MAZA, wheezing or stridor. Cardiovascular: Denies precordial pain, palpitations, edema or PND  Gastrointestinal: Denies poor appetite, indigestion, abdominal pain or blood in stool  Genitourinary: Denies hematuria, dysuria, increased urinary frequency  Musculoskeletal: Denies joint pain or swelling from muscles or joints  Neurologic: Denies tremor, paresthesias, headache, or sensory motor disturbance  Psychiatric: Denies confusion, insomnia, depression  Integumentray: Denies rash, itching or ulcers. Hematologic: Denies easy bruising, bleeding     PHYSICAL EXAMINATION      Vitals:    06/29/18 1007   BP: 138/78   Pulse: 86   SpO2: 96%   Weight: 290 lb (131.5 kg)   Height: 6' (1.829 m)     General: Well developed, in no acute distress. HEENT: No jaundice, oral mucosa moist, no oral ulcers  Neck: Supple, no stiffness, no lymphadenopathy, supple  Heart:  Normal S1/S2 negative S3 or S4. Regular, no murmur, gallop or rub, no jugular venous distention  Respiratory: Clear bilaterally x 4, no wheezing or rales  Extremities:  No edema, normal cap refill, no cyanosis. Musculoskeletal: No clubbing, no deformities  Neuro: A&Ox3, speech clear, gait stable, cooperative, no focal neurologic deficits  Skin: Skin color is normal. No rashes or lesions. Non diaphoretic, moist.       DIAGNOSTIC DATA     1. Lipids  4/6/17- , HDL 41, LDL 77, TG 72  (9/8/17) , HDL 40, LDL 64, TG 61    2. Echo  3/24/18- EF 59%  (5/5/17) - LVEF 50% akinesis of the apical septal walls. LA mildly dilated. AV leaflets sclerosed    3. Stress Test  Lexiscan- 3/26/18-mod/severe large area of anterior apical and septal wall scarring/fixed defect without any significant ischemia.  EF 46%         LABORATORY DATA            Lab Results   Component Value Date/Time    WBC 5.4 05/18/2018 09:01 AM    Hemoglobin (POC) 14.3 03/13/2014 08:16 PM    HGB 11.3 (L) 05/18/2018 09:01 AM    Hematocrit (POC) 42 03/13/2014 08:16 PM    HCT 38.5 05/18/2018 09:01 AM    PLATELET 167 (L) 83/58/8429 09:01 AM    MCV 77 (L) 05/18/2018 09:01 AM      Lab Results   Component Value Date/Time    Sodium 138 05/18/2018 09:01 AM    Potassium 3.7 05/18/2018 09:01 AM    Chloride 101 05/18/2018 09:01 AM    CO2 20 05/18/2018 09:01 AM    Anion gap 7 01/29/2018 02:54 PM    Glucose 121 (H) 05/18/2018 09:01 AM    BUN 3 (L) 05/18/2018 09:01 AM    Creatinine 0.59 (L) 05/18/2018 09:01 AM    BUN/Creatinine ratio 5 (L) 05/18/2018 09:01 AM    GFR est  05/18/2018 09:01 AM    GFR est non- 05/18/2018 09:01 AM    Calcium 8.2 (L) 05/18/2018 09:01 AM    Bilirubin, total 1.5 (H) 01/29/2018 02:54 PM    AST (SGOT) 39 (H) 01/29/2018 02:54 PM    Alk. phosphatase 151 (H) 01/29/2018 02:54 PM    Protein, total 7.7 01/29/2018 02:54 PM    Albumin 3.4 (L) 01/29/2018 02:54 PM    Globulin 4.3 (H) 01/29/2018 02:54 PM    A-G Ratio 0.8 (L) 01/29/2018 02:54 PM    ALT (SGPT) 27 01/29/2018 02:54 PM           ASSESSMENT/RECOMMENDATIONS:.      1. Chest discomfort  - he only notices it with low Hgb, his last stress test demonstrated that he had a moderate to severe anterior apical and septal wall scarring. His EF was 46% and by echo is 50%. He may proceed with his EGD and Colonoscopy with no further testing. 2. Hx of ETOH/esophageal varices without bleeding and hepatic cirrhosis   - He is continuing to have bleeding at this point and needs to have his EGD and Colonoscopy on Monday. 3. Hypertension   - Blood pressure is under good control, no adjustments. 4. Anemia   - Is being followed by Aracelis Celaya DO and his gastroenterologist.   5. Return in 6 months or PRN. Orders Placed This Encounter    HEPATIC FUNCTION PANEL    LIPID PANEL        Follow-up Disposition: Not on File      I have discussed the diagnosis with  Jazmyne Whyte. and the intended plan as seen in the above orders. Questions were answered concerning future plans.   I have discussed medication side effects and warnings with the patient as well. Thank you,  [address] [facility] [address], DO for involving me in the care of  Reji Robles. . Please do not hesitate to contact me for further questions/concerns. Written by Ginna Bhatt, as dictated by West Mccord MD.     Cassie Cruz MD, [facility]    Patient Care Team:  [address] [facility] [address], DO as PCP - General (Family Practice)  [facility] as [address]  Sharron Duval MD (Hematology and Oncology)  Kacy Talavera MD (Surgery)  West Mccord MD (Cardiology)  Damon Estrada MD (Gastroenterology)    [address], [address] [place] [address]      [phone] / [phone] Fax

## 2018-06-29 NOTE — PROGRESS NOTES
Chief Complaint   Patient presents with    Injection     Testosterone    Skin Problem     mole on lower right side     1. Have you been to the ER, urgent care clinic since your last visit? Hospitalized since your last visit? No    2. Have you seen or consulted any other health care providers outside of the [facility] since your last visit? Include any pap smears or colon screening.  No

## 2018-06-29 NOTE — MR AVS SNAPSHOT
[address] [address] [address] [address] 
[phone] Patient: Jeanette Salguero MRN: [id number] :1960 Visit Information Date & Time Provider Department Dept. Phone Encounter #  
 2018  9:00 AM Gomez Seen, [address] [phone] [id number] Follow-up Instructions Return in about 1 month (around 2018), or if symptoms worsen or fail to improve. Your Appointments 2018 10:40 AM  
ESTABLISHED PATIENT with Baljinder Weeks MD  
[facility] ([facility] SCHEDULING) Appt Note: 3 mo fu appt [address] [address] [address] [phone]  
  
   
 [address] [place] [address]  
  
    
 2018  1:00 PM  
New Patient with Deb Bray MD  
[address] ([address]) Appt Note: New Pt/ Hx Liver Cirrhosis/ Referring Provider: Dr. Baljinder Weeks w/ [facility] I#[phone]// tlt 2018 200 [facility] [phone] [place] [address]  
[phone]  
  
   
 [place] [address]  
  
    
 2018 11:00 AM  
ESTABLISHED PATIENT with Kirti Lacy MD  
[facility] [address]) Appt Note: 3 mo fu, Cory 11 [facility], 2[address] [facility] 99 [address]  
[phone]  
  
   
 [address], [address] [place] [address] Upcoming Health Maintenance Date Due Pneumococcal 19-64 Highest Risk (2 of 3 - PCV13) 2012 Influenza Age 5 to Adult 2018 MEDICARE YEARLY EXAM 2019 COLONOSCOPY 2022 DTaP/Tdap/Td series (2 - Td) 2022 Allergies as of 2018  Review Complete On: 2018 By: Aminah Lord Severity Noted Reaction Type Reactions Onion Medium 2011   Side Effect Nausea and Vomiting  Statins-hmg-coa Reductase Inhibitors  2016    Unknown (comments) Current Immunizations  Reviewed on 5/1/2018 Name Date Hep A Vaccine (Adult) 6/28/2016, 12/4/2015 Hep B Vaccine (Adult) 6/28/2016, 9/8/2015, 7/8/2015 Influenza Vaccine (Quad) PF 9/8/2017  9:46 AM, 10/24/2016 12:01 PM, 11/5/2015 TDAP Vaccine 9/5/2012  3:11 PM  
 ZZZ-RETIRED (DO NOT USE) Pneumococcal Vaccine (Unspecified Type) 6/1/2011  8:25 PM  
  
 Not reviewed this visit You Were Diagnosed With   
  
 Codes Comments Skin lesion    -  Primary ICD-10-CM: L98.9 ICD-9-CM: 709.9 Hypogonadism, male     ICD-10-CM: E29.1 ICD-9-CM: 257.2 Vitals BP Pulse Temp Resp Height(growth percentile) Weight(growth percentile) 123/74 (BP 1 Location: Left arm, BP Patient Position: Sitting) 83 99.3 °F (37.4 °C) (Oral) 18 6' (1.829 m) 290 lb (131.5 kg) SpO2 BMI Smoking Status 92% 39.33 kg/m2 Never Smoker Vitals History BMI and BSA Data Body Mass Index Body Surface Area  
 39.33 kg/m 2 2.58 m 2 Preferred Pharmacy Pharmacy Name Phone [address] [phone] Your Updated Medication List  
  
   
This list is accurate as of 6/29/18  9:26 AM.  Always use your most recent med list.  
  
  
  
  
 allopurinol 300 mg tablet Commonly known as:  ZYLOPRIM  
TAKE 1 TABLET TWICE DAILY  
  
 carvedilol 3.125 mg tablet Commonly known as:  Janalyn Chanel Take 1 Tab by mouth two (2) times daily (with meals). cyclobenzaprine 10 mg tablet Commonly known as:  FLEXERIL  
TAKE 1 TABLET THREE TIMES DAILY AS NEEDED  
  
 ferrous sulfate 325 mg (65 mg iron) EC tablet Commonly known as:  IRON  
  
 furosemide 40 mg tablet Commonly known as:  LASIX Take 1 Tab by mouth daily. isosorbide mononitrate ER 30 mg tablet Commonly known as:  IMDUR Take  by mouth daily. * lactulose 10 gram/15 mL solution Commonly known as:  Anita Eileen Take 15 mL by mouth two (2) times a day.   
  
 * lactulose 10 gram/15 mL solution Commonly known as:  Anita Eileen LORazepam 1 mg tablet Commonly known as:  ATIVAN  
TAKE 1 TABLET TWICE DAILY (MAXIMUM DAILY AMOUNT 2MG) omeprazole 40 mg capsule Commonly known as:  PRILOSEC  
TAKE 1 CAPSULE TWICE DAILY potassium chloride SA 10 mEq capsule Commonly known as:  MICRO-K  
TAKE 1 CAPSULE EVERY DAY  
  
 spironolactone 25 mg tablet Commonly known as:  ALDACTONE  
TAKE 1 TABLET TWICE DAILY  
  
 testosterone cypionate 200 mg/mL injection Commonly known as:  DEPOTESTOTERONE CYPIONATE 2 mL by IntraMUSCular route once for 1 dose. Max Daily Amount: 400 mg.  
  
 traZODone 300 mg tablet Commonly known as:  Eward Sheets Take 1 Tab by mouth nightly. venlafaxine- mg capsule Commonly known as:  EFFEXOR-XR  
TAKE 1 CAPSULE EVERY DAY  
  
 * Notice: This list has 2 medication(s) that are the same as other medications prescribed for you. Read the directions carefully, and ask your doctor or other care provider to review them with you. We Performed the Following SC INJ TESTOSTERONE CYPIONATE [ [facility]] SC THER/PROPH/DIAG INJECTION, SUBCUT/IM [id number] CPT(R)] REFERRAL TO DERMATOLOGY [REF19 Custom] Comments:  
 [employer] [address] [facility], [address] [facility] Phone [phone] Fax [phone] Follow-up Instructions Return in about 1 month (around 7/29/2018), or if symptoms worsen or fail to improve. Referral Information Referral ID Referred By Referred To  
  
 [id number] Cristobal THOMSON Not Available Visits Status Start Date End Date 1 New Request 6/29/18 6/29/19 If your referral has a status of pending review or denied, additional information will be sent to support the outcome of this decision. Patient Instructions A Healthy Lifestyle: Care Instructions Your Care Instructions A healthy lifestyle can help you feel good, stay at a healthy weight, and have plenty of energy for both work and play. A healthy lifestyle is something you can share with your whole family. A healthy lifestyle also can lower your risk for serious health problems, such as high blood pressure, heart disease, and diabetes. You can follow a few steps listed below to improve your health and the health of your family. Follow-up care is a key part of your treatment and safety. Be sure to make and go to all appointments, and call your doctor if you are having problems. It's also a good idea to know your test results and keep a list of the medicines you take. How can you care for yourself at home? · Do not eat too much sugar, fat, or fast foods. You can still have dessert and treats now and then. The goal is moderation. · Start small to improve your eating habits. Pay attention to portion sizes, drink less juice and soda pop, and eat more fruits and vegetables. ¨ Eat a healthy amount of food. A 3-ounce serving of meat, for example, is about the size of a deck of cards. Fill the rest of your plate with vegetables and whole grains. ¨ Limit the amount of soda and sports drinks you have every day. Drink more water when you are thirsty. ¨ Eat at least 5 servings of fruits and vegetables every day. It may seem like a lot, but it is not hard to reach this goal. A serving or helping is 1 piece of fruit, 1 cup of vegetables, or 2 cups of leafy, raw vegetables. Have an apple or some carrot sticks as an afternoon snack instead of a candy bar. Try to have fruits and/or vegetables at every meal. 
· Make exercise part of your daily routine. You may want to start with simple activities, such as walking, bicycling, or slow swimming. Try to be active 30 to 60 minutes every day. You do not need to do all 30 to 60 minutes all at once. For example, you can exercise 3 times a day for 10 or 20 minutes.  Moderate exercise is safe for most people, but it is always a good idea to talk to your doctor before starting an exercise program. 
· Keep moving. Samantha Ellis the lawn, work in the garden, or [employer]. Take the stairs instead of the elevator at work. · If you smoke, quit. People who smoke have an increased risk for heart attack, stroke, cancer, and other lung illnesses. Quitting is hard, but there are ways to boost your chance of quitting tobacco for good. ¨ Use nicotine gum, patches, or lozenges. ¨ Ask your doctor about stop-smoking programs and medicines. ¨ Keep trying. In addition to reducing your risk of diseases in the future, you will notice some benefits soon after you stop using tobacco. If you have shortness of breath or asthma symptoms, they will likely get better within a few weeks after you quit. · Limit how much alcohol you drink. Moderate amounts of alcohol (up to 2 drinks a day for men, 1 drink a day for women) are okay. But drinking too much can lead to liver problems, high blood pressure, and other health problems. Family health If you have a family, there are many things you can do together to improve your health. · Eat meals together as a family as often as possible. · Eat healthy foods. This includes fruits, vegetables, lean meats and dairy, and whole grains. · Include your family in your fitness plan. Most people think of activities such as jogging or tennis as the way to fitness, but there are many ways you and your family can be more active. Anything that makes you breathe hard and gets your heart pumping is exercise. Here are some tips: 
¨ Walk to do errands or to take your child to school or the bus. ¨ Go for a family bike ride after dinner instead of watching TV. Where can you learn more? Go to http://bhavana-rosio.info/. Enter A990 in the search box to learn more about \"A Healthy Lifestyle: Care Instructions. \" Current as of: May 12, 2017 Content Version: 11.4 © 4642-4931 Healthwise, Incorporated.  Care instructions adapted under license by [employer] (which disclaims liability or warranty for this information). If you have questions about a medical condition or this instruction, always ask your healthcare professional. [address]  any warranty or liability for your use of this information. Introducing [facility]! [address] [facility] introduces [employer] patient portal. Now you can access parts of your medical record, email your doctor's office, and request medication refills online. 1. In your internet browser, go to [email or web address] 2. Click on the First Time User? Click Here link in the Sign In box. You will see the New Member Sign Up page. 3. Enter your [employer] Access Code exactly as it appears below. You will not need to use this code after youve completed the sign-up process. If you do not sign up before the expiration date, you must request a new code. · [employer] Access Code: [email or web address] Expires: 9/27/2018  9:26 AM 
 
4. Enter the last four digits of your Social Security Number (xxxx) and Date of Birth (mm/dd/yyyy) as indicated and click Submit. You will be taken to the next sign-up page. 5. Create a [employer] ID. This will be your [employer] login ID and cannot be changed, so think of one that is secure and easy to remember. 6. Create a [employer] password. You can change your password at any time. 7. Enter your Password Reset Question and Answer. This can be used at a later time if you forget your password. 8. Enter your e-mail address. You will receive e-mail notification when new information is available in [address] 9. Click Sign Up. You can now view and download portions of your medical record. 10. Click the Download Summary menu link to download a portable copy of your medical information. If you have questions, please visit the Frequently Asked Questions section of the [employer] website. Remember, [employer] is NOT to be used for urgent needs.  For medical emergencies, dial 911. Now available from your iPhone and Android! Please provide this summary of care documentation to your next provider. Your primary care clinician is listed as Milla Maradiaga. If you have any questions after today's visit, please call [phone].

## 2018-07-02 ENCOUNTER — HOSPITAL ENCOUNTER (INPATIENT)
Age: 58
LOS: 1 days | Discharge: HOME | DRG: 394 | End: 2018-07-03
Attending: INTERNAL MEDICINE | Admitting: FAMILY MEDICINE
Payer: MEDICARE

## 2018-07-02 ENCOUNTER — APPOINTMENT (OUTPATIENT)
Dept: INTERVENTIONAL RADIOLOGY/VASCULAR | Age: 58
DRG: 394 | End: 2018-07-02
Attending: INTERNAL MEDICINE
Payer: MEDICARE

## 2018-07-02 ENCOUNTER — ANESTHESIA (OUTPATIENT)
Dept: ENDOSCOPY | Age: 58
DRG: 394 | End: 2018-07-02
Payer: MEDICARE

## 2018-07-02 ENCOUNTER — ANESTHESIA EVENT (OUTPATIENT)
Dept: ENDOSCOPY | Age: 58
DRG: 394 | End: 2018-07-02
Payer: MEDICARE

## 2018-07-02 ENCOUNTER — APPOINTMENT (OUTPATIENT)
Dept: CT IMAGING | Age: 58
DRG: 394 | End: 2018-07-02
Attending: INTERNAL MEDICINE
Payer: MEDICARE

## 2018-07-02 PROBLEM — I85.10 ESOPHAGEAL VARICES IN ALCOHOLIC CIRRHOSIS (HCC): Status: ACTIVE | Noted: 2018-07-02

## 2018-07-02 PROBLEM — K70.30 ESOPHAGEAL VARICES IN ALCOHOLIC CIRRHOSIS (HCC): Status: ACTIVE | Noted: 2018-07-02

## 2018-07-02 PROBLEM — K31.89 DUODENAL MASS: Status: ACTIVE | Noted: 2018-07-02

## 2018-07-02 LAB
ALBUMIN SERPL-MCNC: 3 G/DL (ref 3.5–5)
ALBUMIN/GLOB SERPL: 0.7 {RATIO} (ref 1.1–2.2)
ALP SERPL-CCNC: 123 U/L (ref 45–117)
ALT SERPL-CCNC: 26 U/L (ref 12–78)
AMPHET UR QL SCN: NEGATIVE
ANION GAP SERPL CALC-SCNC: 10 MMOL/L (ref 5–15)
APTT PPP: 31.5 SEC (ref 22.1–32)
AST SERPL-CCNC: 42 U/L (ref 15–37)
BARBITURATES UR QL SCN: NEGATIVE
BENZODIAZ UR QL: NEGATIVE
BILIRUB SERPL-MCNC: 1.2 MG/DL (ref 0.2–1)
BUN SERPL-MCNC: 2 MG/DL (ref 6–20)
BUN/CREAT SERPL: 3 (ref 12–20)
CALCIUM SERPL-MCNC: 8.3 MG/DL (ref 8.5–10.1)
CANNABINOIDS UR QL SCN: NEGATIVE
CHLORIDE SERPL-SCNC: 102 MMOL/L (ref 97–108)
CO2 SERPL-SCNC: 25 MMOL/L (ref 21–32)
COCAINE UR QL SCN: NEGATIVE
CREAT SERPL-MCNC: 0.65 MG/DL (ref 0.7–1.3)
DRUG SCRN COMMENT,DRGCM: NORMAL
ERYTHROCYTE [DISTWIDTH] IN BLOOD BY AUTOMATED COUNT: 20.8 % (ref 11.5–14.5)
ETHANOL SERPL-MCNC: <10 MG/DL
GLOBULIN SER CALC-MCNC: 4.5 G/DL (ref 2–4)
GLUCOSE SERPL-MCNC: 127 MG/DL (ref 65–100)
H PYLORI FROM TISSUE: NEGATIVE
HCT VFR BLD AUTO: 40.2 % (ref 36.6–50.3)
HGB BLD-MCNC: 12.6 G/DL (ref 12.1–17)
INR PPP: 1.3 (ref 0.9–1.1)
KIT LOT NO., HCLOLOT: NORMAL
MCH RBC QN AUTO: 25 PG (ref 26–34)
MCHC RBC AUTO-ENTMCNC: 31.3 G/DL (ref 30–36.5)
MCV RBC AUTO: 79.6 FL (ref 80–99)
METHADONE UR QL: NEGATIVE
NEGATIVE CONTROL: NEGATIVE
NRBC # BLD: 0 K/UL (ref 0–0.01)
NRBC BLD-RTO: 0 PER 100 WBC
OPIATES UR QL: NEGATIVE
PCP UR QL: NEGATIVE
PLATELET # BLD AUTO: 90 K/UL (ref 150–400)
POSITIVE CONTROL: POSITIVE
POTASSIUM SERPL-SCNC: 3.5 MMOL/L (ref 3.5–5.1)
PROT SERPL-MCNC: 7.5 G/DL (ref 6.4–8.2)
PROTHROMBIN TIME: 13.4 SEC (ref 9–11.1)
RBC # BLD AUTO: 5.05 M/UL (ref 4.1–5.7)
SODIUM SERPL-SCNC: 137 MMOL/L (ref 136–145)
THERAPEUTIC RANGE,PTTT: NORMAL SECS (ref 58–77)
WBC # BLD AUTO: 4.6 K/UL (ref 4.1–11.1)

## 2018-07-02 PROCEDURE — C1760 CLOSURE DEV, VASC: HCPCS

## 2018-07-02 PROCEDURE — 77030019698 HC SYR ANGI MDLON MRTM -A

## 2018-07-02 PROCEDURE — 0DJD8ZZ INSPECTION OF LOWER INTESTINAL TRACT, VIA NATURAL OR ARTIFICIAL OPENING ENDOSCOPIC: ICD-10-PCS | Performed by: INTERNAL MEDICINE

## 2018-07-02 PROCEDURE — 74011250636 HC RX REV CODE- 250/636

## 2018-07-02 PROCEDURE — B41B1ZZ FLUOROSCOPY OF OTHER INTRA-ABDOMINAL ARTERIES USING LOW OSMOLAR CONTRAST: ICD-10-PCS | Performed by: RADIOLOGY

## 2018-07-02 PROCEDURE — 74011250636 HC RX REV CODE- 250/636: Performed by: INTERNAL MEDICINE

## 2018-07-02 PROCEDURE — 74011250636 HC RX REV CODE- 250/636: Performed by: RADIOLOGY

## 2018-07-02 PROCEDURE — 76040000007: Performed by: INTERNAL MEDICINE

## 2018-07-02 PROCEDURE — 80053 COMPREHEN METABOLIC PANEL: CPT | Performed by: INTERNAL MEDICINE

## 2018-07-02 PROCEDURE — 77030014243 HC BND LIG VRCES BSC -D: Performed by: INTERNAL MEDICINE

## 2018-07-02 PROCEDURE — 65660000000 HC RM CCU STEPDOWN

## 2018-07-02 PROCEDURE — 77030003657 HC NDL SCLER BSC -B: Performed by: INTERNAL MEDICINE

## 2018-07-02 PROCEDURE — 74011250636 HC RX REV CODE- 250/636: Performed by: STUDENT IN AN ORGANIZED HEALTH CARE EDUCATION/TRAINING PROGRAM

## 2018-07-02 PROCEDURE — 77030009426 HC FCPS BIOP ENDOSC BSC -B: Performed by: INTERNAL MEDICINE

## 2018-07-02 PROCEDURE — 74178 CT ABD&PLV WO CNTR FLWD CNTR: CPT

## 2018-07-02 PROCEDURE — 86900 BLOOD TYPING SEROLOGIC ABO: CPT | Performed by: STUDENT IN AN ORGANIZED HEALTH CARE EDUCATION/TRAINING PROGRAM

## 2018-07-02 PROCEDURE — 85610 PROTHROMBIN TIME: CPT | Performed by: INTERNAL MEDICINE

## 2018-07-02 PROCEDURE — 77030029065 HC DRSG HEMO QCLOT ZMED -B

## 2018-07-02 PROCEDURE — 76060000032 HC ANESTHESIA 0.5 TO 1 HR: Performed by: INTERNAL MEDICINE

## 2018-07-02 PROCEDURE — 74011636320 HC RX REV CODE- 636/320: Performed by: RADIOLOGY

## 2018-07-02 PROCEDURE — 86922 COMPATIBILITY TEST ANTIGLOB: CPT | Performed by: STUDENT IN AN ORGANIZED HEALTH CARE EDUCATION/TRAINING PROGRAM

## 2018-07-02 PROCEDURE — 99152 MOD SED SAME PHYS/QHP 5/>YRS: CPT

## 2018-07-02 PROCEDURE — 74011000250 HC RX REV CODE- 250: Performed by: RADIOLOGY

## 2018-07-02 PROCEDURE — HZ2ZZZZ DETOXIFICATION SERVICES FOR SUBSTANCE ABUSE TREATMENT: ICD-10-PCS | Performed by: FAMILY MEDICINE

## 2018-07-02 PROCEDURE — 0W3P8ZZ CONTROL BLEEDING IN GASTROINTESTINAL TRACT, VIA NATURAL OR ARTIFICIAL OPENING ENDOSCOPIC: ICD-10-PCS | Performed by: INTERNAL MEDICINE

## 2018-07-02 PROCEDURE — 86921 COMPATIBILITY TEST INCUBATE: CPT | Performed by: STUDENT IN AN ORGANIZED HEALTH CARE EDUCATION/TRAINING PROGRAM

## 2018-07-02 PROCEDURE — C1769 GUIDE WIRE: HCPCS

## 2018-07-02 PROCEDURE — 77030010857 HC CATH PRB GLD BSC -C: Performed by: INTERNAL MEDICINE

## 2018-07-02 PROCEDURE — 77030004530 HC CATH ANGI DX IMGR BSC -A

## 2018-07-02 PROCEDURE — 86902 BLOOD TYPE ANTIGEN DONOR EA: CPT | Performed by: STUDENT IN AN ORGANIZED HEALTH CARE EDUCATION/TRAINING PROGRAM

## 2018-07-02 PROCEDURE — 85730 THROMBOPLASTIN TIME PARTIAL: CPT | Performed by: INTERNAL MEDICINE

## 2018-07-02 PROCEDURE — 80307 DRUG TEST PRSMV CHEM ANLYZR: CPT

## 2018-07-02 PROCEDURE — 74011250637 HC RX REV CODE- 250/637: Performed by: STUDENT IN AN ORGANIZED HEALTH CARE EDUCATION/TRAINING PROGRAM

## 2018-07-02 PROCEDURE — 77030009378 HC DEV TORQ OLCT F/GWIRE MANIP COOK -A

## 2018-07-02 PROCEDURE — 0DB98ZX EXCISION OF DUODENUM, VIA NATURAL OR ARTIFICIAL OPENING ENDOSCOPIC, DIAGNOSTIC: ICD-10-PCS | Performed by: INTERNAL MEDICINE

## 2018-07-02 PROCEDURE — 77030016712 HC CATH ANGI DX SVU3 ANGI -B: Performed by: RADIOLOGY

## 2018-07-02 PROCEDURE — 74011000250 HC RX REV CODE- 250: Performed by: STUDENT IN AN ORGANIZED HEALTH CARE EDUCATION/TRAINING PROGRAM

## 2018-07-02 PROCEDURE — 36415 COLL VENOUS BLD VENIPUNCTURE: CPT

## 2018-07-02 PROCEDURE — 85027 COMPLETE CBC AUTOMATED: CPT | Performed by: INTERNAL MEDICINE

## 2018-07-02 PROCEDURE — 87077 CULTURE AEROBIC IDENTIFY: CPT | Performed by: INTERNAL MEDICINE

## 2018-07-02 PROCEDURE — C1894 INTRO/SHEATH, NON-LASER: HCPCS

## 2018-07-02 PROCEDURE — 88305 TISSUE EXAM BY PATHOLOGIST: CPT | Performed by: INTERNAL MEDICINE

## 2018-07-02 PROCEDURE — B4141ZZ FLUOROSCOPY OF SUPERIOR MESENTERIC ARTERY USING LOW OSMOLAR CONTRAST: ICD-10-PCS | Performed by: RADIOLOGY

## 2018-07-02 PROCEDURE — 86920 COMPATIBILITY TEST SPIN: CPT | Performed by: STUDENT IN AN ORGANIZED HEALTH CARE EDUCATION/TRAINING PROGRAM

## 2018-07-02 PROCEDURE — 86870 RBC ANTIBODY IDENTIFICATION: CPT | Performed by: STUDENT IN AN ORGANIZED HEALTH CARE EDUCATION/TRAINING PROGRAM

## 2018-07-02 DEVICE — MULTIPLE BAND LIGATOR
Type: IMPLANTABLE DEVICE | Site: DUODENUM | Status: FUNCTIONAL
Brand: SPEEDBAND SUPERVIEW SUPER 7

## 2018-07-02 RX ORDER — FLUMAZENIL 0.1 MG/ML
0.2 INJECTION INTRAVENOUS
Status: ACTIVE | OUTPATIENT
Start: 2018-07-02 | End: 2018-07-02

## 2018-07-02 RX ORDER — ALLOPURINOL 100 MG/1
300 TABLET ORAL DAILY
Status: DISCONTINUED | OUTPATIENT
Start: 2018-07-03 | End: 2018-07-03 | Stop reason: HOSPADM

## 2018-07-02 RX ORDER — DEXTROMETHORPHAN/PSEUDOEPHED 2.5-7.5/.8
1.2 DROPS ORAL
Status: DISCONTINUED | OUTPATIENT
Start: 2018-07-02 | End: 2018-07-02 | Stop reason: HOSPADM

## 2018-07-02 RX ORDER — MIDAZOLAM HYDROCHLORIDE 1 MG/ML
.5-1 INJECTION, SOLUTION INTRAMUSCULAR; INTRAVENOUS
Status: DISCONTINUED | OUTPATIENT
Start: 2018-07-02 | End: 2018-07-02 | Stop reason: HOSPADM

## 2018-07-02 RX ORDER — SODIUM CHLORIDE 0.9 % (FLUSH) 0.9 %
5-10 SYRINGE (ML) INJECTION AS NEEDED
Status: DISCONTINUED | OUTPATIENT
Start: 2018-07-02 | End: 2018-07-03 | Stop reason: HOSPADM

## 2018-07-02 RX ORDER — TRAZODONE HYDROCHLORIDE 100 MG/1
300 TABLET ORAL
Status: DISCONTINUED | OUTPATIENT
Start: 2018-07-02 | End: 2018-07-03 | Stop reason: HOSPADM

## 2018-07-02 RX ORDER — MIDAZOLAM HYDROCHLORIDE 1 MG/ML
.25-5 INJECTION, SOLUTION INTRAMUSCULAR; INTRAVENOUS
Status: DISCONTINUED | OUTPATIENT
Start: 2018-07-02 | End: 2018-07-02 | Stop reason: HOSPADM

## 2018-07-02 RX ORDER — LIDOCAINE HYDROCHLORIDE 10 MG/ML
10-30 INJECTION INFILTRATION; PERINEURAL
Status: DISCONTINUED | OUTPATIENT
Start: 2018-07-02 | End: 2018-07-02 | Stop reason: HOSPADM

## 2018-07-02 RX ORDER — LORAZEPAM 2 MG/ML
2 INJECTION INTRAMUSCULAR
Status: DISCONTINUED | OUTPATIENT
Start: 2018-07-02 | End: 2018-07-03 | Stop reason: HOSPADM

## 2018-07-02 RX ORDER — LORAZEPAM 2 MG/ML
4 INJECTION INTRAMUSCULAR
Status: DISCONTINUED | OUTPATIENT
Start: 2018-07-02 | End: 2018-07-03 | Stop reason: HOSPADM

## 2018-07-02 RX ORDER — DIPHENHYDRAMINE HYDROCHLORIDE 50 MG/ML
INJECTION, SOLUTION INTRAMUSCULAR; INTRAVENOUS
Status: COMPLETED
Start: 2018-07-02 | End: 2018-07-02

## 2018-07-02 RX ORDER — SODIUM CHLORIDE 9 MG/ML
50 INJECTION, SOLUTION INTRAVENOUS CONTINUOUS
Status: DISPENSED | OUTPATIENT
Start: 2018-07-02 | End: 2018-07-02

## 2018-07-02 RX ORDER — FENTANYL CITRATE 50 UG/ML
100 INJECTION, SOLUTION INTRAMUSCULAR; INTRAVENOUS
Status: ACTIVE | OUTPATIENT
Start: 2018-07-02 | End: 2018-07-02

## 2018-07-02 RX ORDER — ASPIRIN 325 MG/1
100 TABLET, FILM COATED ORAL DAILY
Status: DISCONTINUED | OUTPATIENT
Start: 2018-07-02 | End: 2018-07-03 | Stop reason: HOSPADM

## 2018-07-02 RX ORDER — NALOXONE HYDROCHLORIDE 0.4 MG/ML
0.4 INJECTION, SOLUTION INTRAMUSCULAR; INTRAVENOUS; SUBCUTANEOUS
Status: ACTIVE | OUTPATIENT
Start: 2018-07-02 | End: 2018-07-02

## 2018-07-02 RX ORDER — VENLAFAXINE HYDROCHLORIDE 37.5 MG/1
150 CAPSULE, EXTENDED RELEASE ORAL
Status: DISCONTINUED | OUTPATIENT
Start: 2018-07-03 | End: 2018-07-03 | Stop reason: HOSPADM

## 2018-07-02 RX ORDER — CARVEDILOL 3.12 MG/1
3.12 TABLET ORAL 2 TIMES DAILY WITH MEALS
Status: DISCONTINUED | OUTPATIENT
Start: 2018-07-02 | End: 2018-07-03 | Stop reason: HOSPADM

## 2018-07-02 RX ORDER — SODIUM CHLORIDE 0.9 % (FLUSH) 0.9 %
5-10 SYRINGE (ML) INJECTION EVERY 8 HOURS
Status: DISCONTINUED | OUTPATIENT
Start: 2018-07-02 | End: 2018-07-03 | Stop reason: HOSPADM

## 2018-07-02 RX ORDER — ENOXAPARIN SODIUM 100 MG/ML
40 INJECTION SUBCUTANEOUS EVERY 24 HOURS
Status: DISCONTINUED | OUTPATIENT
Start: 2018-07-02 | End: 2018-07-03 | Stop reason: HOSPADM

## 2018-07-02 RX ORDER — PANTOPRAZOLE SODIUM 40 MG/1
40 TABLET, DELAYED RELEASE ORAL DAILY
Status: DISCONTINUED | OUTPATIENT
Start: 2018-07-03 | End: 2018-07-03 | Stop reason: HOSPADM

## 2018-07-02 RX ORDER — FENTANYL CITRATE 50 UG/ML
25-200 INJECTION, SOLUTION INTRAMUSCULAR; INTRAVENOUS
Status: DISCONTINUED | OUTPATIENT
Start: 2018-07-02 | End: 2018-07-02 | Stop reason: HOSPADM

## 2018-07-02 RX ORDER — FUROSEMIDE 40 MG/1
40 TABLET ORAL DAILY
Status: DISCONTINUED | OUTPATIENT
Start: 2018-07-03 | End: 2018-07-03 | Stop reason: HOSPADM

## 2018-07-02 RX ORDER — DIPHENHYDRAMINE HYDROCHLORIDE 50 MG/ML
50 INJECTION, SOLUTION INTRAMUSCULAR; INTRAVENOUS ONCE
Status: COMPLETED | OUTPATIENT
Start: 2018-07-02 | End: 2018-07-02

## 2018-07-02 RX ORDER — SPIRONOLACTONE 25 MG/1
25 TABLET ORAL DAILY
Status: DISCONTINUED | OUTPATIENT
Start: 2018-07-03 | End: 2018-07-03 | Stop reason: HOSPADM

## 2018-07-02 RX ORDER — ATROPINE SULFATE 0.1 MG/ML
0.5 INJECTION INTRAVENOUS
Status: DISCONTINUED | OUTPATIENT
Start: 2018-07-02 | End: 2018-07-02 | Stop reason: HOSPADM

## 2018-07-02 RX ORDER — EPINEPHRINE 0.1 MG/ML
1 INJECTION INTRACARDIAC; INTRAVENOUS
Status: COMPLETED | OUTPATIENT
Start: 2018-07-02 | End: 2018-07-02

## 2018-07-02 RX ORDER — LORAZEPAM 1 MG/1
1 TABLET ORAL 2 TIMES DAILY
Status: DISCONTINUED | OUTPATIENT
Start: 2018-07-02 | End: 2018-07-03 | Stop reason: HOSPADM

## 2018-07-02 RX ORDER — PROPOFOL 10 MG/ML
INJECTION, EMULSION INTRAVENOUS AS NEEDED
Status: DISCONTINUED | OUTPATIENT
Start: 2018-07-02 | End: 2018-07-02 | Stop reason: HOSPADM

## 2018-07-02 RX ORDER — CEFAZOLIN SODIUM/WATER 2 G/20 ML
2 SYRINGE (ML) INTRAVENOUS ONCE
Status: DISCONTINUED | OUTPATIENT
Start: 2018-07-02 | End: 2018-07-02 | Stop reason: DRUGHIGH

## 2018-07-02 RX ADMIN — DIPHENHYDRAMINE HYDROCHLORIDE 50 MG: 50 INJECTION, SOLUTION INTRAMUSCULAR; INTRAVENOUS at 17:10

## 2018-07-02 RX ADMIN — Medication 10 ML: at 21:55

## 2018-07-02 RX ADMIN — PROPOFOL 50 MG: 10 INJECTION, EMULSION INTRAVENOUS at 07:22

## 2018-07-02 RX ADMIN — PROPOFOL 20 MG: 10 INJECTION, EMULSION INTRAVENOUS at 07:44

## 2018-07-02 RX ADMIN — IOPAMIDOL 190 ML: 755 INJECTION, SOLUTION INTRAVENOUS at 17:35

## 2018-07-02 RX ADMIN — LIDOCAINE HYDROCHLORIDE 10 ML: 10 INJECTION, SOLUTION INFILTRATION; PERINEURAL at 17:09

## 2018-07-02 RX ADMIN — PROPOFOL 30 MG: 10 INJECTION, EMULSION INTRAVENOUS at 07:41

## 2018-07-02 RX ADMIN — PROPOFOL 30 MG: 10 INJECTION, EMULSION INTRAVENOUS at 07:39

## 2018-07-02 RX ADMIN — SODIUM CHLORIDE 50 ML/HR: 9 INJECTION, SOLUTION INTRAVENOUS at 06:49

## 2018-07-02 RX ADMIN — PROPOFOL 30 MG: 10 INJECTION, EMULSION INTRAVENOUS at 07:27

## 2018-07-02 RX ADMIN — TRAZODONE HYDROCHLORIDE 300 MG: 100 TABLET ORAL at 21:55

## 2018-07-02 RX ADMIN — PROPOFOL 80 MG: 10 INJECTION, EMULSION INTRAVENOUS at 07:13

## 2018-07-02 RX ADMIN — PROPOFOL 120 MG: 10 INJECTION, EMULSION INTRAVENOUS at 07:11

## 2018-07-02 RX ADMIN — LORAZEPAM 1 MG: 1 TABLET ORAL at 21:55

## 2018-07-02 RX ADMIN — FENTANYL CITRATE 50 MCG: 50 INJECTION, SOLUTION INTRAMUSCULAR; INTRAVENOUS at 17:20

## 2018-07-02 RX ADMIN — PROPOFOL 30 MG: 10 INJECTION, EMULSION INTRAVENOUS at 07:37

## 2018-07-02 RX ADMIN — PROPOFOL 30 MG: 10 INJECTION, EMULSION INTRAVENOUS at 07:29

## 2018-07-02 RX ADMIN — PROPOFOL 30 MG: 10 INJECTION, EMULSION INTRAVENOUS at 07:35

## 2018-07-02 RX ADMIN — PROPOFOL 30 MG: 10 INJECTION, EMULSION INTRAVENOUS at 07:33

## 2018-07-02 RX ADMIN — FENTANYL CITRATE 50 MCG: 50 INJECTION, SOLUTION INTRAMUSCULAR; INTRAVENOUS at 17:07

## 2018-07-02 RX ADMIN — PROPOFOL 50 MG: 10 INJECTION, EMULSION INTRAVENOUS at 07:17

## 2018-07-02 RX ADMIN — MIDAZOLAM 2 MG: 1 INJECTION INTRAMUSCULAR; INTRAVENOUS at 17:12

## 2018-07-02 RX ADMIN — CEFAZOLIN 3 G: 1 INJECTION, POWDER, FOR SOLUTION INTRAMUSCULAR; INTRAVENOUS; PARENTERAL at 17:04

## 2018-07-02 RX ADMIN — PROPOFOL 50 MG: 10 INJECTION, EMULSION INTRAVENOUS at 07:21

## 2018-07-02 RX ADMIN — MIDAZOLAM 2 MG: 1 INJECTION INTRAMUSCULAR; INTRAVENOUS at 17:09

## 2018-07-02 RX ADMIN — MIDAZOLAM 1 MG: 1 INJECTION INTRAMUSCULAR; INTRAVENOUS at 17:21

## 2018-07-02 RX ADMIN — FOLIC ACID: 5 INJECTION, SOLUTION INTRAMUSCULAR; INTRAVENOUS; SUBCUTANEOUS at 18:57

## 2018-07-02 RX ADMIN — PROPOFOL 30 MG: 10 INJECTION, EMULSION INTRAVENOUS at 07:31

## 2018-07-02 RX ADMIN — EPINEPHRINE 0.2 MG: 0.1 INJECTION INTRACARDIAC; INTRAVENOUS at 07:23

## 2018-07-02 RX ADMIN — ENOXAPARIN SODIUM 40 MG: 40 INJECTION, SOLUTION INTRAVENOUS; SUBCUTANEOUS at 21:55

## 2018-07-02 RX ADMIN — PROPOFOL 50 MG: 10 INJECTION, EMULSION INTRAVENOUS at 07:20

## 2018-07-02 RX ADMIN — FENTANYL CITRATE 50 MCG: 50 INJECTION, SOLUTION INTRAMUSCULAR; INTRAVENOUS at 16:56

## 2018-07-02 RX ADMIN — CARVEDILOL 3.12 MG: 3.12 TABLET, FILM COATED ORAL at 19:05

## 2018-07-02 RX ADMIN — PROPOFOL 30 MG: 10 INJECTION, EMULSION INTRAVENOUS at 07:24

## 2018-07-02 RX ADMIN — IOPAMIDOL 98 ML: 755 INJECTION, SOLUTION INTRAVENOUS at 10:16

## 2018-07-02 RX ADMIN — Medication 10 ML: at 15:06

## 2018-07-02 RX ADMIN — MIDAZOLAM 1 MG: 1 INJECTION INTRAMUSCULAR; INTRAVENOUS at 17:17

## 2018-07-02 RX ADMIN — PROPOFOL 50 MG: 10 INJECTION, EMULSION INTRAVENOUS at 07:19

## 2018-07-02 NOTE — PROCEDURES
Mary Anne Patel M.D. 2018    Esophagogastroduodenoscopy (EGD) Procedure Note  Mitchel Guzman. : 1960  Fernando Grace Medical Record Number: [id number]      Indications:    blood loss anemia  Referring Physician:  Constance Santiago DO  Anesthesia/Sedation: Conscious Sedation/Moderate Sedation  Endoscopist:  Dr. Meliton Roger:  The indications, risks, benefits and alternatives were reviewed with the patient or their decision maker who was provided an opportunity to ask questions and all questions were answered. The specific risks of esophagogastroduodenoscopy with conscious sedation were reviewed, including but not limited to anesthetic complication, bleeding, adverse drug reaction, missed lesion, infection, IV site reactions, and intestinal perforation which would lead to the need for surgical repair. Alternatives to EGD including radiographic imaging, observation without testing, or laboratory testing were reviewed as well as the limitations of those alternatives discussed. After considering the options and having all their questions answered, the patient or their decision maker provided both verbal and written consent to proceed. Procedure in Detail:  After obtaining informed consent, positioning of the patient in the left lateral decubitus position, and conduction of a pre-procedure pause or \"time out\" the endoscope was introduced into the mouth and advanced to the third portion duodenum. A careful inspection was made, and findings or interventions are described below.     Findings:   Esophagus:Grade 4 varices mid and distal third esophagus with Gr 4 status at EG junction; these are not bleeding  Stomach: enlarged folds with diffuse mucosa erythema entire stomach without bleeding  Duodenum/jejunum: mass lesion bulb with much more firm consistency than would expect with varices; this is active oozing blood. Beyond bulb duodenum is normal    Complications/estimated blood loss: none    Therapies:  pyloritek biopsy  Biopsy duodenal mass  Injection duodenal mass 2 cc 1:10,000 epinephrine with bicap cautery and application three bands before bleeding appears to stop    Specimens: biopsies           Recommendations:  -case discussed Dy Habib; for Ct angio, possible TIPS thereafter    Thank you for entrusting me with this patient's care. Please do not hesitate to contact me with any questions or if I can be of assistance with any of your other patients' GI needs.   Signed By: Rasheed Gonsalves MD                        July 2, 2018

## 2018-07-02 NOTE — H&P
[address] [facility] [address] [place] [address]   Office [phone]  Fax [phone]       Admission H&P     Name: Mira Helms MRN: [id number]  Sex: Male   YOB: 1960  Age: 66 y.o. PCP: Sakina Faith DO     Source of Information: patient, medical records    Chief complaint: Esophageal Varices     History of Present Illness  Mira Helms is a 66 y.o. male with known cirrhosis of the liver was seen by GI for outpatient upper and lower endoscopy this morning. Pt also has a hx of ascites 2/2 to liver cirrhosis, alcohol abuse w/ hx of seizure from withdrawal (2013), HTN, GERD, peptic and gastric ulcer disease and depression/anxiety. During his endoscopy today Grade 4 varices with mid and distal esophagus (non bleeding) as well as an actively oozing duodenal mass lesion much more firm than would expect with varices was noted. Pt was admitted at this time for further workup of bleeding duodenal mass. He was asymptomatic and stable during the admission course. During his admission vitals were unremarkable.  Labs were remarkable for INR 1.3, PTT 31.5, albumin 3.0, AST 42 and AlkPhos 123        Past Medical History:   Diagnosis Date    Adverse effect of anesthesia     pt reports waking up during colonoscopy/endoscopy    Alcohol abuse 6/1/2011    Anemia NEC     Anxiety     Ascites     has to have fluid drained occasionally    Cirrhosis of liver (HCC)     Depression     Gastric ulcer, unspecified as acute or chronic, without mention of hemorrhage, perforation, or obstruction     GERD (gastroesophageal reflux disease)     Gout     Hypertension     Liver disease     cirrhosis    Obesity, Class II, BMI 35-39.9     Pneumonia     PUD (peptic ulcer disease)     Seizures ([facility] 2013    from alcohol withdrawal    Stroke ([facility] 2013    per pt, possible mini stroke from alcohol withdrawal (same time as seizure)      Patient Vitals for the past 12 hrs:   Temp Pulse Resp BP SpO2 07/02/18 1457 98.3 °F (36.8 °C) 77 18 158/90 96 %   07/02/18 1450 - 76 - - -   07/02/18 1227 - 78 - (!) 161/96 -   07/02/18 1152 - 69 - - -   07/02/18 1135 97.9 °F (36.6 °C) 70 18 180/85 97 %   07/02/18 1058 - 80 18 160/86 97 %   07/02/18 1053 - 80 18 (!) 168/91 97 %   07/02/18 1048 - 76 19 171/86 97 %   07/02/18 0902 - 76 19 134/76 97 %   07/02/18 0857 - 76 17 146/66 98 %   07/02/18 0852 - 75 20 135/73 99 %   07/02/18 0847 - 75 19 144/76 97 %   07/02/18 0842 - 78 19 136/77 98 %   07/02/18 0837 - 78 21 144/74 97 %   07/02/18 0832 - 79 21 139/74 96 %   07/02/18 0827 - 78 20 128/79 96 %   07/02/18 0822 - 80 25 140/74 98 %   07/02/18 0818 - 78 22 145/73 96 %   07/02/18 0807 - 81 18 129/75 94 %   07/02/18 0802 - 81 20 120/70 94 %   07/02/18 0634 98.1 °F (36.7 °C) 85 14 138/76 97 %       Home Medications   Prior to Admission medications    Medication Sig Start Date End Date Taking? Authorizing Provider   venlafaxine-SR [facility] 150 mg capsule TAKE 1 CAPSULE EVERY DAY 6/20/18  Yes Lissett Valladares NP   cyclobenzaprine (FLEXERIL) 10 mg tablet TAKE 1 TABLET THREE TIMES DAILY AS NEEDED 4/27/18  Yes Lissett Valladares NP   allopurinol (ZYLOPRIM) 300 mg tablet TAKE 1 TABLET TWICE DAILY 4/6/18  Yes Trinity Keith DO   carvedilol (COREG) 3.125 mg tablet Take 1 Tab by mouth two (2) times daily (with meals). Patient taking differently: Take 3.125 mg by mouth daily. 4/6/18  Yes Trinity Keith DO   furosemide (LASIX) 40 mg tablet Take 1 Tab by mouth daily. Patient taking differently: Take 40 mg by mouth two (2) times a day. 4/6/18  Yes Trinity Keith DO   lactulose (CHRONULAC) 10 gram/15 mL solution Take 15 mL by mouth two (2) times a day.  4/6/18  Yes Trinity Keith DO   LORazepam (ATIVAN) 1 mg tablet TAKE 1 TABLET TWICE DAILY (MAXIMUM DAILY AMOUNT 2MG) 4/6/18  Yes Trinity Keith DO   omeprazole (PRILOSEC) 40 mg capsule TAKE 1 CAPSULE TWICE DAILY 4/6/18  Yes Trinity Keith DO   spironolactone (ALDACTONE) 25 mg tablet TAKE 1 TABLET TWICE DAILY 4/6/18  Yes Trinity Keith DO   potassium chloride SA (MICRO-K) 10 mEq capsule TAKE 1 CAPSULE EVERY DAY  Patient taking differently: TAKE 1 CAPSULE EVERY DAY - QHS 8/15/17  Yes Brigid Elizabeth MD   ferrous sulfate (IRON) 325 mg (65 mg iron) EC tablet  6/22/18   Historical Provider   doxycycline (ADOXA) 100 mg tablet 2 tabs PO x 1 dose 6/29/18   Trinity Keith DO   traZODone (DESYREL) 300 mg tablet Take 1 Tab by mouth nightly. 4/6/18   Trinity Keith DO   isosorbide mononitrate ER (IMDUR) 30 mg tablet Take  by mouth daily.     Historical Provider       Allergies  Allergies   Allergen Reactions    Onion Nausea and Vomiting    Statins-Hmg-Coa Reductase Inhibitors Rash     rash       Past Medical History:   Diagnosis Date    Adverse effect of anesthesia     pt reports waking up during colonoscopy/endoscopy    Alcohol abuse 6/1/2011    Anemia NEC     Anxiety     Ascites     has to have fluid drained occasionally    Cirrhosis of liver (HCC)     Depression     Gastric ulcer, unspecified as acute or chronic, without mention of hemorrhage, perforation, or obstruction     GERD (gastroesophageal reflux disease)     Gout     Hypertension     Liver disease     cirrhosis    Obesity, Class II, BMI 35-39.9     Pneumonia     PUD (peptic ulcer disease)     Seizures ([facility] 2013    from alcohol withdrawal    Stroke ([facility] 2013    per pt, possible mini stroke from alcohol withdrawal (same time as seizure)       Previous Hospitalization(s)    Past Surgical History:   Procedure Laterality Date    HX ENDOSCOPY      also colonoscopy    HX GI  1998    rectal abscess surgery    HX GI  1998    rectal abscess surgery    HX HEENT  1980    wisdom teeth       Family History   Problem Relation Age of Onset    Asthma Mother     Other Father      history of fall multiple injuries    Hypertension Brother     Cancer Paternal Grandmother      uncertain if colon or stomach    Cancer Paternal Grandfather stomach cancer- dx mid [de-identified]       Social History  Social History     Social History    Marital status:      Spouse name: N/A    Number of children: N/A    Years of education: N/A     Occupational History    Landscaping      Social History Main Topics    Smoking status: Former Smoker    Smokeless tobacco: Current User     Types: Chew      Comment: currently uses snuff    Alcohol use 4.8 oz/week     8 Cans of beer per week    Drug use: No    Sexual activity: Yes     Partners: Female     Other Topics Concern    Not on file     Social History Narrative       Alcohol history: past 2 weeks: 8 beers, 12 oz per day, prior: 12 beers, 12 oz per day   Smoking history: tobacco chew- one can every couple of days   Illicit drug history: none     Living arrangement: patient lives with wife at home . Ambulates: Independently     Review of Systems  Review of Systems   Constitutional: Negative for chills, fatigue, fever and unexpected weight change. Respiratory: Negative for cough, chest tightness and wheezing. Cardiovascular: Negative for chest pain. Gastrointestinal: Negative for abdominal pain, anal bleeding, constipation, diarrhea and nausea. Endocrine: Negative for polyuria. Genitourinary: Negative for difficulty urinating and urgency. Musculoskeletal: Negative for gait problem. Physical Exam  Visit Vitals    /90 (BP 1 Location: Right arm, BP Patient Position: At rest)    Pulse 77    Temp 98.3 °F (36.8 °C)    Resp 18    Ht 6' (1.829 m)    Wt 289 lb (131.1 kg)    SpO2 96%    BMI 39.2 kg/m2       General: No acute distress. Alert. Cooperative. Head: Normocephalic. Atraumatic. Eyes:              Conjunctiva pink. Sclera white. Nose:             Septum midline. Mucosa pink. No drainage. Neck: Supple. Normal ROM. No stiffness. Respiratory: CTAB. No w/r/r/c.   Cardiovascular: RRR. Normal S1,S2. No m/r/g. Pulses 2+ throughout. GI: + bowel sounds. Nontender.  No rebound tenderness or guarding. Nondistended. Musculoskeletal: Distal pulses intact. Skin: Warm, dry. No rashes. Neuro: CN II-XII grossly intact. Laboratory Data  Recent Results (from the past 8 hour(s))   ETHYL ALCOHOL    Collection Time: 07/02/18  3:09 PM   Result Value Ref Range    ALCOHOL(ETHYL),SERUM <10 <10 MG/DL   DRUG SCREEN, URINE    Collection Time: 07/02/18  4:02 PM   Result Value Ref Range    AMPHETAMINES NEGATIVE  NEG      BARBITURATES NEGATIVE  NEG      BENZODIAZEPINES NEGATIVE  NEG      COCAINE NEGATIVE  NEG      METHADONE NEGATIVE  NEG      OPIATES NEGATIVE  NEG      PCP(PHENCYCLIDINE) NEGATIVE  NEG      THC (TH-CANNABINOL) NEGATIVE  NEG      Drug screen comment (NOTE)        Imaging  CXR Results  (Last 48 hours)    None        CT Results  (Last 48 hours)    None            Assessment and Plan      Mira Rounds. is a 66 y.o. male who is admitted for bleeding duodenal mass found on routine endoscopy. 1. Bleeding Duodenal Mass- mass bulb lesion with active oozing found on routine EGD    - IR to perform embolization procedure    - IR to also consider TIPS procedure    - CT Abd/Pelv wo cont and Abd Angio W Aortogram- ordered, results pending    - lesion biopsy obtained    - Labs: CBC w/o diff daily    2. Esophageal varices and ascites in the setting of liver cirrhosis- Grade 4 varices mid and distal 3rd esophagus    - Continue home lactulose 10 mg/BID   - Continue home spironolactone 25mg/day    - Labs: CMP, PT + INR, daily   - Continue home Furosemide 40 mg/day    3. Alcohol Dependence - [facility] protocol in place   - Goody Bag 125 ml/day    - Thiamine 100 mg/day    - Labs: Magnesium and Phosphorus daily    - UDS and alcohol screen- negative     4. Hx of Prior MI   - Continue home Carvedilol 3.125 mg     5.  Depression/Anxiety    - Continue home Lorazepam 1mg/BID    - Continue home Venlafaxine- mg/day     6 Gout    - Continue home allopurinol 300 mg     7 GERD   - Continue home Protonix 40 mg/day    8. Insomnia    - Continue home trazodone 300 mg/day       FEN/GI - NPO. Activity - Ambulate as tolerated  DVT prophylaxis - SCDs  GI prophylaxis - Protonix  Fall prophylaxis - Not indicated at this time. Disposition - Admit to Medical. Plan to d/c to Home. PT/OT consulted  Code Status - Full, discussed with patient / caregivers. Next of Kin Name and Contact     Patient Jonathan Aden. will be discussed Dr. Timothy Martlel. 1:30 PM, 07/02/18  Renée Wallace MD  Family Medicine Resident       For Billing    No chief complaint on file.       Hospital Problems  Date Reviewed: 6/29/2018          Codes Class Noted POA    Esophageal var[facility] ([facility] NVH-44-HN: K70.30, I85.10  ICD-9-CM: 571.2, 456.21  7/2/2018 Unknown        Duodenal mass ICD-10-CM: K31.89  ICD-9-CM: 537.89  7/2/2018 Unknown

## 2018-07-02 NOTE — PROGRESS NOTES
Patient resting comfortably on stretcher, HOB elevated, VS stable, call bell within reach, wife at bedside, will continue to monitor pt. Awaiting STAT creatinine and visit from hospitalist for admitting.

## 2018-07-02 NOTE — PROGRESS NOTES
TRANSFER - IN REPORT:  18  Verbal report received from Josette(name) on Mimi De La Rosa.  being received from angio lab(unit) for routine progression of care      Report consisted of patients Situation, Background, Assessment and   Recommendations(SBAR). Information from the following report(s) Procedure Summary was reviewed with the receiving nurse. Opportunity for questions and clarification was provided. Assessment completed upon patients arrival to unit and care assumed.      1810 pt eating a sandwich awake, alert oriented, right groin site oft dry in tact, palpable pulses    1820 right groinsite soft dry in tact  1840 report called , transferring pt to 521, reviewed right groin site with oncoming nurse

## 2018-07-02 NOTE — PROGRESS NOTES
Chart accessed/reviewed by writer, patient is a pending or scheduled endoscopy of .  Audit done of supply list.

## 2018-07-02 NOTE — PROCEDURES
[address]  [phone]      2018    Colonoscopy Procedure Note  Anny Mina. :  1960  [facility] Medical Record Number: [id number]    Indications:     blood loss ane  PCP:  Cookie Sanders DO  Anesthesia/Sedation: Conscious Sedation/Moderate Sedation  Endoscopist:  Dr. Margarita Fam  Complications:  None  Estimate Blood Loss:  None    Permit:  The indications, risks, benefits and alternatives were reviewed with the patient or their decision maker who was provided an opportunity to ask questions and all questions were answered. The specific risks of colonoscopy with conscious sedation were reviewed, including but not limited to anesthetic complication, bleeding, adverse drug reaction, missed lesion, infection, IV site reactions, and intestinal perforation which would lead to the need for surgical repair. Alternatives to colonoscopy including radiographic imaging, observation without testing, or laboratory testing were reviewed including the limitations of those alternatives. After considering the options and having all their questions answered, the patient or their decision maker provided both verbal and written consent to proceed. Procedure in Detail:  After obtaining informed consent, positioning of the patient in the left lateral decubitus position, and conduction of a pre-procedure pause or \"time out\" the endoscope was introduced into the anus and advanced to the cecum, which was identified by the ileocecal valve and appendiceal orifice. The quality of the colonic preparation was inadequate for sessile lesions; adequate for identification focal active blood loss source. A careful inspection was made as the colonoscope was withdrawn, findings and interventions are described below.     Appendiceal orifice photographed    Findings:   normal  no mucosal lesion appreciated    Specimens:    none    Complications:   None; patient tolerated the procedure well. Estimated blood loss: none    Impression:  Normal colonoscopy to the cecum, with no evidence of neoplasia, diverticular disease, or mucosal abnormality. Recommendations:      - No follow up colon exam    Thank you for entrusting me with this patient's care. Please do not hesitate to contact me with any questions or if I can be of assistance with any of your other patients' GI needs.     Signed By: Kiarra Ch MD                        July 2, 2018

## 2018-07-02 NOTE — IP AVS SNAPSHOT
[address] [facility] [address] 
 
 
 566 [facility] 1007 [facility] 
[phone] Patient: Yanira Reyna. 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 
[phone] Alfredo Whitley MD Go on 2018 Hospital follow-up with GI at 1:45 [address] [address] 
[phone] Your Scheduled Appointments Tuesday July 10, 2018  1:45 PM EDT  
ESTABLISHED PATIENT with [facility], DO [address] ([facility]) [address] [address] [address]  
[phone] 2018 10:00 AM EDT  
ESTABLISHED PATIENT with [facility], DO [address] ([facility] [address] [address]  
[phone] Discharge Orders None A check karlos indicates which time of day the medication should be taken. My Medications CHANGE how you take these medications Instructions Each Dose to Equal  
 Morning Noon Evening Bedtime  
 carvedilol 3.125 mg tablet Commonly known as:  Luis Gut What changed:  when to take this Your last dose was: Your next dose is: Take 1 Tab by mouth two (2) times daily (with meals). 3.125 mg  
    
   
   
   
  
 furosemide 40 mg tablet Commonly known as:  LASIX What changed:  when to take this Your last dose was: Your next dose is: Take 1 Tab by mouth daily.   
 40 mg lactulose 10 gram/15 mL solution Commonly known as:  Bettina Arteaga What changed:  Another medication with the same name was removed. Continue taking this medication, and follow the directions you see here. Your last dose was: Your next dose is: Take 15 mL by mouth two (2) times a day. 10 g  
    
   
   
   
  
 potassium chloride SA 10 mEq capsule Commonly known as:  Sydni Peñaloza What changed:  See the new instructions. Your last dose was: Your next dose is: TAKE 1 CAPSULE EVERY DAY  
     
   
   
   
  
  
CONTINUE taking these medications Instructions Each Dose to Equal  
 Morning Noon Evening Bedtime  
 allopurinol 300 mg tablet Commonly known as:  Moises Night Your last dose was: Your next dose is: TAKE 1 TABLET TWICE DAILY  
     
   
   
   
  
 cyclobenzaprine 10 mg tablet Commonly known as:  FLEXERIL Your last dose was: Your next dose is: TAKE 1 TABLET THREE TIMES DAILY AS NEEDED  
     
   
   
   
  
 ferrous sulfate 325 mg (65 mg iron) EC tablet Commonly known as:  IRON Your last dose was: Your next dose is:    
   
   
      
   
   
   
  
 isosorbide mononitrate ER 30 mg tablet Commonly known as:  IMDUR Your last dose was: Your next dose is: Take  by mouth daily. LORazepam 1 mg tablet Commonly known as:  ATIVAN Your last dose was: Your next dose is: TAKE 1 TABLET TWICE DAILY (MAXIMUM DAILY AMOUNT 2MG) omeprazole 40 mg capsule Commonly known as:  PRILOSEC Your last dose was: Your next dose is: TAKE 1 CAPSULE TWICE DAILY  
     
   
   
   
  
 spironolactone 25 mg tablet Commonly known as:  ALDACTONE Your last dose was: Your next dose is: TAKE 1 TABLET TWICE DAILY traZODone 300 mg tablet Commonly known as:  Junellphil Johnson Your last dose was: Your next dose is: Take 1 Tab by mouth nightly. 300 mg  
    
   
   
   
  
 venlafaxine- mg capsule Commonly known as:  EFFEXOR-XR Your last dose was: Your next dose is: TAKE 1 CAPSULE EVERY DAY  
     
   
   
   
  
  
STOP taking these medications   
 doxycycline 100 mg tablet Commonly known as:  ADOXA Discharge Instructions HOME DISCHARGE INSTRUCTIONS Rodrigo Greene. / [id number] : 1960 Admission date: 2018 Discharge date: 7/3/2018 Please bring this form with you to show your care provider at your follow-up appointment. Primary care provider:  Sadia Ngo DO Discharging provider:  Yari Gloria MD  - Family Medicine Resident Dr Barker - Attending, Family Medicine You have been admitted to the hospital with the following diagnoses: 
 
ACUTE DIAGNOSES: 
CIRRHOSIS OF LIVER, ESOPHAGEAL VARICES, IRON DEFICIENCY ANEMIA Esophageal varices in alcoholic cirrhosis (HCC) Duodenal mass Fatuma Golden . . . . . . . . . . . . . . . . . . . . . . . . . . . . . . . . . . . . . . . . . . . . . . . . . . . . . . . . . . . . . . . . . . . . . . Fatuma Golden FOLLOW-UP CARE RECOMMENDATIONS: 
 
Appointments Follow-up Information Follow up With Details Comments Contact Info Sadia Ngo DO Go on 7/10/2018 Hospital Follow Up with PCP at 1:[address] [facility] [address] [id number] [phone] Cj Faith MD Go on 2018 Hospital follow-up with GI at 1:45 [address] [address] [phone] Please follow up with your PCP regarding: 
Transition of care from the hospital  
 
MEDICATION CHANGES: 
None Follow-up tests needed: None Pending test results: At the time of your discharge the following test results are still pending: None.   
Please make sure you review these results with your outpatient follow-up provider(s). Specific symptoms to watch for: chest pain, shortness of breath, fever, chills, nausea, vomiting, diarrhea, change in mentation, falling, weakness, bleeding. DIET/what to eat:  Low Sodium Diet ACTIVITY:  Activity as tolerated Wound care: none Equipment needed:  none What to do if new or unexpected symptoms occur? If you experience any of the above symptoms (or should other concerns or questions arise after discharge) please call your primary care physician. Return to the emergency room if you cannot get hold of your doctor. · It is very important that you keep your follow-up appointment(s). · Please bring discharge papers, medication list (and/or medication bottles) to your follow-up appointments for review by your outpatient provider(s). · Please check the list of medications and be sure it includes every medication (even non-prescription medications) that your provider wants you to take. · It is important that you take the medication exactly as they are prescribed. · Keep your medication in the bottles provided by the pharmacist and keep a list of the medication names, dosages, and times to be taken in your wallet. · Do not take other medications without consulting your doctor. · If you have any questions about your medications or other instructions, please talk to your nurse or care provider before you leave the hospital.  
 
Information obtained by:  
 
I understand that if any problems occur once I am at home I am to contact my physician. These instructions were explained to me and I had the opportunity to ask questions. I understand and acknowledge receipt of the instructions indicated above.   
 
 
 
                                                                                                                                     
Physician's or R.N.'s Signature Date/Time Patient or Representative Signature                                                          Date/Time Introducing [facility]! [employer] introduces [employer] patient portal. Now you can access parts of your medical record, email your doctor's office, and request medication refills online. 1. In your internet browser, go to [email or web address] 2. Click on the First Time User? Click Here link in the Sign In box. You will see the New Member Sign Up page. 3. Enter your [employer] Access Code exactly as it appears below. You will not need to use this code after youve completed the sign-up process. If you do not sign up before the expiration date, you must request a new code. · [employer] Access Code: [email or web address] Expires: 9/27/2018  9:26 AM 
 
4. Enter the last four digits of your Social Security Number (xxxx) and Date of Birth (mm/dd/yyyy) as indicated and click Submit. You will be taken to the next sign-up page. 5. Create a [employer] ID. This will be your [employer] login ID and cannot be changed, so think of one that is secure and easy to remember. 6. Create a [employer] password. You can change your password at any time. 7. Enter your Password Reset Question and Answer. This can be used at a later time if you forget your password. 8. Enter your e-mail address. You will receive e-mail notification when new information is available in [address] 9. Click Sign Up. You can now view and download portions of your medical record. 10. Click the Download Summary menu link to download a portable copy of your medical information. If you have questions, please visit the Frequently Asked Questions section of the [employer] website. Remember, [employer] is NOT to be used for urgent needs. For medical emergencies, dial 911. Now available from your iPhone and Android! Introducing Ken Hu As a [facility] patient, I wanted to make you aware of our electronic visit tool called Ken Hu. Shadia MiniLuxe/7 allows you to connect within minutes with a medical provider 24 hours a day, seven days a week via a mobile device or tablet or logging into a secure website from your computer. You can access Ken Hu from anywhere in the [place]. A virtual visit might be right for you when you have a simple condition and feel like you just dont want to get out of bed, or cant get away from work for an appointment, when your regular [facility] provider is not available (evenings, weekends or holidays), or when youre out of town and need minor care. Electronic visits cost only $49 and if the [facility] Stagee/7 provider determines a prescription is needed to treat your condition, one can be electronically transmitted to a nearby pharmacy*. Please take a moment to enroll today if you have not already done so. The enrollment process is free and takes just a few minutes. To enroll, please download the [facility] 24/Quantopian kanchan to your tablet or phone, or visit [email or web address] to enroll on your computer. And, as an [address] [place] [address] patient with a [employer] account, the results of your visits will be scanned into your electronic medical record and your primary care provider will be able to view the scanned results. We urge you to continue to see your regular [facility] provider for your ongoing medical care. And while your primary care provider may not be the one available when you seek a Ken Hu virtual visit, the peace of mind you get from getting a real diagnosis real time can be priceless. For more information on Ken Hu, view our Frequently Asked Questions (FAQs) at [email or web address]. Sincerely, 
 
Dee Wise MD 
Chief Medical Officer Davonte Rivas [address] [facility] *:  certain medications cannot be prescribed via Ken Hu Providers Seen During Your Hospitalization Provider Specialty Primary office phone Jose Bower MD Gastroenterology [phone] Van Sims MD Family Practice [phone] Your Primary Care Physician (PCP) Primary Care Physician Office Phone Office Fax Miriam Suarez [phone] You are allergic to the following Allergen Reactions Onion Nausea and Vomiting Statins-Hmg-Coa Reductase Inhibitors Rash  
 rash Recent Documentation Height Weight BMI Smoking Status 1.829 m 131.1 kg 39.2 kg/m2 Former Smoker Emergency Contacts Name Discharge Info Relation Home Work Mobile Coby Paez DISCHARGE CAREGIVER [3] Spouse [3] [phone] Patient Belongings The following personal items are in your possession at time of discharge: 
  Dental Appliances: None  Visual Aid: None Please provide this summary of care documentation to your next provider. Signatures-by signing, you are acknowledging that this After Visit Summary has been reviewed with you and you have received a copy. Patient Signature:  ____________________________________________________________ Date:  ____________________________________________________________  
  
Annamarie Alicia Provider Signature:  ____________________________________________________________ Date:  ____________________________________________________________

## 2018-07-02 NOTE — ANESTHESIA POSTPROCEDURE EVALUATION
Post-Anesthesia Evaluation and Assessment    Patient: Flavio Burkett MRN: [id number]  SSN: [id number]    YOB: 1960  Age: 66 y.o. Sex: male       Cardiovascular Function/Vital Signs  Visit Vitals    /70    Pulse 81    Temp 36.7 °C (98.1 °F)    Resp 20    Ht 6' (1.829 m)    Wt 131.1 kg (289 lb)    SpO2 94%    BMI 39.2 kg/m2       Patient is status post MAC anesthesia for Procedure(s):  COLONOSCOPY  ESOPHAGOGASTRODUODENOSCOPY (EGD)  ESOPHAGOGASTRODUODENAL (EGD) BIOPSY  ENDOSCOPIC BANDING OR LIGATION  BICAP  INJECTION. Nausea/Vomiting: None    Postoperative hydration reviewed and adequate. Pain:  Pain Scale 1: Visual (07/02/18 0802)  Pain Intensity 1: 0 (07/02/18 0802)   Managed    Neurological Status: At baseline    Mental Status and Level of Consciousness: Arousable    Pulmonary Status:   O2 Device: Room air (07/02/18 0802)   Adequate oxygenation and airway patent    Complications related to anesthesia: None    Post-anesthesia assessment completed.  No concerns    Signed By: Yamileth Romeo MD     July 2, 2018

## 2018-07-02 NOTE — PROGRESS NOTES
TRANSFER - OUT REPORT:    Verbal report given to Emperatriz(name) on Dina [place].  being transferred to room 521 for routine progression of care       Report consisted of patients Situation, Background, Assessment and   Recommendations(SBAR). Information from the following report(s) SBAR was reviewed with the receiving nurse. Instructed nurse for patient to  Maintain NPO. Lines:   Peripheral IV 07/02/18 Right Antecubital (Active)   Site Assessment Clean, dry, & intact 7/2/2018  7:40 AM   Phlebitis Assessment 0 7/2/2018  7:40 AM   Infiltration Assessment 0 7/2/2018  7:40 AM   Dressing Status Clean, dry, & intact; New 7/2/2018  7:40 AM   Dressing Type Tape;Transparent 7/2/2018  7:40 AM   Hub Color/Line Status Pink; Infusing 7/2/2018  7:40 AM   Alcohol Cap Used Yes 7/2/2018  7:40 AM        Opportunity for questions and clarification was provided.       Patient transported with:

## 2018-07-02 NOTE — PROGRESS NOTES
Primary Nurse Daja Jones and Raeann Bertrand RN performed a dual skin assessment on this patient  Spot on bottom from injection earlier in the week. Skin dry and flaky BLE.  No other impairment noted  Brenden score is 20

## 2018-07-02 NOTE — ANESTHESIA PREPROCEDURE EVALUATION
Anesthetic History   No history of anesthetic complications            Review of Systems / Medical History  Patient summary reviewed, nursing notes reviewed and pertinent labs reviewed    Pulmonary  Within defined limits                 Neuro/Psych   Within defined limits  seizures    TIA     Cardiovascular  Within defined limits  Hypertension              Exercise tolerance: >4 METS     GI/Hepatic/Renal  Within defined limits   GERD      PUD and liver disease     Endo/Other  Within defined limits      Morbid obesity     Other Findings   Comments: EtOH abuse, cirrhosis, varices           Physical Exam    Airway  Mallampati: II    Neck ROM: normal range of motion   Mouth opening: Normal     Cardiovascular  Regular rate and rhythm,  S1 and S2 normal,  no murmur, click, rub, or gallop  Rhythm: regular  Rate: normal         Dental  No notable dental hx       Pulmonary  Breath sounds clear to auscultation               Abdominal  GI exam deferred       Other Findings            Anesthetic Plan    ASA: 3  Anesthesia type: MAC          Induction: Intravenous  Anesthetic plan and risks discussed with: Patient

## 2018-07-02 NOTE — PROGRESS NOTES
1215: Family practice on floor. Notified of patient bp upon arrival. Patient reports pressure drops low with blood pressure medications. Orders to monitor. 1230: Patient with questions about procedure this afternoon. Spoke to nurse in [address]. Will speak to Dr. Joan Bernal and notify RN if procedure will be today or tomorrow. 1430: Patient would like a diet orders. Questions about when procedure will take place. Spoke to Ella Cole in [address]. Planning on taking patient next. 1815: Patient coming up from cath lab soon. Spoke to KEN Jones about diet order. Orders to call and speak to GI. Spoke to Dr. Amrik Hubbard. Would recommend keeping patient NPO with bleeding. 1835: Report received from cath lab. Patient has already eaten diet. Notified family practice. Orders to keep NPO. Family practice will be up to see patient. Notified patient. 2030: Dr. Kai Grady on floor. Spoke to patient. Verbal order patient is to remain NPO until GI can evaluate. Bedside shift change report given to Marielle DAY (oncoming nurse) by Letty Irvin RN (offgoing nurse). Report included the following information SBAR, Kardex, Intake/Output, MAR, Recent Results and Cardiac Rhythm NSR.

## 2018-07-02 NOTE — PERIOP NOTES
Patient tolerated procedure without problems. Abdomen soft and patient arousable and voices no complaints Report received from CRNA, see anesthesia note. Patient transported to endoscopy recovery area and verbal bedside report given to [facility].

## 2018-07-03 VITALS
HEART RATE: 80 BPM | BODY MASS INDEX: 39.14 KG/M2 | RESPIRATION RATE: 16 BRPM | OXYGEN SATURATION: 97 % | HEIGHT: 72 IN | DIASTOLIC BLOOD PRESSURE: 88 MMHG | TEMPERATURE: 97.9 F | SYSTOLIC BLOOD PRESSURE: 148 MMHG | WEIGHT: 289 LBS

## 2018-07-03 LAB
ALBUMIN SERPL-MCNC: 2.8 G/DL (ref 3.5–5)
ALBUMIN/GLOB SERPL: 0.7 {RATIO} (ref 1.1–2.2)
ALP SERPL-CCNC: 112 U/L (ref 45–117)
ALT SERPL-CCNC: 22 U/L (ref 12–78)
ANION GAP SERPL CALC-SCNC: 6 MMOL/L (ref 5–15)
AST SERPL-CCNC: 35 U/L (ref 15–37)
BILIRUB SERPL-MCNC: 1.1 MG/DL (ref 0.2–1)
BUN SERPL-MCNC: 4 MG/DL (ref 6–20)
BUN/CREAT SERPL: 6 (ref 12–20)
CALCIUM SERPL-MCNC: 8.1 MG/DL (ref 8.5–10.1)
CHLORIDE SERPL-SCNC: 104 MMOL/L (ref 97–108)
CO2 SERPL-SCNC: 28 MMOL/L (ref 21–32)
CREAT SERPL-MCNC: 0.68 MG/DL (ref 0.7–1.3)
ERYTHROCYTE [DISTWIDTH] IN BLOOD BY AUTOMATED COUNT: 20.8 % (ref 11.5–14.5)
GLOBULIN SER CALC-MCNC: 4.3 G/DL (ref 2–4)
GLUCOSE SERPL-MCNC: 114 MG/DL (ref 65–100)
HCT VFR BLD AUTO: 39.3 % (ref 36.6–50.3)
HGB BLD-MCNC: 12.2 G/DL (ref 12.1–17)
INR PPP: 1.4 (ref 0.9–1.1)
MAGNESIUM SERPL-MCNC: 1.9 MG/DL (ref 1.6–2.4)
MCH RBC QN AUTO: 25.1 PG (ref 26–34)
MCHC RBC AUTO-ENTMCNC: 31 G/DL (ref 30–36.5)
MCV RBC AUTO: 80.7 FL (ref 80–99)
NRBC # BLD: 0 K/UL (ref 0–0.01)
NRBC BLD-RTO: 0 PER 100 WBC
PHOSPHATE SERPL-MCNC: 4.1 MG/DL (ref 2.6–4.7)
PLATELET # BLD AUTO: 76 K/UL (ref 150–400)
POTASSIUM SERPL-SCNC: 3.7 MMOL/L (ref 3.5–5.1)
PROT SERPL-MCNC: 7.1 G/DL (ref 6.4–8.2)
PROTHROMBIN TIME: 13.9 SEC (ref 9–11.1)
RBC # BLD AUTO: 4.87 M/UL (ref 4.1–5.7)
SODIUM SERPL-SCNC: 138 MMOL/L (ref 136–145)
WBC # BLD AUTO: 3.9 K/UL (ref 4.1–11.1)

## 2018-07-03 PROCEDURE — 74011250637 HC RX REV CODE- 250/637: Performed by: STUDENT IN AN ORGANIZED HEALTH CARE EDUCATION/TRAINING PROGRAM

## 2018-07-03 PROCEDURE — 83735 ASSAY OF MAGNESIUM: CPT | Performed by: FAMILY MEDICINE

## 2018-07-03 PROCEDURE — 84100 ASSAY OF PHOSPHORUS: CPT | Performed by: FAMILY MEDICINE

## 2018-07-03 PROCEDURE — 80053 COMPREHEN METABOLIC PANEL: CPT | Performed by: FAMILY MEDICINE

## 2018-07-03 PROCEDURE — 85610 PROTHROMBIN TIME: CPT | Performed by: FAMILY MEDICINE

## 2018-07-03 PROCEDURE — 85027 COMPLETE CBC AUTOMATED: CPT | Performed by: FAMILY MEDICINE

## 2018-07-03 PROCEDURE — 36415 COLL VENOUS BLD VENIPUNCTURE: CPT | Performed by: FAMILY MEDICINE

## 2018-07-03 RX ADMIN — VENLAFAXINE HYDROCHLORIDE 150 MG: 37.5 CAPSULE, EXTENDED RELEASE ORAL at 09:03

## 2018-07-03 RX ADMIN — PANTOPRAZOLE SODIUM 40 MG: 40 TABLET, DELAYED RELEASE ORAL at 09:03

## 2018-07-03 RX ADMIN — LACTULOSE 10 G: 20 SOLUTION ORAL at 09:05

## 2018-07-03 RX ADMIN — Medication 100 MG: at 09:03

## 2018-07-03 RX ADMIN — CARVEDILOL 3.12 MG: 3.12 TABLET, FILM COATED ORAL at 09:03

## 2018-07-03 RX ADMIN — SPIRONOLACTONE 25 MG: 25 TABLET, FILM COATED ORAL at 09:03

## 2018-07-03 RX ADMIN — Medication 10 ML: at 06:00

## 2018-07-03 RX ADMIN — FUROSEMIDE 40 MG: 40 TABLET ORAL at 09:02

## 2018-07-03 RX ADMIN — LORAZEPAM 1 MG: 1 TABLET ORAL at 09:02

## 2018-07-03 RX ADMIN — ALLOPURINOL 300 MG: 100 TABLET ORAL at 09:02

## 2018-07-03 NOTE — PROGRESS NOTES
PIV removed. Discharge instructions given and gone over with Pt and wife who is at bedside.  Opportunity to ask questions given

## 2018-07-03 NOTE — DISCHARGE SUMMARY
[address] [facility] [address] [place] [address]   Office [phone]  Fax [phone]       Discharge / Transfer / Off-Service Note     Name: Mihai Jaramillo. MRN: [id number]  Sex: Male   YOB: 1960  Age: 66 y.o. PCP: Saul Kramer DO     Date of admission: 7/2/2018  Date of discharge/transfer: 7/3/2018    Attending physician at admission: Dr. Abdiaziz Das    Attending physician at discharge/transfer: Dr. Yanet Muñiz    Resident physician at discharge/transfer: Ashwin Andrew MD     Consultants during hospitalization  Dr. Shama Harper- SARAH     Admission diagnoses   CIRRHOSIS OF LIVER, ESOPHAGEAL VARICES, IRON DEFICIENCY ANEMIA  Esophageal varices in alcoholic cirrhosis ([facility] [address]  Duodenal mass    Recommended follow-up after discharge  1. PCP- Dr Gina Simpson- 7/10/18- 1.45  2. GI: Dr Serge Pozo- 7/9/18- 1.45    Things to follow up on with PCP:  Transition of care from hospital to home     Medication Changes:  1. New Medications: none  2. Modified Medications: none  3. Discontinued Medications: none    History of Present Illness  Mihai Jaramillo. is a 66 y.o. male with known cirrhosis of the liver was seen by GI for outpatient upper and lower endoscopy this morning. Pt also has a hx of ascites 2/2 to liver cirrhosis, alcohol abuse w/ hx of seizure from withdrawal (2013), HTN, GERD, peptic and gastric ulcer disease and depression/anxiety.      During his endoscopy today Grade 4 varices with mid and distal esophagus (non bleeding) as well as an actively oozing duodenal mass lesion much more firm than would expect with varices was noted. Pt was admitted at this time for further workup of bleeding duodenal mass. He was asymptomatic and stable during the admission course.       During his admission vitals were unremarkable.  Labs were remarkable for INR 1.3, PTT 31.5, albumin 3.0, AST 42 and AlkPhos 123    HOSPITAL COURSE    Bleeding Duodenal Mass- mass bulb lesion with active oozing found on routine EGD                         - IR performed angiography on 7/2- no evidence of vessel abnormality- no embolization performed                        - IR consulted to evaluate for TIPS procedure per GI recommendation- IR recommended against procedure                        - CT Abd/Pelv wo cont- nodular liver, cholelithiasis, mild splenomegaly                          - Lesion biopsy obtained                                   - Type and Screen completed                         - Labs: CBC w/o diff daily      2. Esophageal varices and ascites in the setting of liver cirrhosis- Grade 4 varices mid and distal 3rd esophagus                         - Continue home lactulose 10 mg/BID                        - Continue home spironolactone 25mg/day                         - Labs: CMP, PT + INR, daily                        - Continue home Furosemide 40 mg/day      3. Alcohol Dependence - CIWA Score of 0 over past 24 hours                        - Goody Bag 125 ml/day                         - Thiamine 100 mg/day                         - Labs: Magnesium and Phosphorus daily- results unremarkable 7/3                        - UDS and alcohol screen- negative 7/2      4. Hx of Prior MI                        - Continued home Carvedilol 3.125 mg       5. Depression/Anxiety                         - Continued home Lorazepam 1mg/BID                         - Continued home Venlafaxine- mg/day       6 Gout                         - Continued home allopurinol 300 mg       7 GERD                        - Continued home Protonix 40 mg/day      8. Insomnia                         - Continued home trazodone 300 mg/day     Physical exam at discharge:    Vitals Reviewed. General Oriented to person, place, and time and well-developed. Appears well-nourished, no distress. Not diaphoretic. Cardio Normal rate, regular rhythm. Exam reveals no gallop and no friction rub. No murmur heard.  No chest wall tenderness. Pulmonary Effort normal and breath sounds normal. No respiratory distress. No wheezes, no rales. Abdominal Soft. Bowel sounds normal. No distension. No tenderness. Reducible umbilical hernia on exam     Deferred. Extremities No edema of lower extremities. No tenderness. Distal pulses intact. Neurological Alert and oriented to person, place, and time. Dermatology Skin is warm and dry. No rash noted. No erythema or pallor. Psychiatric Affect and judgment normal.        Condition at discharge: Stable. Labs  Recent Labs      07/03/18 0412 07/02/18   0825   WBC  3.9*  4.6   HGB  12.2  12.6   HCT  39.3  40.2   PLT  76*  90*     Recent Labs      07/03/18 0412  07/02/18   0825   NA  138  137   K  3.7  3.5   CL  104  102   CO2  28  25   BUN  4*  2*   CREA  0.68*  0.65*   GLU  114*  127*   CA  8.1*  8.3*   MG  1.9   --    PHOS  4.1   --      Recent Labs      07/03/18 0412 07/02/18   0825   SGOT  35  42*   ALT  22  26   AP  112  123*   TBILI  1.1*  1.2*   TP  7.1  7.5   ALB  2.8*  3.0*   GLOB  4.3*  4.5*     Recent Labs      07/03/18 0412 07/02/18   0825   INR  1.4*  1.3*   PTP  13.9*  13.4*   APTT   --   31.5     Cultures  · none    Procedures / Diagnostic Studies  · CT Angiogram     Imaging    Results from [facility] encounter on 05/26/18   XR CHEST PORT         Results from [place] encounter on 12/18/17   US SCROTUM/TESTICLES   Narrative EXAM:  US SCROTUM/TESTICLES     INDICATION: Testicular pain. COMPARISON: None. TECHNIQUE:  Real-time sonography of the scrotum was performed with a high frequency linear  transducer. Multiple static images were obtained. Color Doppler evaluation was  also performed. FINDINGS:  RIGHT TESTICLE: The right testicle is normal in size and echotexture with normal  color-flow.  The right testis measures 4.1 x 2.6 x 1.6 cm.     RIGHT EPIDIDYMIS: 5 mm epididymal cyst. Otherwise normal.    LEFT TESTICLE: The left testicle is normal in size and echotexture with normal  color-flow. The left testis measures 3.7 x 2.3 x 2.0 cm cm. LEFT EPIDIDYMIS: The left epididymis is normal.         Impression IMPRESSION: Small right epididymal cyst. Otherwise normal scrotal ultrasound. Results from [place] encounter on 07/02/18   CT ABD PELV W WO CONT   Narrative EXAM:  CT ABD PELV W WO CONT  Clinical history: Evaluate for active duodenal bleed  INDICATION: Evaluate for duodenal bleed/duodenal mass. COMPARISON: None. CONTRAST:  98 mL of Isovue-370. TECHNIQUE:    Multislice helical CT was performed from the diaphragm to the symphysis pubis  prior to intravenous contrast administration and from the diaphragm to the  symphysis pubis during uneventful rapid bolus intravenous contrast  administration. Oral contrast was not administered. Contiguous 5 mm axial  images were reconstructed and lung and soft tissue windows were generated. Coronal and sagittal reformations were generated. CT dose reduction was  achieved through use of a standardized protocol tailored for this examination  and automatic exposure control for dose modulation. FINDINGS:  LUNG BASES:Atelectatic change. LIVER: Nodular contour. Mildly irregular enhancement pattern. Patent portal  vein. There is no intrahepatic duct dilatation. The CBD is not dilated. There is  no hepatic parenchymal mass. Hepatic enhancement pattern is within normal  limits. Portal vein is patent. GALLBLADDER:  Cholelithiasis. SPLEEN/PANCREAS: Splenomegaly with mild variceal change. Mild retroperitoneal  mesenteric stranding  There is no pancreatic duct dilatation. ADRENALS/KIDNEYS: No mass. There is no hydronephrosis. There is no renal or  ureteral calculus. There is no renal mass. There is no perinephric mass. COLON AND SMALL BOWEL: Fat-containing umbilical hernia. Colonic diverticula. No  definitive duodenal mass.  No evidence of active gastrointestinal hemorrhage. Replaced right hepatic artery. There is no free intraperitoneal air. There is no  evidence of incarceration or obstruction. No mesenteric adenopathy. PERITONEUM: Diffuse mild mesenteric stranding. APPENDIX: Not clearly visualized  BLADDER/REPRODUCTIVE ORGANS: No mass or calculus. OSSEOUS STRUCTURES: No destructive bone lesion. RETROPERITONEUM: Mild atherosclerotic changes at the origins of the SMA and  celiac. . The abdominal aorta is normal in caliber. No aneurysm. No  retroperitoneal adenopathy. Impression IMPRESSION:  No evidence of active extravasation. No circumferential duodenal mass which is  detectable by CT. Mesenteric angiography will be performed for further  interrogation of the gastroduodenal artery, possible mass lesion in the region  of the duodenum. Nodular liver contour is consistent with early cirrhotic change with mild  pericecal change in mild splenomegaly. Cholelithiasis. Mild atherosclerotic change. Fat-containing umbilical hernia. No procedure found. Chronic diagnoses   Problem List as of 7/3/2018  Date Reviewed: 6/29/2018          Codes Class Noted - Resolved    Esophageal varices in alcoholic cirrhosis ([facility] [id number]: K70.30, I85.10  ICD-9-CM: 571.2, 456.21  7/2/2018 - Present        Duodenal mass ICD-10-CM: K31.89  ICD-9-CM: 537.89  7/2/2018 - Present        Alcoholic cirrhosis ([facility] [id number]: K70.30  ICD-9-CM: 571.2  4/6/2018 - Present        Chest pain ICD-10-CM: R07.9  ICD-9-CM: 786.50  3/30/2018 - Present        Incarcerated umbilical hernia [id number]-[id number]: K42.0  ICD-9-CM: 552.1  1/24/2018 - Present    Overview Signed 1/24/2018  3:04 PM by Kevin Larry MD     Without gangrene or obstruction. Gallbladder calculus without cholecystitis ICD-10-CM: K80.20  ICD-9-CM: 574.20  7/21/2016 - Present    Overview Signed 7/21/2016  1:18 PM by Jaylyn Lopez NP     Seen on US 7/1/16.               Hyponatremia ICD-10-CM: E87.1  ICD-9-CM: 276.1  7/5/2016 - Present        Iron deficiency anemia ICD-10-CM: D50.9  ICD-9-CM: 280.9  3/3/2016 - Present        Esophageal varices without bleeding ([place] [address] ICD-10-CM: I85.00  ICD-9-CM: 456.1  3/1/2016 - Present        Prediabetes ICD-10-CM: R73.03  ICD-9-CM: 790.29  1/11/2016 - Present        Thrombocytopenia ([place] [address] ICD-10-CM: D69.6  ICD-9-CM: 287.5  10/17/2013 - Present        Splenomegaly ICD-10-CM: R16.1  ICD-9-CM: 789.2  10/17/2013 - Present        History of alcohol abuse ICD-10-CM: Z87.898  ICD-9-CM: 305.03  10/17/2013 - Present        Hypogonadism, male ICD-10-CM: E29.1  ICD-9-CM: 257.2  10/10/2013 - Present        Recurrent major depressive disorder ([place] [address] ICD-10-CM: F33.9  ICD-9-CM: 296.30  8/11/2013 - Present        Alcohol dependence with withdrawal ([place] [address] ICD-10-CM: F10.239  ICD-9-CM: 303.90, 291.81  7/25/2013 - Present        Depression ICD-10-CM: F32.9  ICD-9-CM: 152  7/25/2013 - Present        Anxiety ICD-10-CM: F41.9  ICD-9-CM: 300.00  7/25/2013 - Present        GI bleed ICD-10-CM: K92.2  ICD-9-CM: 578.9  6/28/2011 - Present        Chest pain, unspecified ICD-10-CM: R07.9  ICD-9-CM: 786.50  6/28/2011 - Present        Gout ICD-10-CM: M10.9  ICD-9-CM: 274.9  6/27/2011 - Present        HTN (hypertension) (Chronic) ICD-10-CM: I10  ICD-9-CM: 401.9  6/1/2011 - Present        RESOLVED: Type 2 diabetes mellitus ([place] [address] ICD-10-CM: E11.9  ICD-9-CM: 250.00  10/10/2013 - 11/5/2015        RESOLVED: Hypokalemia ICD-10-CM: E87.6  ICD-9-CM: 276.8  6/1/2011 - 6/3/2011        RESOLVED: Alcohol abuse (Chronic) ICD-10-CM: F10.10  ICD-9-CM: 305.00  6/1/2011 - 7/25/2013        RESOLVED: Thrombocytopenia, unspecified ([facility] ICD-10-CM: D69.6  ICD-9-CM: 287.5  6/1/2011 - 6/28/2011        RESOLVED: Nausea & vomiting ICD-10-CM: R11.2  ICD-9-CM: 787.01  6/1/2011 - 6/3/2011        RESOLVED: Diarrhea ICD-10-CM: R19.7  ICD-9-CM: 787.91  6/1/2011 - 6/3/2011              Discharge/Transfer Medications  Discharge Medication List as of 7/3/2018  1:25 PM      CONTINUE these medications which have NOT CHANGED    Details   venlafaxine-SR (EFFEXOR-XR) 150 mg capsule TAKE 1 CAPSULE EVERY DAY, Normal, Disp-90 Cap, R-3      cyclobenzaprine (FLEXERIL) 10 mg tablet TAKE 1 TABLET THREE TIMES DAILY AS NEEDED, Normal, Disp-90 Tab, R-1      allopurinol (ZYLOPRIM) 300 mg tablet TAKE 1 TABLET TWICE DAILY, Normal, Disp-180 Tab, R-3      carvedilol (COREG) 3.125 mg tablet Take 1 Tab by mouth two (2) times daily (with meals). , Normal, Disp-180 Tab, R-3      furosemide (LASIX) 40 mg tablet Take 1 Tab by mouth daily. , Normal, Disp-90 Tab, R-3      lactulose (CHRONULAC) 10 gram/15 mL solution Take 15 mL by mouth two (2) times a day., Print, Disp-2700 mL, R-3      LORazepam (ATIVAN) 1 mg tablet TAKE 1 TABLET TWICE DAILY (MAXIMUM DAILY AMOUNT 2MG), Print, Disp-180 Tab, R-1      omeprazole (PRILOSEC) 40 mg capsule TAKE 1 CAPSULE TWICE DAILY, Normal, Disp-180 Cap, R-3      spironolactone (ALDACTONE) 25 mg tablet TAKE 1 TABLET TWICE DAILY, Normal, Disp-180 Tab, R-3      potassium chloride SA (MICRO-K) 10 mEq capsule TAKE 1 CAPSULE EVERY DAY, Normal, Disp-90 Cap, R-3      ferrous sulfate (IRON) 325 mg (65 mg iron) EC tablet Historical Med      traZODone (DESYREL) 300 mg tablet Take 1 Tab by mouth nightly., Normal, Disp-90 Tab, R-3      isosorbide mononitrate ER (IMDUR) 30 mg tablet Take  by mouth daily. , Historical Med         STOP taking these medications       doxycycline (ADOXA) 100 mg tablet Comments:   Reason for Stopping:                Diet:  Low Sodium Diet     Activity:  As tolerated    Disposition: Home or Self Care    Discharge instructions to patient/family  Please seek medical attention for any new or worsening symptoms particularly fever, chest pain, shortness of breath, abdominal pain, nausea, vomiting    Follow up plans/appointments  Follow-up Information     Follow up With Details Comments [address], DO Go on 7/10/2018 Hospital Follow Up with PCP at [address] [facility] [address] [place]  [phone]      Kristopher Mirza MD Go on 7/9/2018 Hospital follow-up with GI at 1:45 [facility]  [phone]             Jaylen Thomas MD  Family Medicine Resident       For Billing    No chief complaint on file.       Hospital Problems  Date Reviewed: 6/29/2018          Codes Class Noted POA    Esophageal varices in alcoholic cirrhosis ([facility] MFY-05-GN: K70.30, I85.10  ICD-9-CM: 571.2, 456.21  7/2/2018 Unknown        Duodenal mass ICD-10-CM: K31.89  ICD-9-CM: 537.89  7/2/2018 Unknown

## 2018-07-03 NOTE — PROGRESS NOTES
Physical Therapy Noted:  PT consult acknowledged, chart reviewed, and RN consulted. Pt received standing and walking independently in the room. Stating no need for PT evaluation. Orders completed.

## 2018-07-03 NOTE — PROGRESS NOTES
7/3/2018     Reason for Admission:   Cirrhosis of liver, Esophageal varices                  RRAT Score:    16              Do you (patient/family) have any concerns for transition/discharge? None voiced              Plan for utilizing home health:    No     Likelihood of readmission? Mod/yellow            Transition of Care Plan:     Return home with wife    7/3/2018   CARE MANAGEMENT NOTE:  CM is following pt who was his own historian for this needs assessment. Reportedly, pt resides with his wife and 25 y.o dtr in a one story home with 2 entry steps. PTA., pt was ambulatory, indepn with ADLs, but does not drive. Pt is unemployed and on disability. He has RX drug coverage thru [facility] and uses [employer] order or [employer] on [address]. Pt does not have home healthcare nor DME for home use. PCP is Dr. Amparo Carmona. CM notified Nurse Navigator of pt's admission and discharge. Plan is to return home without any post discharge needs. His wife will transport pt home.   [place]

## 2018-07-03 NOTE — DISCHARGE INSTRUCTIONS
HOME DISCHARGE INSTRUCTIONS    Chun Clark / [id number] : 1960    Admission date: 2018 Discharge date: 7/3/2018     Please bring this form with you to show your care provider at your follow-up appointment. Primary care provider:  Jani Mccoy DO    Discharging provider:  Jose Palmer MD  - Family Medicine Resident  Dr Bhanu Vaughan - Attending, Family Medicine     You have been admitted to the hospital with the following diagnoses:    ACUTE DIAGNOSES:  CIRRHOSIS OF LIVER, ESOPHAGEAL VARICES, IRON DEFICIENCY ANEMIA  Esophageal varices in alcoholic cirrhosis ([facility] [address]  Duodenal mass  . . . . . . . . . . . . . . . . . . . . . . . . . . . . . . . . . . . . . . . . . . . . . . . . . . . . . . . . . . . . . . . . . . . . . . . [place] FOLLOW-UP CARE RECOMMENDATIONS:    Appointments  Follow-up Information     Follow up With Details Comments [address], DO Go on 7/10/2018 Hospital Follow Up with PCP at [address]  [address]  [phone]      Ben Weems MD Go on 2018 Hospital follow-up with GI at 1:45 [address] [id number] [phone]             Please follow up with your PCP regarding:  Transition of care from the hospital     MEDICATION CHANGES:  None     Follow-up tests needed: None     Pending test results: At the time of your discharge the following test results are still pending: None. Please make sure you review these results with your outpatient follow-up provider(s). Specific symptoms to watch for: chest pain, shortness of breath, fever, chills, nausea, vomiting, diarrhea, change in mentation, falling, weakness, bleeding. DIET/what to eat:  Low Sodium Diet    ACTIVITY:  Activity as tolerated    Wound care: none    Equipment needed:  none    What to do if new or unexpected symptoms occur?     If you experience any of the above symptoms (or should other concerns or questions arise after discharge) please call your primary care physician. Return to the emergency room if you cannot get hold of your doctor. · It is very important that you keep your follow-up appointment(s). · Please bring discharge papers, medication list (and/or medication bottles) to your follow-up appointments for review by your outpatient provider(s). · Please check the list of medications and be sure it includes every medication (even non-prescription medications) that your provider wants you to take. · It is important that you take the medication exactly as they are prescribed. · Keep your medication in the bottles provided by the pharmacist and keep a list of the medication names, dosages, and times to be taken in your wallet. · Do not take other medications without consulting your doctor. · If you have any questions about your medications or other instructions, please talk to your nurse or care provider before you leave the hospital.     Information obtained by:     I understand that if any problems occur once I am at home I am to contact my physician. These instructions were explained to me and I had the opportunity to ask questions. I understand and acknowledge receipt of the instructions indicated above.                                                                                                                                                Physician's or R.N.'s Signature                                                                  Date/Time                                                                                                                                              Patient or Representative Signature                                                          Date/Time

## 2018-07-03 NOTE — PROGRESS NOTES
Spoke with Dr. Faustino Briggs, would like Dr. Claudia Ospina to look into the case. Information given to Dr. Claudia Ospina. Dr. Claudia Ospina called and said that procedure was not indicated currently. Called and spoke to Lillian Rivero RN and relayed message. She advised will notify residents.

## 2018-07-03 NOTE — PROGRESS NOTES
12: Notified by Kiara Ortiz. Patient does not need TIPS procedure. Notified family practice. Orders to follow.

## 2018-07-03 NOTE — PROGRESS NOTES
Bedside shift change report given to Catherine Roblero (oncoming nurse) by Farzad Alves (offgoing nurse).  Report included the following information SBAR, Kardex, Intake/Output, Recent Results and Cardiac Rhythm SR.

## 2018-07-03 NOTE — PROGRESS NOTES
[address] [place] [address]   Office [phone]  Fax [phone]          Assessment and Plan     Rick Guerra is a 66 y.o. male admitted for bleeding duodenal mass lesion found on routine EGD. He has spent 1 night(s) in the hospital.    24 Hour Events: Patient denies any pain or discomfort. . Bleeding Duodenal Mass- mass bulb lesion with active oozing found on routine EGD                         - IR performed angiography on 7/2- no evidence of vessel abnormality- no embolization performed                        - IR consulted to evaluate for TIPS procedure per GI recommendation- IR recommended against procedure                        - CT Abd/Pelv wo cont- ordered, results pending                         - Lesion biopsy obtained              - Type and Screen completed                         - Labs: CBC w/o diff daily     2. Esophageal varices and ascites in the setting of liver cirrhosis- Grade 4 varices mid and distal 3rd esophagus                         - Continue home lactulose 10 mg/BID                        - Continue home spironolactone 25mg/day                         - Labs: CMP, PT + INR, daily                        - Continue home Furosemide 40 mg/day     3. Alcohol Dependence - CIWA Score of 0 over past 24 hours                        - Goody Bag 125 ml/day                         - Thiamine 100 mg/day                         - Labs: Magnesium and Phosphorus daily- results unremarkable 7/3                        - UDS and alcohol screen- negative 7/2     4. Hx of Prior MI                        - Continue home Carvedilol 3.125 mg      5. Depression/Anxiety                         - Continue home Lorazepam 1mg/BID                         - Continue home Venlafaxine- mg/day      6 Gout                         - Continue home allopurinol 300 mg      7 GERD                        - Continue home Protonix 40 mg/day     8.  Insomnia                         - Continue home trazodone 300 mg/day     FEN/GI - NPO. Ivan Wynne Activity - Ambulate as tolerated  DVT prophylaxis - SCDs  GI prophylaxis - Not indicated at this time  Fall prophylaxis - Not indicated at this time. Unit - Medical  Admission Status - Under Observation  IV Access - Peripheral IV  R antecubital  Disposition - Plan to d/c to Home. Code Status - Full    I appreciate the opportunity to participate in the care of this patient,  Michael Hua MD  Family Medicine Resident         Subjective / Objective     Subjective    Temp (24hrs), Av.1 °F (36.7 °C), Min:97.7 °F (36.5 °C), Max:98.3 °F (36.8 °C)     Objective  Respiratory:   O2 Device: Room air   Visit Vitals    /71 (BP 1 Location: Left arm, BP Patient Position: At rest)    Pulse 66    Temp 98.3 °F (36.8 °C)    Resp 16    Ht 6' (1.829 m)    Wt 289 lb (131.1 kg)    SpO2 93%    BMI 39.2 kg/m2       General: no acute distress. Alert. Cooperative. Head: Normocephalic. Atraumatic. Eyes:              Conjunctiva pink. Sclera white. PERRL. Respiratory: CTAB. No w/r/r/c.   Cardiovascular: RRR. Normal S1,S2. No m/r/g. Pulses 2+ throughout. GI: + bowel sounds. Nontender. No rebound tenderness or guarding. Nondistended. Musculoskeletal: Full ROM in all extremities. No LE edema. Distal pulses intact. Skin: Warm, dry. No rashes. Neuro: CN II-XII grossly intact.         I/O:    Date 18 - 18 - 18 0659   Shift 0613-4660 0865-0850 24 Hour Total 1900-0659 24 Hour Total   I  N  T  A  K  E   Shift Total  (mL/kg)         O  U  T  P  U  T   Urine  (mL/kg/hr)  600  (0.4) 600  (0.2)         Urine Voided  600 600       Shift Total  (mL/kg)  600  (4.6) 600  (4.6)      NET  -600 -600      Weight (kg) 131.1 131.1 131.1 131.1 131.1 131.1       CBC:  Recent Labs      18   0412  18   0825   WBC  3.9*  4.6   HGB  12.2  12.6   HCT  39.3  40.2   PLT  76*  90*       Metabolic Panel:  Recent Labs 07/03/18   0412  07/02/18   0825   NA  138  137   K  3.7  3.5   CL  104  102   CO2  28  25   BUN  4*  2*   CREA  0.68*  0.65*   GLU  114*  127*   CA  8.1*  8.3*   MG  1.9   --    PHOS  4.1   --    ALB  2.8*  3.0*   SGOT  35  42*   ALT  22  26   INR  1.4*  1.3*          For Billing    No chief complaint on file.       Hospital Problems  Date Reviewed: 6/29/2018          Codes Class Noted POA    Es[facility] [id number]: K70.30, I85.10  ICD-9-CM: 571.2, 456.21  7/2/2018 Unknown        Duodenal mass ICD-10-CM: K31.89  ICD-9-CM: 537.89  7/2/2018 Unknown

## 2018-07-03 NOTE — PROGRESS NOTES
[facility] Res[facility]       Resident Post-Op Note in Brief    S: Patient seen and examined at bedside. Denies any current abdominal pain or n/v. Denies any bloody emesis or blood in stool. Discussed with patient the need to be kept NPO and he voiced understanding. O:  Visit Vitals    /75 (BP 1 Location: Right arm, BP Patient Position: At rest)    Pulse 74    Temp 97.7 °F (36.5 °C)    Resp 16    Ht 6' (1.829 m)    Wt 289 lb (131.1 kg)    SpO2 95%    BMI 39.2 kg/m2     Physical Examination:   General appearance - alert, well appearing, and in no distress  Chest - clear to auscultation, no wheezes, rales or rhonchi, symmetric air entry  Heart - normal rate, regular rhythm, normal S1, S2, no murmurs, rubs, clicks or gallops,   Abdomen - soft, nontender, nondistended, no masses or organomegaly  Neurological - alert, oriented, normal speech, no focal findings  Extremities - peripheral pulses normal, no pedal edema, no clubbing or cyanosis    A/P: 66yo M with hx alcoholic cirrhosis and esophageal varices found to have duodenal mass now s/p EGD, colonoscopy, and angiography. EGD showed bleeding duodenal mass which was injected with epinephrine and had bands applied.  Now Angiography report shows no active sites of bleeding.   -Per GI note, case discussed with Dr. Mike Ramon of radiology, considering possible TIPS procedure  -Will keep NPO for now  -Please see daily progress note for further details of plan      Naren Pereira MD  Family Medicine Resident

## 2018-07-03 NOTE — PROGRESS NOTES
Occupational therapy note:  Orders received, chart reviewed. Noted patient standing and walking independently in room, verbalizing no need for OT evaluation at this time. Will complete orders. Senia Song MS OTR/L

## 2018-07-04 NOTE — PROGRESS NOTES
Spiritual Care Partner Volunteer visited patient in Med Surg on 7/3/18. Documented by:    Pam Redmond M.S.   Spiritual Care Department  If needs arise please call Manuel-AUBRIE ([phone])

## 2018-07-05 LAB
ABO + RH BLD: NORMAL
ANTIGENS PRESENT RBC DONR: NORMAL
ANTIGENS PRESENT RBC DONR: NORMAL
BLD PROD TYP BPU: NORMAL
BLD PROD TYP BPU: NORMAL
BLOOD BANK CMNT PATIENT-IMP: NORMAL
BLOOD GROUP ANTIBODIES SERPL: NORMAL
BLOOD GROUP ANTIBODIES SERPL: NORMAL
BPU ID: NORMAL
BPU ID: NORMAL
CROSSMATCH RESULT,%XM: NORMAL
CROSSMATCH RESULT,%XM: NORMAL
SPECIMEN EXP DATE BLD: NORMAL
STATUS OF UNIT,%ST: NORMAL
STATUS OF UNIT,%ST: NORMAL
UNIT DIVISION, %UDIV: 0
UNIT DIVISION, %UDIV: 0

## 2018-07-17 NOTE — TELEPHONE ENCOUNTER
Patient's wife Nia Charlton returned your call. (on HIPPA form). She stated that they are in [place] and to please leave a vm if they do not answer.   Phone [phone]

## 2018-07-27 PROBLEM — E66.01 SEVERE OBESITY (BMI 35.0-39.9): Status: ACTIVE | Noted: 2018-01-01

## 2018-07-27 NOTE — PATIENT INSTRUCTIONS
A Healthy Lifestyle: Care Instructions Your Care Instructions A healthy lifestyle can help you feel good, stay at a healthy weight, and have plenty of energy for both work and play. A healthy lifestyle is something you can share with your whole family. A healthy lifestyle also can lower your risk for serious health problems, such as high blood pressure, heart disease, and diabetes. You can follow a few steps listed below to improve your health and the health of your family. Follow-up care is a key part of your treatment and safety. Be sure to make and go to all appointments, and call your doctor if you are having problems. It's also a good idea to know your test results and keep a list of the medicines you take. How can you care for yourself at home? · Do not eat too much sugar, fat, or fast foods. You can still have dessert and treats now and then. The goal is moderation. · Start small to improve your eating habits. Pay attention to portion sizes, drink less juice and soda pop, and eat more fruits and vegetables. ¨ Eat a healthy amount of food. A 3-ounce serving of meat, for example, is about the size of a deck of cards. Fill the rest of your plate with vegetables and whole grains. ¨ Limit the amount of soda and sports drinks you have every day. Drink more water when you are thirsty. ¨ Eat at least 5 servings of fruits and vegetables every day. It may seem like a lot, but it is not hard to reach this goal. A serving or helping is 1 piece of fruit, 1 cup of vegetables, or 2 cups of leafy, raw vegetables. Have an apple or some carrot sticks as an afternoon snack instead of a candy bar. Try to have fruits and/or vegetables at every meal. 
· Make exercise part of your daily routine. You may want to start with simple activities, such as walking, bicycling, or slow swimming. Try to be active 30 to 60 minutes every day. You do not need to do all 30 to 60 minutes all at once.  For example, you can exercise 3 times a day for 10 or 20 minutes. Moderate exercise is safe for most people, but it is always a good idea to talk to your doctor before starting an exercise program. 
· Keep moving. Lorin Weston the lawn, work in the garden, or DCWafers. Take the stairs instead of the elevator at work. · If you smoke, quit. People who smoke have an increased risk for heart attack, stroke, cancer, and other lung illnesses. Quitting is hard, but there are ways to boost your chance of quitting tobacco for good. ¨ Use nicotine gum, patches, or lozenges. ¨ Ask your doctor about stop-smoking programs and medicines. ¨ Keep trying. In addition to reducing your risk of diseases in the future, you will notice some benefits soon after you stop using tobacco. If you have shortness of breath or asthma symptoms, they will likely get better within a few weeks after you quit. · Limit how much alcohol you drink. Moderate amounts of alcohol (up to 2 drinks a day for men, 1 drink a day for women) are okay. But drinking too much can lead to liver problems, high blood pressure, and other health problems. Family health If you have a family, there are many things you can do together to improve your health. · Eat meals together as a family as often as possible. · Eat healthy foods. This includes fruits, vegetables, lean meats and dairy, and whole grains. · Include your family in your fitness plan. Most people think of activities such as jogging or tennis as the way to fitness, but there are many ways you and your family can be more active. Anything that makes you breathe hard and gets your heart pumping is exercise. Here are some tips: 
¨ Walk to do errands or to take your child to school or the bus. ¨ Go for a family bike ride after dinner instead of watching TV. Where can you learn more? Go to http://bhavana-rosio.info/. Enter I681 in the search box to learn more about \"A Healthy Lifestyle: Care Instructions. \" Current as of: December 7, 2017 Content Version: 11.7 © 4533-8552 [employer]. Care instructions adapted under license by [employer] (which disclaims liability or warranty for this information). If you have questions about a medical condition or this instruction, always ask your healthcare professional. [address] any warranty or liability for your use of this information. Body Mass Index: Care Instructions Your Care Instructions Body mass index (BMI) can help you see if your weight is raising your risk for health problems. It uses a formula to compare how much you weigh with how tall you are. · A BMI lower than 18.5 is considered underweight. · A BMI between 18.5 and 24.9 is considered healthy. · A BMI between 25 and 29.9 is considered overweight. A BMI of 30 or higher is considered obese. If your BMI is in the normal range, it means that you have a lower risk for weight-related health problems. If your BMI is in the overweight or obese range, you may be at increased risk for weight-related health problems, such as high blood pressure, heart disease, stroke, arthritis or joint pain, and diabetes. If your BMI is in the underweight range, you may be at increased risk for health problems such as fatigue, lower protection (immunity) against illness, muscle loss, bone loss, hair loss, and hormone problems. BMI is just one measure of your risk for weight-related health problems. You may be at higher risk for health problems if you are not active, you eat an unhealthy diet, or you drink too much alcohol or use tobacco products. Follow-up care is a key part of your treatment and safety. Be sure to make and go to all appointments, and call your doctor if you are having problems. It's also a good idea to know your test results and keep a list of the medicines you take. How can you care for yourself at home? · Practice healthy eating habits.  This includes eating plenty of fruits, vegetables, whole grains, lean protein, and low-fat dairy. · If your doctor recommends it, get more exercise. Walking is a good choice. Bit by bit, increase the amount you walk every day. Try for at least 30 minutes on most days of the week. · Do not smoke. Smoking can increase your risk for health problems. If you need help quitting, talk to your doctor about stop-smoking programs and medicines. These can increase your chances of quitting for good. · Limit alcohol to 2 drinks a day for men and 1 drink a day for women. Too much alcohol can cause health problems. If you have a BMI higher than 25 · Your doctor may do other tests to check your risk for weight-related health problems. This may include measuring the distance around your waist. A waist measurement of more than 40 inches in men or 35 inches in women can increase the risk of weight-related health problems. · Talk with your doctor about steps you can take to stay healthy or improve your health. You may need to make lifestyle changes to lose weight and stay healthy, such as changing your diet and getting regular exercise. If you have a BMI lower than 18.5 · Your doctor may do other tests to check your risk for health problems. · Talk with your doctor about steps you can take to stay healthy or improve your health. You may need to make lifestyle changes to gain or maintain weight and stay healthy, such as getting more healthy foods in your diet and doing exercises to build muscle. Where can you learn more? Go to http://bhavana-rosio.info/. Enter S176 in the search box to learn more about \"Body Mass Index: Care Instructions. \" Current as of: October 13, 2016 Content Version: 11.4 © 9913-0204 [employer]. Care instructions adapted under license by [employer] (which disclaims liability or warranty for this information).  If you have questions about a medical condition or this instruction, always ask your healthcare professional. [place] [address] any warranty or liability for your use of this information.

## 2018-07-27 NOTE — PROGRESS NOTES
Radha uFller. is a 66 y.o. male Chief Complaint Patient presents with  Follow-up  
  test from GI results  
 pt here for his T injection and is otherwise doing well. Pt had his varices banded recently. Pt is otherwise doing well and recent CBC showed a normal hgb. T injections are not working for his erectile dysfunction. But it does help quite a bit with his energy levels. Pt would like viagra Rx but imdur is on his list and pt is unsure if he is actually taking this since it was not prescribed from us. Pt will check his meds at home and check with cardiology if he may take viagra. he is a 66y.o. year old male who presents for evalution. Reviewed PmHx, RxHx, FmHx, SocHx, AllgHx and updated and dated in the chart. Review of Systems - negative except as listed above in the HPI Objective:  
 
Vitals:  
 07/27/18 1008 BP: 126/82 Pulse: 75 Resp: 18 Temp: 98.6 °F (37 °C) TempSrc: Oral  
SpO2: 91% Weight: 277 lb 12.8 oz (126 kg) Height: 6' (1.829 m) Current Outpatient Prescriptions Medication Sig  testosterone cypionate (DEPOTESTOTERONE CYPIONATE) 200 mg/mL injection 2 mL by IntraMUSCular route once for 1 dose. Max Daily Amount: 400 mg.  ferrous sulfate (IRON) 325 mg (65 mg iron) EC tablet  venlafaxine-SR (EFFEXOR-XR) 150 mg capsule TAKE 1 CAPSULE EVERY DAY  cyclobenzaprine (FLEXERIL) 10 mg tablet TAKE 1 TABLET THREE TIMES DAILY AS NEEDED  
 allopurinol (ZYLOPRIM) 300 mg tablet TAKE 1 TABLET TWICE DAILY  carvedilol (COREG) 3.125 mg tablet Take 1 Tab by mouth two (2) times daily (with meals). (Patient taking differently: Take 3.125 mg by mouth daily.)  furosemide (LASIX) 40 mg tablet Take 1 Tab by mouth daily. (Patient taking differently: Take 40 mg by mouth two (2) times a day.)  lactulose (CHRONULAC) 10 gram/15 mL solution Take 15 mL by mouth two (2) times a day.   
 LORazepam (ATIVAN) 1 mg tablet TAKE 1 TABLET TWICE DAILY (MAXIMUM DAILY AMOUNT 2MG)  omeprazole (PRILOSEC) 40 mg capsule TAKE 1 CAPSULE TWICE DAILY  spironolactone (ALDACTONE) 25 mg tablet TAKE 1 TABLET TWICE DAILY  traZODone (DESYREL) 300 mg tablet Take 1 Tab by mouth nightly.  isosorbide mononitrate ER (IMDUR) 30 mg tablet Take  by mouth daily.  potassium chloride SA (MICRO-K) 10 mEq capsule TAKE 1 CAPSULE EVERY DAY (Patient taking differently: TAKE 1 CAPSULE EVERY DAY - QHS) No current facility-administered medications for this visit. Physical Examination: General appearance - alert, well appearing, and in no distress Chest - clear to auscultation, no wheezes, rales or rhonchi, symmetric air entry Heart - normal rate, regular rhythm, normal S1, S2, no murmurs, rubs, clicks or gallops Assessment/ Plan:  
Diagnoses and all orders for this visit: 1. Erectile dysfunction due to arterial insufficiency Advised no ED meds due to imdur on med list until we can clarify whether or not he is taking 2. Hypogonadism, male 
-     testosterone cypionate (DEPOTESTOTERONE CYPIONATE) 200 mg/mL injection; 2 mL by IntraMUSCular route once for 1 dose. Max Daily Amount: 400 mg. 
-     MI INJ TESTOSTERONE CYPIONATE () - Qty - 200 
-     THER/PROPH/DIAG INJECTION, SUBCUT/IM 3. Esophageal varices recent banding Managed by GI Follow-up Disposition: 
Return in about 1 month (around 8/27/2018), or if symptoms worsen or fail to improve. I have discussed the diagnosis with the patient and the intended plan as seen in the above orders. The patient has received an after-visit summary and questions were answered concerning future plans. Pt conveyed understanding of plan. Medication Side Effects and Warnings were discussed with patient [address] [facility], DO Discussed the patient's BMI with him.   The BMI follow up plan is as follows:  
 
dietary management education, guidance, and counseling 
encourage exercise 
monitor weight 
prescribed dietary intake An After Visit Summary was printed and given to the patient.

## 2018-07-27 NOTE — PROGRESS NOTES
Cortney Lopez is a 66 y.o. male 1. Have you been to the ER, urgent care clinic since your last visit? Hospitalized since your last visit? No 
 
2. Have you seen or consulted any other health care providers outside of the [address] [place] [address] since your last visit? Include any pap smears or colon screening. No 
 
Pt will like printed list of mediations.

## 2018-07-27 NOTE — MR AVS SNAPSHOT
[address] [address] 
[phone] Patient: Thomas Crump. MRN: [id number] :1960 Visit Information Date & Time Provider Department Dept. Phone Encounter #  
 2018 10:00 AM Lizeth LinaresMeghann [phone] [id number] Follow-up Instructions Return in about 1 month (around 2018), or if symptoms worsen or fail to improve. Your Appointments 2018 11:00 AM  
ESTABLISHED PATIENT with Vianey Jones MD  
[facility] at [address] [place] [address]) Appt Note: 3 mo Cory jacobson [address] [place] [address], [address] [place] [phone]  
[phone]  
  
   
 [address] [place] [address], [address] [place] [address] Upcoming Health Maintenance Date Due Pneumococcal 19-64 Highest Risk (2 of 3 - PCV13) 2012 Influenza Age 5 to Adult 2018 MEDICARE YEARLY EXAM 2019 DTaP/Tdap/Td series (2 - Td) 2022 COLONOSCOPY 2023 Allergies as of 2018  Review Complete On: 2018 By: Lizeth Linares DO Severity Noted Reaction Type Reactions Onion Medium 2011   Side Effect Nausea and Vomiting Statins-hmg-coa Reductase Inhibitors  2016    Rash  
 rash Current Immunizations  Reviewed on 2018 Name Date Hep A Vaccine (Adult) 2016, 2015 Hep B Vaccine (Adult) 2016, 2015, 2015 Influenza Vaccine (Quad) PF 2017  9:46 AM, 10/24/2016 12:01 PM, 2015 TDAP Vaccine 2012  3:11 PM  
 ZZZ-RETIRED (DO NOT USE) Pneumococcal Vaccine (Unspecified Type) 2011  8:25 PM  
  
 Not reviewed this visit You Were Diagnosed With   
  
 Codes Comments Erectile dysfunction due to arterial insufficiency    -  Primary ICD-10-CM: N52.01 
ICD-9-CM: 607.84 Hypogonadism, male     ICD-10-CM: E29.1 ICD-9-CM: 257.2 Vitals  BP Pulse Temp Resp Height(growth percentile) Weight(growth percentile) 126/82 (BP 1 Location: Left arm, BP Patient Position: Sitting) 75 98.6 °F (37 °C) (Oral) 18 6' (1.829 m) 277 lb 12.8 oz (126 kg) SpO2 BMI Smoking Status 91% 37.68 kg/m2 Former Smoker BMI and BSA Data Body Mass Index Body Surface Area  
 37.68 kg/m 2 2.53 m 2 Preferred Pharmacy Pharmacy Name Phone [facility] [address] [phone] Your Updated Medication List  
  
   
This list is accurate as of 7/27/18 10:30 AM.  Always use your most recent med list.  
  
  
  
  
 allopurinol 300 mg tablet Commonly known as:  ZYLOPRIM  
TAKE 1 TABLET TWICE DAILY  
  
 carvedilol 3.125 mg tablet Commonly known as:  Vergia Edin Take 1 Tab by mouth two (2) times daily (with meals). cyclobenzaprine 10 mg tablet Commonly known as:  FLEXERIL  
TAKE 1 TABLET THREE TIMES DAILY AS NEEDED  
  
 ferrous sulfate 325 mg (65 mg iron) EC tablet Commonly known as:  IRON  
  
 furosemide 40 mg tablet Commonly known as:  LASIX Take 1 Tab by mouth daily. isosorbide mononitrate ER 30 mg tablet Commonly known as:  IMDUR Take  by mouth daily. lactulose 10 gram/15 mL solution Commonly known as:  Karime Javier Take 15 mL by mouth two (2) times a day. LORazepam 1 mg tablet Commonly known as:  ATIVAN  
TAKE 1 TABLET TWICE DAILY (MAXIMUM DAILY AMOUNT 2MG) omeprazole 40 mg capsule Commonly known as:  PRILOSEC  
TAKE 1 CAPSULE TWICE DAILY potassium chloride SA 10 mEq capsule Commonly known as:  MICRO-K  
TAKE 1 CAPSULE EVERY DAY  
  
 spironolactone 25 mg tablet Commonly known as:  ALDACTONE  
TAKE 1 TABLET TWICE DAILY  
  
 testosterone cypionate 200 mg/mL injection Commonly known as:  DEPOTESTOTERONE CYPIONATE 2 mL by IntraMUSCular route once for 1 dose. Max Daily Amount: 400 mg.  
  
 traZODone 300 mg tablet Commonly known as: Amor Mortony Take 1 Tab by mouth nightly. venlafaxine- mg capsule Commonly known as:  EFFEXOR-XR  
TAKE 1 CAPSULE EVERY DAY We Performed the Following [place] INJ TESTOSTERONE CYPIONATE [ [facility] [place]] [place] THER/PROPH/DIAG INJECTION, SUBCUT/IM [id number] CPT(R)] Follow-up Instructions Return in about 1 month (around 8/27/2018), or if symptoms worsen or fail to improve. Patient Instructions A Healthy Lifestyle: Care Instructions Your Care Instructions A healthy lifestyle can help you feel good, stay at a healthy weight, and have plenty of energy for both work and play. A healthy lifestyle is something you can share with your whole family. A healthy lifestyle also can lower your risk for serious health problems, such as high blood pressure, heart disease, and diabetes. You can follow a few steps listed below to improve your health and the health of your family. Follow-up care is a key part of your treatment and safety. Be sure to make and go to all appointments, and call your doctor if you are having problems. It's also a good idea to know your test results and keep a list of the medicines you take. How can you care for yourself at home? · Do not eat too much sugar, fat, or fast foods. You can still have dessert and treats now and then. The goal is moderation. · Start small to improve your eating habits. Pay attention to portion sizes, drink less juice and soda pop, and eat more fruits and vegetables. ¨ Eat a healthy amount of food. A 3-ounce serving of meat, for example, is about the size of a deck of cards. Fill the rest of your plate with vegetables and whole grains. ¨ Limit the amount of soda and sports drinks you have every day. Drink more water when you are thirsty. ¨ Eat at least 5 servings of fruits and vegetables every day.  It may seem like a lot, but it is not hard to reach this goal. A serving or helping is 1 piece of fruit, 1 cup of vegetables, or 2 cups of leafy, raw vegetables. Have an apple or some carrot sticks as an afternoon snack instead of a candy bar. Try to have fruits and/or vegetables at every meal. 
· Make exercise part of your daily routine. You may want to start with simple activities, such as walking, bicycling, or slow swimming. Try to be active 30 to 60 minutes every day. You do not need to do all 30 to 60 minutes all at once. For example, you can exercise 3 times a day for 10 or 20 minutes. Moderate exercise is safe for most people, but it is always a good idea to talk to your doctor before starting an exercise program. 
· Keep moving. [place] the lawn, work in the garden, or [place]. Take the stairs instead of the elevator at work. · If you smoke, quit. People who smoke have an increased risk for heart attack, stroke, cancer, and other lung illnesses. Quitting is hard, but there are ways to boost your chance of quitting tobacco for good. ¨ Use nicotine gum, patches, or lozenges. ¨ Ask your doctor about stop-smoking programs and medicines. ¨ Keep trying. In addition to reducing your risk of diseases in the future, you will notice some benefits soon after you stop using tobacco. If you have shortness of breath or asthma symptoms, they will likely get better within a few weeks after you quit. · Limit how much alcohol you drink. Moderate amounts of alcohol (up to 2 drinks a day for men, 1 drink a day for women) are okay. But drinking too much can lead to liver problems, high blood pressure, and other health problems. Family health If you have a family, there are many things you can do together to improve your health. · Eat meals together as a family as often as possible. · Eat healthy foods. This includes fruits, vegetables, lean meats and dairy, and whole grains. · Include your family in your fitness plan.  Most people think of activities such as jogging or tennis as the way to fitness, but there are many ways you and your family can be more active. Anything that makes you breathe hard and gets your heart pumping is exercise. Here are some tips: 
¨ Walk to do errands or to take your child to school or the bus. ¨ Go for a family bike ride after dinner instead of watching TV. Where can you learn more? Go to http://bhavana-rosio.info/. Enter C398 in the search box to learn more about \"A Healthy Lifestyle: Care Instructions. \" Current as of: December 7, 2017 Content Version: 11.7 © 7794-8299 [employer]. Care instructions adapted under license by [employer] (which disclaims liability or warranty for this information). If you have questions about a medical condition or this instruction, always ask your healthcare professional. [address] any warranty or liability for your use of this information. Introducing [facility]! Dear Melania Richter: Thank you for requesting a Normal account. Our records indicate that you already have an active Normal account. You can access your account anytime at [email or web address] Did you know that you can access your hospital and ER discharge instructions at any time in Normal? You can also review all of your test results from your hospital stay or ER visit. Additional Information If you have questions, please visit the Frequently Asked Questions section of the Normal website at [email or web address] Remember, Normal is NOT to be used for urgent needs. For medical emergencies, dial 911. Now available from your iPhone and Android! Please provide this summary of care documentation to your next provider. Your primary care clinician is listed as Rinku Warner. If you have any questions after today's visit, please call [phone].

## 2018-08-02 NOTE — PROGRESS NOTES
Rx faxed to [facility] on 8/1/18 with confirmation

## 2018-08-29 NOTE — PATIENT INSTRUCTIONS

## 2018-08-29 NOTE — PROGRESS NOTES
After obtaining consent, and per orders of Dr. Chente Wahl, injection of 400mg of Testosterone Cypionate given by Andrea Christie LPN in (R) glut. Patient instructed to remain in clinic for 20 minutes afterwards, and to report any adverse reaction to me immediately. Injection well tolerated. Pt to return in 1 months.

## 2018-08-29 NOTE — PROGRESS NOTES
Jackie Meade. is a 66 y.o. male   Chief Complaint   Patient presents with    Injection     Testosterone    pt states he received a package yesterday from the mail order and states the bag was ripped open and the bottle was not there. Pt states he has a letter from post master. Will rewrite Rx and advised pt we will resend to [facility]. Pt does continue to drink 9 beers per day and we discussed d/c ativan and will gtive taper and pt is very agreeable to this but would like something else in place and will give vistaril. Pt also needs his shot of T today which does work well and pt is aware his etoh abuse is likely contributing to hypogonadism. he is a 66y.o. year old male who presents for evalution. Reviewed PmHx, RxHx, FmHx, SocHx, AllgHx and updated and dated in the chart. Review of Systems - negative except as listed above in the HPI    Objective:     Vitals:    08/29/18 1116   BP: 100/63   Pulse: 97   Resp: 18   Temp: 98.3 °F (36.8 °C)   TempSrc: Oral   SpO2: 94%   Weight: 277 lb (125.6 kg)   Height: 6' (1.829 m)       Current Outpatient Prescriptions   Medication Sig    hydrOXYzine pamoate (VISTARIL) 25 mg capsule Take 1 Cap by mouth three (3) times daily as needed for Anxiety. D/c lorazepam    LORazepam (ATIVAN) 0.5 mg tablet 1 tab PO bid x 1 week then 1 tab daily x 1 week then stop    testosterone cypionate (DEPOTESTOTERONE CYPIONATE) 200 mg/mL injection 2 mL by IntraMUSCular route once for 1 dose. Max Daily Amount: 400 mg.    omeprazole (PRILOSEC) 40 mg capsule TAKE 1 CAPSULE TWICE DAILY    KLOR-CON SPRINKLE 10 mEq capsule TAKE 1 CAPSULE EVERY DAY    venlafaxine-SR (EFFEXOR-XR) 150 mg capsule TAKE 1 CAPSULE EVERY DAY    cyclobenzaprine (FLEXERIL) 10 mg tablet TAKE 1 TABLET THREE TIMES DAILY AS NEEDED    allopurinol (ZYLOPRIM) 300 mg tablet TAKE 1 TABLET TWICE DAILY    carvedilol (COREG) 3.125 mg tablet Take 1 Tab by mouth two (2) times daily (with meals).  (Patient taking differently: Take 3.125 mg by mouth daily.)    furosemide (LASIX) 40 mg tablet Take 1 Tab by mouth daily. (Patient taking differently: Take 40 mg by mouth two (2) times a day.)    lactulose (CHRONULAC) 10 gram/15 mL solution Take 15 mL by mouth two (2) times a day.  spironolactone (ALDACTONE) 25 mg tablet TAKE 1 TABLET TWICE DAILY    traZODone (DESYREL) 300 mg tablet Take 1 Tab by mouth nightly.  ferrous sulfate (IRON) 325 mg (65 mg iron) EC tablet     isosorbide mononitrate ER (IMDUR) 30 mg tablet Take  by mouth daily. No current facility-administered medications for this visit. Physical Examination: General appearance - alert, well appearing, and in no distress  Mental status - alert, oriented to person, place, and time  Chest - clear to auscultation, no wheezes, rales or rhonchi, symmetric air entry  Heart - normal rate, regular rhythm, normal S1, S2, no murmurs, rubs, clicks or gallops      Assessment/ Plan:   Diagnoses and all orders for this visit:    1. Anxiety and depression  -     hydrOXYzine pamoate (VISTARIL) 25 mg capsule; Take 1 Cap by mouth three (3) times daily as needed for Anxiety. D/c lorazepam    2. Alcohol dependence with uncomplicated withdrawal (HCC)  -     LORazepam (ATIVAN) 0.5 mg tablet; 1 tab PO bid x 1 week then 1 tab daily x 1 week then stop  Taper off ativan, pt has no plan to stop drinking  3. Hypogonadism, male  -     testosterone cypionate (DEPOTESTOTERONE CYPIONATE) 200 mg/mL injection; 2 mL by IntraMUSCular route once for 1 dose. Max Daily Amount: 400 mg.  -     MT INJ TESTOSTERONE CYPIONATE () - Qty - 200  -     THER/PROPH/DIAG INJECTION, SUBCUT/IM       Follow-up Disposition:  Return in about 4 weeks (around 9/26/2018), or if symptoms worsen or fail to improve. I have discussed the diagnosis with the patient and the intended plan as seen in the above orders.   The patient has received an after-visit summary and questions were answered concerning future plans. Pt conveyed understanding of plan.     Medication Side Effects and Warnings were discussed with patient      Miriam Mcgraw DO

## 2018-09-20 NOTE — TELEPHONE ENCOUNTER
Attempted to call patients number listed, number is no longer working. Called patients BALDEMAR Colongreg Gillespie stated that her and patient have split and patient has a new number of [phone]. Voicemail left requesting a call back. Called patient to reschedule no show appointment with Dr. Elfego Leong on 9/20.

## 2018-11-29 PROBLEM — F10.939 WITHDRAWAL SYMPTOMS, ALCOHOL (HCC): Status: ACTIVE | Noted: 2018-01-01

## 2018-11-29 NOTE — ED PROVIDER NOTES
66 y.o. male with past medical history significant for HTN, anemia, alcohol abuse, pneumonia, gout, gastric ulcer, ascites, stroke, seizures, and cirrhosis of liver who presents from home with chief complaint of nausea. Pt reports nausea, accompanied by vomiting for the last couple of days. Pt notes he has been \"gagging everytime he eats for months\". Pt states his \"balance is off\". Pt also c/o increased anxiety and tremors. Pt denies abdominal pain and syncope. There are no other acute medical concerns at this time. PCP: Bryanna Allen DO Note written by Sarah Garner, as dictated by Marinell Hammans, MD 2:25 PM 
 
67 yo male with past medical history significant for HTN, anemia, alcohol abuse, pneumonia, gout, gastric ulcer, ascites, stroke, seizures, and cirrhosis of liver from chronic alcohol abuse who presents from home with chief complaint of nausea and vomiting for 3 days. Denies any abdominal pain. Pt states that his symptoms started on Monday and since then he has not been able to keep any fluids/solids down. He denies any sick contacts or eating anything out of the ordinary. Pt states that he has alcohol on Sunday & Monday but that this was an isolated incident and that he does not binge drink like he used to before. States this was the 1st time he had any alcohol in 1 month. He also has bilateral upper extremity tremors that have worsened over the past 3 days. Pt is also having gait disturbances and states feeling off balance. Past Medical History:  
Diagnosis Date  Adverse effect of anesthesia   
 pt reports waking up during colonoscopy/endoscopy  Alcohol abuse 6/1/2011  Anemia NEC  Anxiety  Ascites   
 has to have fluid drained occasionally  Cirrhosis of liver ([facility] [address]  Depression  Gastric ulcer, unspecified as acute or chronic, without mention of hemorrhage, perforation, or obstruction  GERD (gastroesophageal reflux disease)  Gout  Hypertension  Liver disease   
 cirrhosis  Obesity, Class II, BMI 35-39.9  Pneumonia  PUD (peptic ulcer disease)  Seizures ([facility] [address] 2013  
 from alcohol withdrawal  
 Stroke [facility]) 2013  
 per pt, possible mini stroke from alcohol withdrawal (same time as seizure) Past Surgical History:  
Procedure Laterality Date  COLONOSCOPY N/A 7/2/2018 COLONOSCOPY performed by Teresa Carney MD at [address] [facility] HX COLONOSCOPY  07/2018  HX ENDOSCOPY    
 also colonoscopy  HX GI  1998  
 rectal abscess surgery  HX GI  1998  
 rectal abscess surgery  HX HEENT  1980  
 wisdom teeth Family History:  
Problem Relation Age of Onset  Asthma Mother  Other Father   
     history of fall multiple injuries  Hypertension Brother  Cancer Paternal Grandmother   
     uncertain if colon or stomach  Cancer Paternal Grandfather   
     stomach cancer- dx mid [de-identified] Social History Socioeconomic History  Marital status:  Spouse name: Not on file  Number of children: Not on file  Years of education: Not on file  Highest education level: Not on file Social Needs  Financial resource strain: Not on file  Food insecurity - worry: Not on file  Food insecurity - inability: Not on file  Transportation needs - medical: Not on file  Transportation needs - non-medical: Not on file Occupational History  Occupation: Landscaping Tobacco Use  Smoking status: Former Smoker  Smokeless tobacco: Current User Types: Chew  Tobacco comment: currently uses snuff Substance and Sexual Activity  Alcohol use: Yes Alcohol/week: 4.8 oz Types: 8 Cans of beer per week  Drug use: No  
 Sexual activity: Yes  
  Partners: Female Other Topics Concern  Not on file Social History Narrative  Not on file ALLERGIES: Onion and Statins-hmg-coa reductase inhibitors Review of Systems Constitutional: Negative for chills and fatigue. HENT: Negative for congestion, sinus pressure, sinus pain and sore throat. Respiratory: Negative for cough, choking, chest tightness, shortness of breath, wheezing and stridor. Cardiovascular: Negative for chest pain and leg swelling. Gastrointestinal: Positive for nausea and vomiting. Negative for abdominal distention, abdominal pain, constipation and diarrhea. Genitourinary: Negative for dysuria, frequency, hematuria and urgency. Musculoskeletal: Negative for joint swelling, myalgias, neck pain and neck stiffness. Skin: Negative for color change, pallor and rash. Neurological: Negative for dizziness, tremors, syncope, weakness, numbness and headaches. Psychiatric/Behavioral: Negative for agitation, confusion and suicidal ideas. There were no vitals filed for this visit. Physical Exam  
Constitutional: He is oriented to person, place, and time. He appears well-developed and well-nourished. No distress. HENT:  
Head: Normocephalic and atraumatic. Pupils constricted but reactive Eyes: Conjunctivae are normal.  
Neck: Normal range of motion. Neck supple. Cardiovascular: Normal rate, regular rhythm and normal heart sounds. Exam reveals no gallop and no friction rub. No murmur heard. Pulmonary/Chest: Effort normal and breath sounds normal. No respiratory distress. He has no wheezes. He has no rales. Abdominal: Soft. Bowel sounds are normal. He exhibits no distension. There is no tenderness. Musculoskeletal: Normal range of motion. Neurological: He is alert and oriented to person, place, and time. Skin: Skin is warm and dry. Psychiatric: He has a normal mood and affect. MDM Number of Diagnoses or Management Options Alcohol withdrawal syndrome without complication [facility]):  
Gait disturbance:  
Diagnosis management comments: 4:00 PM:  
Labs wnl except K low at 2.6, will replete with Klor Con 40 mEq if pt is able to tolerate po  
 
4:40 PM 
Pt to receive 2nd dose of Valium 5 mg, anxiety and tremors unchanged after 1st dose. CIWA score of 19.  
 
4:41 PM 
Case discussed with Family Practice, Dr. Joanie Joseph. Will admit patient for alcohol withdrawal with gait disturbance. Amount and/or Complexity of Data Reviewed Clinical lab tests: ordered Procedures

## 2018-11-29 NOTE — ED TRIAGE NOTES
Pt arrives with c/o of nausea and vomiting for the last couple days, pt states balance is off and increased anxiety.

## 2018-11-29 NOTE — ED NOTES
Pt reports hx of alcohol abuse reports he quit drinking a month ago but states he drank of Saturday and Sunday. Pt visibly anxious and reports nausea.  Pt denies any suicidal or homicidal ideation but scored a 6 on the SAD persons score- will discuss with MD.

## 2018-11-29 NOTE — PROGRESS NOTES
[facility]: MED RECONCILIATION Comments/Recommendations:  Med rec performed via interview with patient who was a fair historian. Patient had medication bottles at bedside and states his mother helps him but she was not present.  Patient states he has issues with anxiety and was weaned off of ativan this summer by his MD. Patient is now taking hydroxyzine TID instead. Medications removed: 
 
· Lactulose--not needed at this time · Ferrous sulfate--caused severe constipation · Imdur--pt not taking Medications adjusted: 
 
· Changed hydroxyzine to 25 mg po TID vs prn · Changed trazodone to HS PRN Information obtained from: patient, bottles, rx query Significant PMH/Disease States:  
Past Medical History:  
Diagnosis Date  Adverse effect of anesthesia   
 pt reports waking up during colonoscopy/endoscopy  Alcohol abuse 6/1/2011  Anemia NEC  Anxiety  Ascites   
 has to have fluid drained occasionally  Cirrhosis of liver ([facility] [address]  Depression  Gastric ulcer, unspecified as acute or chronic, without mention of hemorrhage, perforation, or obstruction  GERD (gastroesophageal reflux disease)  Gout  Hypertension  Liver disease   
 cirrhosis  Obesity, Class II, BMI 35-39.9  Pneumonia  PUD (peptic ulcer disease)  Seizures ([facility] [address] 2013  
 from alcohol withdrawal  
 Stroke [facility]) 2013  
 per pt, possible mini stroke from alcohol withdrawal (same time as seizure) Chief Complaint for this Admission: Chief Complaint Patient presents with  Vomiting Allergies: Onion and Statins-hmg-coa reductase inhibitors Prior to Admission Medications:  
Prior to Admission Medications Prescriptions Last Dose Informant Patient Reported? Taking? KLOR-CON SPRINKLE 10 mEq capsule 1 month ago Self No No  
Sig: TAKE 1 CAPSULE EVERY DAY Venlafaxine 150 mg tr24 11/29/2018 at AM Self Yes Yes Sig: Take 150 mg by mouth daily.   
allopurinol (ZYLOPRIM) 300 mg tablet 11/29/2018 at AM Self No Yes Sig: TAKE 1 TABLET TWICE DAILY  
carvedilol (COREG) 3.125 mg tablet 11/29/2018 at AM Self No Yes Sig: Take 1 Tab by mouth two (2) times daily (with meals). cyclobenzaprine (FLEXERIL) 10 mg tablet 2x/month Self No Yes Sig: TAKE 1 TABLET THREE TIMES DAILY AS NEEDED  
furosemide (LASIX) 40 mg tablet 11/29/2018 at AM Self No Yes Sig: Take 1 Tab by mouth daily. hydrOXYzine HCl (ATARAX) 25 mg tablet 11/29/2018 at AM Self Yes Yes Sig: Take 25 mg by mouth three (3) times daily. lactulose (CHRONULAC) 10 gram/15 mL solution  Self Yes Yes Sig: Take 3 tsp by mouth two (2) times daily as needed (liver disease). omeprazole (PRILOSEC) 40 mg capsule 11/29/2018 at AM Self No Yes Sig: TAKE 1 CAPSULE TWICE DAILY  
spironolactone (ALDACTONE) 25 mg tablet 11/29/2018 at AM Self No Yes Sig: TAKE 1 TABLET TWICE DAILY  
traZODone (DESYREL) 300 mg tablet 11/26/2018 at PM Self Yes Yes Sig: Take 300 mg by mouth nightly as needed for Sleep. Facility-Administered Medications: None Romina Ibarra, PHARMD   Contact: [phone]

## 2018-11-29 NOTE — H&P
[address] [facility] [address]  Office [phone] Fax [phone] Admission H&P Name: Dominick Montenegro MRN: [id number]  Sex: Male YOB: 1960  Age: 66 y.o. PCP: Sunitha Suero DO Source of Information: patient, medical records Chief complaint: Nausea and imbalance History of Present Illness Dominick Montenegro is a 66 y.o. male with known HTN, gout, GI bleed, esophageal varices & ascites 2/2 to liver cirrhosis, alcohol abuse w/ hx of seizure from withdrawal (2013), HTN, GERD, peptic and gastric ulcer disease and depression/anxiety who presents to the ER complaining of nausea, vomiting and imbalance. In July, Mr. Santana Hood wife left him, which correlates with the initiation of symptoms. At that time patient presented with nausea and gagging predominantly in the morning. At this time patient was consistently consuming alcoholic beverages until October when he decided to go to rehab and remained sober for a month. He relapsed over the weekend where he consumed bourbon on Saturday and Sunday, unable to recall exact amount. Three days ago presented with worsening nausea accompanied by vomiting, diarrhea (more than five watery stools a day) and imbalance. Patient denies hematemesis, hematochezia, melena, abdominal pain and focal weakness. Of note patient was admitted to the inpatient psychiatric unit on 08/2013 for stabilization secondary to depression with suicidal gesture. In the ED: - Vitals - Temp 97.4, pulse 104, /97, Resp 18, SpO2 100% R. A. 
- Labs - CBC: Platelet 58 (L), RDW 17.4 (H), neutrophils 76 (H), lipase wnl. CMP: K 2.6 (L), Glucose 146 (H), calcium 8.2 (L), Tot. Bilirubin 3.7 (H),  (H), Alk phosphatase 161 (H), albumin 2.9 (L), Globin 5.3 (H). Ethyl alcohol 10 (H) 
- Imaging - none - Treatment - NS bolus x1, zofran 4mg IV x1, potassium chloride 40 mEq x 1, goody bag. EKG: Not performed Patient Vitals for the past 12 hrs: 
 Temp Pulse Resp BP SpO2  
11/29/18 1423 97.4 °F (36.3 °C) (!) 104 18 (!) 173/97 100 % Review of Systems Review of Systems Constitutional: Positive for malaise/fatigue and weight loss. Negative for chills and fever. Eyes: Negative for blurred vision. Respiratory: Negative for cough, hemoptysis and shortness of breath. Cardiovascular: Positive for palpitations. Negative for chest pain, orthopnea and leg swelling. Gastrointestinal: Positive for diarrhea, nausea and vomiting. Negative for abdominal pain, blood in stool, constipation, heartburn and melena. Genitourinary: Negative for dysuria and hematuria. Neurological: Positive for tremors and weakness. Negative for dizziness. Psychiatric/Behavioral: Positive for depression and substance abuse. The patient is nervous/anxious and has insomnia. Review of Systems Constitutional: Positive for malaise/fatigue and weight loss. Negative for chills and fever. Eyes: Negative for blurred vision. Respiratory: Negative for cough, hemoptysis and shortness of breath. Cardiovascular: Positive for palpitations. Negative for chest pain, orthopnea and leg swelling. Gastrointestinal: Positive for diarrhea, nausea and vomiting. Negative for abdominal pain, blood in stool, constipation, heartburn and melena. Genitourinary: Negative for dysuria and hematuria. Neurological: Positive for tremors and weakness. Negative for dizziness. Psychiatric/Behavioral: Positive for depression and substance abuse. The patient is nervous/anxious and has insomnia. Home Medications Prior to Admission medications Medication Sig Start Date End Date Taking? Authorizing Provider  
hydrOXYzine HCl (ATARAX) 25 mg tablet Take 25 mg by mouth three (3) times daily. Yes Other, MD Dillon  
lactulose (CHRONULAC) 10 gram/15 mL solution Take 3 tsp by mouth two (2) times daily as needed (liver disease).    Yes Provider, Historical  
traZODone (DESYREL) 300 mg tablet Take 300 mg by mouth nightly as needed for Sleep. Yes Provider, Historical  
Venlafaxine 150 mg tr24 Take 150 mg by mouth daily. Yes Provider, Historical  
omeprazole (PRILOSEC) 40 mg capsule TAKE 1 CAPSULE TWICE DAILY 8/6/18  Yes Lorethphil Traore, NP  
cyclobenzaprine (FLEXERIL) 10 mg tablet TAKE 1 TABLET THREE TIMES DAILY AS NEEDED 4/27/18  Yes Kait White NP  
allopurinol (ZYLOPRIM) 300 mg tablet TAKE 1 TABLET TWICE DAILY 4/6/18  Yes Trinity Keith DO  
carvedilol (COREG) 3.125 mg tablet Take 1 Tab by mouth two (2) times daily (with meals). 4/6/18  Yes Trinity Keith DO  
furosemide (LASIX) 40 mg tablet Take 1 Tab by mouth daily. 4/6/18  Yes Trinity Keith DO  
spironolactone (ALDACTONE) 25 mg tablet TAKE 1 TABLET TWICE DAILY 4/6/18  Yes Trinity Keith DO  
KLOR-CON SPRINKLE 10 mEq capsule TAKE 1 CAPSULE EVERY DAY 7/31/18   Ana Elizabeth MD  
 
 
Allergies Allergies Allergen Reactions  Onion Nausea and Vomiting  Statins-Hmg-Coa Reductase Inhibitors Rash  
  rash Past Medical History:  
Diagnosis Date  Adverse effect of anesthesia   
 pt reports waking up during colonoscopy/endoscopy  Alcohol abuse 6/1/2011  Anemia NEC  Anxiety  Ascites   
 has to have fluid drained occasionally  Cirrhosis of liver ([facility]  Depression  Gastric ulcer, unspecified as acute or chronic, without mention of hemorrhage, perforation, or obstruction  GERD (gastroesophageal reflux disease)  Gout  Hypertension  Liver disease   
 cirrhosis  Obesity, Class II, BMI 35-39.9  Pneumonia  PUD (peptic ulcer disease)  Seizures ([facility] 2013  
 from alcohol withdrawal  
 Stroke [facility]) 2013  
 per pt, possible mini stroke from alcohol withdrawal (same time as seizure) Previous Hospitalization(s) Past Surgical History:  
Procedure Laterality Date  COLONOSCOPY N/A 7/2/2018  COLONOSCOPY performed by Radha Callahan MD at [address] [address] [facility] HX COLONOSCOPY  07/2018  HX ENDOSCOPY    
 also colonoscopy  HX GI  1998  
 rectal abscess surgery  HX GI  1998  
 rectal abscess surgery  HX HEENT  1980  
 wisdom teeth Family History Problem Relation Age of Onset  Asthma Mother  Other Father   
     history of fall multiple injuries  Hypertension Brother  Cancer Paternal Grandmother   
     uncertain if colon or stomach  Cancer Paternal Grandfather   
     stomach cancer- dx mid [de-identified] Social History Social History Socioeconomic History  Marital status:  Spouse name: Not on file  Number of children: Not on file  Years of education: Not on file  Highest education level: Not on file Social Needs  Financial resource strain: Not on file  Food insecurity - worry: Not on file  Food insecurity - inability: Not on file  Transportation needs - medical: Not on file  Transportation needs - non-medical: Not on file Occupational History  Occupation: Landscaping Tobacco Use  Smoking status: Former Smoker  Smokeless tobacco: Current User Types: Chew  Tobacco comment: currently uses snuff Substance and Sexual Activity  Alcohol use: Yes Alcohol/week: 4.8 oz Types: 8 Cans of beer per week  Drug use: No  
 Sexual activity: Yes  
  Partners: Female Other Topics Concern  Not on file Social History Narrative  Not on file Alcohol history: Patient with history of Alcohol dependence. Was admitted for inpatient rehab twice. Heavily drinking since wife left him in July. Quit for a month in October, relapsed this past weekend where he consumed bourbon, unable to recall how much. Smoking history: Non-smoker Illicit drug history: Not at all Living arrangement: patient lives alone. Ambulates: Independently Physical Exam 
Visit Vitals BP (!) 173/97 (BP 1 Location: Right arm, BP Patient Position: At rest) Pulse (!) 104 Temp 97.4 °F (36.3 °C) Resp 18 Ht 6' (1.829 m) Wt 250 lb (113.4 kg) SpO2 100% BMI 33.91 kg/m² General: Anxious. Alert. Cooperative. Head: Normocephalic. Atraumatic. Eyes:              Conjunctiva pink. Sclera white. PERRL. Ears:              Ear canals patent. TM non-erythematous. Nose:             Septum midline. Mucosa pink. No drainage. Throat: Tongue inflamed and swollen. Mucosa pink. Moist mucous membranes. No tonsillar exudates or erythema. Palate movement equal bilaterally. Neck: Supple. Normal ROM. No stiffness. Respiratory: CTAB. No w/r/r/c.  
Cardiovascular: Tachycardic, regular rhythm. Normal S1,S2. No m/r/g. Pulses 2+ throughout. GI: + bowel sounds. Nontender. No rebound tenderness or guarding. Nondistended. Extremities: B/L upper and lower extremity tremor. Musculoskeletal: Full ROM in all extremities. No LE edema. Distal pulses intact. Skin: Warm, dry. No rashes. Neuro: CN II-XII grossly intact. Strength 5/5 in all extremities. Laboratory Data Recent Results (from the past 8 hour(s)) SAMPLES BEING HELD Collection Time: 11/29/18  2:40 PM  
Result Value Ref Range SAMPLES BEING HELD 1SST,1PST,1LAV,1BLU   
 COMMENT Add-on orders for these samples will be processed based on acceptable specimen integrity and analyte stability, which may vary by analyte. CBC WITH AUTOMATED DIFF Collection Time: 11/29/18  2:40 PM  
Result Value Ref Range WBC 4.1 4.1 - 11.1 K/uL  
 RBC 5.18 4.10 - 5.70 M/uL  
 HGB 14.6 12.1 - 17.0 g/dL HCT 44.0 36.6 - 50.3 % MCV 84.9 80.0 - 99.0 FL  
 MCH 28.2 26.0 - 34.0 PG  
 MCHC 33.2 30.0 - 36.5 g/dL  
 RDW 17.4 (H) 11.5 - 14.5 % PLATELET 58 (L) 832 - 400 K/uL MPV 10.3 8.9 - 12.9 FL  
 NRBC 0.0 0  WBC ABSOLUTE NRBC 0.00 0.00 - 0.01 K/uL NEUTROPHILS 76 (H) 32 - 75 % BAND NEUTROPHILS 3 0 - 6 % LYMPHOCYTES 9 (L) 12 - 49 % MONOCYTES 10 5 - 13 % EOSINOPHILS 0 0 - 7 % BASOPHILS 1 0 - 1 %  METAMYELOCYTES 1 (H) 0 % IMMATURE GRANULOCYTES 0 %  
 ABS. NEUTROPHILS 3.2 1.8 - 8.0 K/UL  
 ABS. LYMPHOCYTES 0.4 (L) 0.8 - 3.5 K/UL  
 ABS. MONOCYTES 0.4 0.0 - 1.0 K/UL  
 ABS. EOSINOPHILS 0.0 0.0 - 0.4 K/UL  
 ABS. BASOPHILS 0.0 0.0 - 0.1 K/UL  
 ABS. IMM. GRANS. 0.0 K/UL  
 DF MANUAL    
 RBC COMMENTS ANISOCYTOSIS 1+ 
    
 RBC COMMENTS TARGET CELLS 
PRESENT 
    
 RBC COMMENTS MICROCYTOSIS 
1+ WBC COMMENTS ATYPICAL LYMPHOCYTES PRESENT    
LIPASE Collection Time: 11/29/18  2:40 PM  
Result Value Ref Range Lipase 153 73 - 399 U/L  
METABOLIC PANEL, COMPREHENSIVE Collection Time: 11/29/18  2:40 PM  
Result Value Ref Range Sodium 139 136 - 145 mmol/L Potassium 2.6 (LL) 3.5 - 5.1 mmol/L Chloride 98 97 - 108 mmol/L  
 CO2 27 21 - 32 mmol/L Anion gap 14 5 - 15 mmol/L Glucose 146 (H) 65 - 100 mg/dL BUN 4 (L) 6 - 20 MG/DL Creatinine 0.92 0.70 - 1.30 MG/DL  
 BUN/Creatinine ratio 4 (L) 12 - 20 GFR est AA >60 >60 ml/min/1.73m2 GFR est non-AA >60 >60 ml/min/1.73m2 Calcium 8.2 (L) 8.5 - 10.1 MG/DL Bilirubin, total 3.7 (H) 0.2 - 1.0 MG/DL  
 ALT (SGPT) 38 12 - 78 U/L  
 AST (SGOT) 137 (H) 15 - 37 U/L Alk. phosphatase 161 (H) 45 - 117 U/L Protein, total 8.2 6.4 - 8.2 g/dL Albumin 2.9 (L) 3.5 - 5.0 g/dL Globulin 5.3 (H) 2.0 - 4.0 g/dL A-G Ratio 0.5 (L) 1.1 - 2.2 LD Collection Time: 11/29/18  2:40 PM  
Result Value Ref Range  (H) 85 - 241 U/L  
ETHYL ALCOHOL Collection Time: 11/29/18  5:14 PM  
Result Value Ref Range ALCOHOL(ETHYL),SERUM 10 (H) <10 MG/DL  
SAMPLES BEING HELD Collection Time: 11/29/18  5:14 PM  
Result Value Ref Range SAMPLES BEING HELD 3SST.PST.RED   
 COMMENT Add-on orders for these samples will be processed based on acceptable specimen integrity and analyte stability, which may vary by analyte. BLOOD GAS, ARTERIAL Collection Time: 11/29/18  5:15 PM  
Result Value Ref Range  pH 7.57 (HH) 7.35 - 7.45    
 PCO2 27 (L) 35.0 - 45.0 mmHg PO2 77 (L) 80 - 100 mmHg O2 SAT 97 92 - 97 % BICARBONATE 24 22 - 26 mmol/L  
 BASE EXCESS 3.1 mmol/L  
 O2 METHOD ROOM AIR    
 FIO2 21 % Sample source ARTERIAL    
 SITE RIGHT RADIAL KYM'S TEST YES Critical value read back Edward Dozier RN Imaging CXR Results  (Last 48 hours) None CT Results  (Last 48 hours) None Assessment and Plan Claire Albarran is a 66 y.o. male who is admitted for nausea, vomiting and imbalance in setting of alcohol withdrawl. ETOH withdrawal: Alcohol abuse w/ hx of seizure from withdrawal (2013) . On PE patient is anxious, tachycardic and tremors noted in all extremeties. POA CIWA 19, Ethyl Alcohol 10 (H), ABG pH 7.57, PCO2 27 (L), PO2 77 (L). Ammonia 45 (H), ph 1.9(L), Mg 1.6, lipase 153, ,    
- Admit to stepdown 
- EKG 
- UDS 
- Neuro checks q4h 
- seizure precautions - f/u  folate, vit B12 
- CBC, CMP, Mg and phosphorus daily - Goody bag 
- CIWA 
- Folic acid, multivitamin, thiamine daily - Protonix 40 mg IV daily - Compazine 10 mg q6h prn for nausea 
- PT, PTT and INR Alcoholic liver cirrhosis with history of esophageal varices and ascites  
- Resume home spironolactone 25mg/day - Resume home Furosemide 40 mg/day - Labs: CMP, PT, PTT and INR 
- Will consider GI consult Major Depression/ ROBY: Patient admitted to inpatient psychiatry unit in 8/2013 for depression with suicide ideation. Depression exacerbated since wife left him earlier this year. - Resume venlafaxine 150 mg/day 
- Evaluate for suicidal ideation Hypertension: BP on admission 173/97.  
- Continuing home medications of Coreg, lasix - Will continue to monitor at this time and readjust as BP's trend. History of prior MI 
- Resume coreg 3.125 mg BID 
- Troponin, trend x3 
- EKG PUD/GERD: Has had GI bleeding in the past.  
- Resume home omeprazole 40 mg/day 
-CBC daily to monitor hgb.   
 
Gout 
- Resume allopurinol 300 mg/day Obesity BMI 36.1 
- Will  on diet and exercise. FEN/GI - NPO. NS at 150 mL/hr. Activity - Out of bed with assistance DVT prophylaxis - SCDs GI prophylaxis - Protonix Fall prophylaxis - Fall precautions ordered. Disposition - Admit to Stepdown. Plan to d/c to Home. Consulting PT/OT Code Status - Full, discussed with patient / caregivers. Next of Kin Name and Contact Se Trinidad (mother) [phone] or Zev Casey  [phone] Patient Claire Vallecillo. will be discussed with Dr. Alicia Pelletier. 4:37 PM, 11/29/18 Robb Shah MD 
Family Medicine Resident For Billing Chief Complaint Patient presents with  Vomiting Hospital Problems  Date Reviewed: 8/29/2018 Codes Class Noted POA Withdrawal symptoms, alcohol (HCC) ICD-10-CM: X33.403 ICD-9-CM: 291.81  11/29/2018 Unknown

## 2018-11-30 PROBLEM — Z87.19 HISTORY OF GI BLEED: Status: ACTIVE | Noted: 2018-01-01

## 2018-11-30 PROBLEM — R07.9 CHEST PAIN: Status: RESOLVED | Noted: 2018-03-30 | Resolved: 2018-01-01

## 2018-11-30 NOTE — PROGRESS NOTES
30 [facility], [address] with [address] [facility] Resident Progress Note in Brief S: Patient seen and examined at bedside w/ Dr. Bethany Barber. Patient is sleeping comfortably but is arousable. Most recent CIWA 4. Denies cp/sob/abdominal pain/n/v Denies SI/HI/recent drug or toxin ingestion Pt has no other concerns at the time O: 
Visit Vitals /90 Pulse 79 Temp 98.9 °F (37.2 °C) Resp 21 Ht 6' (1.829 m) Wt 250 lb (113.4 kg) SpO2 95% BMI 33.91 kg/m² Physical Examination:  
General appearance - sleepy, well appearing, and in no distress Chest - CTAB, symmetric air entry Heart - normal rate, regular rhythm, normal S1, S2, no murmurs, rubs, clicks or gallops, Abdomen - soft, nontender to palpation. +BS Neurological - alert, oriented, normal speech, no focal findings Extremities - peripheral pulses normal, no pedal edema A/P:  
Allyne Anis. is a 66 y.o. man admitted for alcohol withdrawal 
 
- LA and salicylate ordered in setting of mixed respiratory metabolic alkalosis - Continue CIWA monitoring w/ ativan 
 
----------------------------- 
LA: 1.4 Salicylate <1.0 Please see daily progress note for detailed plan. Tyron Patel MD 
Family Medicine Resident

## 2018-11-30 NOTE — PROGRESS NOTES
[facility] [address]  Office [phone] Fax [phone] Assessment and Plan Yan Garza is a 66 y.o. male who is admitted for nausea, vomiting and imbalance in setting of alcohol withdrawl. 24 Hour Events: No acute events. 
  
ETOH withdrawal: Alcohol abuse w/ hx of seizure from withdrawal (2013) . On PE patient is anxious, tachycardic and tremors noted in all extremeties. POA CIWA 19, Ethyl Alcohol 10 (H), ABG pH 7.57, PCO2 27 (L), PO2 77 (L). Ammonia 45 (H), ph 1.9(L), Mg 1.6, lipase 153, . UDS negative. Folate 4.9 (L), B12 1,150 (H). CIWA 2-9 overnight.   
- Neuro checks q4h 
- seizure precautions 
- CBC, CMP, Mg and phosphorus daily - Goody bag 
- CIWA protocol.  
- Folic acid, multivitamin, thiamine daily - Protonix 40 mg IV daily - Compazine 10 mg q6h prn for nausea 
- PT, PTT and INR 
  
Alcoholic liver cirrhosis with history of esophageal varices and ascites. MELD score 18, 6% 3 month mortality.  
- Resume home spironolactone 25mg/day - Resume home Furosemide 40 mg/day - Labs: CMP, PT, PTT and INR 
- Will consider GI consult 
  
Major Depression/ ROBY: Patient admitted to inpatient psychiatry unit in 8/2013 for depression with suicide ideation. Depression exacerbated since wife left him earlier this year. - Resume venlafaxine 150 mg/day 
- Evaluate for suicidal ideation 
  
Hypertension: BP on admission 173/97.  
- Continuing home medications of Coreg, lasix - Will continue to monitor at this time and readjust as BP's trend. 
  
History of prior MI: Old septal infarct on previous EKG.  EKG on 11/29/18 sinus rhythm with occasional PVCs and PACs, left axis deviation, low voltage QRS, old septal infarct and prolonged QT.  
- Resume coreg 3.125 mg BID 
- Troponin, trend x3 
  
PUD/GERD: Has had GI bleeding in the past.  
- Resume home omeprazole 40 mg/day 
-CBC daily to monitor hgb.  
  
Gout 
- Resume allopurinol 300 mg/day 
  
Obesity BMI 36.1 
- Will  on diet and exercise.  
  
  
  
FEN/GI - NPO. NS at 150 mL/hr. Activity - Out of bed with assistance DVT prophylaxis - SCDs GI prophylaxis - Protonix Fall prophylaxis - Fall precautions ordered. Disposition - Admit to Stepdown. Plan to d/c to Home. Consulting PT/OT Code Status - Full, discussed with patient / caregivers. Next of Kin Name and Contact Renay Miller (mother) [phone] or Ady Childs Sr [phone] 
  
Patient will be discussed with Dr. Pamela Salvador (Attending Physician). Jd Pena MD 
Family Medicine Resident Subjective / Objective Subjective Patient continues presenting with tremors. Says nausea and vomiting have improved. Urinating appropriately. Temp (24hrs), Av.4 °F (36.9 °C), Min:97.4 °F (36.3 °C), Max:99 °F (37.2 °C) Objective Respiratory: O2 Flow Rate (L/min): 4 l/min O2 Device: Nasal cannula Visit Vitals /79 Pulse 85 Temp 98.4 °F (36.9 °C) Resp 20 Ht 6' (1.829 m) Wt 266 lb (120.7 kg) SpO2 91% BMI 36.08 kg/m² General: No acute distress. Alert. Cooperative. Head: Normocephalic. Atraumatic. Eyes:              Conjunctiva pink. Sclera white. PERRL. Ears:              Ear canals patent. TM non-erythematous. Nose:             Septum midline. Mucosa pink. No drainage. Throat: Mucosa pink. Moist mucous membranes. No tonsillar exudates or erythema. Palate movement equal bilaterally. Neck: Supple. Normal ROM. No stiffness. Respiratory: CTAB. No w/r/r/c.  
Cardiovascular: RRR. Normal S1,S2. No m/r/g. Pulses 2+ throughout. GI: + bowel sounds. Nontender. No rebound tenderness or guarding. Nondistended. Extremities:  No LE edema. Distal pulses intact. Tremors in upper extremities. Musculoskeletal: Full ROM in all extremities. Skin: Warm, dry. No rashes. Neuro: CN II-XII grossly intact. Strength 5/5 in all extremities.   
  
 
I/O: 
Date 18 0700 - 18 4061 18 0700 - 12/01/18 8364 Shift 2412-1952 1365-4888 24 Hour Total 5970-3188 4199-1628 24 Hour Total  
INTAKE  
I.V.(mL/kg/hr)  1720 1720 Volume (0.9% sodium chloride infusion)  1320 1320 Volume (potassium chloride 10 mEq in 100 ml IVPB)  400 400 Shift Total(mL/kg)  7985(76.3) [id number]) OUTPUT Shift Total(mL/kg) NET  6014 0776 Weight (kg) 113.4 120.7 120.7 120.7 120.7 120.7 CBC: 
Recent Labs 11/30/18 0453 11/29/18 2049 11/29/18 [address] WBC 3.2* 3.4* 4.1 HGB 11.8* 11.7* 14.6 HCT 35.0* 35.0* 44.0  
PLT 39* 43* 58* Metabolic Panel: 
Recent Labs 11/30/18 0453 11/29/18 2049 11/29/18 
1714 11/29/18 [address]  136  --  139  
K 3.0* 3.0*  --  2.6*  
 100  --  98  
CO2 28 30  --  27 BUN 7 5*  --  4*  
CREA 0.71 0.85  --  0.92  
GLU 91 174*  --  146* CA 7.3* 7.1*  --  8.2* MG 1.7  --  1.6  --   
PHOS 2.1*  --  1.9*  --   
ALB 2.1* 2.2*  --  2.9*  
SGOT 102* 106*  --  137* ALT 29 33  --  38 INR  --   --   --  1.7* For Billing Chief Complaint Patient presents with  Vomiting Hospital Problems  Date Reviewed: 8/29/2018 Codes Class Noted POA History of GI bleed ICD-10-CM: Z87.19 ICD-9-CM: V12.79  11/30/2018 Yes * (Principal) Withdrawal symptoms, alcohol (HCC) ICD-10-CM: B44.973 ICD-9-CM: 291.81  11/29/2018 Yes Severe obesity with body mass index (BMI) of 35.0 to 39.9 with serious comorbidity ([address] ICD-10-CM: E66.01 
ICD-9-CM: 278.01  7/27/2018 Yes Alcoholic cirrhosis ([facility]: K70.30 ICD-9-CM: 571.2  4/6/2018 Yes Esophageal varices without bleeding (HCC) ICD-10-CM: I85.00 ICD-9-CM: 456.1  3/1/2016 Yes Thrombocytopenia ([facility] ICD-10-CM: D69.6 ICD-9-CM: 287.5  10/17/2013 Yes Recurrent major depressive disorder ([facility] ICD-10-CM: F33.9 ICD-9-CM: 296.30  8/11/2013 Yes Alcohol dependence with withdrawal ([facility] ICD-10-CM: I74.416 ICD-9-CM: 303.90, 291.81 7/25/2013 Yes Gout ICD-10-CM: M10.9 ICD-9-CM: 274.9  6/27/2011 Yes HTN (hypertension) (Chronic) ICD-10-CM: I10 
ICD-9-CM: 401.9  6/1/2011 Yes

## 2018-11-30 NOTE — CONSULTS
Gastroenterology Consultation Note NAME: Trav Decker : 1960 MRN: [id number] ATTG: Dr. Cielo Porter PCP: Laxmi Tucker DO Date/Time:  2018 9:41 AM 
Subjective:  
REASON FOR CONSULT:     
Niki Love is a 66 y.o.  male with PMH of decompensated cirrhosis, depression, GI bleed, HTN presents with complaints of nausea/vomiting and imbalance. Patient states that he was doing well and had been sober x 1 month. Unfortunately, his wife of 40 years left him so he returned to drinking. This past Sat and Sun he drank heavily throughout the day. He then started to experience nausea/vomiting and difficulty maintaining his balance. Denies hematemesis, melena or hematochezia. Came to ED for evaluation and he was admitted for alcohol withdrawal.  
 
On admission patient Hgb was 14.6 and trended down to 11.7. Hgb this AM is 11.8. Review of prior labs and Hgb trends to be around 11-12. He states he's not had an visible signs of blood loss. N/V has resolved. He is asking for something to drink. EGD 18 (Dr. Marybeth Mathews) Findings:  
Esophagus:Grade 4 varices mid and distal third esophagus with Gr 4 status at EG junction; these are not bleeding Stomach: enlarged folds with diffuse mucosa erythema entire stomach without bleeding Duodenum/jejunum: mass lesion bulb with much more firm consistency than would expect with varices; this is active oozing blood. Beyond bulb duodenum is normal 
 
Colonoscopy 18 (Dr. Marybeth Mathews) - normal 
 
 
Past Medical History:  
Diagnosis Date  Adverse effect of anesthesia   
 pt reports waking up during colonoscopy/endoscopy  Alcohol abuse 2011  Anemia NEC  Anxiety  Ascites   
 has to have fluid drained occasionally  Cirrhosis of liver ([facility] [address]  Depression  Gastric ulcer, unspecified as acute or chronic, without mention of hemorrhage, perforation, or obstruction  GERD (gastroesophageal reflux disease)  Gout  Hypertension  Liver disease   
 cirrhosis  Obesity, Class II, BMI 35-39.9  Pneumonia  PUD (peptic ulcer disease)  Seizures ([facility] [address] 2013  
 from alcohol withdrawal  
 Stroke [facility]) 2013  
 per pt, possible mini stroke from alcohol withdrawal (same time as seizure) Past Surgical History:  
Procedure Laterality Date  COLONOSCOPY N/A 7/2/2018 COLONOSCOPY performed by Jayne Vaughn MD at [address] HX COLONOSCOPY  07/2018  HX ENDOSCOPY    
 also colonoscopy  HX GI  1998  
 rectal abscess surgery  HX GI  1998  
 rectal abscess surgery  HX HEENT  1980  
 wisdom teeth Social History Tobacco Use  Smoking status: Former Smoker  Smokeless tobacco: Current User Types: Chew  Tobacco comment: currently uses snuff Substance Use Topics  Alcohol use: Yes Alcohol/week: 4.8 oz Types: 8 Cans of beer per week Family History Problem Relation Age of Onset  Asthma Mother  Other Father   
     history of fall multiple injuries  Hypertension Brother  Cancer Paternal Grandmother   
     uncertain if colon or stomach  Cancer Paternal Grandfather   
     stomach cancer- dx mid [de-identified] Allergies Allergen Reactions  Onion Nausea and Vomiting  Statins-Hmg-Coa Reductase Inhibitors Rash  
  rash Home Medications: 
Prior to Admission Medications Prescriptions Last Dose Informant Patient Reported? Taking? KLOR-CON SPRINKLE 10 mEq capsule 1 month ago Self No No  
Sig: TAKE 1 CAPSULE EVERY DAY Venlafaxine 150 mg tr24 11/29/2018 at AM Self Yes Yes Sig: Take 150 mg by mouth daily. allopurinol (ZYLOPRIM) 300 mg tablet 11/29/2018 at AM Self No Yes Sig: TAKE 1 TABLET TWICE DAILY  
carvedilol (COREG) 3.125 mg tablet 11/29/2018 at AM Self No Yes Sig: Take 1 Tab by mouth two (2) times daily (with meals). cyclobenzaprine (FLEXERIL) 10 mg tablet 2x/month Self No Yes Sig: TAKE 1 TABLET THREE TIMES DAILY AS NEEDED  
furosemide (LASIX) 40 mg tablet 11/29/2018 at AM Self No Yes Sig: Take 1 Tab by mouth daily. hydrOXYzine HCl (ATARAX) 25 mg tablet 11/29/2018 at AM Self Yes Yes Sig: Take 25 mg by mouth three (3) times daily. lactulose (CHRONULAC) 10 gram/15 mL solution  Self Yes Yes Sig: Take 3 tsp by mouth two (2) times daily as needed (liver disease). omeprazole (PRILOSEC) 40 mg capsule 11/29/2018 at AM Self No Yes Sig: TAKE 1 CAPSULE TWICE DAILY  
spironolactone (ALDACTONE) 25 mg tablet 11/29/2018 at AM Self No Yes Sig: TAKE 1 TABLET TWICE DAILY  
traZODone (DESYREL) 300 mg tablet 11/26/2018 at PM Self Yes Yes Sig: Take 300 mg by mouth nightly as needed for Sleep. Facility-Administered Medications: None Hospital medications: 
Current Facility-Administered Medications Medication Dose Route Frequency  influenza vaccine 2018-19 (6 mos+)(PF) (FLUARIX QUAD/FLULAVAL QUAD) injection 0.5 mL  0.5 mL IntraMUSCular PRIOR TO DISCHARGE  
 0.9% sodium chloride 1,000 mL with mvi, adult no. 4 with vit K 10 mL, thiamine 517 mg, folic acid 1 mg infusion   IntraVENous Q24H  potassium chloride 10 mEq in 100 ml IVPB  10 mEq IntraVENous Q1H  
 lactulose (CHRONULAC) solution 10 g  10 g Oral BID  sodium chloride (NS) flush 5-10 mL  5-10 mL IntraVENous Q8H  
 sodium chloride (NS) flush 5-10 mL  5-10 mL IntraVENous PRN  
 folic acid (FOLVITE) tablet 1 mg  1 mg Oral DAILY  prochlorperazine (COMPAZINE) with saline injection 10 mg  10 mg IntraVENous Q6H PRN  pantoprazole (PROTONIX) 40 mg in sodium chloride 0.9% 10 mL injection  40 mg IntraVENous DAILY  allopurinol (ZYLOPRIM) tablet 300 mg  300 mg Oral BID  carvedilol (COREG) tablet 3.125 mg  3.125 mg Oral BID WITH MEALS  furosemide (LASIX) tablet 40 mg  40 mg Oral DAILY  venlafaxine-SR (EFFEXOR-XR) capsule 150 mg  150 mg Oral DAILY WITH BREAKFAST  0.9% sodium chloride infusion  150 mL/hr IntraVENous CONTINUOUS  
 nicotine (NICODERM CQ) 14 mg/24 hr patch 1 Patch  1 Patch TransDERmal DAILY  LORazepam (ATIVAN) injection 2 mg  2 mg IntraVENous Q1H PRN  
 LORazepam (ATIVAN) injection 4 mg  4 mg IntraVENous Q1H PRN  
 
REVIEW OF SYSTEMS:   
 []     Unable to obtain  ROS due to  []    mental status change  []    sedated   []    intubated Review of Systems - pertinent positives and negative as mentioned in HPI Objective: VITALS:   
Visit Vitals /55 Pulse 82 Temp 98.7 °F (37.1 °C) Resp 28 Ht 6' (1.829 m) Wt 120.7 kg (266 lb) SpO2 92% BMI 36.08 kg/m² Temp (24hrs), Av.4 °F (36.9 °C), Min:97.4 °F (36.3 °C), Max:99 °F (37.2 °C) PHYSICAL EXAM:  
General:    Alert, cooperative, no distress, appears stated age. Head:   Normocephalic, without obvious abnormality, atraumatic. Eyes:   Conjunctivae clear, anicteric sclerae. Pupils are equal 
Throat:    Lips, mucosa, and tongue normal.  No Thrush Lungs:   CTA bilaterally. No wheezing/rhonchi/rales. Heart:   Regular rate and rhythm,  no murmur, rub or gallop. Abdomen: Bowel sounds present, soft, nondistended, nontender Extremities: No cyanosis. No edema. No clubbing Skin:     No jaundice Psych:   Not anxious or agitated. Neurologic: EOMs intact. No facial asymmetry. A/O X 3. Tremors noted. LAB DATA REVIEWED:   
Lab Results Component Value Date/Time WBC 3.2 (L) 2018 04:53 AM  
 Hemoglobin (POC) 14.3 2014 08:16 PM  
 HGB 11.8 (L) 2018 04:53 AM  
 Hematocrit (POC) 42 2014 08:16 PM  
 HCT 35.0 (L) 2018 04:53 AM  
 PLATELET 39 (LL)  04:53 AM  
 MCV 87.1 2018 04:53 AM  
 
Lab Results Component Value Date/Time ALT (SGPT) 29 2018 04:53 AM  
 AST (SGOT) 102 (H) 2018 04:53 AM  
 Alk. phosphatase 118 (H) 2018 04:53 AM  
 Bilirubin, direct 0.3 (H) 2014 08:15 PM  
 Bilirubin, total 4.4 (H) 2018 04:53 AM  
 
Lab Results Component Value Date/Time  Sodium 139 2018 04:53 AM  
 Potassium 3.0 (L) 2018 04:53 AM Chloride 103 11/30/2018 04:53 AM  
 CO2 28 11/30/2018 04:53 AM  
 Anion gap 8 11/30/2018 04:53 AM  
 Glucose 91 11/30/2018 04:53 AM  
 BUN 7 11/30/2018 04:53 AM  
 Creatinine 0.71 11/30/2018 04:53 AM  
 BUN/Creatinine ratio 10 (L) 11/30/2018 04:53 AM  
 GFR est AA >60 11/30/2018 04:53 AM  
 GFR est non-AA >60 11/30/2018 04:53 AM  
 Calcium 7.3 (L) 11/30/2018 04:53 AM  
 
Lab Results Component Value Date/Time Lipase 153 11/29/2018 02:40 PM  
 
Lab Results Component Value Date/Time INR 1.7 (H) 11/29/2018 02:40 PM  
 INR 1.4 (H) 07/03/2018 04:12 AM  
 INR 1.3 (H) 07/02/2018 08:25 AM  
 Prothrombin time 17.0 (H) 11/29/2018 02:40 PM  
 Prothrombin time 13.9 (H) 07/03/2018 04:12 AM  
 Prothrombin time 13.4 (H) 07/02/2018 08:25 AM  
 
 
Impression: 
Principal Problem: 
  Withdrawal symptoms, alcohol ([address] (11/29/2018) Active Problems: 
  HTN (hypertension) (6/1/2011) Gout (6/27/2011) Alcohol dependence with withdrawal ([address] (7/25/2013) Recurrent major depressive disorder ([address] (8/11/2013) Thrombocytopenia ([address] (10/17/2013) Esophageal varices without bleeding ([address] (3/1/2016) Alcoholic cirrhosis ([address] (4/6/2018) Severe obesity with body mass index (BMI) of 35.0 to 39.9 with serious comorbidity ([address] (7/27/2018) History of GI bleed (11/30/2018) No signs of active GI bleed. Suspect Hgb of 14 was due to dehydration/hemoconcentrated. His baseline Hgb seems to be around 12. Plan: 
- no plans for endoscopic evaluation at this time - patient up to date with [address] screening (imaging in July without hepatic mass) 
- okay for clear liquids - check hemoccult - patient will need counseling for alcohol abuse and depression that is triggering his return to alcohol - plan reviewed with Dr. Collins Pert: his evaluation to follow  
   
___________________________________________________ Care Plan discussed with: 
  [x]    Patient   []    Family   [x]    Nursing   []    Attending ___________________________________________________ GI: Patel Baca PA-C  
            11/30/18 
            9:41 AM 
 
I have interviewed and examined patient with addendum to note above and formulation care plan to reflect my evaluation Shruthi Crisostomo M.D.

## 2018-11-30 NOTE — ED NOTES
Verbal shift change report given to Obdulia LAWRENCE (oncoming nurse) by Erlin Monday (offgoing nurse). Report included the following information SBAR, Kardex, ED Summary, [facility] and Recent Results.

## 2018-11-30 NOTE — PROGRESS NOTES
Bedside and Verbal shift change report given to Natalie (oncoming nurse) by Alla Keene (offgoing nurse). Report included the following information SBAR, Kardex, ED Summary, Intake/Output, MAR, Recent Results, Med Rec Status and Cardiac Rhythm NSR.  
 
0740: Pt is found standing beside his bed with everything still attached. Pt is reminded to call prior to getting out of bed and he verbalized understanding. 0800: Dr Gomez Walker arrives to bedside to assess. MD is asked if patient needs to stay NPO, MD states sips are ok for now. 5515: Pt is in bed lying to right side and an ear protector is applied to left ear oxygen tubing. Pt is provided the rationale for applying this and he verbalized understanding.  
 
0902: Pt is sleeping in no acute distress. 1003: GI NP is at bedside to assess patient. He is awake, alert, and communicating approprietly. Still demonstrates some tremors. 1538: TRANSFER - OUT REPORT: 
 
Verbal report given to Noemi(name) on [facility].  being transferred to [facility] for routine progression of care Report consisted of patients Situation, Background, Assessment and  
Recommendations(SBAR). Information from the following report(s) SBAR, Kardex, ED Summary, MAR, Recent Results, Med Rec Status and Cardiac Rhythm NSR PVC was reviewed with the receiving nurse. Lines:  
Peripheral IV 11/29/18 Right Antecubital (Active) Site Assessment Clean, dry, & intact 11/30/2018  7:36 AM  
Phlebitis Assessment 0 11/30/2018  7:36 AM  
Infiltration Assessment 0 11/30/2018  7:36 AM  
Dressing Status Clean, dry, & intact 11/30/2018  7:36 AM  
Dressing Type Transparent 11/30/2018  7:36 AM  
Hub Color/Line Status Pink; Infusing;Flushed;Patent 11/30/2018  7:36 AM  
Action Taken Open ports on tubing capped 11/30/2018  7:36 AM  
Alcohol Cap Used Yes 11/30/2018  7:36 AM  
  
 
Opportunity for questions and clarification was provided. Patient transported with: 
 [employer] 1320: Family Practice MD notified of plt count 36. No orders received.

## 2018-11-30 NOTE — CONSULTS
[facility] at [address], [address] [place] [address] W: [phone]  F: [phone] Reason for Visit:  
Shauna Caldera is a 66 y.o. male who is seen in consultation at the request of Dr. Ben Jasso for evaluation of thrombocytopenia. Hematology / Oncology Treatment History: N/A History of Present Illness:  
 
Mr Chiqui Aquino was admitted on 11/29/2018 from the ED when he presented with c/o N/V and imbalance. HX of ETOH abuse with recent rehab resulting in remaining sober x 1 month and then relapse following his wife leaving him. ED labs WBC 4.1 Hgb 14.6 plts 58  Therefore was admitted for further eval.  
Hx of admission to psychiatric unit (8/2013) for stabilization 2/2 to depression and suicidal gesture. Mr Chiqui Aquino aware that he as had low plts . Denies any signs of bleeding; had small amt of blood noted with sneezing x 1 at home but no additional bleeding since then. Denies blood with urination or bowel movements. Denies any pain or SOB. No family at bedside. Past Medical History:  
Diagnosis Date  Adverse effect of anesthesia   
 pt reports waking up during colonoscopy/endoscopy  Alcohol abuse 6/1/2011  Anemia NEC  Anxiety  Ascites   
 has to have fluid drained occasionally  Cirrhosis of liver ([address]  Depression  Gastric ulcer, unspecified as acute or chronic, without mention of hemorrhage, perforation, or obstruction  GERD (gastroesophageal reflux disease)  Gout  Hypertension  Liver disease   
 cirrhosis  Obesity, Class II, BMI 35-39.9  Pneumonia  PUD (peptic ulcer disease)  Seizures ([address] 2013  
 from alcohol withdrawal  
 Stroke [facility]) 2013  
 per pt, possible mini stroke from alcohol withdrawal (same time as seizure) Past Surgical History:  
Procedure Laterality Date  COLONOSCOPY N/A 7/2/2018  COLONOSCOPY performed by Edwardo Alonzo MD at [address] [facility] COLONOSCOPY  07/2018  HX ENDOSCOPY    
 also colonoscopy  HX GI  1998  
 rectal abscess surgery  HX GI  1998  
 rectal abscess surgery  HX HEENT  1980  
 wisdom teeth Social History Tobacco Use  Smoking status: Former Smoker  Smokeless tobacco: Current User Types: Chew  Tobacco comment: currently uses snuff Substance Use Topics  Alcohol use: Yes Alcohol/week: 4.8 oz Types: 8 Cans of beer per week Family History Problem Relation Age of Onset  Asthma Mother  Other Father   
     history of fall multiple injuries  Hypertension Brother  Cancer Paternal Grandmother   
     uncertain if colon or stomach  Cancer Paternal Grandfather   
     stomach cancer- dx mid [de-identified] Current Facility-Administered Medications Medication Dose Route Frequency  influenza vaccine 2018-19 (6 mos+)(PF) (FLUARIX QUAD/FLULAVAL QUAD) injection 0.5 mL  0.5 mL IntraMUSCular PRIOR TO DISCHARGE  
 0.9% sodium chloride 1,000 mL with mvi, adult no. 4 with vit K 10 mL, thiamine 547 mg, folic acid 1 mg infusion   IntraVENous Q24H  
 lactulose (CHRONULAC) solution 10 g  10 g Oral BID  sodium phosphate 15 mmol in 0.9% sodium chloride 250 mL infusion   IntraVENous ONCE  
 sodium chloride (NS) flush 5-10 mL  5-10 mL IntraVENous Q8H  
 sodium chloride (NS) flush 5-10 mL  5-10 mL IntraVENous PRN  
 folic acid (FOLVITE) tablet 1 mg  1 mg Oral DAILY  prochlorperazine (COMPAZINE) with saline injection 10 mg  10 mg IntraVENous Q6H PRN  pantoprazole (PROTONIX) 40 mg in sodium chloride 0.9% 10 mL injection  40 mg IntraVENous DAILY  allopurinol (ZYLOPRIM) tablet 300 mg  300 mg Oral BID  carvedilol (COREG) tablet 3.125 mg  3.125 mg Oral BID WITH MEALS  furosemide (LASIX) tablet 40 mg  40 mg Oral DAILY  venlafaxine-SR (EFFEXOR-XR) capsule 150 mg  150 mg Oral DAILY WITH BREAKFAST  0.9% sodium chloride infusion  150 mL/hr IntraVENous CONTINUOUS  
 nicotine (NICODERM CQ) 14 mg/24 hr patch 1 Patch  1 Patch TransDERmal DAILY  LORazepam (ATIVAN) injection 2 mg  2 mg IntraVENous Q1H PRN  
 LORazepam (ATIVAN) injection 4 mg  4 mg IntraVENous Q1H PRN Allergies Allergen Reactions  Onion Nausea and Vomiting  Statins-Hmg-Coa Reductase Inhibitors Rash  
  rash Review of Systems: A complete review of systems was obtained, negative except as described above. Physical Exam:  
 
Visit Vitals /86 (BP 1 Location: Left arm, BP Patient Position: At rest) Pulse 96 Temp 98.2 °F (36.8 °C) Resp 20 Ht 6' (1.829 m) Wt 266 lb (120.7 kg) SpO2 96% BMI 36.08 kg/m² ECOG PS: 2 General: disheveled; No distress Eyes: PERRLA, anicteric sclerae HENT: Atraumatic with normal appearance of ears and nose; OP clear Neck: Supple; no thyromegaly Lymphatic: No cervical, supraclavicular, or axillary adenopathy Respiratory: CTAB, normal respiratory effort CV: tachy rate, regular rhythm, no murmurs, no peripheral edema GI: distended; slightly firm; , tender difficult to assess for masses,hepatomegaly or splenomegaly 2/2 to habitus MS: Digits without clubbing or cyanosis. Skin: No rashes, ecchymoses, or petechiae. Normal temperature, turgor, and texture. Neuro/Psych: Alert, oriented, appropriate affect, normal judgment/insight Results:  
 
Lab Results Component Value Date/Time WBC 4.0 (L) 11/30/2018 12:44 PM  
 HGB 12.9 11/30/2018 12:44 PM  
 HCT 39.1 11/30/2018 12:44 PM  
 PLATELET 36 (LL) 65/65/0163 12:44 PM  
 MCV 88.7 11/30/2018 12:44 PM  
 ABS. NEUTROPHILS 3.2 11/29/2018 02:40 PM  
 Hemoglobin (POC) 14.3 03/13/2014 08:16 PM  
 Hematocrit (POC) 42 03/13/2014 08:16 PM  
 
Lab Results Component Value Date/Time  Sodium 139 11/30/2018 04:53 AM  
 Potassium 3.0 (L) 11/30/2018 04:53 AM  
 Chloride 103 11/30/2018 04:53 AM  
 CO2 28 11/30/2018 04:53 AM  
 Glucose 91 11/30/2018 04:53 AM  
 BUN 7 11/30/2018 04:53 AM  
 Creatinine 0.71 11/30/2018 04:53 AM  
 GFR est AA >60 11/30/2018 04:53 AM  
 GFR est non-AA >60 11/30/2018 04:53 AM  
 Calcium 7.3 (L) 11/30/2018 04:53 AM  
 Sodium (POC) 141 03/13/2014 08:16 PM  
 Potassium (POC) 3.1 (L) 03/13/2014 08:16 PM  
 Chloride (POC) 102 03/13/2014 08:16 PM  
 Glucose (POC) 118 (H) 07/07/2016 07:11 AM  
 BUN (POC) <3 (L) 03/13/2014 08:16 PM  
 Creatinine (POC) 0.6 01/16/2018 08:17 AM  
 Calcium, ionized (POC) 1.06 (L) 03/13/2014 08:16 PM  
 
Lab Results Component Value Date/Time Bilirubin, total 4.4 (H) 11/30/2018 04:53 AM  
 ALT (SGPT) 29 11/30/2018 04:53 AM  
 AST (SGOT) 102 (H) 11/30/2018 04:53 AM  
 Alk. phosphatase 118 (H) 11/30/2018 04:53 AM  
 Protein, total 6.3 (L) 11/30/2018 04:53 AM  
 Albumin 2.1 (L) 11/30/2018 04:53 AM  
 Globulin 4.2 (H) 11/30/2018 04:53 AM  
 
Lab Results Component Value Date/Time Reticulocyte count 1.3 05/17/2017 01:54 PM  
 Iron % saturation 14 (L) 05/18/2018 09:01 AM  
 TIBC 295 05/18/2018 09:01 AM  
 Ferritin 140 05/18/2018 09:01 AM  
 Vitamin B12 1,150 (H) 11/29/2018 05:14 PM  
 Folate 4.9 (L) 11/29/2018 05:14 PM  
 Haptoglobin 83 05/17/2017 01:54 PM  
  (H) 11/29/2018 02:40 PM  
 TSH 2.490 09/08/2017 09:48 AM  
 OTILIO, Direct None Detected 06/02/2011 04:08 AM  
 Lipase 153 11/29/2018 02:40 PM  
 HIV 1/2 Interpretation NONREACTIVE 10/16/2013 03:15 PM  
 
Lab Results Component Value Date/Time INR 1.7 (H) 11/29/2018 02:40 PM  
 aPTT 31.5 07/02/2018 08:25 AM  
 D-dimer 0.67 (H) 10/16/2013 03:50 PM  
 Fibrinogen 180 (L) 10/16/2013 03:15 PM  
 
Lab Results Component Value Date/Time  
 Prostate Specific Ag <0.1 05/18/2018 09:01 AM  
  (H) 11/29/2018 02:40 PM  
 AFP (TUMOR MARKER) 7 06/02/2011 04:08 AM  
 AFP, Serum, Tumor Marker 5.3 07/11/2017 09:45 AM  
 
No images on this admission to review 7/2/2018 CT ABD PELV W WO CONT 
SPLEEN/PANCREAS: Splenomegaly with mild variceal change. Mild retroperitoneal 
mesenteric stranding  There is no pancreatic duct dilatation. Assessment and Recommendations:  
 
67 yo old male with PMH,  HTN, gout, GI bleed, esophageal varices and ascites 2/2 to liver cirrhosis, ETOH abuse with hx of seizures during withdrawal, GERD, peptic and gastric ulcer disease and depression/anxiety admitted with N/V and imbalance. 1. Thrombocytopenia Chronic since 2011. Secondary to cirrhosis, splenomegaly, and etoh. He has followed me in the office for this. Worsened this hospital stay with his increased etoh consumption. Monitor and abstain from ETOH. Avoid antiplatelets agents, NSAIDs, or anticoagulants while PLT <50k. 2. Cirrhosis/ esophageal varices and ascities 2/2 to ETOH abuse EGD 7/2018 / Dr Chin Older Esophagus: grade 4 varices mid and distal third esophagus with Grade 4 status : colonoscopy normal 
GI consulted: no plans for endo at this time; 3. Depression Continue home meds 4. ETOH abuse Needs counseling on cessation 5. H/o iron deficiency anemia S/p IV iron in the past. HGB normal currently. Monitor. Patient seen in conjunction with Mikey Jimenez NP.  
 
 
Signed By: Dwight Dent MD

## 2018-11-30 NOTE — PROGRESS NOTES
Problem: Falls - Risk of 
Goal: *Absence of Falls Document Jorge Obrien Fall Risk and appropriate interventions in the flowsheet. Outcome: Progressing Towards Goal 
Fall Risk Interventions: 
Mobility Interventions: Patient to call before getting OOB Medication Interventions: Evaluate medications/consider consulting pharmacy, Teach patient to arise slowly, Patient to call before getting OOB Elimination Interventions: Urinal in reach, Patient to call for help with toileting needs, Call light in reach History of Falls Interventions: Bed/chair exit alarm, Door open when patient unattended Problem: Alcohol Withdrawal 
Goal: *STG: Vital signs within defined limits Outcome: Progressing Towards Goal 
stable

## 2018-11-30 NOTE — PROGRESS NOTES
Reason for Admission:   ETOH abuse RRAT Score:     15 Do you (patient/family) have any concerns for transition/discharge? Not at this time Plan for utilizing home health: At this time,there are no identified home health needs. Likelihood of readmission? Low to moderate Transition of Care Plan:      Case management is following pt for discharge needs but none are anticipated Pt will be transferring to telemetry today, 
I notified Dr Montelongo nurse navigator regarding pt.'s hospitalization.  
Buster Lee

## 2018-11-30 NOTE — PROGRESS NOTES
[address] Office [phone] Fax [phone] Assessment and Plan Brian Fernando is a 66 y.o. male who is admitted for nausea, vomiting and imbalance in setting of alcohol withdrawl. 24 Hour Events:  
 
- Per heme/onc consult: Avoid antiplatelets agents, NSAIDs, or anticoagulants while PLT <50k. - Per GI consult: No plans for endoscopic evaluation at this time 
- CIWA scores: 1 and 2 overnight - Repleted Mg and K  
- Advance diet from liquid --> full liquids this morning 
  
ETOH withdrawal: Alcohol abuse w/ hx of seizure from withdrawal (2013) . On PE patient is anxious, tachycardic and tremors noted in all extremeties. POA CIWA 19, Ethyl Alcohol 10 (H), ABG pH 7.57, PCO2 27 (L), PO2 77 (L). Ammonia 45 (H), ph 1.9(L), Mg 1.6, lipase 153, . UDS negative. Folate 4.9 (L), B12 1,150 (H). CIWA 1 & 2 overnight.   
- Neuro checks q4h 
- seizure precautions 
- CBC, CMP, Mg and phosphorus daily - Goody bag 
- CIWA protocol.  
- Folic acid, multivitamin, thiamine daily - Protonix 40 mg IV daily - Compazine 10 mg q6h prn for nausea 
- PT, PTT and INR Pancytopenia likely 2/2 cirrhosis, Plt this am 45 
- WBC at baseline of 3-4, hgb stable - Platelets down (BL 29-16), no active bleeding, trend cbc daily - SCDs only for DVT ppx - Per heme/onc consult: Avoid antiplatelets agents, NSAIDs, or anticoagulants while PLT <50k. Alcoholic liver cirrhosis with history of esophageal varices and ascites. MELD score 18, 6% 3 month mortality.  
- Resume home spironolactone 25mg/day - Resume home Furosemide 40 mg/day - Labs: CMP, PT, PTT and INR 
- Per GI consult: No plans for endoscopic evaluation at this time, FOBT neg  
  
Major Depression/ ROBY: Patient admitted to inpatient psychiatry unit in 8/2013 for depression with suicide ideation. Depression exacerbated since wife left him earlier this year.   
- Resume venlafaxine 150 mg/day 
- Evaluate for EXAM: BILATERAL LOWER EXTREMITY ARTERIAL DUPLEX



INDICATION: Peripheral Arterial Disease. Leg pain. Bilateral lower leg

ulcers.



FINDINGS: 

Right Leg: Common femoral and profunda femoral arteries are patent.

Superficial femoral artery is patent. 40-50% stenosis mid popliteal

artery. Posterior tibial artery is occluded as is the peroneal artery.

Anterior tibial artery is patent.    



Left Leg:  Common femoral and profunda femoral arteries are patent.

50-60% stenosis mid native superficial femoral artery and distal

native popliteal artery. Occlusion of the peroneal and posterior

tibial arteries. High-grade stenosis or occlusion of the proximal

anterior tibial artery.    



IMPRESSION: 

40-50% stenosis mid native right popliteal artery.

Occlusion of the right peroneal and posterior tibial arteries.

50-60% stenosis mid native left superficial femoral artery.

50-60% stenosis distal native left popliteal artery.

Occlusion of the left peroneal and posterior tibial arteries.

High-grade stenosis or occlusion of the proximal left anterior tibial

artery.



Incidental note is made of a 2.4 x 4.2 x 5.4 cm complex left popliteal

cyst.



LOC:[id number] suicidal ideation 
  
Hypertension: BP on admission 173/97.  
- Continuing home medications of Coreg, lasix - Will continue to monitor at this time and readjust as BP's trend. 
  
History of prior MI: Old septal infarct on previous EKG. EKG on 18 sinus rhythm with occasional PVCs and PACs, left axis deviation, low voltage QRS, old septal infarct and prolonged QT.  
- Resume coreg 3.125 mg BID 
- Troponin, trend x3 
  
PUD/GERD: Has had GI bleeding in the past.  
- Resume home omeprazole 40 mg/day 
-CBC daily to monitor hgb.  
  
Gout 
- Resume allopurinol 300 mg/day 
  
Obesity BMI 36.1 
- Will  on diet and exercise. FEN/GI - Clear Liquid. NS at 150 mL/hr. Activity - Out of bed with assistance DVT prophylaxis - SCDs GI prophylaxis - Protonix Fall prophylaxis - Fall precautions ordered. Disposition - Admit to Stepdown. Plan to d/c to Home. Consulting PT/OT Code Status - Full, discussed with patient / caregivers. Next of Kin Name and Contact Marilee Lemus (mother) [phone] or Jessica Horton  [phone] 
  
Patient will be discussed with Dr. Kaitlin Cantrell (Attending Physician). Sebastian Parr MD 
Family Medicine Resident Subjective / Objective Subjective Pt feels that he is back to his baseline and is ready to go home Temp (24hrs), Av.4 °F (36.9 °C), Min:97.7 °F (36.5 °C), Max:99 °F (37.2 °C) Objective: 
Vital signs: (most recent): Blood pressure 109/55, pulse 99, temperature 98.5 °F (36.9 °C), resp. rate 20, height 6' (1.829 m), weight 266 lb (120.7 kg), SpO2 96 %. Respiratory: O2 Flow Rate (L/min): 4 l/min O2 Device: Room air Visit Vitals /55 (BP 1 Location: Left arm, BP Patient Position: At rest;Lying right side) Pulse 99 Temp 98.5 °F (36.9 °C) Resp 20 Ht 6' (1.829 m) Wt 266 lb (120.7 kg) SpO2 96% BMI 36.08 kg/m² General: No acute distress. Alert. Cooperative. Head: Normocephalic. Atraumatic. Neck: Supple. Normal ROM. No stiffness. Respiratory: CTAB. No w/r/r/c.  
Cardiovascular: RRR. Normal S1,S2. No m/r/g. Pulses 2+ throughout. GI: + bowel sounds. Nontender. No rebound tenderness or guarding. Nondistended. Extremities:  No LE edema. Distal pulses intact. Tremors in upper extremities. Musculoskeletal: Full ROM in all extremities. I/O: 
Date 11/29/18 0700 - 11/30/18 1370 11/30/18 0700 - 12/01/18 2042 Shift 4210-9371 1573-0547 24 Hour Total 2280-1006 2358-0551 24 Hour Total  
INTAKE  
P.O.    1560  1560  
  P.O.    1560  1560  
I. V.(mL/kg/hr)  1720(1.2) 1720(0.6) 350  350 Volume (0.9% sodium chloride infusion)  1320 1320 Volume (sodium phosphate 15 mmol in 0.9% sodium chloride 250 mL infusion)    250  250 Volume (potassium chloride 10 mEq in 100 ml IVPB)  400 400 Volume (potassium chloride 10 mEq in 100 ml IVPB)    100  100 Shift Total(mL/kg)  5764(37.3) 9639(97.7) 7194(29.3)  1910(15.8) OUTPUT Urine(mL/kg/hr)    650  650 Urine Voided    650  650 Urine Occurrence(s)    2 x  2 x Stool    400  400 Stool Occurrence(s)    3 x  3 x Stool    400  400 Shift Total(mL/kg)    1050(8.7)  1050(8.7) NET  3674 9982 541  993 Weight (kg) 113.4 120.7 120.7 120.7 120.7 120.7 CBC: 
Recent Labs 11/30/18 
1244 11/30/18 
0453 11/29/18 2049 WBC 4.0* 3.2* 3.4* HGB 12.9 11.8* 11.7* HCT 39.1 35.0* 35.0*  
PLT 36* 39* 43* Metabolic Panel: 
Recent Labs 11/30/18 
0453 11/29/18 
2049 11/29/18 
1714 11/29/18 [address]  136  --  139  
K 3.0* 3.0*  --  2.6*  
 100  --  98  
CO2 28 30  --  27 BUN 7 5*  --  4*  
CREA 0.71 0.85  --  0.92  
GLU 91 174*  --  146* CA 7.3* 7.1*  --  8.2* MG 1.7  --  1.6  --   
PHOS 2.1*  --  1.9*  --   
ALB 2.1* 2.2*  --  2.9*  
SGOT 102* 106*  --  137* ALT 29 33  --  38 INR  --   --   --  1.7* For Billing Chief Complaint Patient presents with  Vomiting Hospital Problems  Date Reviewed: 8/29/2018 Codes Class Noted POA History of GI bleed ICD-10-CM: Z87.19 ICD-9-CM: V12.79  11/30/2018 Yes * (Principal) Withdrawal symptoms, alcohol (HCC) ICD-10-CM: S73.378 ICD-9-CM: 291.81  11/29/2018 Yes Severe obesity with body mass index (BMI) of 35.0 to 39.9 with serious comorbidity ([facility] ICD-10-CM: E66.01 
ICD-9-CM: 278.01  7/27/2018 Yes Alcoholic cirrhosis ([facility] WZD-29-XM: K70.30 ICD-9-CM: 571.2  4/6/2018 Yes Esophageal varices without bleeding (HCC) ICD-10-CM: I85.00 ICD-9-CM: 456.1  3/1/2016 Yes Thrombocytopenia ([facility] ICD-10-CM: D69.6 ICD-9-CM: 287.5  10/17/2013 Yes Recurrent major depressive disorder ([facility] ICD-10-CM: F33.9 ICD-9-CM: 296.30  8/11/2013 Yes Alcohol dependence with withdrawal ([facility] ICD-10-CM: U03.316 ICD-9-CM: 303.90, 291.81  7/25/2013 Yes Gout ICD-10-CM: M10.9 ICD-9-CM: 274.9  6/27/2011 Yes HTN (hypertension) (Chronic) ICD-10-CM: I10 
ICD-9-CM: 401.9  6/1/2011 Yes

## 2018-11-30 NOTE — PROGRESS NOTES
SHIFT REPORT: 
1900-Bedside shift change report given to Erika Mcdonald RN (oncoming nurse) by Cassandra Catherine RN (offgoing nurse). Report included the following information SBAR, Kardex, Procedure Summary, Intake/Output, Recent Results and Cardiac Rhythm. SHIFT SUMMARY: 
0130-venous blood drawn for AM labs. 0300-pt feeling very stongly that he wants to go home today; 
0545-Mg 2 gram IVPB  and Klor-con 40mq po given as ordered. Pt told MACK CASTRO he wants to go home today. END OF SHIFT REPORT: 
0700-Bedside shift change report given to Ellen AZAR RN (oncoming nurse) by Erika Mcdonald RN (offgoing nurse). Report included the following information SBAR, Kardex, Procedure Summary, Intake/Output, Recent Results and Cardiac Rhythm .

## 2018-11-30 NOTE — ED NOTES
TRANSFER - OUT REPORT: 
 
Verbal report given to Sharda Aguiar (name) on [facility].  being transferred to [facility] (unit) for routine progression of care Report consisted of patients Situation, Background, Assessment and  
Recommendations(SBAR). Information from the following report(s) SBAR, ED Summary, [place] and Recent Results was reviewed with the receiving nurse. Lines:  
Peripheral IV 11/29/18 Right Antecubital (Active) Site Assessment Clean, dry, & intact 11/29/2018  2:41 PM  
Phlebitis Assessment 0 11/29/2018  2:41 PM  
Infiltration Assessment 0 11/29/2018  2:41 PM  
Dressing Status Clean, dry, & intact 11/29/2018  2:41 PM  
Dressing Type Tape;Transparent 11/29/2018  2:41 PM  
Hub Color/Line Status Pink;Flushed;Patent 11/29/2018  2:41 PM  
Action Taken Blood drawn 11/29/2018  2:41 PM  
Alcohol Cap Used Yes 11/29/2018  2:41 PM  
  
 
Opportunity for questions and clarification was provided. Patient transported with: 
 Registered Nurse

## 2018-11-30 NOTE — PROGRESS NOTES
TRANSFER - IN REPORT: 
 
Verbal report received from Obdulia(name) on Rosa Roberson.  being received from ED(unit) for routine progression of care Report consisted of patients Situation, Background, Assessment and  
Recommendations(SBAR). Information from the following report(s) SBAR, Kardex, ED Summary, Intake/Output, MAR, Recent Results and Cardiac Rhythm sinus was reviewed with the receiving nurse. Opportunity for questions and clarification was provided. Assessment completed upon patients arrival to unit and care assumed. 0710 Bedside and Verbal shift change report given to Natalie (oncoming nurse) by Eli Teague (offgoing nurse). Report included the following information SBAR, Kardex, ED Summary, Intake/Output, MAR, Recent Results and Cardiac Rhythm normal sinus.

## 2018-11-30 NOTE — PROGRESS NOTES
1540 - TRANSFER - IN REPORT: 
 
Verbal report received from [address] RN(name) on Eileen Majanomine.  being received from ICU(unit) for routine progression of care Report consisted of patients Situation, Background, Assessment and  
Recommendations(SBAR). Information from the following report(s) SBAR, Kardex, Intake/Output, Accordion, Recent Results and Cardiac Rhythm NSR-ST was reviewed with the receiving nurse. Opportunity for questions and clarification was provided. Assessment completed upon patients arrival to unit and care assumed.

## 2018-11-30 NOTE — PROGRESS NOTES
Problem: Falls - Risk of 
Goal: *Absence of Falls Document Christopher Evens Fall Risk and appropriate interventions in the flowsheet. Outcome: Progressing Towards Goal 
Fall Risk Interventions: 
Mobility Interventions: Bed/chair exit alarm Mentation Interventions: Bed/chair exit alarm Medication Interventions: Bed/chair exit alarm Elimination Interventions: Call light in reach, Bed/chair exit alarm History of Falls Interventions: Door open when patient unattended Problem: Patient Education: Go to Patient Education Activity Goal: Patient/Family Education Outcome: Progressing Towards Goal 
CIWA score 2 so far this shift. Problem: Alcohol Withdrawal 
Goal: *STG: Remains safe in hospital 
Outcome: Progressing Towards Goal 
Seizure pads in place, bed alarm on, frequent rounds to be made, patient room close to nurse's station, reminding patient to call prior to ambulating, gripper sox on, door open when patient is unattended. Goal: *STG: Seeks staff when symptoms of withdrawal increase Outcome: Progressing Towards Goal 
Not demonstrated so far. Goal: *STG: Complies with medication therapy Outcome: Progressing Towards Goal 
Pt compliant with all medication administrations this shift.

## 2018-12-01 NOTE — DISCHARGE SUMMARY
[place] [address]  Office [phone] Fax [phone] Discharge / Transfer / Off-Service Note Name: Eileen Gomez MRN: [id number]  Sex: Male YOB: 1960  Age: 66 y.o. PCP: Peter Duval DO Date of admission: 11/29/2018 Date of discharge/transfer: 12/1/2018 Attending physician at admission: Dr. Karen Andrew Attending physician at discharge/transfer: Dr. Karen Andrew Resident physician at discharge/transfer: Julia Aviles MD 
  
Consultants during hospitalization Dr. Mitali Ngo- Hematology Terrye Cranker, [place]- GI Admission diagnoses Withdrawal symptoms, alcohol ([address] [F10.239] Recommended follow-up after discharge 1. PCP Dr Kerri Grover Things to follow up on with PCP: 
- Low Potassium: Have Potassium rechecked at PCP office, you may require your daily Potassium supplements to be readjusted - Low Platelets: Follow up with PCP to ensure platelets do not keep on dropping, also call Dr. Drema Cheadle (heme-onc) in the next 2 days to schedule an appointment - Prolonged QTC- You will need to get your EKG repeated before you restart your trazadone. Have this done at the PCP's office. Medication Changes: 
- Stop home Trazadone until EKG at the PCP's office 
- Start Compazine 5 mg every 4 hours as needed for nausea - Restart Klor Con (Potassium) 10 mEq sprinkle daily History of Present Illness Per admitting provider,  
 
Eileen Gomez is a 66 y.o. male with known HTN, gout, GI bleed, esophageal varices & ascites 2/2 to liver cirrhosis, alcohol abuse w/ hx of seizure from withdrawal (2013), HTN, GERD, peptic and gastric ulcer disease and depression/anxiety who presents to the ER complaining of nausea, vomiting and imbalance. 
  
In July, Mr. Aubrie Tomlin wife left him, which correlates with the initiation of symptoms. At that time patient presented with nausea and gagging predominantly in the morning.  At this time patient was consistently consuming alcoholic beverages until October when he decided to go to rehab and remained sober for a month. He relapsed over the weekend where he consumed bourbon on Saturday and Sunday, unable to recall exact amount. Three days ago presented with worsening nausea accompanied by vomiting, diarrhea (more than five watery stools a day) and imbalance. Patient denies hematemesis, hematochezia, melena, abdominal pain and focal weakness.  
  
Of note patient was admitted to the inpatient psychiatric unit on 08/2013 for stabilization secondary to depression with suicidal gesture. In the ED: - Vitals - Temp 97.4, pulse 104, /97, Resp 18, SpO2 100% R. A. 
- Labs - CBC: Platelet 58 (L), RDW 17.4 (H), neutrophils 76 (H), lipase wnl. CMP: K 2.6 (L), Glucose 146 (H), calcium 8.2 (L), Tot. Bilirubin 3.7 (H),  (H), Alk phosphatase 161 (H), albumin 2.9 (L), Globin 5.3 (H). Ethyl alcohol 10 (H) 
- Imaging - none - Treatment - NS bolus x1, zofran 4mg IV x1, potassium chloride 40 mEq x 1, goody bag. HOSPITAL COURSE 
 
ETOH withdrawal: Pt has a hx of Alcohol abuse w/ hx of seizure from withdrawal (2013). He presented to the ED- anxious, tachycardic and with tremors noted in all extremeties. His POA CIWA was 19. Pt was treated for alcohol withdrawal with CIWA protocol with Ativan. His last dose of Ativan was 11/30 at 0400. Pt was also given daily folic acid, multivitamin, thiamine and goody bag. His nausea was treated with Compazine prn. On discharge pt did not any tremors, or anxiety and vital signs were stable.  
  
Pancytopenia likely 2/2 cirrhosis: Pt's baseline Plt are 80-90 but during this admission his initial Plt POA were 43. Plt on discharge 45. Case was discussed with heme/onc who recommended to avoid antiplatelets agents, NSAIDs, or anticoagulants while PLT <50k.  Pt to follow up with heme/onc outpatient for pancytopenia. 
  
Alcoholic liver cirrhosis with history of esophageal varices and ascites. MELD score 18, 6% 3 month mortality. Pt continued home spironolactone 25mg/day and Furosemide 40 mg/day. Pt was seen by GI who did not recommend endoscopic evaluation.   
  
Major Depression/ ROBY: Patient admitted to inpatient psychiatry unit in 8/2013 for depression with suicide ideation. Depression exacerbated since wife left him earlier this year. He continued home venlafaxine 150 mg/day. Pt did not have any suicidal ideation on discharge. Hypertension: BP on admission 173/97. He continued home medications of Coreg and lasix during the stay. History of prior MI: Old septal infarct on previous EKG. EKG on 11/29/18 sinus rhythm with occasional PVCs and PACs, left axis deviation, low voltage QRS, old septal infarct and prolonged QT. He continued home coreg 3.125 mg BID. Trops were neg X3 
  
PUD/GERD: Has had GI bleeding in the past. He continued home omeprazole 40 mg/day 
  
Gout: He continued home allopurinol 300 mg/day Physical exam at discharge: 
 
  
General: No acute distress. Alert. Cooperative. Head: Normocephalic. Atraumatic. Neck: Supple. Normal ROM. No stiffness. Respiratory: CTAB. No w/r/r/c.  
Cardiovascular: RRR. Normal S1,S2. No m/r/g. Pulses 2+ throughout. GI: + bowel sounds. Nontender. No rebound tenderness or guarding. Nondistended. Extremities:  No LE edema. Distal pulses intact. Tremors in upper extremities. Musculoskeletal: Full ROM in all extremities. Condition at discharge: Stable. Labs Recent Labs 12/01/18 
0128 11/30/18 
1244 11/30/18 
3540 WBC 4.7 4.0* 3.2* HGB 12.4 12.9 11.8* HCT 37.0 39.1 35.0*  
PLT 45* 36* 39* Recent Labs 12/01/18 
0128 11/30/18 
0453 11/29/18 
2049 11/29/18 
1714  139 136  --   
K 3.3* 3.0* 3.0*  --   
 103 100  --   
CO2 27 28 30  --   
BUN 4* 7 5*  --   
CREA 0.76 0.71 0.85  --   
* 91 174*  --   
CA 7.2* 7.3* 7.1*  --   
MG 1.5* 1.7  --  1.6 PHOS 1.9* 2.1*  --  1.9*  
 Recent Labs 12/01/18 
0128 11/30/18 
0453 11/29/18 
2049 11/29/18 [address] SGOT 90* 102* 106* 137* ALT 31 29 33 38 * 118* 123* 161* TBILI 5.4* 4.4* 3.4* 3.7* TP 6.9 6.3* 6.5 8.2 ALB 2.3* 2.1* 2.2* 2.9*  
GLOB 4.6* 4.2* 4.3* 5.3*  
LPSE  --   --   --  153 Recent Labs  
  11/29/18 
1715 11/29/18 
1714 11/29/18 [address] PH 7.57*  --   --   
PCO2 27*  --   --   
PO2 77*  --   --   
TROIQ  --  <0.05  --   
INR  --   --  1.7* PTP  --   --  17.0* Cultures · none Procedures / Diagnostic Studies · none Imaging Results from Abstract encounter on 05/26/18 XR CHEST PORT Results from Hospital Encounter encounter on 12/18/17 US SCROTUM/TESTICLES Narrative EXAM:  US SCROTUM/TESTICLES  
 
INDICATION: Testicular pain. COMPARISON: None. TECHNIQUE: 
Real-time sonography of the scrotum was performed with a high frequency linear 
transducer. Multiple static images were obtained. Color Doppler evaluation was 
also performed. FINDINGS: 
RIGHT TESTICLE: The right testicle is normal in size and echotexture with normal 
color-flow. The right testis measures 4.1 x 2.6 x 1.6 cm.  
 
RIGHT EPIDIDYMIS: 5 mm epididymal cyst. Otherwise normal. 
 
LEFT TESTICLE: The left testicle is normal in size and echotexture with normal 
color-flow. The left testis measures 3.7 x 2.3 x 2.0 cm cm. LEFT EPIDIDYMIS: The left epididymis is normal. 
  
 Impression IMPRESSION: Small right epididymal cyst. Otherwise normal scrotal ultrasound. [place] [facility] [place]18 CT ABD PELV W WO CONT Narrative EXAM:  CT ABD PELV W WO CONT Clinical history: Evaluate for active duodenal bleed INDICATION: Evaluate for duodenal bleed/duodenal mass. COMPARISON: None. CONTRAST:  98 mL of Isovue-370.  
 
TECHNIQUE:   
Multislice helical CT was performed from the diaphragm to the symphysis pubis 
prior to intravenous contrast administration and from the diaphragm to the symphysis pubis during uneventful rapid bolus intravenous contrast 
administration. Oral contrast was not administered. Contiguous 5 mm axial 
images were reconstructed and lung and soft tissue windows were generated. Coronal and sagittal reformations were generated. CT dose reduction was 
achieved through use of a standardized protocol tailored for this examination 
and automatic exposure control for dose modulation. FINDINGS: 
LUNG BASES:Atelectatic change. LIVER: Nodular contour. Mildly irregular enhancement pattern. Patent portal 
vein. There is no intrahepatic duct dilatation. The CBD is not dilated. There is 
no hepatic parenchymal mass. Hepatic enhancement pattern is within normal 
limits. Portal vein is patent. GALLBLADDER:  Cholelithiasis. SPLEEN/PANCREAS: Splenomegaly with mild variceal change. Mild retroperitoneal 
mesenteric stranding  There is no pancreatic duct dilatation. ADRENALS/KIDNEYS: No mass. There is no hydronephrosis. There is no renal or 
ureteral calculus. There is no renal mass. There is no perinephric mass. COLON AND SMALL BOWEL: Fat-containing umbilical hernia. Colonic diverticula. No 
definitive duodenal mass. No evidence of active gastrointestinal hemorrhage. Replaced right hepatic artery. There is no free intraperitoneal air. There is no 
evidence of incarceration or obstruction. No mesenteric adenopathy. PERITONEUM: Diffuse mild mesenteric stranding. APPENDIX: Not clearly visualized BLADDER/REPRODUCTIVE ORGANS: No mass or calculus. OSSEOUS STRUCTURES: No destructive bone lesion. RETROPERITONEUM: Mild atherosclerotic changes at the origins of the SMA and 
celiac. . The abdominal aorta is normal in caliber. No aneurysm. No 
retroperitoneal adenopathy. Impression IMPRESSION: 
No evidence of active extravasation. No circumferential duodenal mass which is 
detectable by CT.  Mesenteric angiography will be performed for further 
interrogation of the gastroduodenal artery, possible mass lesion in the region 
of the duodenum. Nodular liver contour is consistent with early cirrhotic change with mild 
pericecal change in mild splenomegaly. Cholelithiasis. Mild atherosclerotic change. Fat-containing umbilical hernia. No procedure found. Chronic diagnoses Problem List as of 12/1/2018 Date Reviewed: 8/29/2018 Codes Class Noted - Resolved History of GI bleed ICD-10-CM: Z87.19 ICD-9-CM: V12.79  11/30/2018 - Present * (Principal) Withdrawal symptoms, alcohol (HCC) ICD-10-CM: G92.481 ICD-9-CM: 291.81  11/29/2018 - Present Severe obesity with body mass index (BMI) of 35.0 to 39.9 with serious comorbidity ([facility] ICD-10-CM: E66.01 
ICD-9-CM: 278.01  7/27/2018 - Present Esophageal varices in alcoholic cirrhosis (HCC) BYT-86-CF: K70.30, I85.10 ICD-9-CM: 571.2, 456.21  7/2/2018 - Present Duodenal mass ICD-10-CM: K31.89 ICD-9-CM: 537.89  7/2/2018 - Present Alcoholic cirrhosis ([facility] [address] JBN-55-LC: K70.30 ICD-9-CM: 571.2  4/6/2018 - Present Incarcerated umbilical hernia JUI-49-BR: K42.0 ICD-9-CM: 552.1  1/24/2018 - Present Overview Signed 1/24/2018  3:04 PM by Micah Holstein., MD  
  Without gangrene or obstruction. Gallbladder calculus without cholecystitis ICD-10-CM: K80.20 ICD-9-CM: 574.20  7/21/2016 - Present Overview Signed 7/21/2016  1:18 PM by Blake Ram NP Seen on US 7/1/16. Iron deficiency anemia ICD-10-CM: D50.9 ICD-9-CM: 280.9  3/3/2016 - Present Esophageal varices without bleeding (HCC) ICD-10-CM: I85.00 ICD-9-CM: 456.1  3/1/2016 - Present Prediabetes ICD-10-CM: R73.03 
ICD-9-CM: 790.29  1/11/2016 - Present Thrombocytopenia ([facility] [address] ICD-10-CM: D69.6 ICD-9-CM: 287.5  10/17/2013 - Present Splenomegaly ICD-10-CM: R16.1 ICD-9-CM: 789.2  10/17/2013 - Present History of alcohol abuse ICD-10-CM: Z87.898 ICD-9-CM: 305.03 10/17/2013 - Present Hypogonadism, male ICD-10-CM: E29.1 ICD-9-CM: 257.2  10/10/2013 - Present Recurrent major depressive disorder ([facility] ICD-10-CM: F33.9 ICD-9-CM: 296.30  8/11/2013 - Present Alcohol dependence with withdrawal ([facility] ICD-10-CM: A28.325 ICD-9-CM: 303.90, 291.81  7/25/2013 - Present Anxiety ICD-10-CM: F41.9 ICD-9-CM: 300.00  7/25/2013 - Present Gout ICD-10-CM: M10.9 ICD-9-CM: 274.9  6/27/2011 - Present HTN (hypertension) (Chronic) ICD-10-CM: I10 
ICD-9-CM: 401.9  6/1/2011 - Present RESOLVED: Chest pain ICD-10-CM: R07.9 ICD-9-CM: 786.50  3/30/2018 - 11/30/2018 RESOLVED: Hyponatremia ICD-10-CM: E87.1 ICD-9-CM: 276.1  7/5/2016 - 11/30/2018 RESOLVED: Type 2 diabetes mellitus ([place] [address].) ICD-10-CM: E11.9 ICD-9-CM: 250.00  10/10/2013 - 11/5/2015 RESOLVED: Depression ICD-10-CM: F32.9 ICD-9-CM: 068  7/25/2013 - 11/30/2018 RESOLVED: GI bleed ICD-10-CM: K92.2 ICD-9-CM: 578.9  6/28/2011 - 11/30/2018 RESOLVED: Chest pain, unspecified ICD-10-CM: R07.9 ICD-9-CM: 786.50  6/28/2011 - 11/30/2018 RESOLVED: Hypokalemia ICD-10-CM: E87.6 ICD-9-CM: 276.8  6/1/2011 - 6/3/2011 RESOLVED: Alcohol abuse (Chronic) ICD-10-CM: F10.10 ICD-9-CM: 305.00  6/1/2011 - 7/25/2013 RESOLVED: Thrombocytopenia, unspecified ([address] ICD-10-CM: D69.6 ICD-9-CM: 287.5  6/1/2011 - 6/28/2011 RESOLVED: Nausea & vomiting ICD-10-CM: R11.2 ICD-9-CM: 787.01  6/1/2011 - 6/3/2011 RESOLVED: Diarrhea ICD-10-CM: R19.7 ICD-9-CM: 787.91  6/1/2011 - 6/3/2011 Discharge/Transfer Medications Current Discharge Medication List  
  
START taking these medications Details  
prochlorperazine (COMPAZINE) 5 mg tablet Take 1 Tab by mouth every six (6) hours as needed for Nausea for up to 7 days. Qty: 30 Tab, Refills: 1 CONTINUE these medications which have CHANGED  Details  
potassium chloride SA (KLOR-CON SPRINKLE) 10 mEq capsule Take 1 Cap by mouth daily. Qty: 90 Cap, Refills: 3 CONTINUE these medications which have NOT CHANGED Details  
hydrOXYzine HCl (ATARAX) 25 mg tablet Take 25 mg by mouth three (3) times daily. lactulose (CHRONULAC) 10 gram/15 mL solution Take 3 tsp by mouth two (2) times daily as needed (liver disease). Venlafaxine 150 mg tr24 Take 150 mg by mouth daily. omeprazole (PRILOSEC) 40 mg capsule TAKE 1 CAPSULE TWICE DAILY Qty: 180 Cap, Refills: 3 Associated Diagnoses: Gastroesophageal reflux disease without esophagitis  
  
cyclobenzaprine (FLEXERIL) 10 mg tablet TAKE 1 TABLET THREE TIMES DAILY AS NEEDED Qty: 90 Tab, Refills: 1  
  
allopurinol (ZYLOPRIM) 300 mg tablet TAKE 1 TABLET TWICE DAILY Qty: 180 Tab, Refills: 3 Associated Diagnoses: Gout, unspecified cause, unspecified chronicity, unspecified site  
  
carvedilol (COREG) 3.125 mg tablet Take 1 Tab by mouth two (2) times daily (with meals). Qty: 180 Tab, Refills: 3  
  
furosemide (LASIX) 40 mg tablet Take 1 Tab by mouth daily. Qty: 90 Tab, Refills: 3 Associated Diagnoses: Alcoholic cirrhosis, unspecified whether ascites present ([address]  
  
spironolactone (ALDACTONE) 25 mg tablet TAKE 1 TABLET TWICE DAILY Qty: 180 Tab, Refills: 3 Associated Diagnoses: Essential hypertension STOP taking these medications  
  
 traZODone (DESYREL) 300 mg tablet Comments:  
Reason for Stopping:   
   
  
 
  
Diet:  Regular diet. Activity:  As tolerated Disposition: Home or Self Care Discharge instructions to patient/family Please seek medical attention for any new or worsening symptoms particularly fever, chest pain, shortness of breath, abdominal pain, nausea, vomiting Follow up plans/appointments Follow-up Information Follow up With Specialties Details Why Contact Chito Augustine DO Family Jacqueline Schedule an appointment as soon as possible for a visit in 2 days  [address] [facility] 
[phone] Luis Ramirez MD Hematology and Oncology Schedule an appointment as soon as possible for a visit in 2 days for low platelet and hemoglobin levels  [address] [facility] [address] [place] [address] 
[phone] Eugene Carvajal MD Gastroenterology Schedule an appointment as soon as possible for a visit in 3 days for cirrhosis  [address] [place] [address] 
[phone] Satnam Ayala MD 
Family Medicine Resident For Billing Chief Complaint Patient presents with  Vomiting Hospital Problems  Date Reviewed: 8/29/2018 Codes Class Noted POA History of GI bleed ICD-10-CM: Z87.19 ICD-9-CM: V12.79  11/30/2018 Yes * (Principal) Withdrawal symptoms, alcohol (HCC) ICD-10-CM: W65.594 ICD-9-CM: 291.81  11/29/2018 Yes Severe obesity with body mass index (BMI) of 35.0 to 39.9 with serious comorbidity ([facility] ICD-10-CM: E66.01 
ICD-9-CM: 278.01  7/27/2018 Yes Alcoholic cirrhosis ([facility] RID-95-XJ: K70.30 ICD-9-CM: 571.2  4/6/2018 Yes Esophageal varices without bleeding (HCC) ICD-10-CM: I85.00 ICD-9-CM: 456.1  3/1/2016 Yes Thrombocytopenia ([facility] ICD-10-CM: D69.6 ICD-9-CM: 287.5  10/17/2013 Yes Recurrent major depressive disorder ([facility] ICD-10-CM: F33.9 ICD-9-CM: 296.30  8/11/2013 Yes Alcohol dependence with withdrawal ([facility] ICD-10-CM: G73.692 ICD-9-CM: 303.90, 291.81  7/25/2013 Yes Gout ICD-10-CM: M10.9 ICD-9-CM: 274.9  6/27/2011 Yes HTN (hypertension) (Chronic) ICD-10-CM: I10 
ICD-9-CM: 401.9  6/1/2011 Yes

## 2018-12-01 NOTE — PROGRESS NOTES
Problem: Falls - Risk of 
Goal: *Absence of Falls Document Dawson Kins Fall Risk and appropriate interventions in the flowsheet. Outcome: Progressing Towards Goal 
Fall Risk Interventions: 
Mobility Interventions: Assess mobility with egress test, Bed/chair exit alarm, Mechanical lift, Patient to call before getting OOB, PT Consult for mobility concerns, Strengthening exercises (ROM-active/passive), Utilize walker, cane, or other assistive device, PT Consult for assist device competence, Utilize gait belt for transfers/ambulation, OT consult for ADLs, Communicate number of staff needed for ambulation/transfer Mentation Interventions: Adequate sleep, hydration, pain control, Bed/chair exit alarm, Door open when patient unattended, Familiar objects from home, Family/sitter at bedside, HELP ([address]) if available, More frequent rounding, Room close to nurse's station, Toileting rounds, Update white board, Reorient patient, Increase mobility, Gait belt with transfers/ambulation, Eyeglasses and hearing aids, Evaluate medications/consider consulting pharmacy Medication Interventions: Bed/chair exit alarm, Evaluate medications/consider consulting pharmacy, Teach patient to arise slowly, Utilize gait belt for transfers/ambulation, Patient to call before getting OOB, Assess postural VS orthostatic hypotension Elimination Interventions: Bed/chair exit alarm, Patient to call for help with toileting needs, Urinal in reach, Toileting schedule/hourly rounds, Toilet paper/wipes in reach, Elevated toilet seat, Call light in reach History of Falls Interventions: Bed/chair exit alarm, Consult care management for discharge planning, Door open when patient unattended, Investigate reason for fall, Utilize gait belt for transfer/ambulation, Evaluate medications/consider consulting pharmacy

## 2018-12-01 NOTE — PROGRESS NOTES
PCP RENE appt scheduled with Dr. Livan Song on 12/5/2018 at 1:15pm Appt added to AVS. Angel Mccoy CM Specialist

## 2018-12-01 NOTE — DISCHARGE INSTRUCTIONS
HOME DISCHARGE INSTRUCTIONS    Angela Yoo / [id number] : 1960    Admission date: 2018 Discharge date: 2018     Please bring this form with you to show your care provider at your follow-up appointment. Primary care provider:  Pedro Mendez DO    Discharging provider:  Elicia Leblanc MD  - Family Medicine Resident  Meir Castillo MD - Attending, Family Medicine     You have been admitted to the hospital with the following diagnoses:    ACUTE DIAGNOSES:  Withdrawal symptoms, alcohol ([address] [F10.239]  . . . . . . . . . . . . . . . . . . . . . . . . . . . . . . . . . . . . . . . . . . . . . . . . . . . . . . . . . . . . . . . . . . . . . . . Dayanna Fletcher FOLLOW-UP CARE RECOMMENDATIONS:    Appointments  Follow-up Information     Follow up With Specialties Details Why Contact Info    Richard Keith DO Family Practice Schedule an appointment as soon as possible for a visit in 2 days  [address]  [address] [facility]  [phone]      Mey Braun MD Hematology and Oncology Schedule an appointment as soon as possible for a visit in 2 days for low platelet and hemoglobin levels  [address]  [phone]      Skip Guerrero MD Gastroenterology Schedule an appointment as soon as possible for a visit in 3 days for cirrhosis  [address] [facility]  [phone]             Please follow up with your PCP regarding:  - Low Potassium: Have Potassium rechecked at PCP office, you may require your daily Potassium supplements to be readjusted    - Low Platelets: Follow up with PCP to ensure platelets do not keep on dropping, also call Dr. Shonda Morales (heme-onc) in the next 2 days to schedule an appointment   - Prolonged QTC- You will need to get your EKG repeated before you restart your trazadone. Have this done at the PCP's office.     MEDICATION CHANGES:    - Stop home Trazadone until EKG at the PCP's office  - Start Compazine 5 mg every 4 hours as needed for nausea   - Resume Klor Con (Potassium) 10 mEq sprinkle daily     Follow-up tests needed:   - CBC (platelet, hemoglobin levels)   - CMP (potassium levels, liver function)     Pending test results: At the time of your discharge the following test results are still pending: none   Please make sure you review these results with your outpatient follow-up provider(s). Specific symptoms to watch for: chest pain, shortness of breath, fever, chills, nausea, vomiting, diarrhea, change in mentation, falling, weakness, bleeding. DIET/what to eat:  Regular Diet    ACTIVITY:  Activity as tolerated    Wound care: none    Equipment needed:  none    What to do if new or unexpected symptoms occur? If you experience any of the above symptoms (or should other concerns or questions arise after discharge) please call your primary care physician. Return to the emergency room if you cannot get hold of your doctor. · It is very important that you keep your follow-up appointment(s). · Please bring discharge papers, medication list (and/or medication bottles) to your follow-up appointments for review by your outpatient provider(s). · Please check the list of medications and be sure it includes every medication (even non-prescription medications) that your provider wants you to take. · It is important that you take the medication exactly as they are prescribed. · Keep your medication in the bottles provided by the pharmacist and keep a list of the medication names, dosages, and times to be taken in your wallet. · Do not take other medications without consulting your doctor. · If you have any questions about your medications or other instructions, please talk to your nurse or care provider before you leave the hospital.     Information obtained by:     I understand that if any problems occur once I am at home I am to contact my physician.     These instructions were explained to me and I had the opportunity to ask questions. I understand and acknowledge receipt of the instructions indicated above.                                                                                                                                                Physician's or R.N.'s Signature                                                                  Date/Time                                                                                                                                              Patient or Representative Signature                                                          Date/Time

## 2018-12-03 NOTE — PROGRESS NOTES
[facility] [address] [facility], [address] [place]: [phone]  F: [phone]      Reason for Visit:   Rajeev Hernandez is a 66 y.o. male who is seen for follow up of anemia, thrombocytopenia. History of Present Illness:   He was recently hospitalized for etoh withdrawal and pancytopenia. I was consulted regarding his worsening thrombocytopenia, which was attributed to his ETOH and cirrhosis. His nausea has improved. Balance has improved. No ETOH since hospital stay. No bleeding or bruising. No fevers. PAST HISTORY: The following sections were reviewed and updated in the EMR as appropriate: PMH, SH, FH, Medications, Allergies. Allergies   Allergen Reactions    Onion Nausea and Vomiting    Statins-Hmg-Coa Reductase Inhibitors Rash     rash      Review of Systems: A complete review of systems was obtained, reviewed, and scanned into the EMR. Pertinent findings reviewed above. Physical Exam:     Visit Vitals  /80 (BP 1 Location: Left arm, BP Patient Position: Sitting)   Pulse 65   Temp 98.6 °F (37 °C) (Oral)   Resp 18   Ht 6' (1.829 m)   Wt 260 lb 3.2 oz (118 kg)   SpO2 96%   BMI 35.29 kg/m²     General: No distress  Eyes: PERRLA, anicteric sclerae  HENT: Atraumatic, OP clear  Neck: Supple  Lymphatic: No cervical, supraclavicular, or inguinal adenopathy  Respiratory: CTAB, normal respiratory effort  CV: Normal rate, regular rhythm, no murmurs, no peripheral edema  GI: Soft, nontender, distended with ascites, no masses, no hepatomegaly, no splenomegaly  Skin: No rashes, ecchymoses, or petechiae. Normal temperature, turgor, and texture.   Psych: Alert, oriented, appropriate affect, normal judgment/insight    Results:     Lab Results   Component Value Date/Time    WBC 4.7 12/01/2018 01:28 AM    HGB 12.4 12/01/2018 01:28 AM    HCT 37.0 12/01/2018 01:28 AM    PLATELET 45 (LL) 41/02/6337 01:28 AM    MCV 88.1 12/01/2018 01:28 AM ABS. NEUTROPHILS 3.2 11/29/2018 02:40 PM    Hemoglobin (POC) 14.3 03/13/2014 08:16 PM    Hematocrit (POC) 42 03/13/2014 08:16 PM     Lab Results   Component Value Date/Time    Sodium 137 12/01/2018 01:28 AM    Potassium 3.3 (L) 12/01/2018 01:28 AM    Chloride 101 12/01/2018 01:28 AM    CO2 27 12/01/2018 01:28 AM    Glucose 104 (H) 12/01/2018 01:28 AM    BUN 4 (L) 12/01/2018 01:28 AM    Creatinine 0.76 12/01/2018 01:28 AM    GFR est AA >60 12/01/2018 01:28 AM    GFR est non-AA >60 12/01/2018 01:28 AM    Calcium 7.2 (L) 12/01/2018 01:28 AM    Sodium (POC) 141 03/13/2014 08:16 PM    Potassium (POC) 3.1 (L) 03/13/2014 08:16 PM    Chloride (POC) 102 03/13/2014 08:16 PM    Glucose (POC) 118 (H) 07/07/2016 07:11 AM    BUN (POC) <3 (L) 03/13/2014 08:16 PM    Creatinine (POC) 0.6 01/16/2018 08:17 AM    Calcium, ionized (POC) 1.06 (L) 03/13/2014 08:16 PM     Lab Results   Component Value Date/Time    Bilirubin, total 5.4 (H) 12/01/2018 01:28 AM    ALT (SGPT) 31 12/01/2018 01:28 AM    AST (SGOT) 90 (H) 12/01/2018 01:28 AM    Alk.  phosphatase 128 (H) 12/01/2018 01:28 AM    Protein, total 6.9 12/01/2018 01:28 AM    Albumin 2.3 (L) 12/01/2018 01:28 AM    Globulin 4.6 (H) 12/01/2018 01:28 AM     Lab Results   Component Value Date/Time    Reticulocyte count 1.3 05/17/2017 01:54 PM    Iron % saturation 14 (L) 05/18/2018 09:01 AM    TIBC 295 05/18/2018 09:01 AM    Ferritin 140 05/18/2018 09:01 AM    Vitamin B12 1,150 (H) 11/29/2018 05:14 PM    Folate 4.9 (L) 11/29/2018 05:14 PM    Haptoglobin 83 05/17/2017 01:54 PM     (H) 11/29/2018 02:40 PM    TSH 2.490 09/08/2017 09:48 AM    OTILIO, Direct None Detected 06/02/2011 04:08 AM    Lipase 153 11/29/2018 02:40 PM    HIV 1/2 Interpretation NONREACTIVE 10/16/2013 03:15 PM     Lab Results   Component Value Date/Time    INR 1.7 (H) 11/29/2018 02:40 PM    aPTT 31.5 07/02/2018 08:25 AM    D-dimer 0.67 (H) 10/16/2013 03:50 PM    Fibrinogen 180 (L) 10/16/2013 03:15 PM     HGB (g/dL)   Date Value   12/01/2018 12.4   11/30/2018 12.9   11/30/2018 11.8 (L)   11/29/2018 11.7 (L)   11/29/2018 14.6   07/03/2018 12.2   07/02/2018 12.6   05/18/2018 11.3 (L)   04/06/2018 8.1 (L)   01/29/2018 9.0 (L)   09/08/2017 11.3 (L)   07/11/2017 12.2 (L)   05/17/2017 7.3 (L)   04/06/2017 7.5 (L)   01/05/2017 8.9 (L)   10/18/2016 8.2 (L)   07/21/2016 9.7 (L)   07/14/2016 9.1 (L)   07/07/2016 10.3 (L)   07/06/2016 9.1 (L)   07/05/2016 9.3 (L)   06/28/2016 10.8 (L)   03/01/2016 10.7 (L)   01/07/2016 10.7 (L)   09/08/2015 12.0 (L)   07/08/2015 12.7   08/14/2014 14.1   07/16/2014 12.9   07/02/2014 13.5   05/16/2014 11.4 (L)   04/16/2014 13.5   03/13/2014 13.3   01/03/2014 13.6   10/16/2013 14.0   10/10/2013 12.9   09/30/2013 13.6   08/09/2013 14.7   07/24/2013 15.1   02/18/2013 16.1   04/18/2012 15.6   06/30/2011 8.6 (L)   06/29/2011 8.7 (L)   06/29/2011 7.2 (L)   06/28/2011 6.3 (LL)   06/27/2011 6.2 (LL)   06/03/2011 9.3 (L)   06/02/2011 10.2 (L)   06/01/2011 10.9 (L)           Assessment:   1) Iron deficiency anemia  S/p injectafer 6/2017, 10/2017, and 4/2018. He has struggled to tolerate oral iron in the past.  The cause of his iron deficiency is presumably intermittent variceal bleeding related to his cirrhosis. Last EGD and colonoscopy were with Dr. Minh Green 7/2018    HGB most recently has been normal.  Monitor. 2) Thrombocytopenia  Chronic, secondary to cirrhosis and etoh. Worsened recently with worsening of his liver disease and increased ETOH consumption. Monitor. 3) Alcoholic cirrhosis  He quit ETOH before his recent hospital stay. I encouraged him to continue to abstain. Follow up with GI. Previously referred to hepatology but did not go. 4) Depression  Worsened by wife leaving him. On effexor. Follow up with PCP tomorrow.     Plan:     · Labs in 6 months: CBC, iron profile, ferritin ([facility])  · Return to see me in 6 months      Signed By: Navin Dominguez MD

## 2018-12-04 NOTE — PROGRESS NOTES
Jose Yuan is a 66 y.o. male    Chief Complaint   Patient presents with   [address] Follow Up       1. Have you been to the ER, urgent care clinic since your last visit? Hospitalized since your last visit? 11/29/18  Alcohol withdrawal SF    M  2. Have you seen or consulted any other health care providers outside of the [address] [place] [address] since your last visit? Include any pap smears or colon screening. No      Visit Vitals  /80 (BP 1 Location: Left arm, BP Patient Position: Sitting)   Pulse 65   Temp 98.6 °F (37 °C) (Oral)   Resp 18   Ht 6' (1.829 m)   Wt 260 lb 3.2 oz (118 kg)   SpO2 96%   BMI 35.29 kg/m²           Health Maintenance Due   Topic Date Due    Pneumococcal 19-64 Highest Risk (2 of 3 - PCV13) 06/01/2012    Shingrix Vaccine Age 50> (1 of 2) 06/12/2012         Medication Reconciliation completed, changes noted.   Please  Update medication list.

## 2019-01-01 ENCOUNTER — OFFICE VISIT (OUTPATIENT)
Dept: FAMILY MEDICINE CLINIC | Age: 59
End: 2019-01-01

## 2019-01-01 ENCOUNTER — HOSPITAL ENCOUNTER (OUTPATIENT)
Dept: ULTRASOUND IMAGING | Age: 59
Discharge: HOME | DRG: 432 | End: 2019-07-09
Attending: INTERNAL MEDICINE
Payer: OTHER MISCELLANEOUS

## 2019-01-01 ENCOUNTER — ED HISTORICAL/CONVERTED ENCOUNTER (OUTPATIENT)
Dept: OTHER | Age: 59
End: 2019-01-01

## 2019-01-01 ENCOUNTER — APPOINTMENT (OUTPATIENT)
Dept: CT IMAGING | Age: 59
DRG: 433 | End: 2019-01-01
Attending: EMERGENCY MEDICINE
Payer: MEDICARE

## 2019-01-01 ENCOUNTER — HOME CARE VISIT (OUTPATIENT)
Dept: HOSPICE | Facility: HOSPICE | Age: 59
End: 2019-01-01
Payer: MEDICARE

## 2019-01-01 ENCOUNTER — PATIENT OUTREACH (OUTPATIENT)
Dept: FAMILY MEDICINE CLINIC | Age: 59
End: 2019-01-01

## 2019-01-01 ENCOUNTER — TELEPHONE (OUTPATIENT)
Dept: FAMILY MEDICINE CLINIC | Age: 59
End: 2019-01-01

## 2019-01-01 ENCOUNTER — HOSPITAL ENCOUNTER (INPATIENT)
Age: 59
LOS: 11 days | Discharge: NURSING, REHAB OR LONG-TERM CARE | DRG: 433 | End: 2019-04-16
Attending: EMERGENCY MEDICINE | Admitting: NEUROMUSCULOSKELETAL MEDICINE & OMM
Payer: MEDICARE

## 2019-01-01 ENCOUNTER — APPOINTMENT (OUTPATIENT)
Dept: ULTRASOUND IMAGING | Age: 59
DRG: 433 | End: 2019-01-01
Attending: PHYSICIAN ASSISTANT
Payer: MEDICARE

## 2019-01-01 ENCOUNTER — HOSPITAL ENCOUNTER (OUTPATIENT)
Dept: ULTRASOUND IMAGING | Age: 59
Discharge: HOME | End: 2019-06-21
Attending: INTERNAL MEDICINE
Payer: MEDICARE

## 2019-01-01 ENCOUNTER — HOSPITAL ENCOUNTER (OUTPATIENT)
Dept: ULTRASOUND IMAGING | Age: 59
Discharge: HOME | End: 2019-05-30
Attending: SPECIALIST
Payer: MEDICARE

## 2019-01-01 ENCOUNTER — HOSPITAL ENCOUNTER (OUTPATIENT)
Dept: ULTRASOUND IMAGING | Age: 59
Discharge: HOME | End: 2019-07-02
Attending: INTERNAL MEDICINE
Payer: MEDICARE

## 2019-01-01 ENCOUNTER — APPOINTMENT (OUTPATIENT)
Dept: ULTRASOUND IMAGING | Age: 59
DRG: 433 | End: 2019-01-01
Attending: SURGERY
Payer: MEDICARE

## 2019-01-01 ENCOUNTER — HOSPITAL ENCOUNTER (INPATIENT)
Age: 59
LOS: 2 days | Discharge: HOME | DRG: 433 | End: 2019-03-23
Attending: EMERGENCY MEDICINE | Admitting: FAMILY MEDICINE
Payer: MEDICARE

## 2019-01-01 ENCOUNTER — HOSPITAL ENCOUNTER (EMERGENCY)
Age: 59
Discharge: HOME | End: 2019-05-29
Attending: EMERGENCY MEDICINE
Payer: MEDICARE

## 2019-01-01 ENCOUNTER — APPOINTMENT (OUTPATIENT)
Dept: GENERAL RADIOLOGY | Age: 59
DRG: 432 | End: 2019-01-01
Attending: INTERNAL MEDICINE
Payer: OTHER MISCELLANEOUS

## 2019-01-01 ENCOUNTER — TELEPHONE (OUTPATIENT)
Dept: ONCOLOGY | Age: 59
End: 2019-01-01

## 2019-01-01 ENCOUNTER — HOSPITAL ENCOUNTER (INPATIENT)
Age: 59
LOS: 1 days | DRG: 951 | End: 2019-07-11
Attending: FAMILY MEDICINE | Admitting: FAMILY MEDICINE
Payer: OTHER MISCELLANEOUS

## 2019-01-01 ENCOUNTER — HOSPITAL ENCOUNTER (INPATIENT)
Age: 59
LOS: 1 days | Discharge: HOSPICE | DRG: 432 | End: 2019-07-10
Attending: EMERGENCY MEDICINE | Admitting: FAMILY MEDICINE
Payer: OTHER MISCELLANEOUS

## 2019-01-01 ENCOUNTER — APPOINTMENT (OUTPATIENT)
Dept: CT IMAGING | Age: 59
DRG: 432 | End: 2019-01-01
Attending: EMERGENCY MEDICINE
Payer: OTHER MISCELLANEOUS

## 2019-01-01 ENCOUNTER — APPOINTMENT (OUTPATIENT)
Dept: GENERAL RADIOLOGY | Age: 59
DRG: 432 | End: 2019-01-01
Attending: EMERGENCY MEDICINE
Payer: OTHER MISCELLANEOUS

## 2019-01-01 ENCOUNTER — HOSPICE ADMISSION (OUTPATIENT)
Dept: HOSPICE | Facility: HOSPICE | Age: 59
End: 2019-01-01
Payer: MEDICARE

## 2019-01-01 ENCOUNTER — TELEPHONE (OUTPATIENT)
Dept: HEMATOLOGY | Age: 59
End: 2019-01-01

## 2019-01-01 VITALS
BODY MASS INDEX: 36.16 KG/M2 | SYSTOLIC BLOOD PRESSURE: 161 MMHG | WEIGHT: 267 LBS | RESPIRATION RATE: 16 BRPM | TEMPERATURE: 97.7 F | DIASTOLIC BLOOD PRESSURE: 95 MMHG | HEIGHT: 72 IN | OXYGEN SATURATION: 94 % | HEART RATE: 91 BPM

## 2019-01-01 VITALS
OXYGEN SATURATION: 91 % | TEMPERATURE: 104.6 F | RESPIRATION RATE: 30 BRPM | SYSTOLIC BLOOD PRESSURE: 146 MMHG | HEART RATE: 142 BPM | DIASTOLIC BLOOD PRESSURE: 74 MMHG

## 2019-01-01 VITALS — DIASTOLIC BLOOD PRESSURE: 53 MMHG | HEART RATE: 96 BPM | OXYGEN SATURATION: 98 % | SYSTOLIC BLOOD PRESSURE: 85 MMHG

## 2019-01-01 VITALS
TEMPERATURE: 97.9 F | RESPIRATION RATE: 18 BRPM | DIASTOLIC BLOOD PRESSURE: 78 MMHG | HEART RATE: 66 BPM | WEIGHT: 266 LBS | HEIGHT: 72 IN | SYSTOLIC BLOOD PRESSURE: 167 MMHG | OXYGEN SATURATION: 98 % | BODY MASS INDEX: 36.03 KG/M2

## 2019-01-01 VITALS
HEIGHT: 72 IN | RESPIRATION RATE: 16 BRPM | DIASTOLIC BLOOD PRESSURE: 81 MMHG | BODY MASS INDEX: 36.03 KG/M2 | WEIGHT: 266 LBS | HEART RATE: 84 BPM | TEMPERATURE: 98.3 F | OXYGEN SATURATION: 97 % | SYSTOLIC BLOOD PRESSURE: 131 MMHG

## 2019-01-01 VITALS
SYSTOLIC BLOOD PRESSURE: 124 MMHG | RESPIRATION RATE: 16 BRPM | TEMPERATURE: 98 F | HEIGHT: 72 IN | OXYGEN SATURATION: 96 % | HEART RATE: 93 BPM | WEIGHT: 255 LBS | DIASTOLIC BLOOD PRESSURE: 83 MMHG | BODY MASS INDEX: 34.54 KG/M2

## 2019-01-01 VITALS
DIASTOLIC BLOOD PRESSURE: 60 MMHG | SYSTOLIC BLOOD PRESSURE: 140 MMHG | OXYGEN SATURATION: 100 % | HEART RATE: 102 BPM | RESPIRATION RATE: 16 BRPM

## 2019-01-01 VITALS
DIASTOLIC BLOOD PRESSURE: 68 MMHG | OXYGEN SATURATION: 97 % | HEART RATE: 94 BPM | HEIGHT: 72 IN | SYSTOLIC BLOOD PRESSURE: 109 MMHG | RESPIRATION RATE: 12 BRPM | BODY MASS INDEX: 37.25 KG/M2 | WEIGHT: 275 LBS

## 2019-01-01 VITALS
OXYGEN SATURATION: 98 % | TEMPERATURE: 97.9 F | RESPIRATION RATE: 16 BRPM | WEIGHT: 249 LBS | BODY MASS INDEX: 33.72 KG/M2 | SYSTOLIC BLOOD PRESSURE: 97 MMHG | DIASTOLIC BLOOD PRESSURE: 69 MMHG | HEART RATE: 76 BPM | HEIGHT: 72 IN

## 2019-01-01 VITALS
HEIGHT: 72 IN | DIASTOLIC BLOOD PRESSURE: 86 MMHG | BODY MASS INDEX: 30.01 KG/M2 | HEART RATE: 98 BPM | RESPIRATION RATE: 20 BRPM | SYSTOLIC BLOOD PRESSURE: 129 MMHG | OXYGEN SATURATION: 97 % | TEMPERATURE: 97.9 F | WEIGHT: 221.56 LBS

## 2019-01-01 VITALS
DIASTOLIC BLOOD PRESSURE: 90 MMHG | HEART RATE: 104 BPM | BODY MASS INDEX: 31.15 KG/M2 | HEIGHT: 72 IN | TEMPERATURE: 96.8 F | RESPIRATION RATE: 16 BRPM | WEIGHT: 230 LBS | SYSTOLIC BLOOD PRESSURE: 122 MMHG | OXYGEN SATURATION: 99 %

## 2019-01-01 VITALS
SYSTOLIC BLOOD PRESSURE: 114 MMHG | OXYGEN SATURATION: 95 % | TEMPERATURE: 98.6 F | HEART RATE: 98 BPM | DIASTOLIC BLOOD PRESSURE: 68 MMHG | RESPIRATION RATE: 18 BRPM | HEIGHT: 72 IN | WEIGHT: 275 LBS | BODY MASS INDEX: 37.25 KG/M2

## 2019-01-01 VITALS
OXYGEN SATURATION: 99 % | DIASTOLIC BLOOD PRESSURE: 76 MMHG | WEIGHT: 232 LBS | BODY MASS INDEX: 31.42 KG/M2 | HEART RATE: 106 BPM | RESPIRATION RATE: 16 BRPM | SYSTOLIC BLOOD PRESSURE: 118 MMHG | HEIGHT: 72 IN | TEMPERATURE: 98.4 F

## 2019-01-01 VITALS
WEIGHT: 248.2 LBS | DIASTOLIC BLOOD PRESSURE: 82 MMHG | HEIGHT: 72 IN | OXYGEN SATURATION: 99 % | HEART RATE: 114 BPM | SYSTOLIC BLOOD PRESSURE: 120 MMHG | TEMPERATURE: 97.7 F | BODY MASS INDEX: 33.62 KG/M2 | RESPIRATION RATE: 18 BRPM

## 2019-01-01 VITALS — OXYGEN SATURATION: 95 % | DIASTOLIC BLOOD PRESSURE: 52 MMHG | SYSTOLIC BLOOD PRESSURE: 103 MMHG | HEART RATE: 97 BPM

## 2019-01-01 VITALS
HEIGHT: 72 IN | HEART RATE: 107 BPM | DIASTOLIC BLOOD PRESSURE: 77 MMHG | OXYGEN SATURATION: 99 % | TEMPERATURE: 98.3 F | WEIGHT: 268 LBS | SYSTOLIC BLOOD PRESSURE: 119 MMHG | BODY MASS INDEX: 36.3 KG/M2 | RESPIRATION RATE: 18 BRPM

## 2019-01-01 DIAGNOSIS — I85.10 SECONDARY ESOPHAGEAL VARICES WITHOUT BLEEDING (HCC): ICD-10-CM

## 2019-01-01 DIAGNOSIS — R45.1 RESTLESSNESS AND AGITATION: ICD-10-CM

## 2019-01-01 DIAGNOSIS — R18.8 CIRRHOSIS OF LIVER WITH ASCITES, UNSPECIFIED HEPATIC CIRRHOSIS TYPE (HCC): ICD-10-CM

## 2019-01-01 DIAGNOSIS — E29.1 HYPOGONADISM, MALE: ICD-10-CM

## 2019-01-01 DIAGNOSIS — K70.30 ALCOHOLIC CIRRHOSIS, UNSPECIFIED WHETHER ASCITES PRESENT (HCC): ICD-10-CM

## 2019-01-01 DIAGNOSIS — K70.31 ASCITES DUE TO ALCOHOLIC CIRRHOSIS (HCC): Primary | ICD-10-CM

## 2019-01-01 DIAGNOSIS — F10.10 ETOH ABUSE: Primary | ICD-10-CM

## 2019-01-01 DIAGNOSIS — R11.2 INTRACTABLE VOMITING WITH NAUSEA, UNSPECIFIED VOMITING TYPE: ICD-10-CM

## 2019-01-01 DIAGNOSIS — R40.1 STUPOR: Primary | ICD-10-CM

## 2019-01-01 DIAGNOSIS — Z71.89 GOALS OF CARE, COUNSELING/DISCUSSION: ICD-10-CM

## 2019-01-01 DIAGNOSIS — K42.9 UMBILICAL HERNIA WITHOUT OBSTRUCTION AND WITHOUT GANGRENE: ICD-10-CM

## 2019-01-01 DIAGNOSIS — M54.50 ACUTE MIDLINE LOW BACK PAIN WITHOUT SCIATICA: ICD-10-CM

## 2019-01-01 DIAGNOSIS — K72.10 END STAGE LIVER DISEASE (HCC): ICD-10-CM

## 2019-01-01 DIAGNOSIS — F10.20 ALCOHOL DEPENDENCE (HCC): ICD-10-CM

## 2019-01-01 DIAGNOSIS — K74.60 CIRRHOSIS OF LIVER (HCC): ICD-10-CM

## 2019-01-01 DIAGNOSIS — F10.11 HISTORY OF ALCOHOL ABUSE: ICD-10-CM

## 2019-01-01 DIAGNOSIS — D69.6 THROMBOCYTOPENIA (HCC): Primary | ICD-10-CM

## 2019-01-01 DIAGNOSIS — F10.930 ALCOHOL WITHDRAWAL SYNDROME WITHOUT COMPLICATION (HCC): Primary | ICD-10-CM

## 2019-01-01 DIAGNOSIS — K70.31 ALCOHOLIC CIRRHOSIS OF LIVER WITH ASCITES (HCC): ICD-10-CM

## 2019-01-01 DIAGNOSIS — E83.42 HYPOMAGNESEMIA: ICD-10-CM

## 2019-01-01 DIAGNOSIS — I85.10 ESOPHAGEAL VARICES IN ALCOHOLIC CIRRHOSIS (HCC): ICD-10-CM

## 2019-01-01 DIAGNOSIS — L08.9 SKIN INFECTION: ICD-10-CM

## 2019-01-01 DIAGNOSIS — D69.6 THROMBOCYTOPENIA (HCC): ICD-10-CM

## 2019-01-01 DIAGNOSIS — K70.11 ALCOHOLIC HEPATITIS WITH ASCITES: ICD-10-CM

## 2019-01-01 DIAGNOSIS — K72.10 CHRONIC LIVER FAILURE WITHOUT HEPATIC COMA (HCC): Primary | ICD-10-CM

## 2019-01-01 DIAGNOSIS — Z71.89 ADVANCED CARE PLANNING/COUNSELING DISCUSSION: ICD-10-CM

## 2019-01-01 DIAGNOSIS — R16.1 SPLENOMEGALY: ICD-10-CM

## 2019-01-01 DIAGNOSIS — D64.9 ANEMIA, UNSPECIFIED TYPE: ICD-10-CM
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
## 2019-01-01 DIAGNOSIS — E87.6 HYPOKALEMIA: ICD-10-CM

## 2019-01-01 DIAGNOSIS — K70.31 ALCOHOLIC CIRRHOSIS OF LIVER WITH ASCITES (HCC): Primary | ICD-10-CM

## 2019-01-01 DIAGNOSIS — R79.89 INCREASED AMMONIA LEVEL: ICD-10-CM

## 2019-01-01 DIAGNOSIS — M62.830 BACK SPASM: Primary | ICD-10-CM

## 2019-01-01 DIAGNOSIS — E83.39 HYPOPHOSPHATEMIA: ICD-10-CM

## 2019-01-01 DIAGNOSIS — E29.1 HYPOGONADISM, MALE: Primary | ICD-10-CM

## 2019-01-01 DIAGNOSIS — F10.929 ALCOHOLIC INTOXICATION WITH COMPLICATION (HCC): ICD-10-CM

## 2019-01-01 DIAGNOSIS — F10.231 ALCOHOL DEPENDENCE WITH WITHDRAWAL DELIRIUM (HCC): ICD-10-CM

## 2019-01-01 DIAGNOSIS — K70.11 ASCITES DUE TO ALCOHOLIC HEPATITIS: ICD-10-CM

## 2019-01-01 DIAGNOSIS — K74.60 CIRRHOSIS OF LIVER WITH ASCITES, UNSPECIFIED HEPATIC CIRRHOSIS TYPE (HCC): ICD-10-CM

## 2019-01-01 DIAGNOSIS — R06.02 SOB (SHORTNESS OF BREATH): ICD-10-CM

## 2019-01-01 DIAGNOSIS — F10.230 ALCOHOL DEPENDENCE WITH UNCOMPLICATED WITHDRAWAL (HCC): ICD-10-CM

## 2019-01-01 DIAGNOSIS — Z71.89 DNR (DO NOT RESUSCITATE) DISCUSSION: ICD-10-CM

## 2019-01-01 DIAGNOSIS — K70.30 ESOPHAGEAL VARICES IN ALCOHOLIC CIRRHOSIS (HCC): ICD-10-CM

## 2019-01-01 DIAGNOSIS — K76.82 HEPATIC ENCEPHALOPATHY: ICD-10-CM

## 2019-01-01 DIAGNOSIS — E72.20 HYPERAMMONEMIA (HCC): ICD-10-CM

## 2019-01-01 DIAGNOSIS — K74.60 LIVER CIRRHOSIS (HCC): ICD-10-CM

## 2019-01-01 DIAGNOSIS — R73.03 PREDIABETES: Primary | ICD-10-CM

## 2019-01-01 DIAGNOSIS — F51.01 PRIMARY INSOMNIA: ICD-10-CM

## 2019-01-01 LAB
ALBUMIN FLD-MCNC: 0.4 G/DL
ALBUMIN SERPL-MCNC: 2.2 G/DL (ref 3.5–5)
ALBUMIN SERPL-MCNC: 2.2 G/DL (ref 3.5–5)
ALBUMIN SERPL-MCNC: 2.3 G/DL (ref 3.5–5)
ALBUMIN SERPL-MCNC: 2.3 G/DL (ref 3.5–5)
ALBUMIN SERPL-MCNC: 2.4 G/DL (ref 3.5–5)
ALBUMIN SERPL-MCNC: 2.4 G/DL (ref 3.5–5)
ALBUMIN SERPL-MCNC: 2.5 G/DL (ref 3.5–5)
ALBUMIN SERPL-MCNC: 2.6 G/DL (ref 3.5–5)
ALBUMIN SERPL-MCNC: 2.8 G/DL (ref 3.5–5.5)
ALBUMIN/GLOB SERPL: 0.5 {RATIO} (ref 1.1–2.2)
ALBUMIN/GLOB SERPL: 0.6 {RATIO} (ref 1.1–2.2)
ALBUMIN/GLOB SERPL: 0.6 {RATIO} (ref 1.2–2.2)
ALP SERPL-CCNC: 106 U/L (ref 45–117)
ALP SERPL-CCNC: 107 IU/L (ref 39–117)
ALP SERPL-CCNC: 112 U/L (ref 45–117)
ALP SERPL-CCNC: 112 U/L (ref 45–117)
ALP SERPL-CCNC: 113 U/L (ref 45–117)
ALP SERPL-CCNC: 118 U/L (ref 45–117)
ALP SERPL-CCNC: 127 U/L (ref 45–117)
ALP SERPL-CCNC: 130 U/L (ref 45–117)
ALP SERPL-CCNC: 135 U/L (ref 45–117)
ALP SERPL-CCNC: 160 U/L (ref 45–117)
ALP SERPL-CCNC: 93 U/L (ref 45–117)
ALT SERPL-CCNC: 14 U/L (ref 12–78)
ALT SERPL-CCNC: 16 U/L (ref 12–78)
ALT SERPL-CCNC: 18 IU/L (ref 0–44)
ALT SERPL-CCNC: 19 U/L (ref 12–78)
ALT SERPL-CCNC: 19 U/L (ref 12–78)
ALT SERPL-CCNC: 20 U/L (ref 12–78)
ALT SERPL-CCNC: 21 U/L (ref 12–78)
ALT SERPL-CCNC: 24 U/L (ref 12–78)
ALT SERPL-CCNC: 24 U/L (ref 12–78)
ALT SERPL-CCNC: 25 U/L (ref 12–78)
ALT SERPL-CCNC: 30 U/L (ref 12–78)
AMMONIA PLAS-MCNC: 122 UG/DL (ref 27–102)
AMMONIA PLAS-MCNC: 149 UG/DL (ref 27–102)
AMMONIA PLAS-SCNC: 118 UMOL/L
AMMONIA PLAS-SCNC: 241 UMOL/L
AMMONIA PLAS-SCNC: 55 UMOL/L
AMMONIA PLAS-SCNC: 88 UMOL/L
AMPHET UR QL SCN: NEGATIVE
ANION GAP SERPL CALC-SCNC: 10 MMOL/L (ref 5–15)
ANION GAP SERPL CALC-SCNC: 3 MMOL/L (ref 5–15)
ANION GAP SERPL CALC-SCNC: 5 MMOL/L (ref 5–15)
ANION GAP SERPL CALC-SCNC: 6 MMOL/L (ref 5–15)
ANION GAP SERPL CALC-SCNC: 7 MMOL/L (ref 5–15)
ANION GAP SERPL CALC-SCNC: 8 MMOL/L (ref 5–15)
ANION GAP SERPL CALC-SCNC: 8 MMOL/L (ref 5–15)
ANION GAP SERPL CALC-SCNC: 9 MMOL/L (ref 5–15)
ANION GAP SERPL CALC-SCNC: 9 MMOL/L (ref 5–15)
APPEARANCE FLD: ABNORMAL
APPEARANCE UR: CLEAR
APTT PPP: 34.7 SEC (ref 22.1–32)
APTT PPP: 39 SEC (ref 24–33)
AST SERPL-CCNC: 20 U/L (ref 15–37)
AST SERPL-CCNC: 32 U/L (ref 15–37)
AST SERPL-CCNC: 33 U/L (ref 15–37)
AST SERPL-CCNC: 35 IU/L (ref 0–40)
AST SERPL-CCNC: 35 U/L (ref 15–37)
AST SERPL-CCNC: 35 U/L (ref 15–37)
AST SERPL-CCNC: 41 U/L (ref 15–37)
AST SERPL-CCNC: 48 U/L (ref 15–37)
AST SERPL-CCNC: 61 U/L (ref 15–37)
AST SERPL-CCNC: 67 U/L (ref 15–37)
AST SERPL-CCNC: 71 U/L (ref 15–37)
ATRIAL RATE: 102 BPM
ATRIAL RATE: 73 BPM
ATRIAL RATE: 83 BPM
BACTERIA SPEC CULT: NORMAL
BACTERIA URNS QL MICRO: NEGATIVE /HPF
BARBITURATES UR QL SCN: NEGATIVE
BASOPHILS # BLD AUTO: 0 X10E3/UL (ref 0–0.2)
BASOPHILS # BLD AUTO: 0.1 X10E3/UL (ref 0–0.2)
BASOPHILS # BLD AUTO: 0.3 X10E3/UL (ref 0–0.2)
BASOPHILS # BLD: 0 K/UL (ref 0–0.1)
BASOPHILS # BLD: 0.1 K/UL (ref 0–0.1)
BASOPHILS # BLD: 0.2 K/UL (ref 0–0.1)
BASOPHILS NFR BLD AUTO: 1 %
BASOPHILS NFR BLD AUTO: 2 %
BASOPHILS NFR BLD AUTO: 4 %
BASOPHILS NFR BLD: 1 % (ref 0–1)
BASOPHILS NFR BLD: 2 % (ref 0–1)
BASOPHILS NFR BLD: 3 % (ref 0–1)
BENZODIAZ UR QL: NEGATIVE
BILIRUB SERPL-MCNC: 1.7 MG/DL (ref 0–1.2)
BILIRUB SERPL-MCNC: 2.1 MG/DL (ref 0.2–1)
BILIRUB SERPL-MCNC: 2.5 MG/DL (ref 0.2–1)
BILIRUB SERPL-MCNC: 2.5 MG/DL (ref 0.2–1)
BILIRUB SERPL-MCNC: 2.6 MG/DL (ref 0.2–1)
BILIRUB SERPL-MCNC: 2.6 MG/DL (ref 0.2–1)
BILIRUB SERPL-MCNC: 2.9 MG/DL (ref 0.2–1)
BILIRUB SERPL-MCNC: 3.7 MG/DL (ref 0.2–1)
BILIRUB SERPL-MCNC: 4 MG/DL (ref 0.2–1)
BILIRUB SERPL-MCNC: 4.2 MG/DL (ref 0.2–1)
BILIRUB SERPL-MCNC: 4.3 MG/DL (ref 0.2–1)
BILIRUB SERPL-MCNC: 4.5 MG/DL (ref 0.2–1)
BILIRUB SERPL-MCNC: 5 MG/DL (ref 0.2–1)
BILIRUB SERPL-MCNC: 5.4 MG/DL (ref 0.2–1)
BILIRUB UR QL CFM: NEGATIVE
BILIRUB UR QL CFM: POSITIVE
BILIRUB UR QL: NEGATIVE
BNP SERPL-MCNC: 239 PG/ML
BNP SERPL-MCNC: 56 PG/ML
BUN SERPL-MCNC: 16 MG/DL (ref 6–20)
BUN SERPL-MCNC: 3 MG/DL (ref 6–20)
BUN SERPL-MCNC: 3 MG/DL (ref 6–20)
BUN SERPL-MCNC: 3 MG/DL (ref 6–24)
BUN SERPL-MCNC: 4 MG/DL (ref 6–20)
BUN SERPL-MCNC: 4 MG/DL (ref 6–24)
BUN SERPL-MCNC: 5 MG/DL (ref 6–20)
BUN SERPL-MCNC: 6 MG/DL (ref 6–20)
BUN SERPL-MCNC: 6 MG/DL (ref 6–20)
BUN SERPL-MCNC: 6 MG/DL (ref 6–24)
BUN SERPL-MCNC: 7 MG/DL (ref 6–20)
BUN SERPL-MCNC: 8 MG/DL (ref 6–24)
BUN/CREAT SERPL: 10 (ref 12–20)
BUN/CREAT SERPL: 11 (ref 12–20)
BUN/CREAT SERPL: 3 (ref 12–20)
BUN/CREAT SERPL: 4 (ref 12–20)
BUN/CREAT SERPL: 5 (ref 12–20)
BUN/CREAT SERPL: 5 (ref 12–20)
BUN/CREAT SERPL: 5 (ref 9–20)
BUN/CREAT SERPL: 5 (ref 9–20)
BUN/CREAT SERPL: 6 (ref 12–20)
BUN/CREAT SERPL: 6 (ref 12–20)
BUN/CREAT SERPL: 6 (ref 9–20)
BUN/CREAT SERPL: 7 (ref 12–20)
BUN/CREAT SERPL: 8 (ref 12–20)
BUN/CREAT SERPL: 8 (ref 9–20)
CALCIUM SERPL-MCNC: 7.2 MG/DL (ref 8.5–10.1)
CALCIUM SERPL-MCNC: 7.3 MG/DL (ref 8.5–10.1)
CALCIUM SERPL-MCNC: 7.4 MG/DL (ref 8.5–10.1)
CALCIUM SERPL-MCNC: 7.5 MG/DL (ref 8.5–10.1)
CALCIUM SERPL-MCNC: 7.6 MG/DL (ref 8.7–10.2)
CALCIUM SERPL-MCNC: 7.7 MG/DL (ref 8.5–10.1)
CALCIUM SERPL-MCNC: 7.7 MG/DL (ref 8.5–10.1)
CALCIUM SERPL-MCNC: 7.9 MG/DL (ref 8.5–10.1)
CALCIUM SERPL-MCNC: 8 MG/DL (ref 8.5–10.1)
CALCIUM SERPL-MCNC: 8.1 MG/DL (ref 8.5–10.1)
CALCIUM SERPL-MCNC: 8.1 MG/DL (ref 8.7–10.2)
CALCIUM SERPL-MCNC: 8.1 MG/DL (ref 8.7–10.2)
CALCIUM SERPL-MCNC: 8.2 MG/DL (ref 8.5–10.1)
CALCIUM SERPL-MCNC: 8.3 MG/DL (ref 8.5–10.1)
CALCIUM SERPL-MCNC: 8.4 MG/DL (ref 8.7–10.2)
CALCIUM SERPL-MCNC: 8.5 MG/DL (ref 8.5–10.1)
CALCIUM SERPL-MCNC: 8.7 MG/DL (ref 8.5–10.1)
CALCULATED P AXIS, ECG09: -10 DEGREES
CALCULATED P AXIS, ECG09: 16 DEGREES
CALCULATED P AXIS, ECG09: 51 DEGREES
CALCULATED R AXIS, ECG10: -26 DEGREES
CALCULATED R AXIS, ECG10: -39 DEGREES
CALCULATED R AXIS, ECG10: -43 DEGREES
CALCULATED T AXIS, ECG11: 20 DEGREES
CALCULATED T AXIS, ECG11: 27 DEGREES
CALCULATED T AXIS, ECG11: 54 DEGREES
CANNABINOIDS UR QL SCN: NEGATIVE
CC UR VC: NORMAL
CHLORIDE SERPL-SCNC: 100 MMOL/L (ref 97–108)
CHLORIDE SERPL-SCNC: 101 MMOL/L (ref 97–108)
CHLORIDE SERPL-SCNC: 102 MMOL/L (ref 96–106)
CHLORIDE SERPL-SCNC: 102 MMOL/L (ref 97–108)
CHLORIDE SERPL-SCNC: 103 MMOL/L (ref 97–108)
CHLORIDE SERPL-SCNC: 104 MMOL/L (ref 96–106)
CHLORIDE SERPL-SCNC: 104 MMOL/L (ref 96–106)
CHLORIDE SERPL-SCNC: 105 MMOL/L (ref 97–108)
CHLORIDE SERPL-SCNC: 107 MMOL/L (ref 97–108)
CHLORIDE SERPL-SCNC: 107 MMOL/L (ref 97–108)
CHLORIDE SERPL-SCNC: 94 MMOL/L (ref 97–108)
CHLORIDE SERPL-SCNC: 97 MMOL/L (ref 97–108)
CHLORIDE SERPL-SCNC: 98 MMOL/L (ref 96–106)
CHLORIDE SERPL-SCNC: 99 MMOL/L (ref 97–108)
CK MB CFR SERPL CALC: 2.2 % (ref 0–2.5)
CK MB SERPL-MCNC: 3.3 NG/ML (ref 5–25)
CK SERPL-CCNC: 147 U/L (ref 39–308)
CO2 SERPL-SCNC: 20 MMOL/L (ref 20–29)
CO2 SERPL-SCNC: 21 MMOL/L (ref 20–29)
CO2 SERPL-SCNC: 21 MMOL/L (ref 20–29)
CO2 SERPL-SCNC: 22 MMOL/L (ref 21–32)
CO2 SERPL-SCNC: 23 MMOL/L (ref 21–32)
CO2 SERPL-SCNC: 24 MMOL/L (ref 21–32)
CO2 SERPL-SCNC: 25 MMOL/L (ref 20–29)
CO2 SERPL-SCNC: 25 MMOL/L (ref 21–32)
CO2 SERPL-SCNC: 26 MMOL/L (ref 21–32)
CO2 SERPL-SCNC: 26 MMOL/L (ref 21–32)
CO2 SERPL-SCNC: 27 MMOL/L (ref 21–32)
CO2 SERPL-SCNC: 27 MMOL/L (ref 21–32)
CO2 SERPL-SCNC: 28 MMOL/L (ref 21–32)
COCAINE UR QL SCN: NEGATIVE
COLOR FLD: YELLOW
COLOR UR: ABNORMAL
COMMENT, HOLDF: NORMAL
CREAT SERPL-MCNC: 0.56 MG/DL (ref 0.7–1.3)
CREAT SERPL-MCNC: 0.65 MG/DL (ref 0.76–1.27)
CREAT SERPL-MCNC: 0.66 MG/DL (ref 0.7–1.3)
CREAT SERPL-MCNC: 0.68 MG/DL (ref 0.7–1.3)
CREAT SERPL-MCNC: 0.7 MG/DL (ref 0.7–1.3)
CREAT SERPL-MCNC: 0.71 MG/DL (ref 0.76–1.27)
CREAT SERPL-MCNC: 0.72 MG/DL (ref 0.7–1.3)
CREAT SERPL-MCNC: 0.72 MG/DL (ref 0.7–1.3)
CREAT SERPL-MCNC: 0.74 MG/DL (ref 0.7–1.3)
CREAT SERPL-MCNC: 0.74 MG/DL (ref 0.7–1.3)
CREAT SERPL-MCNC: 0.76 MG/DL (ref 0.7–1.3)
CREAT SERPL-MCNC: 0.78 MG/DL (ref 0.7–1.3)
CREAT SERPL-MCNC: 0.8 MG/DL (ref 0.76–1.27)
CREAT SERPL-MCNC: 0.8 MG/DL (ref 0.7–1.3)
CREAT SERPL-MCNC: 0.81 MG/DL (ref 0.7–1.3)
CREAT SERPL-MCNC: 0.81 MG/DL (ref 0.7–1.3)
CREAT SERPL-MCNC: 0.91 MG/DL (ref 0.7–1.3)
CREAT SERPL-MCNC: 1.4 MG/DL (ref 0.76–1.27)
CREAT SERPL-MCNC: 1.56 MG/DL (ref 0.7–1.3)
CREAT UR-MCNC: 59.5 MG/DL
DIAGNOSIS, 93000: NORMAL
DIFFERENTIAL METHOD BLD: ABNORMAL
DRUG SCRN COMMENT,DRGCM: NORMAL
EOSINOPHIL # BLD AUTO: 0 X10E3/UL (ref 0–0.4)
EOSINOPHIL # BLD AUTO: 0.2 X10E3/UL (ref 0–0.4)
EOSINOPHIL # BLD AUTO: 0.3 X10E3/UL (ref 0–0.4)
EOSINOPHIL # BLD: 0.1 K/UL (ref 0–0.4)
EOSINOPHIL # BLD: 0.2 K/UL (ref 0–0.4)
EOSINOPHIL # BLD: 0.3 K/UL (ref 0–0.4)
EOSINOPHIL # BLD: 0.4 K/UL (ref 0–0.4)
EOSINOPHIL NFR BLD AUTO: 1 %
EOSINOPHIL NFR BLD AUTO: 3 %
EOSINOPHIL NFR BLD AUTO: 3 %
EOSINOPHIL NFR BLD AUTO: 4 %
EOSINOPHIL NFR BLD AUTO: 5 %
EOSINOPHIL NFR BLD AUTO: 5 %
EOSINOPHIL NFR BLD: 2 % (ref 0–7)
EOSINOPHIL NFR BLD: 2 % (ref 0–7)
EOSINOPHIL NFR BLD: 3 % (ref 0–7)
EOSINOPHIL NFR BLD: 4 % (ref 0–7)
EOSINOPHIL NFR BLD: 4 % (ref 0–7)
EOSINOPHIL NFR BLD: 6 % (ref 0–7)
EOSINOPHIL NFR BLD: 6 % (ref 0–7)
EOSINOPHIL NFR BLD: 7 % (ref 0–7)
EPITH CASTS URNS QL MICRO: ABNORMAL /LPF
ERYTHROCYTE [DISTWIDTH] IN BLOOD BY AUTOMATED COUNT: 17.8 % (ref 11.5–14.5)
ERYTHROCYTE [DISTWIDTH] IN BLOOD BY AUTOMATED COUNT: 18 % (ref 12.3–15.4)
ERYTHROCYTE [DISTWIDTH] IN BLOOD BY AUTOMATED COUNT: 18.4 % (ref 12.3–15.4)
ERYTHROCYTE [DISTWIDTH] IN BLOOD BY AUTOMATED COUNT: 19.6 % (ref 12.3–15.4)
ERYTHROCYTE [DISTWIDTH] IN BLOOD BY AUTOMATED COUNT: 19.6 % (ref 12.3–15.4)
ERYTHROCYTE [DISTWIDTH] IN BLOOD BY AUTOMATED COUNT: 20 % (ref 12.3–15.4)
ERYTHROCYTE [DISTWIDTH] IN BLOOD BY AUTOMATED COUNT: 20.9 % (ref 11.5–14.5)
ERYTHROCYTE [DISTWIDTH] IN BLOOD BY AUTOMATED COUNT: 21.3 % (ref 11.5–14.5)
ERYTHROCYTE [DISTWIDTH] IN BLOOD BY AUTOMATED COUNT: 21.4 % (ref 11.5–14.5)
ERYTHROCYTE [DISTWIDTH] IN BLOOD BY AUTOMATED COUNT: 22 % (ref 11.5–14.5)
ERYTHROCYTE [DISTWIDTH] IN BLOOD BY AUTOMATED COUNT: 22.1 % (ref 11.5–14.5)
ERYTHROCYTE [DISTWIDTH] IN BLOOD BY AUTOMATED COUNT: 22.1 % (ref 11.5–14.5)
ERYTHROCYTE [DISTWIDTH] IN BLOOD BY AUTOMATED COUNT: 22.2 % (ref 12.3–15.4)
ERYTHROCYTE [DISTWIDTH] IN BLOOD BY AUTOMATED COUNT: 22.3 % (ref 11.5–14.5)
ERYTHROCYTE [DISTWIDTH] IN BLOOD BY AUTOMATED COUNT: 22.3 % (ref 11.5–14.5)
ERYTHROCYTE [DISTWIDTH] IN BLOOD BY AUTOMATED COUNT: 22.4 % (ref 11.5–14.5)
ERYTHROCYTE [DISTWIDTH] IN BLOOD BY AUTOMATED COUNT: 22.4 % (ref 11.5–14.5)
ERYTHROCYTE [DISTWIDTH] IN BLOOD BY AUTOMATED COUNT: 22.6 % (ref 11.5–14.5)
EST. AVERAGE GLUCOSE BLD GHB EST-MCNC: 97 MG/DL
ETHANOL SERPL-MCNC: 142 MG/DL
ETHANOL SERPL-MCNC: <10 MG/DL
ETHANOL SERPL-MCNC: <10 MG/DL
FERRITIN SERPL-MCNC: 66 NG/ML (ref 30–400)
FOLATE SERPL-MCNC: 13.7 NG/ML (ref 5–21)
GLOBULIN SER CALC-MCNC: 3.9 G/DL (ref 2–4)
GLOBULIN SER CALC-MCNC: 4.3 G/DL (ref 2–4)
GLOBULIN SER CALC-MCNC: 4.4 G/DL (ref 1.5–4.5)
GLOBULIN SER CALC-MCNC: 4.5 G/DL (ref 2–4)
GLOBULIN SER CALC-MCNC: 4.5 G/DL (ref 2–4)
GLOBULIN SER CALC-MCNC: 4.6 G/DL (ref 2–4)
GLOBULIN SER CALC-MCNC: 4.7 G/DL (ref 2–4)
GLOBULIN SER CALC-MCNC: 5 G/DL (ref 2–4)
GLOBULIN SER CALC-MCNC: 5.1 G/DL (ref 2–4)
GLOBULIN SER CALC-MCNC: 5.3 G/DL (ref 2–4)
GLOBULIN SER CALC-MCNC: 5.7 G/DL (ref 2–4)
GLUCOSE BLD STRIP.AUTO-MCNC: 107 MG/DL (ref 65–100)
GLUCOSE BLD STRIP.AUTO-MCNC: 140 MG/DL (ref 65–100)
GLUCOSE FLD-MCNC: 121 MG/DL
GLUCOSE SERPL-MCNC: 105 MG/DL (ref 65–99)
GLUCOSE SERPL-MCNC: 109 MG/DL (ref 65–100)
GLUCOSE SERPL-MCNC: 109 MG/DL (ref 65–100)
GLUCOSE SERPL-MCNC: 111 MG/DL (ref 65–100)
GLUCOSE SERPL-MCNC: 111 MG/DL (ref 65–100)
GLUCOSE SERPL-MCNC: 122 MG/DL (ref 65–100)
GLUCOSE SERPL-MCNC: 139 MG/DL (ref 65–100)
GLUCOSE SERPL-MCNC: 150 MG/DL (ref 65–99)
GLUCOSE SERPL-MCNC: 80 MG/DL (ref 65–100)
GLUCOSE SERPL-MCNC: 80 MG/DL (ref 65–100)
GLUCOSE SERPL-MCNC: 85 MG/DL (ref 65–100)
GLUCOSE SERPL-MCNC: 87 MG/DL (ref 65–100)
GLUCOSE SERPL-MCNC: 88 MG/DL (ref 65–100)
GLUCOSE SERPL-MCNC: 89 MG/DL (ref 65–100)
GLUCOSE SERPL-MCNC: 90 MG/DL (ref 65–99)
GLUCOSE SERPL-MCNC: 91 MG/DL (ref 65–100)
GLUCOSE SERPL-MCNC: 94 MG/DL (ref 65–100)
GLUCOSE SERPL-MCNC: 94 MG/DL (ref 65–99)
GLUCOSE SERPL-MCNC: 97 MG/DL (ref 65–100)
GLUCOSE UR STRIP.AUTO-MCNC: NEGATIVE MG/DL
GRAM STN SPEC: NORMAL
GRAM STN SPEC: NORMAL
HBA1C MFR BLD: 5 % (ref 4.8–5.6)
HCT VFR BLD AUTO: 30 % (ref 36.6–50.3)
HCT VFR BLD AUTO: 31.3 % (ref 36.6–50.3)
HCT VFR BLD AUTO: 31.7 % (ref 36.6–50.3)
HCT VFR BLD AUTO: 31.7 % (ref 37.5–51)
HCT VFR BLD AUTO: 32 % (ref 36.6–50.3)
HCT VFR BLD AUTO: 33 % (ref 36.6–50.3)
HCT VFR BLD AUTO: 34.1 % (ref 36.6–50.3)
HCT VFR BLD AUTO: 34.3 % (ref 36.6–50.3)
HCT VFR BLD AUTO: 34.6 % (ref 37.5–51)
HCT VFR BLD AUTO: 34.8 % (ref 36.6–50.3)
HCT VFR BLD AUTO: 34.9 % (ref 36.6–50.3)
HCT VFR BLD AUTO: 34.9 % (ref 36.6–50.3)
HCT VFR BLD AUTO: 35.7 % (ref 37.5–51)
HCT VFR BLD AUTO: 36 % (ref 37.5–51)
HCT VFR BLD AUTO: 36.1 % (ref 36.6–50.3)
HCT VFR BLD AUTO: 36.1 % (ref 37.5–51)
HCT VFR BLD AUTO: 37.2 % (ref 37.5–51)
HCT VFR BLD AUTO: 37.6 % (ref 36.6–50.3)
HGB BLD-MCNC: 10.2 G/DL (ref 12.1–17)
HGB BLD-MCNC: 10.2 G/DL (ref 12.1–17)
HGB BLD-MCNC: 10.2 G/DL (ref 13–17.7)
HGB BLD-MCNC: 10.4 G/DL (ref 12.1–17)
HGB BLD-MCNC: 10.7 G/DL (ref 12.1–17)
HGB BLD-MCNC: 11 G/DL (ref 13–17.7)
HGB BLD-MCNC: 11.2 G/DL (ref 12.1–17)
HGB BLD-MCNC: 11.2 G/DL (ref 12.1–17)
HGB BLD-MCNC: 11.3 G/DL (ref 13–17.7)
HGB BLD-MCNC: 11.4 G/DL (ref 12.1–17)
HGB BLD-MCNC: 11.5 G/DL (ref 13–17.7)
HGB BLD-MCNC: 11.6 G/DL (ref 12.1–17)
HGB BLD-MCNC: 11.8 G/DL (ref 13–17.7)
HGB BLD-MCNC: 12.5 G/DL (ref 13–17.7)
HGB BLD-MCNC: 12.6 G/DL (ref 12.1–17)
HGB BLD-MCNC: 9.7 G/DL (ref 12.1–17)
HGB UR QL STRIP: ABNORMAL
HGB UR QL STRIP: ABNORMAL
HGB UR QL STRIP: NEGATIVE
HYALINE CASTS URNS QL MICRO: ABNORMAL /LPF (ref 0–5)
IMM GRANULOCYTES # BLD AUTO: 0 K/UL (ref 0–0.04)
IMM GRANULOCYTES # BLD AUTO: 0 X10E3/UL (ref 0–0.1)
IMM GRANULOCYTES # BLD AUTO: 0.1 K/UL (ref 0–0.04)
IMM GRANULOCYTES # BLD AUTO: 0.2 K/UL (ref 0–0.04)
IMM GRANULOCYTES NFR BLD AUTO: 0 %
IMM GRANULOCYTES NFR BLD AUTO: 0 % (ref 0–0.5)
IMM GRANULOCYTES NFR BLD AUTO: 1 %
IMM GRANULOCYTES NFR BLD AUTO: 1 %
IMM GRANULOCYTES NFR BLD AUTO: 1 % (ref 0–0.5)
IMM GRANULOCYTES NFR BLD AUTO: 2 % (ref 0–0.5)
INR PPP: 1.5 (ref 0.9–1.1)
INR PPP: 1.6 (ref 0.9–1.1)
INR PPP: 1.7 (ref 0.9–1.1)
INR PPP: 1.8 (ref 0.8–1.2)
INTERPRETATION: NORMAL
IRON SATN MFR SERPL: 29 % (ref 15–55)
IRON SERPL-MCNC: 61 UG/DL (ref 38–169)
KETONES UR QL STRIP.AUTO: 15 MG/DL
KETONES UR QL STRIP.AUTO: ABNORMAL MG/DL
KETONES UR QL STRIP.AUTO: NEGATIVE MG/DL
LACTATE BLD-SCNC: 3.17 MMOL/L (ref 0.4–2)
LACTATE SERPL-SCNC: 1.5 MMOL/L (ref 0.4–2)
LEUKOCYTE ESTERASE UR QL STRIP.AUTO: ABNORMAL
LEUKOCYTE ESTERASE UR QL STRIP.AUTO: NEGATIVE
LEUKOCYTE ESTERASE UR QL STRIP.AUTO: NEGATIVE
LIPASE SERPL-CCNC: 102 U/L (ref 73–393)
LIPASE SERPL-CCNC: 187 U/L (ref 73–393)
LIPASE SERPL-CCNC: 220 U/L (ref 73–393)
LYMPHOCYTES # BLD AUTO: 0.7 X10E3/UL (ref 0.7–3.1)
LYMPHOCYTES # BLD AUTO: 0.8 X10E3/UL (ref 0.7–3.1)
LYMPHOCYTES # BLD AUTO: 0.9 X10E3/UL (ref 0.7–3.1)
LYMPHOCYTES # BLD AUTO: 1.2 X10E3/UL (ref 0.7–3.1)
LYMPHOCYTES # BLD: 0.6 K/UL (ref 0.8–3.5)
LYMPHOCYTES # BLD: 0.7 K/UL (ref 0.8–3.5)
LYMPHOCYTES # BLD: 0.8 K/UL (ref 0.8–3.5)
LYMPHOCYTES # BLD: 0.8 K/UL (ref 0.8–3.5)
LYMPHOCYTES # BLD: 0.9 K/UL (ref 0.8–3.5)
LYMPHOCYTES # BLD: 0.9 K/UL (ref 0.8–3.5)
LYMPHOCYTES # BLD: 1 K/UL (ref 0.8–3.5)
LYMPHOCYTES # BLD: 1.3 K/UL (ref 0.8–3.5)
LYMPHOCYTES NFR BLD AUTO: 13 %
LYMPHOCYTES NFR BLD AUTO: 14 %
LYMPHOCYTES NFR BLD AUTO: 16 %
LYMPHOCYTES NFR BLD AUTO: 17 %
LYMPHOCYTES NFR BLD AUTO: 19 %
LYMPHOCYTES NFR BLD AUTO: 20 %
LYMPHOCYTES NFR BLD: 12 % (ref 12–49)
LYMPHOCYTES NFR BLD: 13 % (ref 12–49)
LYMPHOCYTES NFR BLD: 14 % (ref 12–49)
LYMPHOCYTES NFR BLD: 17 % (ref 12–49)
LYMPHOCYTES NFR BLD: 17 % (ref 12–49)
LYMPHOCYTES NFR BLD: 19 % (ref 12–49)
LYMPHOCYTES NFR FLD: 37 %
MAGNESIUM SERPL-MCNC: 1.6 MG/DL (ref 1.6–2.4)
MAGNESIUM SERPL-MCNC: 1.7 MG/DL (ref 1.6–2.4)
MAGNESIUM SERPL-MCNC: 1.8 MG/DL (ref 1.6–2.3)
MAGNESIUM SERPL-MCNC: 1.8 MG/DL (ref 1.6–2.4)
MCH RBC QN AUTO: 25.7 PG (ref 26.6–33)
MCH RBC QN AUTO: 25.7 PG (ref 26.6–33)
MCH RBC QN AUTO: 25.9 PG (ref 26–34)
MCH RBC QN AUTO: 25.9 PG (ref 26–34)
MCH RBC QN AUTO: 26 PG (ref 26–34)
MCH RBC QN AUTO: 26.2 PG (ref 26.6–33)
MCH RBC QN AUTO: 26.4 PG (ref 26–34)
MCH RBC QN AUTO: 26.6 PG (ref 26.6–33)
MCH RBC QN AUTO: 26.7 PG (ref 26–34)
MCH RBC QN AUTO: 26.8 PG (ref 26.6–33)
MCH RBC QN AUTO: 27 PG (ref 26–34)
MCH RBC QN AUTO: 27.1 PG (ref 26–34)
MCH RBC QN AUTO: 27.2 PG (ref 26–34)
MCH RBC QN AUTO: 27.2 PG (ref 26–34)
MCH RBC QN AUTO: 27.4 PG (ref 26–34)
MCH RBC QN AUTO: 27.5 PG (ref 26–34)
MCH RBC QN AUTO: 27.7 PG (ref 26–34)
MCH RBC QN AUTO: 29.7 PG (ref 26.6–33)
MCHC RBC AUTO-ENTMCNC: 31.4 G/DL (ref 31.5–35.7)
MCHC RBC AUTO-ENTMCNC: 31.8 G/DL (ref 31.5–35.7)
MCHC RBC AUTO-ENTMCNC: 31.9 G/DL (ref 30–36.5)
MCHC RBC AUTO-ENTMCNC: 31.9 G/DL (ref 31.5–35.7)
MCHC RBC AUTO-ENTMCNC: 32.1 G/DL (ref 30–36.5)
MCHC RBC AUTO-ENTMCNC: 32.1 G/DL (ref 30–36.5)
MCHC RBC AUTO-ENTMCNC: 32.2 G/DL (ref 30–36.5)
MCHC RBC AUTO-ENTMCNC: 32.2 G/DL (ref 30–36.5)
MCHC RBC AUTO-ENTMCNC: 32.2 G/DL (ref 31.5–35.7)
MCHC RBC AUTO-ENTMCNC: 32.3 G/DL (ref 30–36.5)
MCHC RBC AUTO-ENTMCNC: 32.4 G/DL (ref 30–36.5)
MCHC RBC AUTO-ENTMCNC: 32.7 G/DL (ref 30–36.5)
MCHC RBC AUTO-ENTMCNC: 33.1 G/DL (ref 31.5–35.7)
MCHC RBC AUTO-ENTMCNC: 33.2 G/DL (ref 30–36.5)
MCHC RBC AUTO-ENTMCNC: 33.2 G/DL (ref 30–36.5)
MCHC RBC AUTO-ENTMCNC: 33.4 G/DL (ref 30–36.5)
MCHC RBC AUTO-ENTMCNC: 33.5 G/DL (ref 30–36.5)
MCHC RBC AUTO-ENTMCNC: 33.6 G/DL (ref 31.5–35.7)
MCV RBC AUTO: 77.2 FL (ref 80–99)
MCV RBC AUTO: 78 FL (ref 79–97)
MCV RBC AUTO: 80.1 FL (ref 80–99)
MCV RBC AUTO: 80.6 FL (ref 80–99)
MCV RBC AUTO: 80.8 FL (ref 80–99)
MCV RBC AUTO: 81 FL (ref 79–97)
MCV RBC AUTO: 82 FL (ref 79–97)
MCV RBC AUTO: 82.2 FL (ref 80–99)
MCV RBC AUTO: 82.4 FL (ref 80–99)
MCV RBC AUTO: 83 FL (ref 79–97)
MCV RBC AUTO: 83 FL (ref 80–99)
MCV RBC AUTO: 83.5 FL (ref 80–99)
MCV RBC AUTO: 84 FL (ref 80–99)
MCV RBC AUTO: 84.1 FL (ref 80–99)
MCV RBC AUTO: 84.5 FL (ref 80–99)
MCV RBC AUTO: 84.9 FL (ref 80–99)
MCV RBC AUTO: 85 FL (ref 79–97)
MCV RBC AUTO: 88 FL (ref 79–97)
METHADONE UR QL: NEGATIVE
MONOCYTES # BLD AUTO: 0.2 X10E3/UL (ref 0.1–0.9)
MONOCYTES # BLD AUTO: 0.6 X10E3/UL (ref 0.1–0.9)
MONOCYTES # BLD AUTO: 0.7 X10E3/UL (ref 0.1–0.9)
MONOCYTES # BLD AUTO: 0.8 X10E3/UL (ref 0.1–0.9)
MONOCYTES # BLD AUTO: 0.9 X10E3/UL (ref 0.1–0.9)
MONOCYTES # BLD AUTO: 0.9 X10E3/UL (ref 0.1–0.9)
MONOCYTES # BLD: 0.4 K/UL (ref 0–1)
MONOCYTES # BLD: 0.5 K/UL (ref 0–1)
MONOCYTES # BLD: 0.6 K/UL (ref 0–1)
MONOCYTES # BLD: 0.6 K/UL (ref 0–1)
MONOCYTES # BLD: 0.8 K/UL (ref 0–1)
MONOCYTES # BLD: 0.8 K/UL (ref 0–1)
MONOCYTES # BLD: 1 K/UL (ref 0–1)
MONOCYTES # BLD: 1.1 K/UL (ref 0–1)
MONOCYTES NFR BLD AUTO: 12 %
MONOCYTES NFR BLD AUTO: 14 %
MONOCYTES NFR BLD AUTO: 15 %
MONOCYTES NFR BLD AUTO: 16 %
MONOCYTES NFR BLD AUTO: 19 %
MONOCYTES NFR BLD AUTO: 3 %
MONOCYTES NFR BLD: 11 % (ref 5–13)
MONOCYTES NFR BLD: 12 % (ref 5–13)
MONOCYTES NFR BLD: 13 % (ref 5–13)
MONOCYTES NFR BLD: 15 % (ref 5–13)
MONOCYTES NFR BLD: 15 % (ref 5–13)
MONOCYTES NFR BLD: 18 % (ref 5–13)
MONOS+MACROS NFR FLD: 60 %
MORPHOLOGY BLD-IMP: ABNORMAL
NEUTROPHILS # BLD AUTO: 2.6 X10E3/UL (ref 1.4–7)
NEUTROPHILS # BLD AUTO: 3 X10E3/UL (ref 1.4–7)
NEUTROPHILS # BLD AUTO: 3.5 X10E3/UL (ref 1.4–7)
NEUTROPHILS # BLD AUTO: 3.6 X10E3/UL (ref 1.4–7)
NEUTROPHILS # BLD AUTO: 3.9 X10E3/UL (ref 1.4–7)
NEUTROPHILS # BLD AUTO: 4.5 X10E3/UL (ref 1.4–7)
NEUTROPHILS NFR BLD AUTO: 59 %
NEUTROPHILS NFR BLD AUTO: 60 %
NEUTROPHILS NFR BLD AUTO: 61 %
NEUTROPHILS NFR BLD AUTO: 66 %
NEUTROPHILS NFR BLD AUTO: 68 %
NEUTROPHILS NFR BLD AUTO: 75 %
NEUTROPHILS NFR FLD: 3 %
NEUTS SEG # BLD: 2.4 K/UL (ref 1.8–8)
NEUTS SEG # BLD: 2.9 K/UL (ref 1.8–8)
NEUTS SEG # BLD: 2.9 K/UL (ref 1.8–8)
NEUTS SEG # BLD: 3.3 K/UL (ref 1.8–8)
NEUTS SEG # BLD: 3.9 K/UL (ref 1.8–8)
NEUTS SEG # BLD: 4.1 K/UL (ref 1.8–8)
NEUTS SEG # BLD: 4.1 K/UL (ref 1.8–8)
NEUTS SEG # BLD: 4.7 K/UL (ref 1.8–8)
NEUTS SEG # BLD: 4.8 K/UL (ref 1.8–8)
NEUTS SEG # BLD: 5 K/UL (ref 1.8–8)
NEUTS SEG NFR BLD: 62 % (ref 32–75)
NEUTS SEG NFR BLD: 62 % (ref 32–75)
NEUTS SEG NFR BLD: 63 % (ref 32–75)
NEUTS SEG NFR BLD: 65 % (ref 32–75)
NEUTS SEG NFR BLD: 65 % (ref 32–75)
NEUTS SEG NFR BLD: 66 % (ref 32–75)
NEUTS SEG NFR BLD: 68 % (ref 32–75)
NEUTS SEG NFR BLD: 68 % (ref 32–75)
NEUTS SEG NFR BLD: 69 % (ref 32–75)
NEUTS SEG NFR BLD: 70 % (ref 32–75)
NITRITE UR QL STRIP.AUTO: NEGATIVE
NITRITE UR QL STRIP.AUTO: NEGATIVE
NITRITE UR QL STRIP.AUTO: POSITIVE
NRBC # BLD: 0 K/UL (ref 0–0.01)
NRBC BLD-RTO: 0 PER 100 WBC
NUC CELL # FLD: 135 /CU MM
OPIATES UR QL: NEGATIVE
P-R INTERVAL, ECG05: 136 MS
P-R INTERVAL, ECG05: 136 MS
P-R INTERVAL, ECG05: 142 MS
PCP UR QL: NEGATIVE
PH UR STRIP: 5.5 [PH] (ref 5–8)
PH UR STRIP: 6 [PH] (ref 5–8)
PH UR STRIP: 6.5 [PH] (ref 5–8)
PHOSPHATE SERPL-MCNC: 1.9 MG/DL (ref 2.6–4.7)
PHOSPHATE SERPL-MCNC: 2.4 MG/DL (ref 2.6–4.7)
PHOSPHATE SERPL-MCNC: 2.7 MG/DL (ref 2.6–4.7)
PHOSPHATE SERPL-MCNC: 2.9 MG/DL (ref 2.5–4.5)
PLATELET # BLD AUTO: 105 K/UL (ref 150–400)
PLATELET # BLD AUTO: 118 X10E3/UL (ref 150–450)
PLATELET # BLD AUTO: 123 K/UL (ref 150–400)
PLATELET # BLD AUTO: 123 K/UL (ref 150–400)
PLATELET # BLD AUTO: 130 X10E3/UL (ref 150–379)
PLATELET # BLD AUTO: 136 X10E3/UL (ref 150–379)
PLATELET # BLD AUTO: 141 K/UL (ref 150–400)
PLATELET # BLD AUTO: 142 X10E3/UL (ref 150–379)
PLATELET # BLD AUTO: 173 X10E3/UL (ref 150–379)
PLATELET # BLD AUTO: 55 K/UL (ref 150–400)
PLATELET # BLD AUTO: 60 K/UL (ref 150–400)
PLATELET # BLD AUTO: 69 K/UL (ref 150–400)
PLATELET # BLD AUTO: 76 K/UL (ref 150–400)
PLATELET # BLD AUTO: 82 X10E3/UL (ref 150–379)
PLATELET # BLD AUTO: 86 K/UL (ref 150–400)
PLATELET # BLD AUTO: 86 K/UL (ref 150–400)
PLATELET # BLD AUTO: 88 K/UL (ref 150–400)
PLATELET # BLD AUTO: 89 K/UL (ref 150–400)
PLATELET COMMENTS,PCOM: ABNORMAL
PLATELET COMMENTS,PCOM: ABNORMAL
PMV BLD AUTO: 10 FL (ref 8.9–12.9)
PMV BLD AUTO: 10 FL (ref 8.9–12.9)
PMV BLD AUTO: 10.4 FL (ref 8.9–12.9)
PMV BLD AUTO: 10.5 FL (ref 8.9–12.9)
PMV BLD AUTO: 11.3 FL (ref 8.9–12.9)
PMV BLD AUTO: 9.3 FL (ref 8.9–12.9)
PMV BLD AUTO: 9.6 FL (ref 8.9–12.9)
PMV BLD AUTO: 9.7 FL (ref 8.9–12.9)
PMV BLD AUTO: 9.9 FL (ref 8.9–12.9)
PMV BLD AUTO: 9.9 FL (ref 8.9–12.9)
POTASSIUM SERPL-SCNC: 2.6 MMOL/L (ref 3.5–5.1)
POTASSIUM SERPL-SCNC: 2.8 MMOL/L (ref 3.5–5.1)
POTASSIUM SERPL-SCNC: 3.1 MMOL/L (ref 3.5–5.1)
POTASSIUM SERPL-SCNC: 3.3 MMOL/L (ref 3.5–5.1)
POTASSIUM SERPL-SCNC: 3.3 MMOL/L (ref 3.5–5.2)
POTASSIUM SERPL-SCNC: 3.5 MMOL/L (ref 3.5–5.1)
POTASSIUM SERPL-SCNC: 3.6 MMOL/L (ref 3.5–5.1)
POTASSIUM SERPL-SCNC: 3.7 MMOL/L (ref 3.5–5.1)
POTASSIUM SERPL-SCNC: 3.8 MMOL/L (ref 3.5–5.1)
POTASSIUM SERPL-SCNC: 4.1 MMOL/L (ref 3.5–5.2)
POTASSIUM SERPL-SCNC: 4.2 MMOL/L (ref 3.5–5.2)
POTASSIUM SERPL-SCNC: 4.2 MMOL/L (ref 3.5–5.2)
PROT FLD-MCNC: 0.8 G/DL
PROT SERPL-MCNC: 6.1 G/DL (ref 6.4–8.2)
PROT SERPL-MCNC: 6.7 G/DL (ref 6.4–8.2)
PROT SERPL-MCNC: 6.7 G/DL (ref 6.4–8.2)
PROT SERPL-MCNC: 6.9 G/DL (ref 6.4–8.2)
PROT SERPL-MCNC: 7 G/DL (ref 6.4–8.2)
PROT SERPL-MCNC: 7.2 G/DL (ref 6.4–8.2)
PROT SERPL-MCNC: 7.2 G/DL (ref 6–8.5)
PROT SERPL-MCNC: 7.4 G/DL (ref 6.4–8.2)
PROT SERPL-MCNC: 7.5 G/DL (ref 6.4–8.2)
PROT SERPL-MCNC: 7.7 G/DL (ref 6.4–8.2)
PROT SERPL-MCNC: 8.3 G/DL (ref 6.4–8.2)
PROT UR STRIP-MCNC: 30 MG/DL
PROT UR STRIP-MCNC: NEGATIVE MG/DL
PROT UR STRIP-MCNC: NEGATIVE MG/DL
PROTHROMBIN TIME: 14.6 SEC (ref 9–11.1)
PROTHROMBIN TIME: 15 SEC (ref 9–11.1)
PROTHROMBIN TIME: 15.1 SEC (ref 9–11.1)
PROTHROMBIN TIME: 15.1 SEC (ref 9–11.1)
PROTHROMBIN TIME: 15.4 SEC (ref 9–11.1)
PROTHROMBIN TIME: 15.5 SEC (ref 9–11.1)
PROTHROMBIN TIME: 15.8 SEC (ref 9–11.1)
PROTHROMBIN TIME: 17.2 SEC (ref 9–11.1)
PROTHROMBIN TIME: 18.5 SEC (ref 9.1–12)
Q-T INTERVAL, ECG07: 398 MS
Q-T INTERVAL, ECG07: 406 MS
Q-T INTERVAL, ECG07: 454 MS
QRS DURATION, ECG06: 102 MS
QRS DURATION, ECG06: 102 MS
QRS DURATION, ECG06: 94 MS
QTC CALCULATION (BEZET), ECG08: 477 MS
QTC CALCULATION (BEZET), ECG08: 500 MS
QTC CALCULATION (BEZET), ECG08: 518 MS
RBC # BLD AUTO: 3.57 M/UL (ref 4.1–5.7)
RBC # BLD AUTO: 3.77 M/UL (ref 4.1–5.7)
RBC # BLD AUTO: 3.8 M/UL (ref 4.1–5.7)
RBC # BLD AUTO: 3.8 X10E6/UL (ref 4.14–5.8)
RBC # BLD AUTO: 3.82 M/UL (ref 4.1–5.7)
RBC # BLD AUTO: 4.11 M/UL (ref 4.1–5.7)
RBC # BLD AUTO: 4.12 M/UL (ref 4.1–5.7)
RBC # BLD AUTO: 4.12 M/UL (ref 4.1–5.7)
RBC # BLD AUTO: 4.15 M/UL (ref 4.1–5.7)
RBC # BLD AUTO: 4.2 X10E6/UL (ref 4.14–5.8)
RBC # BLD AUTO: 4.21 X10E6/UL (ref 4.14–5.8)
RBC # BLD AUTO: 4.22 M/UL (ref 4.1–5.7)
RBC # BLD AUTO: 4.25 X10E6/UL (ref 4.14–5.8)
RBC # BLD AUTO: 4.33 M/UL (ref 4.1–5.7)
RBC # BLD AUTO: 4.39 M/UL (ref 4.1–5.7)
RBC # BLD AUTO: 4.48 X10E6/UL (ref 4.14–5.8)
RBC # BLD AUTO: 4.59 X10E6/UL (ref 4.14–5.8)
RBC # BLD AUTO: 4.87 M/UL (ref 4.1–5.7)
RBC # FLD: >100 /CU MM
RBC #/AREA URNS HPF: ABNORMAL /HPF (ref 0–5)
RBC MORPH BLD: ABNORMAL
SAMPLES BEING HELD,HOLD: NORMAL
SERVICE CMNT-IMP: ABNORMAL
SERVICE CMNT-IMP: ABNORMAL
SERVICE CMNT-IMP: NORMAL
SODIUM SERPL-SCNC: 130 MMOL/L (ref 136–145)
SODIUM SERPL-SCNC: 131 MMOL/L (ref 136–145)
SODIUM SERPL-SCNC: 131 MMOL/L (ref 136–145)
SODIUM SERPL-SCNC: 132 MMOL/L (ref 136–145)
SODIUM SERPL-SCNC: 132 MMOL/L (ref 136–145)
SODIUM SERPL-SCNC: 133 MMOL/L (ref 134–144)
SODIUM SERPL-SCNC: 134 MMOL/L (ref 136–145)
SODIUM SERPL-SCNC: 135 MMOL/L (ref 136–145)
SODIUM SERPL-SCNC: 136 MMOL/L (ref 136–145)
SODIUM SERPL-SCNC: 136 MMOL/L (ref 136–145)
SODIUM SERPL-SCNC: 137 MMOL/L (ref 134–144)
SODIUM SERPL-SCNC: 138 MMOL/L (ref 134–144)
SODIUM SERPL-SCNC: 138 MMOL/L (ref 136–145)
SODIUM SERPL-SCNC: 139 MMOL/L (ref 134–144)
SODIUM UR-SCNC: <5 MMOL/L
SP GR UR REFRACTOMETRY: 1.01 (ref 1–1.03)
SP GR UR REFRACTOMETRY: 1.02 (ref 1–1.03)
SP GR UR REFRACTOMETRY: 1.03 (ref 1–1.03)
SPECIMEN SOURCE FLD: ABNORMAL
SPECIMEN SOURCE FLD: NORMAL
TESTOST SERPL-MCNC: 212 NG/DL (ref 264–916)
THERAPEUTIC RANGE,PTTT: ABNORMAL SECS (ref 58–77)
TIBC SERPL-MCNC: 212 UG/DL (ref 250–450)
TROPONIN I SERPL-MCNC: <0.05 NG/ML
UIBC SERPL-MCNC: 151 UG/DL (ref 111–343)
UR CULT HOLD, URHOLD: NORMAL
UR CULT HOLD, URHOLD: NORMAL
UROBILINOGEN UR QL STRIP.AUTO: 2 EU/DL (ref 0.2–1)
UROBILINOGEN UR QL STRIP.AUTO: 4 EU/DL (ref 0.2–1)
UROBILINOGEN UR QL STRIP.AUTO: 4 EU/DL (ref 0.2–1)
VENTRICULAR RATE, ECG03: 102 BPM
VENTRICULAR RATE, ECG03: 73 BPM
VENTRICULAR RATE, ECG03: 83 BPM
VIT B12 SERPL-MCNC: 1436 PG/ML (ref 193–986)
WBC # BLD AUTO: 3.7 K/UL (ref 4.1–11.1)
WBC # BLD AUTO: 4.1 K/UL (ref 4.1–11.1)
WBC # BLD AUTO: 4.4 K/UL (ref 4.1–11.1)
WBC # BLD AUTO: 4.4 X10E3/UL (ref 3.4–10.8)
WBC # BLD AUTO: 4.5 K/UL (ref 4.1–11.1)
WBC # BLD AUTO: 5 K/UL (ref 4.1–11.1)
WBC # BLD AUTO: 5 X10E3/UL (ref 3.4–10.8)
WBC # BLD AUTO: 5.1 K/UL (ref 4.1–11.1)
WBC # BLD AUTO: 5.3 X10E3/UL (ref 3.4–10.8)
WBC # BLD AUTO: 5.7 X10E3/UL (ref 3.4–10.8)
WBC # BLD AUTO: 5.9 K/UL (ref 4.1–11.1)
WBC # BLD AUTO: 5.9 X10E3/UL (ref 3.4–10.8)
WBC # BLD AUTO: 6 X10E3/UL (ref 3.4–10.8)
WBC # BLD AUTO: 6.3 K/UL (ref 4.1–11.1)
WBC # BLD AUTO: 6.3 K/UL (ref 4.1–11.1)
WBC # BLD AUTO: 7.2 K/UL (ref 4.1–11.1)
WBC # BLD AUTO: 7.2 K/UL (ref 4.1–11.1)
WBC # BLD AUTO: 7.6 K/UL (ref 4.1–11.1)
WBC URNS QL MICRO: ABNORMAL /HPF (ref 0–4)

## 2019-01-01 PROCEDURE — 74011250637 HC RX REV CODE- 250/637: Performed by: STUDENT IN AN ORGANIZED HEALTH CARE EDUCATION/TRAINING PROGRAM

## 2019-01-01 PROCEDURE — 83880 ASSAY OF NATRIURETIC PEPTIDE: CPT

## 2019-01-01 PROCEDURE — 74011250637 HC RX REV CODE- 250/637: Performed by: HOSPITALIST

## 2019-01-01 PROCEDURE — 49083 ABD PARACENTESIS W/IMAGING: CPT

## 2019-01-01 PROCEDURE — 83690 ASSAY OF LIPASE: CPT

## 2019-01-01 PROCEDURE — 36415 COLL VENOUS BLD VENIPUNCTURE: CPT

## 2019-01-01 PROCEDURE — 74011250637 HC RX REV CODE- 250/637: Performed by: NEUROMUSCULOSKELETAL MEDICINE & OMM

## 2019-01-01 PROCEDURE — 74011000250 HC RX REV CODE- 250: Performed by: FAMILY MEDICINE

## 2019-01-01 PROCEDURE — 85610 PROTHROMBIN TIME: CPT

## 2019-01-01 PROCEDURE — 74177 CT ABD & PELVIS W/CONTRAST: CPT

## 2019-01-01 PROCEDURE — 77030010545

## 2019-01-01 PROCEDURE — 84100 ASSAY OF PHOSPHORUS: CPT

## 2019-01-01 PROCEDURE — 82140 ASSAY OF AMMONIA: CPT

## 2019-01-01 PROCEDURE — 74011250637 HC RX REV CODE- 250/637: Performed by: FAMILY MEDICINE

## 2019-01-01 PROCEDURE — 74011250636 HC RX REV CODE- 250/636: Performed by: FAMILY MEDICINE

## 2019-01-01 PROCEDURE — 74011250637 HC RX REV CODE- 250/637: Performed by: NURSE PRACTITIONER

## 2019-01-01 PROCEDURE — 74011636320 HC RX REV CODE- 636/320: Performed by: RADIOLOGY

## 2019-01-01 PROCEDURE — C9113 INJ PANTOPRAZOLE SODIUM, VIA: HCPCS | Performed by: FAMILY MEDICINE

## 2019-01-01 PROCEDURE — 74011250636 HC RX REV CODE- 250/636: Performed by: NURSE PRACTITIONER

## 2019-01-01 PROCEDURE — 97530 THERAPEUTIC ACTIVITIES: CPT

## 2019-01-01 PROCEDURE — G0299 HHS/HOSPICE OF RN EA 15 MIN: HCPCS

## 2019-01-01 PROCEDURE — 71045 X-RAY EXAM CHEST 1 VIEW: CPT

## 2019-01-01 PROCEDURE — 77030012856

## 2019-01-01 PROCEDURE — 97116 GAIT TRAINING THERAPY: CPT

## 2019-01-01 PROCEDURE — 65610000006 HC RM INTENSIVE CARE

## 2019-01-01 PROCEDURE — 85025 COMPLETE CBC W/AUTO DIFF WBC: CPT

## 2019-01-01 PROCEDURE — 82247 BILIRUBIN TOTAL: CPT

## 2019-01-01 PROCEDURE — 82042 OTHER SOURCE ALBUMIN QUAN EA: CPT

## 2019-01-01 PROCEDURE — 94760 N-INVAS EAR/PLS OXIMETRY 1: CPT

## 2019-01-01 PROCEDURE — 65660000000 HC RM CCU STEPDOWN

## 2019-01-01 PROCEDURE — 74011250637 HC RX REV CODE- 250/637: Performed by: PHYSICIAN ASSISTANT

## 2019-01-01 PROCEDURE — 0656 HSPC GENERAL INPATIENT

## 2019-01-01 PROCEDURE — 74011250637 HC RX REV CODE- 250/637: Performed by: EMERGENCY MEDICINE

## 2019-01-01 PROCEDURE — 74011250636 HC RX REV CODE- 250/636: Performed by: EMERGENCY MEDICINE

## 2019-01-01 PROCEDURE — 83735 ASSAY OF MAGNESIUM: CPT

## 2019-01-01 PROCEDURE — 84484 ASSAY OF TROPONIN QUANT: CPT

## 2019-01-01 PROCEDURE — 80053 COMPREHEN METABOLIC PANEL: CPT

## 2019-01-01 PROCEDURE — 82962 GLUCOSE BLOOD TEST: CPT

## 2019-01-01 PROCEDURE — 74011250636 HC RX REV CODE- 250/636: Performed by: INTERNAL MEDICINE

## 2019-01-01 PROCEDURE — 74011000258 HC RX REV CODE- 258: Performed by: EMERGENCY MEDICINE

## 2019-01-01 PROCEDURE — 93005 ELECTROCARDIOGRAM TRACING: CPT

## 2019-01-01 PROCEDURE — 74011636320 HC RX REV CODE- 636/320: Performed by: EMERGENCY MEDICINE

## 2019-01-01 PROCEDURE — 99285 EMERGENCY DEPT VISIT HI MDM: CPT

## 2019-01-01 PROCEDURE — 87040 BLOOD CULTURE FOR BACTERIA: CPT

## 2019-01-01 PROCEDURE — 94761 N-INVAS EAR/PLS OXIMETRY MLT: CPT

## 2019-01-01 PROCEDURE — 89050 BODY FLUID CELL COUNT: CPT

## 2019-01-01 PROCEDURE — 82607 VITAMIN B-12: CPT

## 2019-01-01 PROCEDURE — 80307 DRUG TEST PRSMV CHEM ANLYZR: CPT

## 2019-01-01 PROCEDURE — 80048 BASIC METABOLIC PNL TOTAL CA: CPT

## 2019-01-01 PROCEDURE — 96372 THER/PROPH/DIAG INJ SC/IM: CPT

## 2019-01-01 PROCEDURE — 74011250636 HC RX REV CODE- 250/636: Performed by: RADIOLOGY

## 2019-01-01 PROCEDURE — 96366 THER/PROPH/DIAG IV INF ADDON: CPT

## 2019-01-01 PROCEDURE — 74011000250 HC RX REV CODE- 250: Performed by: EMERGENCY MEDICINE

## 2019-01-01 PROCEDURE — 77030029211 HC GEL MEDIH TU INLC -B

## 2019-01-01 PROCEDURE — 77010033678 HC OXYGEN DAILY

## 2019-01-01 PROCEDURE — 99284 EMERGENCY DEPT VISIT MOD MDM: CPT

## 2019-01-01 PROCEDURE — 74011250636 HC RX REV CODE- 250/636: Performed by: STUDENT IN AN ORGANIZED HEALTH CARE EDUCATION/TRAINING PROGRAM

## 2019-01-01 PROCEDURE — 82550 ASSAY OF CK (CPK): CPT

## 2019-01-01 PROCEDURE — 97161 PT EVAL LOW COMPLEX 20 MIN: CPT

## 2019-01-01 PROCEDURE — 74011250636 HC RX REV CODE- 250/636

## 2019-01-01 PROCEDURE — 74011250636 HC RX REV CODE- 250/636: Performed by: HOSPITALIST

## 2019-01-01 PROCEDURE — 65270000029 HC RM PRIVATE

## 2019-01-01 PROCEDURE — 81001 URINALYSIS AUTO W/SCOPE: CPT

## 2019-01-01 PROCEDURE — 74011000250 HC RX REV CODE- 250: Performed by: NURSE PRACTITIONER

## 2019-01-01 PROCEDURE — 99233 SBSQ HOSP IP/OBS HIGH 50: CPT | Performed by: FAMILY MEDICINE

## 2019-01-01 PROCEDURE — 97165 OT EVAL LOW COMPLEX 30 MIN: CPT

## 2019-01-01 PROCEDURE — 83605 ASSAY OF LACTIC ACID: CPT

## 2019-01-01 PROCEDURE — 84157 ASSAY OF PROTEIN OTHER: CPT

## 2019-01-01 PROCEDURE — 74011000250 HC RX REV CODE- 250: Performed by: STUDENT IN AN ORGANIZED HEALTH CARE EDUCATION/TRAINING PROGRAM

## 2019-01-01 PROCEDURE — C1729 CATH, DRAINAGE: HCPCS

## 2019-01-01 PROCEDURE — 74011250636 HC RX REV CODE- 250/636: Performed by: NEUROMUSCULOSKELETAL MEDICINE & OMM

## 2019-01-01 PROCEDURE — 96375 TX/PRO/DX INJ NEW DRUG ADDON: CPT

## 2019-01-01 PROCEDURE — 74011250637 HC RX REV CODE- 250/637: Performed by: INTERNAL MEDICINE

## 2019-01-01 PROCEDURE — 65660000001 HC RM ICU INTERMED STEPDOWN

## 2019-01-01 PROCEDURE — 0W9G30Z DRAINAGE OF PERITONEAL CAVITY WITH DRAINAGE DEVICE, PERCUTANEOUS APPROACH: ICD-10-PCS | Performed by: RADIOLOGY

## 2019-01-01 PROCEDURE — 77030014137 HC TY PARCNT TELE -B

## 2019-01-01 PROCEDURE — 87086 URINE CULTURE/COLONY COUNT: CPT

## 2019-01-01 PROCEDURE — 96365 THER/PROPH/DIAG IV INF INIT: CPT

## 2019-01-01 PROCEDURE — P9047 ALBUMIN (HUMAN), 25%, 50ML: HCPCS | Performed by: HOSPITALIST

## 2019-01-01 PROCEDURE — 70450 CT HEAD/BRAIN W/O DYE: CPT

## 2019-01-01 PROCEDURE — 82746 ASSAY OF FOLIC ACID SERUM: CPT

## 2019-01-01 PROCEDURE — 85027 COMPLETE CBC AUTOMATED: CPT

## 2019-01-01 PROCEDURE — 84300 ASSAY OF URINE SODIUM: CPT

## 2019-01-01 PROCEDURE — C1758 CATHETER, URETERAL: HCPCS

## 2019-01-01 PROCEDURE — P9047 ALBUMIN (HUMAN), 25%, 50ML: HCPCS | Performed by: FAMILY MEDICINE

## 2019-01-01 PROCEDURE — 77030033269 HC SLV COMPR SCD KNE2 CARD -B

## 2019-01-01 PROCEDURE — P9047 ALBUMIN (HUMAN), 25%, 50ML: HCPCS | Performed by: PHYSICIAN ASSISTANT

## 2019-01-01 PROCEDURE — 82570 ASSAY OF URINE CREATININE: CPT

## 2019-01-01 PROCEDURE — 74011000258 HC RX REV CODE- 258: Performed by: NEUROMUSCULOSKELETAL MEDICINE & OMM

## 2019-01-01 PROCEDURE — 74011250636 HC RX REV CODE- 250/636: Performed by: PHYSICIAN ASSISTANT

## 2019-01-01 PROCEDURE — 77030034848

## 2019-01-01 PROCEDURE — 0W9G3ZZ DRAINAGE OF PERITONEAL CAVITY, PERCUTANEOUS APPROACH: ICD-10-PCS | Performed by: STUDENT IN AN ORGANIZED HEALTH CARE EDUCATION/TRAINING PROGRAM

## 2019-01-01 PROCEDURE — 74011000258 HC RX REV CODE- 258: Performed by: STUDENT IN AN ORGANIZED HEALTH CARE EDUCATION/TRAINING PROGRAM

## 2019-01-01 PROCEDURE — 87205 SMEAR GRAM STAIN: CPT

## 2019-01-01 PROCEDURE — 97535 SELF CARE MNGMENT TRAINING: CPT

## 2019-01-01 PROCEDURE — 77030010547 HC BG URIN W/UMETER -A

## 2019-01-01 PROCEDURE — 77030011256 HC DRSG MEPILEX <16IN NO BORD MOLN -A

## 2019-01-01 PROCEDURE — 74018 RADEX ABDOMEN 1 VIEW: CPT

## 2019-01-01 PROCEDURE — 77030009526 HC GEL CARSYN MDII -A

## 2019-01-01 PROCEDURE — 77030008771 HC TU NG SALEM SUMP -A

## 2019-01-01 PROCEDURE — 87015 SPECIMEN INFECT AGNT CONCNTJ: CPT

## 2019-01-01 PROCEDURE — 85730 THROMBOPLASTIN TIME PARTIAL: CPT

## 2019-01-01 PROCEDURE — 82945 GLUCOSE OTHER FLUID: CPT

## 2019-01-01 PROCEDURE — 77030011943

## 2019-01-01 PROCEDURE — P9047 ALBUMIN (HUMAN), 25%, 50ML: HCPCS | Performed by: STUDENT IN AN ORGANIZED HEALTH CARE EDUCATION/TRAINING PROGRAM

## 2019-01-01 PROCEDURE — 77030018870 HC TY PARCNT BD -B

## 2019-01-01 PROCEDURE — 74011000250 HC RX REV CODE- 250: Performed by: INTERNAL MEDICINE

## 2019-01-01 PROCEDURE — 31720 CLEARANCE OF AIRWAYS: CPT

## 2019-01-01 PROCEDURE — 74011000250 HC RX REV CODE- 250

## 2019-01-01 PROCEDURE — 74011000258 HC RX REV CODE- 258: Performed by: INTERNAL MEDICINE

## 2019-01-01 PROCEDURE — 3336500001 HSPC ELECTION

## 2019-01-01 PROCEDURE — 0W9G3ZZ DRAINAGE OF PERITONEAL CAVITY, PERCUTANEOUS APPROACH: ICD-10-PCS | Performed by: RADIOLOGY

## 2019-01-01 PROCEDURE — 77030005208 HC CATH HLDR GLWC -A

## 2019-01-01 RX ORDER — LORAZEPAM 2 MG/ML
2 INJECTION INTRAMUSCULAR
Status: DISCONTINUED | OUTPATIENT
Start: 2019-01-01 | End: 2019-01-01 | Stop reason: HOSPADM

## 2019-01-01 RX ORDER — HYDROMORPHONE HYDROCHLORIDE 2 MG/ML
1 INJECTION, SOLUTION INTRAMUSCULAR; INTRAVENOUS; SUBCUTANEOUS EVERY 4 HOURS
Status: DISCONTINUED | OUTPATIENT
Start: 2019-01-01 | End: 2019-01-01

## 2019-01-01 RX ORDER — TESTOSTERONE CYPIONATE 200 MG/ML
400 INJECTION INTRAMUSCULAR ONCE
Qty: 2 ML | Refills: 0
Start: 2019-01-01 | End: 2019-01-01

## 2019-01-01 RX ORDER — MUPIROCIN 20 MG/G
OINTMENT TOPICAL 3 TIMES DAILY
Qty: 22 G | Refills: 1 | Status: SHIPPED | OUTPATIENT
Start: 2019-01-01 | End: 2019-01-01

## 2019-01-01 RX ORDER — GLYCOPYRROLATE 0.2 MG/ML
0.2 INJECTION INTRAMUSCULAR; INTRAVENOUS
Status: DISCONTINUED | OUTPATIENT
Start: 2019-01-01 | End: 2019-01-01 | Stop reason: HOSPADM

## 2019-01-01 RX ORDER — ZIPRASIDONE MESYLATE 20 MG/ML
20 INJECTION, POWDER, LYOPHILIZED, FOR SOLUTION INTRAMUSCULAR
Status: COMPLETED | OUTPATIENT
Start: 2019-01-01 | End: 2019-01-01

## 2019-01-01 RX ORDER — LIDOCAINE HYDROCHLORIDE 10 MG/ML
10 INJECTION, SOLUTION EPIDURAL; INFILTRATION; INTRACAUDAL; PERINEURAL
Status: ACTIVE | OUTPATIENT
Start: 2019-01-01 | End: 2019-01-01

## 2019-01-01 RX ORDER — POTASSIUM CHLORIDE 750 MG/1
40 TABLET, FILM COATED, EXTENDED RELEASE ORAL
Status: COMPLETED | OUTPATIENT
Start: 2019-01-01 | End: 2019-01-01

## 2019-01-01 RX ORDER — MAGNESIUM SULFATE HEPTAHYDRATE 40 MG/ML
2 INJECTION, SOLUTION INTRAVENOUS ONCE
Status: COMPLETED | OUTPATIENT
Start: 2019-01-01 | End: 2019-01-01

## 2019-01-01 RX ORDER — ASPIRIN 325 MG/1
100 TABLET, FILM COATED ORAL DAILY
Status: DISCONTINUED | OUTPATIENT
Start: 2019-01-01 | End: 2019-01-01 | Stop reason: HOSPADM

## 2019-01-01 RX ORDER — LACTULOSE 10 G/15ML
10 SOLUTION ORAL; RECTAL
Status: DISCONTINUED | OUTPATIENT
Start: 2019-01-01 | End: 2019-01-01

## 2019-01-01 RX ORDER — ALLOPURINOL 300 MG/1
300 TABLET ORAL EVERY 12 HOURS
Status: DISCONTINUED | OUTPATIENT
Start: 2019-01-01 | End: 2019-01-01 | Stop reason: HOSPADM

## 2019-01-01 RX ORDER — HALOPERIDOL 5 MG/ML
5 INJECTION INTRAMUSCULAR
Status: DISCONTINUED | OUTPATIENT
Start: 2019-01-01 | End: 2019-01-01 | Stop reason: HOSPADM

## 2019-01-01 RX ORDER — MAGNESIUM SULFATE 1 G/100ML
1 INJECTION INTRAVENOUS ONCE
Status: COMPLETED | OUTPATIENT
Start: 2019-01-01 | End: 2019-01-01

## 2019-01-01 RX ORDER — SODIUM CHLORIDE 0.9 % (FLUSH) 0.9 %
5-40 SYRINGE (ML) INJECTION EVERY 8 HOURS
Status: DISCONTINUED | OUTPATIENT
Start: 2019-01-01 | End: 2019-01-01

## 2019-01-01 RX ORDER — ALBUMIN HUMAN 250 G/1000ML
12.5 SOLUTION INTRAVENOUS
Status: DISCONTINUED | OUTPATIENT
Start: 2019-01-01 | End: 2019-01-01

## 2019-01-01 RX ORDER — ONDANSETRON 2 MG/ML
4 INJECTION INTRAMUSCULAR; INTRAVENOUS
Status: DISCONTINUED | OUTPATIENT
Start: 2019-01-01 | End: 2019-01-01 | Stop reason: HOSPADM

## 2019-01-01 RX ORDER — POTASSIUM CHLORIDE 7.45 MG/ML
10 INJECTION INTRAVENOUS
Status: COMPLETED | OUTPATIENT
Start: 2019-01-01 | End: 2019-01-01

## 2019-01-01 RX ORDER — HALOPERIDOL 5 MG/ML
INJECTION INTRAMUSCULAR
Status: COMPLETED
Start: 2019-01-01 | End: 2019-01-01

## 2019-01-01 RX ORDER — LANOLIN ALCOHOL/MO/W.PET/CERES
CREAM (GRAM) TOPICAL
Qty: 30 TAB | Refills: 5 | Status: SHIPPED | OUTPATIENT
Start: 2019-01-01 | End: 2019-01-01

## 2019-01-01 RX ORDER — FUROSEMIDE 40 MG/1
80 TABLET ORAL DAILY
Qty: 180 TAB | Refills: 3 | Status: SHIPPED | OUTPATIENT
Start: 2019-01-01 | End: 2019-01-01 | Stop reason: SDUPTHER

## 2019-01-01 RX ORDER — ONDANSETRON 2 MG/ML
INJECTION INTRAMUSCULAR; INTRAVENOUS
Status: COMPLETED
Start: 2019-01-01 | End: 2019-01-01

## 2019-01-01 RX ORDER — POTASSIUM CHLORIDE 750 MG/1
10 TABLET, FILM COATED, EXTENDED RELEASE ORAL 2 TIMES DAILY
COMMUNITY

## 2019-01-01 RX ORDER — FENTANYL CITRATE 50 UG/ML
25 INJECTION, SOLUTION INTRAMUSCULAR; INTRAVENOUS
Status: DISCONTINUED | OUTPATIENT
Start: 2019-01-01 | End: 2019-01-01

## 2019-01-01 RX ORDER — LIDOCAINE HYDROCHLORIDE 10 MG/ML
10 INJECTION, SOLUTION EPIDURAL; INFILTRATION; INTRACAUDAL; PERINEURAL
Status: COMPLETED | OUTPATIENT
Start: 2019-01-01 | End: 2019-01-01

## 2019-01-01 RX ORDER — LORAZEPAM 2 MG/ML
4 INJECTION INTRAMUSCULAR
Status: DISCONTINUED | OUTPATIENT
Start: 2019-01-01 | End: 2019-01-01

## 2019-01-01 RX ORDER — LORAZEPAM 2 MG/ML
4 INJECTION INTRAMUSCULAR
Status: DISCONTINUED | OUTPATIENT
Start: 2019-01-01 | End: 2019-01-01 | Stop reason: HOSPADM

## 2019-01-01 RX ORDER — CEPHALEXIN 500 MG/1
500 CAPSULE ORAL 3 TIMES DAILY
Qty: 30 CAP | Refills: 0 | Status: SHIPPED | OUTPATIENT
Start: 2019-01-01 | End: 2019-01-01

## 2019-01-01 RX ORDER — LACTULOSE 10 G/15ML
20 SOLUTION ORAL; RECTAL 3 TIMES DAILY
COMMUNITY

## 2019-01-01 RX ORDER — ACETAMINOPHEN 325 MG/1
650 TABLET ORAL
Status: DISCONTINUED | OUTPATIENT
Start: 2019-01-01 | End: 2019-01-01

## 2019-01-01 RX ORDER — POTASSIUM CHLORIDE 750 MG/1
40 TABLET, FILM COATED, EXTENDED RELEASE ORAL 2 TIMES DAILY
Status: COMPLETED | OUTPATIENT
Start: 2019-01-01 | End: 2019-01-01

## 2019-01-01 RX ORDER — THERA TABS 400 MCG
1 TAB ORAL DAILY
Status: DISCONTINUED | OUTPATIENT
Start: 2019-01-01 | End: 2019-01-01

## 2019-01-01 RX ORDER — FOLIC ACID 1 MG/1
1 TABLET ORAL DAILY
Status: DISCONTINUED | OUTPATIENT
Start: 2019-01-01 | End: 2019-01-01 | Stop reason: HOSPADM

## 2019-01-01 RX ORDER — SODIUM CHLORIDE 0.9 % (FLUSH) 0.9 %
5-40 SYRINGE (ML) INJECTION AS NEEDED
Status: DISCONTINUED | OUTPATIENT
Start: 2019-01-01 | End: 2019-01-01 | Stop reason: HOSPADM

## 2019-01-01 RX ORDER — CYCLOBENZAPRINE HCL 10 MG
TABLET ORAL
Qty: 270 TAB | Refills: 2 | Status: ON HOLD | OUTPATIENT
Start: 2019-01-01 | End: 2019-01-01

## 2019-01-01 RX ORDER — ACETAMINOPHEN 650 MG/1
650 SUPPOSITORY RECTAL
Status: DISCONTINUED | OUTPATIENT
Start: 2019-01-01 | End: 2019-01-01 | Stop reason: HOSPADM

## 2019-01-01 RX ORDER — FACIAL-BODY WIPES
10 EACH TOPICAL DAILY PRN
Status: DISCONTINUED | OUTPATIENT
Start: 2019-01-01 | End: 2019-01-01 | Stop reason: HOSPADM

## 2019-01-01 RX ORDER — DEXTROSE MONOHYDRATE AND SODIUM CHLORIDE 5; .9 G/100ML; G/100ML
50 INJECTION, SOLUTION INTRAVENOUS CONTINUOUS
Status: DISCONTINUED | OUTPATIENT
Start: 2019-01-01 | End: 2019-01-01

## 2019-01-01 RX ORDER — LANOLIN ALCOHOL/MO/W.PET/CERES
100 CREAM (GRAM) TOPICAL DAILY
Status: DISCONTINUED | OUTPATIENT
Start: 2019-01-01 | End: 2019-01-01 | Stop reason: HOSPADM

## 2019-01-01 RX ORDER — FENTANYL CITRATE 50 UG/ML
50 INJECTION, SOLUTION INTRAMUSCULAR; INTRAVENOUS
Status: COMPLETED | OUTPATIENT
Start: 2019-01-01 | End: 2019-01-01

## 2019-01-01 RX ORDER — HALOPERIDOL 2 MG/ML
2 SOLUTION ORAL
Status: DISCONTINUED | OUTPATIENT
Start: 2019-01-01 | End: 2019-01-01 | Stop reason: ALTCHOICE

## 2019-01-01 RX ORDER — LORAZEPAM 2 MG/ML
1 INJECTION INTRAMUSCULAR
Status: DISCONTINUED | OUTPATIENT
Start: 2019-01-01 | End: 2019-01-01

## 2019-01-01 RX ORDER — FUROSEMIDE 40 MG/1
40 TABLET ORAL DAILY
Status: DISCONTINUED | OUTPATIENT
Start: 2019-01-01 | End: 2019-01-01

## 2019-01-01 RX ORDER — LIDOCAINE HYDROCHLORIDE 10 MG/ML
4 INJECTION, SOLUTION EPIDURAL; INFILTRATION; INTRACAUDAL; PERINEURAL
Status: ACTIVE | OUTPATIENT
Start: 2019-01-01 | End: 2019-01-01

## 2019-01-01 RX ORDER — MUPIROCIN 20 MG/G
OINTMENT TOPICAL EVERY 12 HOURS
Status: DISCONTINUED | OUTPATIENT
Start: 2019-01-01 | End: 2019-01-01 | Stop reason: HOSPADM

## 2019-01-01 RX ORDER — FUROSEMIDE 40 MG/1
80 TABLET ORAL DAILY
Qty: 180 TAB | Refills: 3 | Status: ON HOLD | OUTPATIENT
Start: 2019-01-01 | End: 2019-01-01 | Stop reason: SDUPTHER

## 2019-01-01 RX ORDER — LANOLIN ALCOHOL/MO/W.PET/CERES
100 CREAM (GRAM) TOPICAL DAILY
Qty: 30 TAB | Refills: 2 | Status: ON HOLD | OUTPATIENT
Start: 2019-01-01 | End: 2019-01-01

## 2019-01-01 RX ORDER — LORAZEPAM 2 MG/1
1-2 TABLET ORAL
Qty: 20 TAB | Refills: 0 | Status: ON HOLD | OUTPATIENT
Start: 2019-01-01 | End: 2019-01-01

## 2019-01-01 RX ORDER — HYDROXYZINE 25 MG/1
25 TABLET, FILM COATED ORAL 3 TIMES DAILY
Status: DISCONTINUED | OUTPATIENT
Start: 2019-01-01 | End: 2019-01-01 | Stop reason: HOSPADM

## 2019-01-01 RX ORDER — LORAZEPAM 2 MG/ML
2 INJECTION INTRAMUSCULAR
Status: DISCONTINUED | OUTPATIENT
Start: 2019-01-01 | End: 2019-01-01

## 2019-01-01 RX ORDER — SODIUM CHLORIDE 0.9 % (FLUSH) 0.9 %
5-40 SYRINGE (ML) INJECTION EVERY 8 HOURS
Status: DISCONTINUED | OUTPATIENT
Start: 2019-01-01 | End: 2019-01-01 | Stop reason: HOSPADM

## 2019-01-01 RX ORDER — ALBUMIN HUMAN 250 G/1000ML
50 SOLUTION INTRAVENOUS ONCE
Status: COMPLETED | OUTPATIENT
Start: 2019-01-01 | End: 2019-01-01

## 2019-01-01 RX ORDER — SCOLOPAMINE TRANSDERMAL SYSTEM 1 MG/1
1 PATCH, EXTENDED RELEASE TRANSDERMAL
Status: DISCONTINUED | OUTPATIENT
Start: 2019-01-01 | End: 2019-01-01 | Stop reason: HOSPADM

## 2019-01-01 RX ORDER — SPIRONOLACTONE 25 MG/1
25 TABLET ORAL DAILY
Status: DISCONTINUED | OUTPATIENT
Start: 2019-01-01 | End: 2019-01-01

## 2019-01-01 RX ORDER — GLYCOPYRROLATE 0.2 MG/ML
0.2 INJECTION INTRAMUSCULAR; INTRAVENOUS EVERY 4 HOURS
Status: DISCONTINUED | OUTPATIENT
Start: 2019-01-01 | End: 2019-01-01 | Stop reason: HOSPADM

## 2019-01-01 RX ORDER — FUROSEMIDE 40 MG/1
40 TABLET ORAL
Status: DISCONTINUED | OUTPATIENT
Start: 2019-01-01 | End: 2019-01-01 | Stop reason: HOSPADM

## 2019-01-01 RX ORDER — ONDANSETRON 2 MG/ML
4 INJECTION INTRAMUSCULAR; INTRAVENOUS
Status: DISCONTINUED | OUTPATIENT
Start: 2019-01-01 | End: 2019-01-01

## 2019-01-01 RX ORDER — TRAZODONE HYDROCHLORIDE 150 MG/1
TABLET ORAL
Qty: 90 TAB | Refills: 3 | Status: SHIPPED | OUTPATIENT
Start: 2019-01-01 | End: 2019-01-01

## 2019-01-01 RX ORDER — HYDROMORPHONE HYDROCHLORIDE 2 MG/ML
0.5 INJECTION, SOLUTION INTRAMUSCULAR; INTRAVENOUS; SUBCUTANEOUS
Status: DISCONTINUED | OUTPATIENT
Start: 2019-01-01 | End: 2019-01-01 | Stop reason: HOSPADM

## 2019-01-01 RX ORDER — SPIRONOLACTONE 100 MG/1
100 TABLET, FILM COATED ORAL DAILY
Status: DISCONTINUED | OUTPATIENT
Start: 2019-01-01 | End: 2019-01-01 | Stop reason: HOSPADM

## 2019-01-01 RX ORDER — SPIRONOLACTONE 100 MG/1
100 TABLET, FILM COATED ORAL DAILY
Qty: 30 TAB | Refills: 2 | Status: SHIPPED | OUTPATIENT
Start: 2019-01-01

## 2019-01-01 RX ORDER — SODIUM CHLORIDE 9 MG/ML
140 INJECTION, SOLUTION INTRAVENOUS CONTINUOUS
Status: DISCONTINUED | OUTPATIENT
Start: 2019-01-01 | End: 2019-01-01

## 2019-01-01 RX ORDER — GLYCOPYRROLATE 0.2 MG/ML
0.2 INJECTION INTRAMUSCULAR; INTRAVENOUS EVERY 4 HOURS
Status: DISCONTINUED | OUTPATIENT
Start: 2019-01-01 | End: 2019-01-01

## 2019-01-01 RX ORDER — CARVEDILOL 3.12 MG/1
3.12 TABLET ORAL 2 TIMES DAILY WITH MEALS
Status: DISCONTINUED | OUTPATIENT
Start: 2019-01-01 | End: 2019-01-01

## 2019-01-01 RX ORDER — HYDROMORPHONE HYDROCHLORIDE 2 MG/ML
1 INJECTION, SOLUTION INTRAMUSCULAR; INTRAVENOUS; SUBCUTANEOUS
Status: DISCONTINUED | OUTPATIENT
Start: 2019-01-01 | End: 2019-01-01 | Stop reason: HOSPADM

## 2019-01-01 RX ORDER — FOLIC ACID 1 MG/1
1 TABLET ORAL DAILY
Qty: 30 TAB | Refills: 2 | Status: ON HOLD | OUTPATIENT
Start: 2019-01-01 | End: 2019-01-01

## 2019-01-01 RX ORDER — PANTOPRAZOLE SODIUM 40 MG/1
40 TABLET, DELAYED RELEASE ORAL
Status: DISCONTINUED | OUTPATIENT
Start: 2019-01-01 | End: 2019-01-01 | Stop reason: HOSPADM

## 2019-01-01 RX ORDER — WATER FOR INJECTION,STERILE
VIAL (ML) INJECTION
Status: COMPLETED
Start: 2019-01-01 | End: 2019-01-01

## 2019-01-01 RX ORDER — VENLAFAXINE HYDROCHLORIDE 150 MG/1
150 CAPSULE, EXTENDED RELEASE ORAL DAILY
Qty: 30 CAP | Refills: 1 | Status: SHIPPED | OUTPATIENT
Start: 2019-01-01

## 2019-01-01 RX ORDER — HALOPERIDOL 5 MG/ML
2 INJECTION INTRAMUSCULAR
Status: DISCONTINUED | OUTPATIENT
Start: 2019-01-01 | End: 2019-01-01 | Stop reason: HOSPADM

## 2019-01-01 RX ORDER — IBUPROFEN 200 MG
1 TABLET ORAL DAILY
Status: DISCONTINUED | OUTPATIENT
Start: 2019-01-01 | End: 2019-01-01 | Stop reason: HOSPADM

## 2019-01-01 RX ORDER — CIPROFLOXACIN 500 MG/1
500 TABLET ORAL 2 TIMES DAILY
Qty: 56 TAB | Refills: 0 | Status: SHIPPED | OUTPATIENT
Start: 2019-01-01 | End: 2019-01-01

## 2019-01-01 RX ORDER — SPIRONOLACTONE 25 MG/1
25 TABLET ORAL 2 TIMES DAILY
Status: DISCONTINUED | OUTPATIENT
Start: 2019-01-01 | End: 2019-01-01 | Stop reason: HOSPADM

## 2019-01-01 RX ORDER — LORAZEPAM 2 MG/ML
1 INJECTION INTRAMUSCULAR
Status: DISCONTINUED | OUTPATIENT
Start: 2019-01-01 | End: 2019-01-01 | Stop reason: HOSPADM

## 2019-01-01 RX ORDER — VENLAFAXINE HYDROCHLORIDE 150 MG/1
150 CAPSULE, EXTENDED RELEASE ORAL
Status: DISCONTINUED | OUTPATIENT
Start: 2019-01-01 | End: 2019-01-01 | Stop reason: HOSPADM

## 2019-01-01 RX ORDER — HEPARIN SODIUM 5000 [USP'U]/ML
5000 INJECTION, SOLUTION INTRAVENOUS; SUBCUTANEOUS EVERY 8 HOURS
Status: DISCONTINUED | OUTPATIENT
Start: 2019-01-01 | End: 2019-01-01

## 2019-01-01 RX ORDER — DIPHENHYDRAMINE HYDROCHLORIDE 50 MG/ML
50 INJECTION, SOLUTION INTRAMUSCULAR; INTRAVENOUS
Status: DISCONTINUED | OUTPATIENT
Start: 2019-01-01 | End: 2019-01-01 | Stop reason: HOSPADM

## 2019-01-01 RX ORDER — SPIRONOLACTONE 25 MG/1
25 TABLET ORAL 2 TIMES DAILY
Status: DISCONTINUED | OUTPATIENT
Start: 2019-01-01 | End: 2019-01-01

## 2019-01-01 RX ORDER — HYDRALAZINE HYDROCHLORIDE 20 MG/ML
20 INJECTION INTRAMUSCULAR; INTRAVENOUS
Status: DISCONTINUED | OUTPATIENT
Start: 2019-01-01 | End: 2019-01-01 | Stop reason: HOSPADM

## 2019-01-01 RX ORDER — FUROSEMIDE 40 MG/1
40 TABLET ORAL DAILY
Qty: 30 TAB | Refills: 3 | Status: SHIPPED | OUTPATIENT
Start: 2019-01-01

## 2019-01-01 RX ORDER — LANOLIN ALCOHOL/MO/W.PET/CERES
100 CREAM (GRAM) TOPICAL DAILY
Status: DISCONTINUED | OUTPATIENT
Start: 2019-01-01 | End: 2019-01-01

## 2019-01-01 RX ORDER — HALOPERIDOL 2 MG/ML
2 SOLUTION ORAL
Status: DISCONTINUED | OUTPATIENT
Start: 2019-01-01 | End: 2019-01-01 | Stop reason: HOSPADM

## 2019-01-01 RX ORDER — ACETAMINOPHEN/DIPHENHYDRAMINE 500MG-25MG
1 TABLET ORAL
COMMUNITY

## 2019-01-01 RX ORDER — CYCLOBENZAPRINE HCL 10 MG
10 TABLET ORAL
Status: DISCONTINUED | OUTPATIENT
Start: 2019-01-01 | End: 2019-01-01 | Stop reason: HOSPADM

## 2019-01-01 RX ORDER — ALBUMIN HUMAN 250 G/1000ML
12.5 SOLUTION INTRAVENOUS EVERY 6 HOURS
Status: COMPLETED | OUTPATIENT
Start: 2019-01-01 | End: 2019-01-01

## 2019-01-01 RX ORDER — ALBUMIN HUMAN 250 G/1000ML
25 SOLUTION INTRAVENOUS EVERY 6 HOURS
Status: DISCONTINUED | OUTPATIENT
Start: 2019-01-01 | End: 2019-01-01

## 2019-01-01 RX ORDER — LIDOCAINE HYDROCHLORIDE 10 MG/ML
INJECTION, SOLUTION EPIDURAL; INFILTRATION; INTRACAUDAL; PERINEURAL
Status: COMPLETED
Start: 2019-01-01 | End: 2019-01-01

## 2019-01-01 RX ORDER — HYDROMORPHONE HYDROCHLORIDE 2 MG/ML
2 INJECTION, SOLUTION INTRAMUSCULAR; INTRAVENOUS; SUBCUTANEOUS EVERY 4 HOURS
Status: DISCONTINUED | OUTPATIENT
Start: 2019-01-01 | End: 2019-01-01 | Stop reason: HOSPADM

## 2019-01-01 RX ORDER — ALLOPURINOL 300 MG/1
300 TABLET ORAL 2 TIMES DAILY
Status: DISCONTINUED | OUTPATIENT
Start: 2019-01-01 | End: 2019-01-01 | Stop reason: HOSPADM

## 2019-01-01 RX ORDER — HALOPERIDOL 5 MG/ML
5 INJECTION INTRAMUSCULAR
Status: DISCONTINUED | OUTPATIENT
Start: 2019-01-01 | End: 2019-01-01

## 2019-01-01 RX ORDER — MUPIROCIN 20 MG/G
OINTMENT TOPICAL 3 TIMES DAILY
Qty: 22 G | Refills: 0 | Status: ON HOLD | OUTPATIENT
Start: 2019-01-01 | End: 2019-01-01

## 2019-01-01 RX ORDER — CHLORDIAZEPOXIDE HYDROCHLORIDE 25 MG/1
25 CAPSULE, GELATIN COATED ORAL
Status: DISCONTINUED | OUTPATIENT
Start: 2019-01-01 | End: 2019-01-01

## 2019-01-01 RX ORDER — ALBUMIN HUMAN 250 G/1000ML
25 SOLUTION INTRAVENOUS EVERY 12 HOURS
Status: COMPLETED | OUTPATIENT
Start: 2019-01-01 | End: 2019-01-01

## 2019-01-01 RX ORDER — GLYCOPYRROLATE 0.2 MG/ML
0.2 INJECTION INTRAMUSCULAR; INTRAVENOUS
Status: DISCONTINUED | OUTPATIENT
Start: 2019-01-01 | End: 2019-01-01

## 2019-01-01 RX ORDER — LORAZEPAM 2 MG/ML
1 INJECTION INTRAMUSCULAR EVERY 4 HOURS
Status: DISCONTINUED | OUTPATIENT
Start: 2019-01-01 | End: 2019-01-01

## 2019-01-01 RX ORDER — POTASSIUM CHLORIDE 14.9 MG/ML
10 INJECTION INTRAVENOUS
Status: COMPLETED | OUTPATIENT
Start: 2019-01-01 | End: 2019-01-01

## 2019-01-01 RX ORDER — SODIUM CHLORIDE AND POTASSIUM CHLORIDE .9; .15 G/100ML; G/100ML
SOLUTION INTRAVENOUS CONTINUOUS
Status: DISCONTINUED | OUTPATIENT
Start: 2019-01-01 | End: 2019-01-01

## 2019-01-01 RX ORDER — SODIUM CHLORIDE 9 MG/ML
75 INJECTION, SOLUTION INTRAVENOUS CONTINUOUS
Status: DISCONTINUED | OUTPATIENT
Start: 2019-01-01 | End: 2019-01-01

## 2019-01-01 RX ORDER — CARVEDILOL 3.12 MG/1
3.12 TABLET ORAL 2 TIMES DAILY WITH MEALS
Status: DISCONTINUED | OUTPATIENT
Start: 2019-01-01 | End: 2019-01-01 | Stop reason: HOSPADM

## 2019-01-01 RX ORDER — PROCHLORPERAZINE EDISYLATE 5 MG/ML
10 INJECTION INTRAMUSCULAR; INTRAVENOUS
Status: DISCONTINUED | OUTPATIENT
Start: 2019-01-01 | End: 2019-01-01 | Stop reason: HOSPADM

## 2019-01-01 RX ORDER — PROCHLORPERAZINE MALEATE 5 MG
5 TABLET ORAL
Status: DISCONTINUED | OUTPATIENT
Start: 2019-01-01 | End: 2019-01-01 | Stop reason: HOSPADM

## 2019-01-01 RX ORDER — LORAZEPAM 2 MG/ML
2 INJECTION INTRAMUSCULAR
Status: COMPLETED | OUTPATIENT
Start: 2019-01-01 | End: 2019-01-01

## 2019-01-01 RX ORDER — VENLAFAXINE HYDROCHLORIDE 37.5 MG/1
150 CAPSULE, EXTENDED RELEASE ORAL DAILY
Status: DISCONTINUED | OUTPATIENT
Start: 2019-01-01 | End: 2019-01-01 | Stop reason: HOSPADM

## 2019-01-01 RX ORDER — ONDANSETRON 8 MG/1
8 TABLET, ORALLY DISINTEGRATING ORAL
Qty: 15 TAB | Refills: 0 | Status: SHIPPED | OUTPATIENT
Start: 2019-01-01

## 2019-01-01 RX ORDER — FOLIC ACID 1 MG/1
1 TABLET ORAL DAILY
Status: DISCONTINUED | OUTPATIENT
Start: 2019-01-01 | End: 2019-01-01

## 2019-01-01 RX ORDER — ALBUMIN HUMAN 250 G/1000ML
50 SOLUTION INTRAVENOUS ONCE
Status: ACTIVE | OUTPATIENT
Start: 2019-01-01 | End: 2019-01-01

## 2019-01-01 RX ORDER — ONDANSETRON 2 MG/ML
4 INJECTION INTRAMUSCULAR; INTRAVENOUS
Status: COMPLETED | OUTPATIENT
Start: 2019-01-01 | End: 2019-01-01

## 2019-01-01 RX ORDER — ONDANSETRON 2 MG/ML
8 INJECTION INTRAMUSCULAR; INTRAVENOUS
Status: COMPLETED | OUTPATIENT
Start: 2019-01-01 | End: 2019-01-01

## 2019-01-01 RX ORDER — FUROSEMIDE 40 MG/1
40 TABLET ORAL DAILY
Status: DISCONTINUED | OUTPATIENT
Start: 2019-01-01 | End: 2019-01-01 | Stop reason: HOSPADM

## 2019-01-01 RX ORDER — SPIRONOLACTONE 100 MG/1
200 TABLET, FILM COATED ORAL DAILY
Status: DISCONTINUED | OUTPATIENT
Start: 2019-01-01 | End: 2019-01-01

## 2019-01-01 RX ORDER — HYDROMORPHONE HYDROCHLORIDE 2 MG/ML
0.5 INJECTION, SOLUTION INTRAMUSCULAR; INTRAVENOUS; SUBCUTANEOUS
Status: DISCONTINUED | OUTPATIENT
Start: 2019-01-01 | End: 2019-01-01

## 2019-01-01 RX ORDER — FUROSEMIDE 40 MG/1
80 TABLET ORAL DAILY
Status: DISCONTINUED | OUTPATIENT
Start: 2019-01-01 | End: 2019-01-01

## 2019-01-01 RX ORDER — LIDOCAINE HYDROCHLORIDE 10 MG/ML
10 INJECTION INFILTRATION; PERINEURAL
Status: DISCONTINUED | OUTPATIENT
Start: 2019-01-01 | End: 2019-01-01 | Stop reason: SDUPTHER

## 2019-01-01 RX ORDER — KETOROLAC TROMETHAMINE 30 MG/ML
30 INJECTION, SOLUTION INTRAMUSCULAR; INTRAVENOUS
Status: DISCONTINUED | OUTPATIENT
Start: 2019-01-01 | End: 2019-01-01 | Stop reason: HOSPADM

## 2019-01-01 RX ORDER — POTASSIUM CHLORIDE 1.5 G/1.77G
40 POWDER, FOR SOLUTION ORAL
Status: COMPLETED | OUTPATIENT
Start: 2019-01-01 | End: 2019-01-01

## 2019-01-01 RX ORDER — IBUPROFEN 200 MG
1 TABLET ORAL DAILY
Qty: 30 PATCH | Refills: 0 | Status: SHIPPED | OUTPATIENT
Start: 2019-01-01 | End: 2019-01-01

## 2019-01-01 RX ORDER — SODIUM CHLORIDE 9 MG/ML
75 INJECTION, SOLUTION INTRAVENOUS CONTINUOUS
Status: DISCONTINUED | OUTPATIENT
Start: 2019-01-01 | End: 2019-01-01 | Stop reason: CLARIF

## 2019-01-01 RX ORDER — ALBUMIN HUMAN 250 G/1000ML
25 SOLUTION INTRAVENOUS
Status: DISCONTINUED | OUTPATIENT
Start: 2019-01-01 | End: 2019-01-01 | Stop reason: HOSPADM

## 2019-01-01 RX ORDER — LORAZEPAM 2 MG/ML
2 INJECTION INTRAMUSCULAR EVERY 4 HOURS
Status: DISCONTINUED | OUTPATIENT
Start: 2019-01-01 | End: 2019-01-01 | Stop reason: HOSPADM

## 2019-01-01 RX ORDER — ALBUMIN HUMAN 250 G/1000ML
SOLUTION INTRAVENOUS
Status: DISPENSED
Start: 2019-01-01 | End: 2019-01-01

## 2019-01-01 RX ORDER — LORAZEPAM 2 MG/ML
2 INJECTION INTRAMUSCULAR
Status: DISCONTINUED | OUTPATIENT
Start: 2019-01-01 | End: 2019-01-01 | Stop reason: SDUPTHER

## 2019-01-01 RX ADMIN — PROCHLORPERAZINE MALEATE 5 MG: 5 TABLET, FILM COATED ORAL at 02:07

## 2019-01-01 RX ADMIN — LACTULOSE 20 G: 20 SOLUTION ORAL at 14:27

## 2019-01-01 RX ADMIN — ALLOPURINOL 300 MG: 300 TABLET ORAL at 09:01

## 2019-01-01 RX ADMIN — HYDROXYZINE HYDROCHLORIDE 25 MG: 25 TABLET, FILM COATED ORAL at 17:43

## 2019-01-01 RX ADMIN — SPIRONOLACTONE 150 MG: 25 TABLET ORAL at 08:20

## 2019-01-01 RX ADMIN — Medication 100 MG: at 08:40

## 2019-01-01 RX ADMIN — FOLIC ACID: 5 INJECTION, SOLUTION INTRAMUSCULAR; INTRAVENOUS; SUBCUTANEOUS at 18:23

## 2019-01-01 RX ADMIN — LORAZEPAM 2 MG: 2 INJECTION INTRAMUSCULAR; INTRAVENOUS at 15:32

## 2019-01-01 RX ADMIN — ALBUMIN (HUMAN) 25 G: 0.25 INJECTION, SOLUTION INTRAVENOUS at 11:13

## 2019-01-01 RX ADMIN — ALLOPURINOL 300 MG: 300 TABLET ORAL at 20:15

## 2019-01-01 RX ADMIN — LIDOCAINE HYDROCHLORIDE 10 ML: 10 INJECTION, SOLUTION EPIDURAL; INFILTRATION; INTRACAUDAL; PERINEURAL at 13:00

## 2019-01-01 RX ADMIN — FUROSEMIDE 40 MG: 40 TABLET ORAL at 10:05

## 2019-01-01 RX ADMIN — SPIRONOLACTONE 100 MG: 100 TABLET ORAL at 08:03

## 2019-01-01 RX ADMIN — FUROSEMIDE 80 MG: 40 TABLET ORAL at 12:16

## 2019-01-01 RX ADMIN — LORAZEPAM 2 MG: 2 INJECTION INTRAMUSCULAR; INTRAVENOUS at 09:43

## 2019-01-01 RX ADMIN — LORAZEPAM 2 MG: 2 INJECTION INTRAMUSCULAR; INTRAVENOUS at 19:24

## 2019-01-01 RX ADMIN — POTASSIUM CHLORIDE 40 MEQ: 750 TABLET, EXTENDED RELEASE ORAL at 08:19

## 2019-01-01 RX ADMIN — FENTANYL CITRATE 25 MCG: 50 INJECTION, SOLUTION INTRAMUSCULAR; INTRAVENOUS at 14:38

## 2019-01-01 RX ADMIN — SPIRONOLACTONE 25 MG: 25 TABLET ORAL at 09:01

## 2019-01-01 RX ADMIN — POTASSIUM CHLORIDE 10 MEQ: 10 INJECTION, SOLUTION INTRAVENOUS at 09:31

## 2019-01-01 RX ADMIN — MUPIROCIN: 20 OINTMENT TOPICAL at 02:04

## 2019-01-01 RX ADMIN — SPIRONOLACTONE 100 MG: 100 TABLET ORAL at 10:06

## 2019-01-01 RX ADMIN — HYDROXYZINE HYDROCHLORIDE 25 MG: 25 TABLET, FILM COATED ORAL at 10:04

## 2019-01-01 RX ADMIN — CHLORDIAZEPOXIDE HYDROCHLORIDE 25 MG: 25 CAPSULE ORAL at 12:51

## 2019-01-01 RX ADMIN — POTASSIUM CHLORIDE 40 MEQ: 750 TABLET, FILM COATED, EXTENDED RELEASE ORAL at 18:37

## 2019-01-01 RX ADMIN — SODIUM CHLORIDE 75 ML/HR: 900 INJECTION, SOLUTION INTRAVENOUS at 04:54

## 2019-01-01 RX ADMIN — ONDANSETRON 4 MG: 2 INJECTION INTRAMUSCULAR; INTRAVENOUS at 22:35

## 2019-01-01 RX ADMIN — LACTULOSE 30 ML: 20 SOLUTION ORAL at 21:18

## 2019-01-01 RX ADMIN — VENLAFAXINE HYDROCHLORIDE 150 MG: 37.5 CAPSULE, EXTENDED RELEASE ORAL at 10:03

## 2019-01-01 RX ADMIN — FUROSEMIDE 40 MG: 40 TABLET ORAL at 08:40

## 2019-01-01 RX ADMIN — HYDROXYZINE HYDROCHLORIDE 25 MG: 25 TABLET, FILM COATED ORAL at 21:46

## 2019-01-01 RX ADMIN — LIDOCAINE HYDROCHLORIDE 10 ML: 10 INJECTION, SOLUTION EPIDURAL; INFILTRATION; INTRACAUDAL; PERINEURAL at 12:01

## 2019-01-01 RX ADMIN — LORAZEPAM 2 MG: 2 INJECTION INTRAMUSCULAR; INTRAVENOUS at 16:56

## 2019-01-01 RX ADMIN — ONDANSETRON 4 MG: 2 INJECTION INTRAMUSCULAR; INTRAVENOUS at 19:27

## 2019-01-01 RX ADMIN — LORAZEPAM 2 MG: 2 INJECTION INTRAMUSCULAR; INTRAVENOUS at 03:47

## 2019-01-01 RX ADMIN — HYDROXYZINE HYDROCHLORIDE 25 MG: 25 TABLET, FILM COATED ORAL at 08:02

## 2019-01-01 RX ADMIN — POTASSIUM CHLORIDE 10 MEQ: 200 INJECTION, SOLUTION INTRAVENOUS at 11:10

## 2019-01-01 RX ADMIN — SPIRONOLACTONE 100 MG: 100 TABLET ORAL at 08:26

## 2019-01-01 RX ADMIN — LORAZEPAM 2 MG: 2 INJECTION INTRAMUSCULAR; INTRAVENOUS at 21:12

## 2019-01-01 RX ADMIN — LACTULOSE 30 ML: 20 SOLUTION ORAL at 21:35

## 2019-01-01 RX ADMIN — HYDROXYZINE HYDROCHLORIDE 25 MG: 25 TABLET, FILM COATED ORAL at 21:51

## 2019-01-01 RX ADMIN — FOLIC ACID 1 MG: 1 TABLET ORAL at 08:40

## 2019-01-01 RX ADMIN — VENLAFAXINE HYDROCHLORIDE 150 MG: 37.5 CAPSULE, EXTENDED RELEASE ORAL at 10:04

## 2019-01-01 RX ADMIN — GLYCOPYRROLATE 0.2 MG: 0.2 INJECTION, SOLUTION INTRAMUSCULAR; INTRAVENOUS at 12:47

## 2019-01-01 RX ADMIN — LORAZEPAM 2 MG: 2 INJECTION INTRAMUSCULAR; INTRAVENOUS at 10:11

## 2019-01-01 RX ADMIN — ALLOPURINOL 300 MG: 300 TABLET ORAL at 21:40

## 2019-01-01 RX ADMIN — ALLOPURINOL 300 MG: 300 TABLET ORAL at 08:27

## 2019-01-01 RX ADMIN — FOLIC ACID: 5 INJECTION, SOLUTION INTRAMUSCULAR; INTRAVENOUS; SUBCUTANEOUS at 11:29

## 2019-01-01 RX ADMIN — LORAZEPAM 1 MG: 2 INJECTION INTRAMUSCULAR; INTRAVENOUS at 04:15

## 2019-01-01 RX ADMIN — HYDROXYZINE HYDROCHLORIDE 25 MG: 25 TABLET, FILM COATED ORAL at 18:34

## 2019-01-01 RX ADMIN — SODIUM CHLORIDE 75 ML/HR: 9 INJECTION, SOLUTION INTRAVENOUS at 11:09

## 2019-01-01 RX ADMIN — MAGNESIUM SULFATE HEPTAHYDRATE 2 G: 40 INJECTION, SOLUTION INTRAVENOUS at 09:48

## 2019-01-01 RX ADMIN — PANTOPRAZOLE SODIUM 40 MG: 40 INJECTION, POWDER, FOR SOLUTION INTRAVENOUS at 08:24

## 2019-01-01 RX ADMIN — LORAZEPAM 1 MG: 2 INJECTION INTRAMUSCULAR; INTRAVENOUS at 11:57

## 2019-01-01 RX ADMIN — ALBUMIN (HUMAN) 12.5 G: 0.25 INJECTION, SOLUTION INTRAVENOUS at 16:51

## 2019-01-01 RX ADMIN — SODIUM CHLORIDE 1000 ML: 900 INJECTION, SOLUTION INTRAVENOUS at 06:41

## 2019-01-01 RX ADMIN — ALBUMIN (HUMAN) 12.5 G: 0.25 INJECTION, SOLUTION INTRAVENOUS at 11:53

## 2019-01-01 RX ADMIN — SPIRONOLACTONE 25 MG: 25 TABLET ORAL at 09:48

## 2019-01-01 RX ADMIN — MUPIROCIN: 20 OINTMENT TOPICAL at 12:16

## 2019-01-01 RX ADMIN — LACTULOSE 30 ML: 20 SOLUTION ORAL at 21:51

## 2019-01-01 RX ADMIN — Medication 100 MG: at 09:09

## 2019-01-01 RX ADMIN — FUROSEMIDE 40 MG: 40 TABLET ORAL at 16:55

## 2019-01-01 RX ADMIN — LORAZEPAM 2 MG: 2 INJECTION INTRAMUSCULAR; INTRAVENOUS at 09:20

## 2019-01-01 RX ADMIN — HYDROXYZINE HYDROCHLORIDE 25 MG: 25 TABLET, FILM COATED ORAL at 08:20

## 2019-01-01 RX ADMIN — HYDROXYZINE HYDROCHLORIDE 25 MG: 25 TABLET, FILM COATED ORAL at 15:42

## 2019-01-01 RX ADMIN — FOLIC ACID: 5 INJECTION, SOLUTION INTRAMUSCULAR; INTRAVENOUS; SUBCUTANEOUS at 07:13

## 2019-01-01 RX ADMIN — Medication 10 ML: at 06:17

## 2019-01-01 RX ADMIN — HYDROXYZINE HYDROCHLORIDE 25 MG: 25 TABLET, FILM COATED ORAL at 21:40

## 2019-01-01 RX ADMIN — FOLIC ACID 1 MG: 1 TABLET ORAL at 08:27

## 2019-01-01 RX ADMIN — ALLOPURINOL 300 MG: 300 TABLET ORAL at 08:03

## 2019-01-01 RX ADMIN — HYDROMORPHONE HYDROCHLORIDE 0.5 MG: 2 INJECTION INTRAMUSCULAR; INTRAVENOUS; SUBCUTANEOUS at 08:26

## 2019-01-01 RX ADMIN — PANTOPRAZOLE SODIUM 40 MG: 40 TABLET, DELAYED RELEASE ORAL at 06:46

## 2019-01-01 RX ADMIN — FOLIC ACID 1 MG: 1 TABLET ORAL at 10:04

## 2019-01-01 RX ADMIN — PROMETHAZINE HYDROCHLORIDE 12.5 MG: 25 INJECTION INTRAMUSCULAR; INTRAVENOUS at 09:40

## 2019-01-01 RX ADMIN — LORAZEPAM 2 MG: 2 INJECTION INTRAMUSCULAR; INTRAVENOUS at 06:33

## 2019-01-01 RX ADMIN — CARVEDILOL 3.12 MG: 6.25 TABLET, FILM COATED ORAL at 17:03

## 2019-01-01 RX ADMIN — HYDROXYZINE HYDROCHLORIDE 25 MG: 25 TABLET, FILM COATED ORAL at 16:07

## 2019-01-01 RX ADMIN — FUROSEMIDE 40 MG: 40 TABLET ORAL at 10:03

## 2019-01-01 RX ADMIN — LORAZEPAM 2 MG: 2 INJECTION INTRAMUSCULAR; INTRAVENOUS at 10:07

## 2019-01-01 RX ADMIN — GLYCOPYRROLATE 0.2 MG: 0.2 INJECTION, SOLUTION INTRAMUSCULAR; INTRAVENOUS at 07:52

## 2019-01-01 RX ADMIN — CHLORDIAZEPOXIDE HYDROCHLORIDE 25 MG: 25 CAPSULE ORAL at 07:13

## 2019-01-01 RX ADMIN — ONDANSETRON 4 MG: 2 INJECTION INTRAMUSCULAR; INTRAVENOUS at 21:12

## 2019-01-01 RX ADMIN — ALLOPURINOL 300 MG: 300 TABLET ORAL at 21:32

## 2019-01-01 RX ADMIN — PANTOPRAZOLE SODIUM 40 MG: 40 TABLET, DELAYED RELEASE ORAL at 06:48

## 2019-01-01 RX ADMIN — FOLIC ACID: 5 INJECTION, SOLUTION INTRAMUSCULAR; INTRAVENOUS; SUBCUTANEOUS at 10:28

## 2019-01-01 RX ADMIN — LORAZEPAM 2 MG: 2 INJECTION INTRAMUSCULAR; INTRAVENOUS at 13:45

## 2019-01-01 RX ADMIN — FUROSEMIDE 40 MG: 40 TABLET ORAL at 06:33

## 2019-01-01 RX ADMIN — HYDROXYZINE HYDROCHLORIDE 25 MG: 25 TABLET, FILM COATED ORAL at 15:32

## 2019-01-01 RX ADMIN — HYDROXYZINE HYDROCHLORIDE 25 MG: 25 TABLET, FILM COATED ORAL at 08:27

## 2019-01-01 RX ADMIN — ALLOPURINOL 300 MG: 300 TABLET ORAL at 12:16

## 2019-01-01 RX ADMIN — ALLOPURINOL 300 MG: 300 TABLET ORAL at 10:03

## 2019-01-01 RX ADMIN — LORAZEPAM 2 MG: 2 INJECTION INTRAMUSCULAR; INTRAVENOUS at 21:46

## 2019-01-01 RX ADMIN — ALLOPURINOL 300 MG: 300 TABLET ORAL at 21:00

## 2019-01-01 RX ADMIN — FENTANYL CITRATE 25 MCG: 50 INJECTION, SOLUTION INTRAMUSCULAR; INTRAVENOUS at 09:43

## 2019-01-01 RX ADMIN — HYDROXYZINE HYDROCHLORIDE 25 MG: 25 TABLET, FILM COATED ORAL at 15:34

## 2019-01-01 RX ADMIN — FOLIC ACID 1 MG: 1 TABLET ORAL at 12:16

## 2019-01-01 RX ADMIN — LORAZEPAM 1 MG: 2 INJECTION INTRAMUSCULAR; INTRAVENOUS at 07:40

## 2019-01-01 RX ADMIN — PANTOPRAZOLE SODIUM 40 MG: 40 TABLET, DELAYED RELEASE ORAL at 06:38

## 2019-01-01 RX ADMIN — CEFTRIAXONE SODIUM 1 G: 1 INJECTION, POWDER, FOR SOLUTION INTRAMUSCULAR; INTRAVENOUS at 21:21

## 2019-01-01 RX ADMIN — LACTULOSE 30 ML: 20 SOLUTION ORAL at 08:27

## 2019-01-01 RX ADMIN — HALOPERIDOL LACTATE 5 MG: 5 INJECTION INTRAMUSCULAR at 07:41

## 2019-01-01 RX ADMIN — PANTOPRAZOLE SODIUM 40 MG: 40 TABLET, DELAYED RELEASE ORAL at 06:30

## 2019-01-01 RX ADMIN — FUROSEMIDE 40 MG: 40 TABLET ORAL at 09:11

## 2019-01-01 RX ADMIN — LORAZEPAM 2 MG: 2 INJECTION INTRAMUSCULAR; INTRAVENOUS at 14:38

## 2019-01-01 RX ADMIN — DEXTROSE MONOHYDRATE AND SODIUM CHLORIDE 50 ML/HR: 5; .9 INJECTION, SOLUTION INTRAVENOUS at 22:41

## 2019-01-01 RX ADMIN — Medication 100 MG: at 09:12

## 2019-01-01 RX ADMIN — PANTOPRAZOLE SODIUM 40 MG: 40 TABLET, DELAYED RELEASE ORAL at 06:35

## 2019-01-01 RX ADMIN — POTASSIUM CHLORIDE 40 MEQ: 750 TABLET, FILM COATED, EXTENDED RELEASE ORAL at 19:23

## 2019-01-01 RX ADMIN — ALLOPURINOL 300 MG: 300 TABLET ORAL at 08:20

## 2019-01-01 RX ADMIN — HYDROMORPHONE HYDROCHLORIDE 2 MG: 2 INJECTION INTRAMUSCULAR; INTRAVENOUS; SUBCUTANEOUS at 13:55

## 2019-01-01 RX ADMIN — THERA TABS 1 TABLET: TAB at 08:20

## 2019-01-01 RX ADMIN — LORAZEPAM 4 MG: 2 INJECTION INTRAMUSCULAR; INTRAVENOUS at 20:09

## 2019-01-01 RX ADMIN — Medication 100 MG: at 08:27

## 2019-01-01 RX ADMIN — LORAZEPAM 2 MG: 2 INJECTION INTRAMUSCULAR; INTRAVENOUS at 19:08

## 2019-01-01 RX ADMIN — LORAZEPAM 2 MG: 2 INJECTION INTRAMUSCULAR; INTRAVENOUS at 02:05

## 2019-01-01 RX ADMIN — LORAZEPAM 2 MG: 2 INJECTION INTRAMUSCULAR; INTRAVENOUS at 00:20

## 2019-01-01 RX ADMIN — Medication 100 MG: at 14:38

## 2019-01-01 RX ADMIN — ONDANSETRON 4 MG: 2 INJECTION INTRAMUSCULAR; INTRAVENOUS at 00:22

## 2019-01-01 RX ADMIN — Medication 100 MG: at 10:05

## 2019-01-01 RX ADMIN — HYDROXYZINE HYDROCHLORIDE 25 MG: 25 TABLET, FILM COATED ORAL at 08:40

## 2019-01-01 RX ADMIN — POTASSIUM CHLORIDE 40 MEQ: 1.5 POWDER, FOR SOLUTION ORAL at 11:26

## 2019-01-01 RX ADMIN — Medication 10 ML: at 00:24

## 2019-01-01 RX ADMIN — LORAZEPAM 2 MG: 2 INJECTION INTRAMUSCULAR; INTRAVENOUS at 23:35

## 2019-01-01 RX ADMIN — CARVEDILOL 3.12 MG: 3.12 TABLET, FILM COATED ORAL at 08:23

## 2019-01-01 RX ADMIN — POTASSIUM CHLORIDE 10 MEQ: 10 INJECTION, SOLUTION INTRAVENOUS at 12:53

## 2019-01-01 RX ADMIN — HYDROXYZINE HYDROCHLORIDE 25 MG: 25 TABLET, FILM COATED ORAL at 09:11

## 2019-01-01 RX ADMIN — ALLOPURINOL 300 MG: 300 TABLET ORAL at 10:04

## 2019-01-01 RX ADMIN — HYDROMORPHONE HYDROCHLORIDE 0.5 MG: 2 INJECTION INTRAMUSCULAR; INTRAVENOUS; SUBCUTANEOUS at 07:52

## 2019-01-01 RX ADMIN — ZIPRASIDONE MESYLATE 20 MG: 20 INJECTION, POWDER, LYOPHILIZED, FOR SOLUTION INTRAMUSCULAR at 05:47

## 2019-01-01 RX ADMIN — SODIUM CHLORIDE 140 ML/HR: 900 INJECTION, SOLUTION INTRAVENOUS at 07:26

## 2019-01-01 RX ADMIN — VENLAFAXINE HYDROCHLORIDE 150 MG: 37.5 CAPSULE, EXTENDED RELEASE ORAL at 08:20

## 2019-01-01 RX ADMIN — LACTULOSE 30 ML: 20 SOLUTION ORAL at 15:51

## 2019-01-01 RX ADMIN — Medication 10 ML: at 14:39

## 2019-01-01 RX ADMIN — LACTULOSE 30 ML: 20 SOLUTION ORAL at 16:48

## 2019-01-01 RX ADMIN — VENLAFAXINE HYDROCHLORIDE 150 MG: 37.5 CAPSULE, EXTENDED RELEASE ORAL at 08:03

## 2019-01-01 RX ADMIN — ALLOPURINOL 300 MG: 300 TABLET ORAL at 21:18

## 2019-01-01 RX ADMIN — LACTULOSE 20 G: 20 SOLUTION ORAL at 21:50

## 2019-01-01 RX ADMIN — ALLOPURINOL 300 MG: 300 TABLET ORAL at 22:53

## 2019-01-01 RX ADMIN — HYDROMORPHONE HYDROCHLORIDE 0.5 MG: 2 INJECTION INTRAMUSCULAR; INTRAVENOUS; SUBCUTANEOUS at 15:15

## 2019-01-01 RX ADMIN — LORAZEPAM 2 MG: 2 INJECTION INTRAMUSCULAR; INTRAVENOUS at 20:51

## 2019-01-01 RX ADMIN — CARVEDILOL 3.12 MG: 3.12 TABLET, FILM COATED ORAL at 16:55

## 2019-01-01 RX ADMIN — LORAZEPAM 2 MG: 2 INJECTION INTRAMUSCULAR; INTRAVENOUS at 20:33

## 2019-01-01 RX ADMIN — FOLIC ACID 1 MG: 1 TABLET ORAL at 08:03

## 2019-01-01 RX ADMIN — ALLOPURINOL 300 MG: 300 TABLET ORAL at 21:35

## 2019-01-01 RX ADMIN — VENLAFAXINE HYDROCHLORIDE 150 MG: 150 CAPSULE, EXTENDED RELEASE ORAL at 09:01

## 2019-01-01 RX ADMIN — CARVEDILOL 3.12 MG: 3.12 TABLET, FILM COATED ORAL at 18:34

## 2019-01-01 RX ADMIN — FOLIC ACID 1 MG: 1 TABLET ORAL at 09:12

## 2019-01-01 RX ADMIN — SPIRONOLACTONE 25 MG: 25 TABLET ORAL at 17:03

## 2019-01-01 RX ADMIN — SPIRONOLACTONE 100 MG: 100 TABLET ORAL at 08:27

## 2019-01-01 RX ADMIN — LORAZEPAM 2 MG: 2 INJECTION INTRAMUSCULAR; INTRAVENOUS at 06:43

## 2019-01-01 RX ADMIN — FUROSEMIDE 40 MG: 40 TABLET ORAL at 08:27

## 2019-01-01 RX ADMIN — LORAZEPAM 2 MG: 2 INJECTION INTRAMUSCULAR; INTRAVENOUS at 19:43

## 2019-01-01 RX ADMIN — ALBUMIN (HUMAN) 25 G: 0.25 INJECTION, SOLUTION INTRAVENOUS at 12:49

## 2019-01-01 RX ADMIN — LORAZEPAM 2 MG: 2 INJECTION INTRAMUSCULAR; INTRAVENOUS at 14:15

## 2019-01-01 RX ADMIN — ALLOPURINOL 300 MG: 300 TABLET ORAL at 17:03

## 2019-01-01 RX ADMIN — FUROSEMIDE 40 MG: 40 TABLET ORAL at 22:08

## 2019-01-01 RX ADMIN — FUROSEMIDE 40 MG: 40 TABLET ORAL at 08:03

## 2019-01-01 RX ADMIN — ALLOPURINOL 300 MG: 300 TABLET ORAL at 21:12

## 2019-01-01 RX ADMIN — VENLAFAXINE HYDROCHLORIDE 150 MG: 37.5 CAPSULE, EXTENDED RELEASE ORAL at 09:09

## 2019-01-01 RX ADMIN — ALLOPURINOL 300 MG: 300 TABLET ORAL at 21:51

## 2019-01-01 RX ADMIN — WATER 1.2 ML: 1 INJECTION INTRAMUSCULAR; INTRAVENOUS; SUBCUTANEOUS at 05:53

## 2019-01-01 RX ADMIN — CARVEDILOL 3.12 MG: 3.12 TABLET, FILM COATED ORAL at 16:56

## 2019-01-01 RX ADMIN — PANTOPRAZOLE SODIUM 40 MG: 40 TABLET, DELAYED RELEASE ORAL at 06:39

## 2019-01-01 RX ADMIN — FOLIC ACID 1 MG: 1 TABLET ORAL at 08:26

## 2019-01-01 RX ADMIN — HYDROXYZINE HYDROCHLORIDE 25 MG: 25 TABLET, FILM COATED ORAL at 21:35

## 2019-01-01 RX ADMIN — THERA TABS 1 TABLET: TAB at 12:16

## 2019-01-01 RX ADMIN — FUROSEMIDE 40 MG: 40 TABLET ORAL at 08:26

## 2019-01-01 RX ADMIN — HYDROXYZINE HYDROCHLORIDE 25 MG: 25 TABLET, FILM COATED ORAL at 21:18

## 2019-01-01 RX ADMIN — LACTULOSE 20 G: 20 SOLUTION ORAL at 16:07

## 2019-01-01 RX ADMIN — ONDANSETRON 4 MG: 2 INJECTION INTRAMUSCULAR; INTRAVENOUS at 09:20

## 2019-01-01 RX ADMIN — HYDROXYZINE HYDROCHLORIDE 25 MG: 25 TABLET, FILM COATED ORAL at 21:50

## 2019-01-01 RX ADMIN — Medication 100 MG: at 08:20

## 2019-01-01 RX ADMIN — LORAZEPAM 2 MG: 2 INJECTION INTRAMUSCULAR; INTRAVENOUS at 01:13

## 2019-01-01 RX ADMIN — MUPIROCIN: 20 OINTMENT TOPICAL at 09:48

## 2019-01-01 RX ADMIN — FENTANYL CITRATE 25 MCG: 50 INJECTION, SOLUTION INTRAMUSCULAR; INTRAVENOUS at 09:12

## 2019-01-01 RX ADMIN — FOLIC ACID: 5 INJECTION, SOLUTION INTRAMUSCULAR; INTRAVENOUS; SUBCUTANEOUS at 08:19

## 2019-01-01 RX ADMIN — LACTULOSE 45 ML: 20 SOLUTION ORAL at 13:28

## 2019-01-01 RX ADMIN — ALBUMIN (HUMAN) 25 G: 0.25 INJECTION, SOLUTION INTRAVENOUS at 13:26

## 2019-01-01 RX ADMIN — HYDROXYZINE HYDROCHLORIDE 25 MG: 25 TABLET, FILM COATED ORAL at 21:32

## 2019-01-01 RX ADMIN — CARVEDILOL 3.12 MG: 6.25 TABLET, FILM COATED ORAL at 09:47

## 2019-01-01 RX ADMIN — FUROSEMIDE 40 MG: 40 TABLET ORAL at 17:03

## 2019-01-01 RX ADMIN — HALOPERIDOL LACTATE: 5 INJECTION INTRAMUSCULAR at 07:42

## 2019-01-01 RX ADMIN — LORAZEPAM 2 MG: 2 INJECTION INTRAMUSCULAR; INTRAVENOUS at 08:25

## 2019-01-01 RX ADMIN — ONDANSETRON 8 MG: 2 INJECTION INTRAMUSCULAR; INTRAVENOUS at 06:51

## 2019-01-01 RX ADMIN — HYDROXYZINE HYDROCHLORIDE 25 MG: 25 TABLET, FILM COATED ORAL at 09:12

## 2019-01-01 RX ADMIN — LORAZEPAM 2 MG: 2 INJECTION INTRAMUSCULAR; INTRAVENOUS at 09:59

## 2019-01-01 RX ADMIN — LACTULOSE 20 G: 20 SOLUTION ORAL at 10:04

## 2019-01-01 RX ADMIN — HYDROXYZINE HYDROCHLORIDE 25 MG: 25 TABLET, FILM COATED ORAL at 10:06

## 2019-01-01 RX ADMIN — ACETAMINOPHEN 650 MG: 650 SUPPOSITORY RECTAL at 10:11

## 2019-01-01 RX ADMIN — POTASSIUM CHLORIDE 10 MEQ: 200 INJECTION, SOLUTION INTRAVENOUS at 10:04

## 2019-01-01 RX ADMIN — IOPAMIDOL 100 ML: 755 INJECTION, SOLUTION INTRAVENOUS at 06:29

## 2019-01-01 RX ADMIN — FOLIC ACID 1 MG: 1 TABLET ORAL at 09:11

## 2019-01-01 RX ADMIN — VENLAFAXINE HYDROCHLORIDE 150 MG: 37.5 CAPSULE, EXTENDED RELEASE ORAL at 09:11

## 2019-01-01 RX ADMIN — SPIRONOLACTONE 100 MG: 100 TABLET ORAL at 09:11

## 2019-01-01 RX ADMIN — ALLOPURINOL 300 MG: 300 TABLET ORAL at 10:06

## 2019-01-01 RX ADMIN — ALLOPURINOL 300 MG: 300 TABLET ORAL at 09:12

## 2019-01-01 RX ADMIN — HYDROXYZINE HYDROCHLORIDE 25 MG: 25 TABLET, FILM COATED ORAL at 10:08

## 2019-01-01 RX ADMIN — LORAZEPAM 1 MG: 2 INJECTION INTRAMUSCULAR; INTRAVENOUS at 16:36

## 2019-01-01 RX ADMIN — RIFAXIMIN 550 MG: 550 TABLET ORAL at 11:20

## 2019-01-01 RX ADMIN — Medication 100 MG: at 12:16

## 2019-01-01 RX ADMIN — FENTANYL CITRATE 50 MCG: 50 INJECTION, SOLUTION INTRAMUSCULAR; INTRAVENOUS at 18:23

## 2019-01-01 RX ADMIN — LACTULOSE 30 ML: 20 SOLUTION ORAL at 22:54

## 2019-01-01 RX ADMIN — DEXTROSE MONOHYDRATE AND SODIUM CHLORIDE 100 ML/HR: 5; .9 INJECTION, SOLUTION INTRAVENOUS at 09:44

## 2019-01-01 RX ADMIN — CARVEDILOL 3.12 MG: 6.25 TABLET, FILM COATED ORAL at 09:01

## 2019-01-01 RX ADMIN — LORAZEPAM 2 MG: 2 INJECTION INTRAMUSCULAR; INTRAVENOUS at 11:29

## 2019-01-01 RX ADMIN — FUROSEMIDE 40 MG: 40 TABLET ORAL at 09:12

## 2019-01-01 RX ADMIN — HYDROMORPHONE HYDROCHLORIDE 0.5 MG: 2 INJECTION INTRAMUSCULAR; INTRAVENOUS; SUBCUTANEOUS at 17:43

## 2019-01-01 RX ADMIN — LACTULOSE 30 ML: 20 SOLUTION ORAL at 09:11

## 2019-01-01 RX ADMIN — ALLOPURINOL 300 MG: 300 TABLET ORAL at 08:40

## 2019-01-01 RX ADMIN — VENLAFAXINE HYDROCHLORIDE 150 MG: 37.5 CAPSULE, EXTENDED RELEASE ORAL at 08:26

## 2019-01-01 RX ADMIN — POTASSIUM CHLORIDE 40 MEQ: 750 TABLET, EXTENDED RELEASE ORAL at 17:49

## 2019-01-01 RX ADMIN — HYDROXYZINE HYDROCHLORIDE 25 MG: 25 TABLET, FILM COATED ORAL at 21:05

## 2019-01-01 RX ADMIN — LACTULOSE 30 ML: 20 SOLUTION ORAL at 10:05

## 2019-01-01 RX ADMIN — LORAZEPAM 2 MG: 2 INJECTION INTRAMUSCULAR; INTRAVENOUS at 05:04

## 2019-01-01 RX ADMIN — CARVEDILOL 3.12 MG: 6.25 TABLET, FILM COATED ORAL at 22:08

## 2019-01-01 RX ADMIN — LACTULOSE 20 G: 20 SOLUTION ORAL at 08:20

## 2019-01-01 RX ADMIN — POTASSIUM CHLORIDE 10 MEQ: 200 INJECTION, SOLUTION INTRAVENOUS at 13:42

## 2019-01-01 RX ADMIN — HYDROXYZINE HYDROCHLORIDE 25 MG: 25 TABLET, FILM COATED ORAL at 22:53

## 2019-01-01 RX ADMIN — GLYCOPYRROLATE 0.2 MG: 0.2 INJECTION, SOLUTION INTRAMUSCULAR; INTRAVENOUS at 10:11

## 2019-01-01 RX ADMIN — POTASSIUM CHLORIDE 40 MEQ: 750 TABLET, EXTENDED RELEASE ORAL at 23:52

## 2019-01-01 RX ADMIN — LACTULOSE 20 G: 20 SOLUTION ORAL at 23:31

## 2019-01-01 RX ADMIN — POTASSIUM CHLORIDE 10 MEQ: 10 INJECTION, SOLUTION INTRAVENOUS at 08:27

## 2019-01-01 RX ADMIN — Medication 100 MG: at 08:03

## 2019-01-01 RX ADMIN — HYDROXYZINE HYDROCHLORIDE 25 MG: 25 TABLET, FILM COATED ORAL at 16:48

## 2019-01-01 RX ADMIN — LACTULOSE 30 ML: 20 SOLUTION ORAL at 21:46

## 2019-01-01 RX ADMIN — VENLAFAXINE HYDROCHLORIDE 150 MG: 37.5 CAPSULE, EXTENDED RELEASE ORAL at 08:40

## 2019-01-01 RX ADMIN — PANTOPRAZOLE SODIUM 40 MG: 40 TABLET, DELAYED RELEASE ORAL at 12:16

## 2019-01-01 RX ADMIN — VENLAFAXINE HYDROCHLORIDE 150 MG: 150 CAPSULE, EXTENDED RELEASE ORAL at 09:47

## 2019-01-01 RX ADMIN — ALBUMIN (HUMAN) 25 G: 0.25 INJECTION, SOLUTION INTRAVENOUS at 21:40

## 2019-01-01 RX ADMIN — HYDROXYZINE HYDROCHLORIDE 25 MG: 25 TABLET, FILM COATED ORAL at 22:39

## 2019-01-01 RX ADMIN — LORAZEPAM 1 MG: 2 INJECTION INTRAMUSCULAR; INTRAVENOUS at 01:01

## 2019-01-01 RX ADMIN — PIPERACILLIN SODIUM,TAZOBACTAM SODIUM 3.38 G: 3; .375 INJECTION, POWDER, FOR SOLUTION INTRAVENOUS at 07:22

## 2019-01-01 RX ADMIN — HYDROXYZINE HYDROCHLORIDE 25 MG: 25 TABLET, FILM COATED ORAL at 21:12

## 2019-01-01 RX ADMIN — HYDROXYZINE HYDROCHLORIDE 25 MG: 25 TABLET, FILM COATED ORAL at 15:51

## 2019-01-01 RX ADMIN — FOLIC ACID: 5 INJECTION, SOLUTION INTRAMUSCULAR; INTRAVENOUS; SUBCUTANEOUS at 18:31

## 2019-01-01 RX ADMIN — POTASSIUM CHLORIDE 10 MEQ: 10 INJECTION, SOLUTION INTRAVENOUS at 18:38

## 2019-01-01 RX ADMIN — PANTOPRAZOLE SODIUM 40 MG: 40 TABLET, DELAYED RELEASE ORAL at 06:51

## 2019-01-01 RX ADMIN — ALLOPURINOL 300 MG: 300 TABLET ORAL at 09:48

## 2019-01-01 RX ADMIN — PANTOPRAZOLE SODIUM 40 MG: 40 TABLET, DELAYED RELEASE ORAL at 06:18

## 2019-01-01 RX ADMIN — LACTULOSE 30 ML: 20 SOLUTION ORAL at 15:34

## 2019-01-01 RX ADMIN — LORAZEPAM 2 MG: 2 INJECTION INTRAMUSCULAR; INTRAVENOUS at 13:56

## 2019-01-01 RX ADMIN — SPIRONOLACTONE 25 MG: 25 TABLET ORAL at 12:16

## 2019-01-01 RX ADMIN — LORAZEPAM 2 MG: 2 INJECTION INTRAMUSCULAR; INTRAVENOUS at 00:15

## 2019-01-01 RX ADMIN — ALBUMIN (HUMAN) 50 G: 0.25 INJECTION, SOLUTION INTRAVENOUS at 13:27

## 2019-01-01 RX ADMIN — FUROSEMIDE 40 MG: 40 TABLET ORAL at 08:20

## 2019-01-01 RX ADMIN — FENTANYL CITRATE 25 MCG: 50 INJECTION, SOLUTION INTRAMUSCULAR; INTRAVENOUS at 06:03

## 2019-01-01 RX ADMIN — LACTULOSE 30 ML: 20 SOLUTION ORAL at 10:03

## 2019-01-01 RX ADMIN — LORAZEPAM 2 MG: 2 INJECTION INTRAMUSCULAR; INTRAVENOUS at 01:32

## 2019-01-01 RX ADMIN — ALLOPURINOL 300 MG: 300 TABLET ORAL at 21:46

## 2019-01-01 RX ADMIN — LORAZEPAM 2 MG: 2 INJECTION, SOLUTION INTRAMUSCULAR; INTRAVENOUS at 19:28

## 2019-01-01 RX ADMIN — Medication 10 ML: at 08:29

## 2019-01-01 RX ADMIN — CEFTRIAXONE SODIUM 1 G: 1 INJECTION, POWDER, FOR SOLUTION INTRAMUSCULAR; INTRAVENOUS at 22:55

## 2019-01-01 RX ADMIN — LORAZEPAM 2 MG: 2 INJECTION INTRAMUSCULAR; INTRAVENOUS at 14:00

## 2019-01-01 RX ADMIN — LORAZEPAM 2 MG: 2 INJECTION INTRAMUSCULAR; INTRAVENOUS at 22:56

## 2019-01-01 RX ADMIN — KETOROLAC TROMETHAMINE 30 MG: 30 INJECTION, SOLUTION INTRAMUSCULAR at 07:40

## 2019-01-01 RX ADMIN — FOLIC ACID 1 MG: 1 TABLET ORAL at 10:06

## 2019-01-01 RX ADMIN — LACTULOSE 30 ML: 20 SOLUTION ORAL at 21:05

## 2019-01-01 RX ADMIN — GLYCOPYRROLATE 0.2 MG: 0.2 INJECTION, SOLUTION INTRAMUSCULAR; INTRAVENOUS at 17:38

## 2019-01-01 RX ADMIN — LORAZEPAM 4 MG: 2 INJECTION INTRAMUSCULAR; INTRAVENOUS at 18:44

## 2019-01-01 RX ADMIN — SODIUM CHLORIDE AND POTASSIUM CHLORIDE: .9; .15 SOLUTION INTRAVENOUS at 09:30

## 2019-01-01 RX ADMIN — Medication 10 ML: at 06:04

## 2019-01-01 RX ADMIN — VENLAFAXINE HYDROCHLORIDE 150 MG: 37.5 CAPSULE, EXTENDED RELEASE ORAL at 08:27

## 2019-01-01 RX ADMIN — ALBUMIN (HUMAN) 25 G: 0.25 INJECTION, SOLUTION INTRAVENOUS at 08:20

## 2019-01-01 RX ADMIN — SPIRONOLACTONE 100 MG: 100 TABLET ORAL at 08:21

## 2019-01-01 RX ADMIN — MAGNESIUM SULFATE HEPTAHYDRATE 2 G: 40 INJECTION, SOLUTION INTRAVENOUS at 19:45

## 2019-01-01 RX ADMIN — LORAZEPAM 2 MG: 2 INJECTION INTRAMUSCULAR; INTRAVENOUS at 20:19

## 2019-01-01 RX ADMIN — HYDROXYZINE HYDROCHLORIDE 25 MG: 25 TABLET, FILM COATED ORAL at 08:19

## 2019-01-01 RX ADMIN — FUROSEMIDE 80 MG: 40 TABLET ORAL at 08:20

## 2019-01-01 RX ADMIN — ONDANSETRON 4 MG: 2 INJECTION INTRAMUSCULAR; INTRAVENOUS at 08:20

## 2019-01-01 RX ADMIN — LORAZEPAM 2 MG: 2 INJECTION INTRAMUSCULAR; INTRAVENOUS at 09:47

## 2019-01-01 RX ADMIN — LORAZEPAM 2 MG: 2 INJECTION INTRAMUSCULAR; INTRAVENOUS at 17:47

## 2019-01-01 RX ADMIN — SPIRONOLACTONE 100 MG: 100 TABLET ORAL at 08:40

## 2019-01-01 RX ADMIN — HYDROMORPHONE HYDROCHLORIDE 1 MG: 2 INJECTION INTRAMUSCULAR; INTRAVENOUS; SUBCUTANEOUS at 09:06

## 2019-01-01 RX ADMIN — HYDROXYZINE HYDROCHLORIDE 25 MG: 25 TABLET, FILM COATED ORAL at 16:55

## 2019-01-01 RX ADMIN — LORAZEPAM 1 MG: 2 INJECTION INTRAMUSCULAR; INTRAVENOUS at 20:50

## 2019-01-01 RX ADMIN — SPIRONOLACTONE 25 MG: 25 TABLET ORAL at 22:16

## 2019-01-01 RX ADMIN — Medication 10 ML: at 22:59

## 2019-01-01 RX ADMIN — SPIRONOLACTONE 100 MG: 100 TABLET ORAL at 09:00

## 2019-01-01 RX ADMIN — Medication 100 MG: at 09:01

## 2019-01-01 RX ADMIN — CARVEDILOL 3.12 MG: 3.12 TABLET, FILM COATED ORAL at 08:20

## 2019-01-01 RX ADMIN — KETOROLAC TROMETHAMINE 30 MG: 30 INJECTION, SOLUTION INTRAMUSCULAR at 12:10

## 2019-01-01 RX ADMIN — PROCHLORPERAZINE MALEATE 5 MG: 5 TABLET, FILM COATED ORAL at 06:22

## 2019-01-01 RX ADMIN — LORAZEPAM 2 MG: 2 INJECTION INTRAMUSCULAR; INTRAVENOUS at 16:21

## 2019-01-01 RX ADMIN — LORAZEPAM 2 MG: 2 INJECTION INTRAMUSCULAR; INTRAVENOUS at 22:35

## 2019-01-01 RX ADMIN — PANTOPRAZOLE SODIUM 40 MG: 40 TABLET, DELAYED RELEASE ORAL at 06:33

## 2019-01-01 RX ADMIN — MAGNESIUM SULFATE HEPTAHYDRATE 1 G: 1 INJECTION, SOLUTION INTRAVENOUS at 11:48

## 2019-01-01 RX ADMIN — ALBUMIN (HUMAN) 12.5 G: 0.25 INJECTION, SOLUTION INTRAVENOUS at 06:33

## 2019-01-01 RX ADMIN — LORAZEPAM 2 MG: 2 INJECTION INTRAMUSCULAR; INTRAVENOUS at 15:56

## 2019-01-01 RX ADMIN — HYDROMORPHONE HYDROCHLORIDE 2 MG: 2 INJECTION INTRAMUSCULAR; INTRAVENOUS; SUBCUTANEOUS at 12:53

## 2019-01-01 RX ADMIN — FOLIC ACID 1 MG: 1 TABLET ORAL at 08:20

## 2019-01-01 RX ADMIN — HEPARIN SODIUM 5000 UNITS: 5000 INJECTION, SOLUTION INTRAVENOUS; SUBCUTANEOUS at 09:31

## 2019-01-01 RX ADMIN — PIPERACILLIN SODIUM,TAZOBACTAM SODIUM 3.38 G: 3; .375 INJECTION, POWDER, FOR SOLUTION INTRAVENOUS at 13:28

## 2019-01-01 RX ADMIN — VENLAFAXINE HYDROCHLORIDE 150 MG: 37.5 CAPSULE, EXTENDED RELEASE ORAL at 10:05

## 2019-01-01 RX ADMIN — VENLAFAXINE HYDROCHLORIDE 150 MG: 37.5 CAPSULE, EXTENDED RELEASE ORAL at 12:16

## 2019-01-01 RX ADMIN — LORAZEPAM 2 MG: 2 INJECTION INTRAMUSCULAR; INTRAVENOUS at 12:52

## 2019-01-01 RX ADMIN — LORAZEPAM 4 MG: 2 INJECTION INTRAMUSCULAR; INTRAVENOUS at 17:03

## 2019-01-01 RX ADMIN — LORAZEPAM 2 MG: 2 INJECTION INTRAMUSCULAR; INTRAVENOUS at 09:06

## 2019-01-01 RX ADMIN — LACTULOSE 45 ML: 20 SOLUTION ORAL at 20:50

## 2019-01-01 RX ADMIN — LORAZEPAM 2 MG: 2 INJECTION INTRAMUSCULAR; INTRAVENOUS at 11:53

## 2019-01-01 RX ADMIN — MUPIROCIN: 20 OINTMENT TOPICAL at 22:58

## 2019-01-01 RX ADMIN — LORAZEPAM 1 MG: 2 INJECTION INTRAMUSCULAR; INTRAVENOUS at 10:11

## 2019-01-01 RX ADMIN — IOPAMIDOL 100 ML: 755 INJECTION, SOLUTION INTRAVENOUS at 19:11

## 2019-01-01 RX ADMIN — ALLOPURINOL 300 MG: 300 TABLET ORAL at 22:35

## 2019-01-01 RX ADMIN — PROCHLORPERAZINE MALEATE 5 MG: 5 TABLET, FILM COATED ORAL at 06:08

## 2019-01-01 RX ADMIN — ALLOPURINOL 300 MG: 300 TABLET ORAL at 08:26

## 2019-01-01 RX ADMIN — MAGNESIUM SULFATE HEPTAHYDRATE 2 G: 40 INJECTION, SOLUTION INTRAVENOUS at 06:54

## 2019-01-01 RX ADMIN — LORAZEPAM 2 MG: 2 INJECTION INTRAMUSCULAR; INTRAVENOUS at 00:50

## 2019-01-01 RX ADMIN — FUROSEMIDE 40 MG: 40 TABLET ORAL at 06:18

## 2019-01-01 RX ADMIN — LACTULOSE 45 ML: 20 SOLUTION ORAL at 10:00

## 2019-01-01 RX ADMIN — Medication 100 MG: at 10:04

## 2019-01-01 RX ADMIN — LORAZEPAM 2 MG: 2 INJECTION INTRAMUSCULAR; INTRAVENOUS at 21:55

## 2019-01-01 RX ADMIN — LORAZEPAM 2 MG: 2 INJECTION INTRAMUSCULAR; INTRAVENOUS at 06:28

## 2019-01-01 RX ADMIN — ALLOPURINOL 300 MG: 300 TABLET ORAL at 09:10

## 2019-01-01 RX ADMIN — PANTOPRAZOLE SODIUM 40 MG: 40 TABLET, DELAYED RELEASE ORAL at 07:08

## 2019-01-01 RX ADMIN — HYDROXYZINE HYDROCHLORIDE 25 MG: 25 TABLET, FILM COATED ORAL at 16:56

## 2019-01-01 RX ADMIN — SODIUM CHLORIDE 0.4 MCG/KG/HR: 900 INJECTION, SOLUTION INTRAVENOUS at 23:29

## 2019-01-01 RX ADMIN — HYDROMORPHONE HYDROCHLORIDE 0.5 MG: 2 INJECTION INTRAMUSCULAR; INTRAVENOUS; SUBCUTANEOUS at 19:18

## 2019-01-01 RX ADMIN — LACTULOSE 30 ML: 20 SOLUTION ORAL at 15:42

## 2019-01-10 NOTE — PATIENT INSTRUCTIONS
A Healthy Lifestyle: Care Instructions  Your Care Instructions    A healthy lifestyle can help you feel good, stay at a healthy weight, and have plenty of energy for both work and play. A healthy lifestyle is something you can share with your whole family. A healthy lifestyle also can lower your risk for serious health problems, such as high blood pressure, heart disease, and diabetes. You can follow a few steps listed below to improve your health and the health of your family. Follow-up care is a key part of your treatment and safety. Be sure to make and go to all appointments, and call your doctor if you are having problems. It's also a good idea to know your test results and keep a list of the medicines you take. How can you care for yourself at home? · Do not eat too much sugar, fat, or fast foods. You can still have dessert and treats now and then. The goal is moderation. · Start small to improve your eating habits. Pay attention to portion sizes, drink less juice and soda pop, and eat more fruits and vegetables. ? Eat a healthy amount of food. A 3-ounce serving of meat, for example, is about the size of a deck of cards. Fill the rest of your plate with vegetables and whole grains. ? Limit the amount of soda and sports drinks you have every day. Drink more water when you are thirsty. ? Eat at least 5 servings of fruits and vegetables every day. It may seem like a lot, but it is not hard to reach this goal. A serving or helping is 1 piece of fruit, 1 cup of vegetables, or 2 cups of leafy, raw vegetables. Have an apple or some carrot sticks as an afternoon snack instead of a candy bar. Try to have fruits and/or vegetables at every meal.  · Make exercise part of your daily routine. You may want to start with simple activities, such as walking, bicycling, or slow swimming. Try to be active 30 to 60 minutes every day. You do not need to do all 30 to 60 minutes all at once.  For example, you can exercise 3 times a day for 10 or 20 minutes. Moderate exercise is safe for most people, but it is always a good idea to talk to your doctor before starting an exercise program.  · Keep moving. Janell Solorzano the lawn, work in the garden, or [employer]. Take the stairs instead of the elevator at work. · If you smoke, quit. People who smoke have an increased risk for heart attack, stroke, cancer, and other lung illnesses. Quitting is hard, but there are ways to boost your chance of quitting tobacco for good. ? Use nicotine gum, patches, or lozenges. ? Ask your doctor about stop-smoking programs and medicines. ? Keep trying. In addition to reducing your risk of diseases in the future, you will notice some benefits soon after you stop using tobacco. If you have shortness of breath or asthma symptoms, they will likely get better within a few weeks after you quit. · Limit how much alcohol you drink. Moderate amounts of alcohol (up to 2 drinks a day for men, 1 drink a day for women) are okay. But drinking too much can lead to liver problems, high blood pressure, and other health problems. Family health  If you have a family, there are many things you can do together to improve your health. · Eat meals together as a family as often as possible. · Eat healthy foods. This includes fruits, vegetables, lean meats and dairy, and whole grains. · Include your family in your fitness plan. Most people think of activities such as jogging or tennis as the way to fitness, but there are many ways you and your family can be more active. Anything that makes you breathe hard and gets your heart pumping is exercise. Here are some tips:  ? Walk to do errands or to take your child to school or the bus.  ? Go for a family bike ride after dinner instead of watching TV. Where can you learn more? Go to http://bhavana-rosio.info/. Enter V948 in the search box to learn more about \"A Healthy Lifestyle: Care Instructions. \"  Current as of: December 7, 2017  Content Version: 11.8  © 7716-0926 [employer]wise, Incorporated. Care instructions adapted under license by [employer] (which disclaims liability or warranty for this information). If you have questions about a medical condition or this instruction, always ask your healthcare professional. [address] any warranty or liability for your use of this information.

## 2019-01-10 NOTE — PROGRESS NOTES
Chief Complaint   Patient presents with    Follow-up    Umbilical Hernia    Immunization/Injection     testosterone     Patient presents in office today for f/u to labs. States that he has to have his HGB, K, and platelets checked monthly. Would also like to have his umbilical hernia looked at. States that it has grown in size. He saw Dr. Travis Whitehead with general surgery in January of last year and was told he would not operate on it due to his platelets were low. Also in need of testosterone injection. No other concerns. 1. Have you been to the ER, urgent care clinic since your last visit? Hospitalized since your last visit? Yes When: 11/29/18 admitted to [facility] for alcohol withdrawal     2. Have you seen or consulted any other health care providers outside of the [address] [place] [address] since your last visit? Include any pap smears or colon screening.  No    Learning Assessment 4/6/2017   PRIMARY LEARNER Patient   HIGHEST LEVEL OF EDUCATION - PRIMARY LEARNER  SOME COLLEGE   BARRIERS PRIMARY LEARNER NONE   CO-LEARNER CAREGIVER No   PRIMARY LANGUAGE ENGLISH   LEARNER PREFERENCE PRIMARY DEMONSTRATION   ANSWERED BY patient   RELATIONSHIP SELF

## 2019-01-10 NOTE — PROGRESS NOTES
Reji Robles. is a 66 y.o. male   Chief Complaint   Patient presents with    Follow-up    Umbilical Hernia    Immunization/Injection     testosterone    pt here for follow up labs and his T injection. Chronic thrombocytopenia from his etoh abuse. Pt continues to drink 12 beers per week. Pt does not plan to change this and is aware of ill health effects. Pt does report the T injections help his energy levels considerably. PSA check is UTD. Pt also has an umbilical hernia and this is not bothering him at all and he just does not like the way it looks. Pt advised against surgery given platelets and asymptomatic no issues with spontaneous bleeding. Pt has seen surgery and was advised the same previously. Pt advised to f/u with surgery if he starts to get any discomfort. Pt also with multiple electrolyte abnormalities and will  as well, mag low and phos low. he is a 66y.o. year old male who presents for evalution. Reviewed PmHx, RxHx, FmHx, SocHx, AllgHx and updated and dated in the chart. Review of Systems - negative except as listed above in the HPI    Objective:     Vitals:    01/10/19 0754   BP: 131/81   Pulse: 84   Resp: 16   Temp: 98.3 °F (36.8 °C)   TempSrc: Oral   SpO2: 97%   Weight: 266 lb (120.7 kg)   Height: 6' (1.829 m)       Current Outpatient Medications   Medication Sig    testosterone cypionate (DEPOTESTOTERONE CYPIONATE) 200 mg/mL injection 2 mL by IntraMUSCular route once for 1 dose. Max Daily Amount: 400 mg.  prochlorperazine (COMPAZINE) 5 mg tablet TAKE 1 TABLET BY MOUTH EVERY 6 HOURS AS NEEDED FOR NAUSEA FOR UP TO 7 DAYS    potassium chloride SA (KLOR-CON SPRINKLE) 10 mEq capsule Take 1 Cap by mouth daily.  hydrOXYzine HCl (ATARAX) 25 mg tablet Take 25 mg by mouth three (3) times daily.  lactulose (CHRONULAC) 10 gram/15 mL solution Take 3 tsp by mouth two (2) times daily as needed (liver disease).  Venlafaxine 150 mg tr24 Take 150 mg by mouth daily.  omeprazole (PRILOSEC) 40 mg capsule TAKE 1 CAPSULE TWICE DAILY    cyclobenzaprine (FLEXERIL) 10 mg tablet TAKE 1 TABLET THREE TIMES DAILY AS NEEDED    allopurinol (ZYLOPRIM) 300 mg tablet TAKE 1 TABLET TWICE DAILY    carvedilol (COREG) 3.125 mg tablet Take 1 Tab by mouth two (2) times daily (with meals).  furosemide (LASIX) 40 mg tablet Take 1 Tab by mouth daily.  spironolactone (ALDACTONE) 25 mg tablet TAKE 1 TABLET TWICE DAILY     No current facility-administered medications for this visit. Physical Examination: General appearance - alert, well appearing, and in no distress  Chest - clear to auscultation, no wheezes, rales or rhonchi, symmetric air entry  Heart - normal rate, regular rhythm, normal S1, S2, no murmurs, rubs, clicks or gallops  Abdomen - soft, nontender, nondistended, no masses or organomegaly      Assessment/ Plan:   Diagnoses and all orders for this visit:    1. Thrombocytopenia (HCC)  -     CBC WITH AUTOMATED DIFF  -     IRON PROFILE  -     FERRITIN    2. Hypogonadism, male  -     TESTOSTERONE, TOTAL, ADULT MALE  -     testosterone cypionate (DEPOTESTOTERONE CYPIONATE) 200 mg/mL injection; 2 mL by IntraMUSCular route once for 1 dose. Max Daily Amount: 400 mg.  -     NM INJ TESTOSTERONE CYPIONATE  -     NM THER/PROPH/DIAG INJECTION, SUBCUT/IM    3. Hypophosphatemia  -     PHOSPHORUS    4. Hypomagnesemia  -     MAGNESIUM    5. Hypokalemia  -     METABOLIC PANEL, BASIC    6. Umbilical hernia without obstruction and without gangrene  Nothing to do for now, elective repair to be avoided given low platelet count if starts having pain then advised to f/u with surgeon again. Follow-up Disposition:  Return in about 4 weeks (around 2/7/2019), or if symptoms worsen or fail to improve. I have discussed the diagnosis with the patient and the intended plan as seen in the above orders.   The patient has received an after-visit summary and questions were answered concerning future plans. Pt conveyed understanding of plan.     Medication Side Effects and Warnings were discussed with patient      Jarrell Cerrato DO

## 2019-02-20 NOTE — PROGRESS NOTES
Chief Complaint   Patient presents with    Injection     Testerone     Labs     (fasting)    Medication Refill     Flexeril       1. Have you been to the ER, urgent care clinic since your last visit? Hospitalized since your last visit? No    2. Have you seen or consulted any other health care providers outside of the [facility] since your last visit? Include any pap smears or colon screening.  NO      Visit Vitals  /78 (BP 1 Location: Left arm, BP Patient Position: Sitting)   Pulse 66   Temp 97.9 °F (36.6 °C) (Oral)   Resp 18   Ht 6' (1.829 m)   Wt 266 lb (120.7 kg)   SpO2 98%   BMI 36.08 kg/m²

## 2019-02-20 NOTE — PROGRESS NOTES
Shady Saeed. is a 66 y.o. male   Chief Complaint   Patient presents with    Injection     Testerone     Labs     (fasting)    Medication Refill     Flexeril    pt states he has been getting back spasms and has been using flexeril with relief and would like a refill of this. Pt nuris request refill of trazodone he uses PRN for sleep and this works very well for him. Pt also would like to recheck his bmp for K and is currently on a supplement 10 meq. Pt also for his T injection these work very well for his energy levels and pt is UTD on monitoring labs for this. Otherwise doing well. Needs recheck of his platelets last at 875, pt has chronic thrombocytopenia 2nd etoh. Denies any bleeding episodes. he is a 66y.o. year old male who presents for evalution. Reviewed PmHx, RxHx, FmHx, SocHx, AllgHx and updated and dated in the chart. Review of Systems - negative except as listed above in the HPI    Objective:     Vitals:    02/20/19 1043   BP: 167/78   Pulse: 66   Resp: 18   Temp: 97.9 °F (36.6 °C)   TempSrc: Oral   SpO2: 98%   Weight: 266 lb (120.7 kg)   Height: 6' (1.829 m)       Current Outpatient Medications   Medication Sig    cyclobenzaprine (FLEXERIL) 10 mg tablet TAKE 1 TABLET THREE TIMES DAILY AS NEEDED    traZODone (DESYREL) 150 mg tablet TAKE 1 TABLET BY MOUTH NIGHTLY    testosterone cypionate (DEPOTESTOTERONE CYPIONATE) 200 mg/mL injection 2 mL by IntraMUSCular route once for 1 dose. Max Daily Amount: 400 mg.  prochlorperazine (COMPAZINE) 5 mg tablet TAKE 1 TABLET BY MOUTH EVERY 6 HOURS AS NEEDED FOR NAUSEA FOR UP TO 7 DAYS    potassium chloride SA (KLOR-CON SPRINKLE) 10 mEq capsule Take 1 Cap by mouth daily.  hydrOXYzine HCl (ATARAX) 25 mg tablet Take 25 mg by mouth three (3) times daily.  lactulose (CHRONULAC) 10 gram/15 mL solution Take 3 tsp by mouth two (2) times daily as needed (liver disease).  Venlafaxine 150 mg tr24 Take 150 mg by mouth daily.     omeprazole (PRILOSEC) 40 mg capsule TAKE 1 CAPSULE TWICE DAILY    allopurinol (ZYLOPRIM) 300 mg tablet TAKE 1 TABLET TWICE DAILY    carvedilol (COREG) 3.125 mg tablet Take 1 Tab by mouth two (2) times daily (with meals).  furosemide (LASIX) 40 mg tablet Take 1 Tab by mouth daily.  spironolactone (ALDACTONE) 25 mg tablet TAKE 1 TABLET TWICE DAILY     No current facility-administered medications for this visit. Physical Examination: General appearance - alert, well appearing, and in no distress  Mental status - alert, oriented to person, place, and time  Chest - clear to auscultation, no wheezes, rales or rhonchi, symmetric air entry  Heart - normal rate, regular rhythm, normal S1, S2, no murmurs, rubs, clicks or gallops  Back exam - ttp over parapsinal muscles lumbar      Assessment/ Plan:   Diagnoses and all orders for this visit:    1. Back spasm  -     cyclobenzaprine (FLEXERIL) 10 mg tablet; TAKE 1 TABLET THREE TIMES DAILY AS NEEDED    2. Primary insomnia  -     traZODone (DESYREL) 150 mg tablet; TAKE 1 TABLET BY MOUTH NIGHTLY    3. Thrombocytopenia (HCC)  -     CBC WITH AUTOMATED DIFF    4. Hypokalemia  -     METABOLIC PANEL, BASIC    5. Hypogonadism, male  -     testosterone cypionate (DEPOTESTOTERONE CYPIONATE) 200 mg/mL injection; 2 mL by IntraMUSCular route once for 1 dose. Max Daily Amount: 400 mg.  -     IA INJ TESTOSTERONE CYPIONATE  -     IA THER/PROPH/DIAG INJECTION, SUBCUT/IM       Follow-up Disposition:  Return in about 4 weeks (around 3/20/2019), or if symptoms worsen or fail to improve. I have discussed the diagnosis with the patient and the intended plan as seen in the above orders. The patient has received an after-visit summary and questions were answered concerning future plans. Pt conveyed understanding of plan.     Medication Side Effects and Warnings were discussed with patient      [address] [facility] [address], DO

## 2019-02-20 NOTE — PROGRESS NOTES
Written order received to administer 200 mg/mL of testosterone cypionate. After obtaining verbal consent from the patient, 1 ml of testosterone  injection was administered to right gluteus by Twin Mcclure LPN. MIX:[id number] , Lot: [id number] Exp:4/30/21 Manf: [id number]. Patient tolerated procedure well. No concerns voiced.

## 2019-02-20 NOTE — PATIENT INSTRUCTIONS
A Healthy Lifestyle: Care Instructions  Your Care Instructions    A healthy lifestyle can help you feel good, stay at a healthy weight, and have plenty of energy for both work and play. A healthy lifestyle is something you can share with your whole family. A healthy lifestyle also can lower your risk for serious health problems, such as high blood pressure, heart disease, and diabetes. You can follow a few steps listed below to improve your health and the health of your family. Follow-up care is a key part of your treatment and safety. Be sure to make and go to all appointments, and call your doctor if you are having problems. It's also a good idea to know your test results and keep a list of the medicines you take. How can you care for yourself at home? · Do not eat too much sugar, fat, or fast foods. You can still have dessert and treats now and then. The goal is moderation. · Start small to improve your eating habits. Pay attention to portion sizes, drink less juice and soda pop, and eat more fruits and vegetables. ? Eat a healthy amount of food. A 3-ounce serving of meat, for example, is about the size of a deck of cards. Fill the rest of your plate with vegetables and whole grains. ? Limit the amount of soda and sports drinks you have every day. Drink more water when you are thirsty. ? Eat at least 5 servings of fruits and vegetables every day. It may seem like a lot, but it is not hard to reach this goal. A serving or helping is 1 piece of fruit, 1 cup of vegetables, or 2 cups of leafy, raw vegetables. Have an apple or some carrot sticks as an afternoon snack instead of a candy bar. Try to have fruits and/or vegetables at every meal.  · Make exercise part of your daily routine. You may want to start with simple activities, such as walking, bicycling, or slow swimming. Try to be active 30 to 60 minutes every day. You do not need to do all 30 to 60 minutes all at once.  For example, you can exercise 3 times a day for 10 or 20 minutes. Moderate exercise is safe for most people, but it is always a good idea to talk to your doctor before starting an exercise program.  · Keep moving. [place] the lawn, work in the garden, or Charlie App. Take the stairs instead of the elevator at work. · If you smoke, quit. People who smoke have an increased risk for heart attack, stroke, cancer, and other lung illnesses. Quitting is hard, but there are ways to boost your chance of quitting tobacco for good. ? Use nicotine gum, patches, or lozenges. ? Ask your doctor about stop-smoking programs and medicines. ? Keep trying. In addition to reducing your risk of diseases in the future, you will notice some benefits soon after you stop using tobacco. If you have shortness of breath or asthma symptoms, they will likely get better within a few weeks after you quit. · Limit how much alcohol you drink. Moderate amounts of alcohol (up to 2 drinks a day for men, 1 drink a day for women) are okay. But drinking too much can lead to liver problems, high blood pressure, and other health problems. Family health  If you have a family, there are many things you can do together to improve your health. · Eat meals together as a family as often as possible. · Eat healthy foods. This includes fruits, vegetables, lean meats and dairy, and whole grains. · Include your family in your fitness plan. Most people think of activities such as jogging or tennis as the way to fitness, but there are many ways you and your family can be more active. Anything that makes you breathe hard and gets your heart pumping is exercise. Here are some tips:  ? Walk to do errands or to take your child to school or the bus.  ? Go for a family bike ride after dinner instead of watching TV. Where can you learn more? Go to http://bhavana-rosio.info/. Enter Z706 in the search box to learn more about \"A Healthy Lifestyle: Care Instructions. \"  Current as of: September 11, 2018  Content Version: 11.9  © 0814-5072 [employer]. Care instructions adapted under license by [employer] (which disclaims liability or warranty for this information). If you have questions about a medical condition or this instruction, always ask your healthcare professional. [address] any warranty or liability for your use of this information.

## 2019-02-21 NOTE — PROGRESS NOTES
Your anemia has worsened and your platelets have dropped, we should recheck these in 2 weeks. Are you taking an iron supplement? If not I will send in a once daily with food iron supplement for you.

## 2019-02-22 NOTE — TELEPHONE ENCOUNTER
----- Message from Robert Knowles sent at 2/22/2019  9:56 AM EST -----  Regarding: /Telephone  Pt requesting changes for Rx\"Fursoemide (unsure of mg)\" directions to double the pill amount to be called into [facility] mail order pharmacy on file (unsure of telephone) due to fluid in stomach. Pt stated its costly to get the fluid drain he rather take the Rx to keep the fluid down.  contact:[phone]

## 2019-02-22 NOTE — TELEPHONE ENCOUNTER
Sent in to [facility] 2 40mg tabs daily. This may not solve his problem tho. Needs 2 week f/u for recheck of renal function and potassium.

## 2019-03-20 NOTE — PROGRESS NOTES
Chief Complaint   Patient presents with    Follow-up    Labs    Injection     testoserone     Patient presents in office today for f/u to labs. Also her for testosterone injection. No concerns. 1. Have you been to the ER, urgent care clinic since your last visit? Hospitalized since your last visit? No    2. Have you seen or consulted any other health care providers outside of the [address] [place] [address] since your last visit? Include any pap smears or colon screening.  No    Learning Assessment 4/6/2017   PRIMARY LEARNER Patient   HIGHEST LEVEL OF EDUCATION - PRIMARY LEARNER  SOME COLLEGE   BARRIERS PRIMARY LEARNER NONE   CO-LEARNER CAREGIVER No   PRIMARY LANGUAGE ENGLISH   LEARNER PREFERENCE PRIMARY DEMONSTRATION   ANSWERED BY patient   RELATIONSHIP SELF

## 2019-03-20 NOTE — PROGRESS NOTES
Kassandra Bragg. is a 66 y.o. male   Chief Complaint   Patient presents with    Follow-up    Labs    Injection     testoserone    pt here for f/u on his labs and his platelets were 88 last check. Pt denies any bleeding issues. Pt also with a large umbilical hernia that he is continuosly picking at so the skin over it is now raw without pustulenmt discharge. No fever or chills. States the hernia does not hurt, no abd pain. Moving bowels as usual.  Pt also for his T injection which has been working well for his fatigue. he is a 66y.o. year old male who presents for evalution. Reviewed PmHx, RxHx, FmHx, SocHx, AllgHx and updated and dated in the chart. Review of Systems - negative except as listed above in the HPI    Objective:     Vitals:    03/20/19 1028   BP: (!) 161/95   Pulse: 91   Resp: 16   Temp: 97.7 °F (36.5 °C)   TempSrc: Oral   SpO2: 94%   Weight: 267 lb (121.1 kg)   Height: 6' (1.829 m)       Current Outpatient Medications   Medication Sig    testosterone cypionate (DEPOTESTOTERONE CYPIONATE) 200 mg/mL injection 2 mL by IntraMUSCular route once for 1 dose. Max Daily Amount: 400 mg. IM qmonth    cephALEXin (KEFLEX) 500 mg capsule Take 1 Cap by mouth three (3) times daily for 10 days.  mupirocin (BACTROBAN) 2 % ointment Apply  to affected area three (3) times daily.  furosemide (LASIX) 40 mg tablet Take 2 Tabs by mouth daily.  ferrous sulfate 325 mg (65 mg iron) tablet 1 tab PO qday with food    cyclobenzaprine (FLEXERIL) 10 mg tablet TAKE 1 TABLET THREE TIMES DAILY AS NEEDED    traZODone (DESYREL) 150 mg tablet TAKE 1 TABLET BY MOUTH NIGHTLY    prochlorperazine (COMPAZINE) 5 mg tablet TAKE 1 TABLET BY MOUTH EVERY 6 HOURS AS NEEDED FOR NAUSEA FOR UP TO 7 DAYS    potassium chloride SA (KLOR-CON SPRINKLE) 10 mEq capsule Take 1 Cap by mouth daily.  hydrOXYzine HCl (ATARAX) 25 mg tablet Take 25 mg by mouth three (3) times daily.     lactulose (CHRONULAC) 10 gram/15 mL solution Take 3 tsp by mouth two (2) times daily as needed (liver disease).  Venlafaxine 150 mg tr24 Take 150 mg by mouth daily.  omeprazole (PRILOSEC) 40 mg capsule TAKE 1 CAPSULE TWICE DAILY    allopurinol (ZYLOPRIM) 300 mg tablet TAKE 1 TABLET TWICE DAILY    carvedilol (COREG) 3.125 mg tablet Take 1 Tab by mouth two (2) times daily (with meals).  spironolactone (ALDACTONE) 25 mg tablet TAKE 1 TABLET TWICE DAILY     No current facility-administered medications for this visit. Physical Examination: General appearance - alert, well appearing, and in no distress  Chest - clear to auscultation, no wheezes, rales or rhonchi, symmetric air entry  Heart - normal rate, regular rhythm, normal S1, S2, no murmurs, rubs, clicks or gallops  Abdomen - large umbilical hernia reducible non tender skin overlying is erythematous and raw no pustulent discharge      Assessment/ Plan:   Diagnoses and all orders for this visit:    1. Hypogonadism, male  -     testosterone cypionate (DEPOTESTOTERONE CYPIONATE) 200 mg/mL injection; 2 mL by IntraMUSCular route once for 1 dose. Max Daily Amount: 400 mg. IM qmonth  -     MI INJ TESTOSTERONE CYPIONATE  -     MI THER/PROPH/DIAG INJECTION, SUBCUT/IM    2. Thrombocytopenia (HCC)  -     CBC WITH AUTOMATED DIFF    3. Umbilical hernia without obstruction and without gangrene  -     REFERRAL TO GENERAL SURGERY    4. Skin infection  -     cephALEXin (KEFLEX) 500 mg capsule; Take 1 Cap by mouth three (3) times daily for 10 days.  -     mupirocin (BACTROBAN) 2 % ointment; Apply  to affected area three (3) times daily. 5. Alcoholic cirrhosis, unspecified whether ascites present (HCC)  -     furosemide (LASIX) 40 mg tablet; Take 2 Tabs by mouth daily. pt advised to stop picking at his umbilicus and to use abx po and topical.    Follow-up Disposition: Not on File    I have discussed the diagnosis with the patient and the intended plan as seen in the above orders.   The patient has received an after-visit summary and questions were answered concerning future plans. Pt conveyed understanding of plan.     Medication Side Effects and Warnings were discussed with patient      [address], DO

## 2019-03-20 NOTE — PATIENT INSTRUCTIONS

## 2019-03-21 PROBLEM — R18.8 ASCITES: Status: ACTIVE | Noted: 2019-01-01

## 2019-03-21 NOTE — ED TRIAGE NOTES
Pt presents with back pain that started yesterday with nausea. Pt also reports hernia and \"fluid on my stomach. \" Hx of cirrhosis.

## 2019-03-21 NOTE — ED NOTES
Patient last drink was yesterday morning. Patient shaking and reports cold. Patient reports drinking 6 pack a day.

## 2019-03-21 NOTE — ED PROVIDER NOTES
Angeles Verde is a 66 y.o. male who presents ambulatory to the ED with a c/o low back pain, abdominal distention today. Pt currently rate his pain at 9/10 in severity. Pt states that he began noticing abdominal distention for the past 2-3 days and began having low back pain yesterday. He additionally c/o nausea. Of note, pt reports that he had been diagnosed with early stages of cirrhosis and believes he is going through alcohol withdrawal. Pt reports that his last alcoholic drink was yesterday morning. He states that he normally drinks 1 6-pack of 12 oz cans of beer per day. Pt specifically denies chest pain, shortness of breath, vomiting, diarrhea, , fever, chills, numbness, tingling, cough, leg swelling, dizziness or any other acute sx. Pt denies any recent travel, known sick contacts, or recent illness. PCP: Shantanu Wesley DO 
PMHx significant for: Alcohol abuse . Anemia, Anxiety, Ascites, Cirrhosis of liver (HCC),DepressionGastric ulcer, unspecified as acute or chronic, GERD,Gout, Hypertension, Liver disease (cirrhosis), Pneumonia, PUD, Seizures, Stroke PSHx significant for: Cushing Teeth Surgery, Rectal Abscess Surgery, Colonoscopy Social Hx: Tobacco: Former Smoker EtOH: EtOH Abuse Illicit drug use: none There are no further complaints or symptoms at this time. Signed by: skylar Gould for Ann Awad. Rogerio Carballo MD on  March 21st, 2019 at 17:48pm. 
 
 
  
 
Past Medical History:  
Diagnosis Date  Adverse effect of anesthesia   
 pt reports waking up during colonoscopy/endoscopy  Alcohol abuse 6/1/2011  Anemia NEC  Anxiety  Ascites   
 has to have fluid drained occasionally  Cirrhosis of liver ([facility] [address]  Depression  Gastric ulcer, unspecified as acute or chronic, without mention of hemorrhage, perforation, or obstruction  GERD (gastroesophageal reflux disease)  Gout  Hypertension  Liver disease   
 cirrhosis  Obesity, Class II, BMI 35-39.9  Pneumonia  PUD (peptic ulcer disease)  Seizures ([facility] [address] 2013  
 from alcohol withdrawal  
 Stroke [facility]) 2013  
 per pt, possible mini stroke from alcohol withdrawal (same time as seizure) Past Surgical History:  
Procedure Laterality Date  COLONOSCOPY N/A 7/2/2018 COLONOSCOPY performed by Selin Rooney MD at [address] HX COLONOSCOPY  07/2018  HX ENDOSCOPY    
 also colonoscopy  HX GI  1998  
 rectal abscess surgery  HX GI  1998  
 rectal abscess surgery  HX HEENT  1980  
 wisdom teeth Family History:  
Problem Relation Age of Onset  Asthma Mother  Other Father   
     history of fall multiple injuries  Hypertension Brother  Cancer Paternal Grandmother   
     uncertain if colon or stomach  Cancer Paternal Grandfather   
     stomach cancer- dx mid [de-identified] Social History Socioeconomic History  Marital status:  Spouse name: Not on file  Number of children: Not on file  Years of education: Not on file  Highest education level: Not on file Occupational History  Occupation: Landscaping Social Needs  Financial resource strain: Not on file  Food insecurity:  
  Worry: Not on file Inability: Not on file  Transportation needs:  
  Medical: Not on file Non-medical: Not on file Tobacco Use  Smoking status: Former Smoker  Smokeless tobacco: Current User Types: Chew  Tobacco comment: currently uses snuff Substance and Sexual Activity  Alcohol use: Yes Alcohol/week: 4.8 oz Types: 8 Cans of beer per week  Drug use: No  
 Sexual activity: Yes  
  Partners: Female Lifestyle  Physical activity:  
  Days per week: Not on file Minutes per session: Not on file  Stress: Not on file Relationships  Social connections:  
  Talks on phone: Not on file Gets together: Not on file Attends Temple service: Not on file   Active member of club or organization: Not on file Attends meetings of clubs or organizations: Not on file Relationship status: Not on file  Intimate partner violence:  
  Fear of current or ex partner: Not on file Emotionally abused: Not on file Physically abused: Not on file Forced sexual activity: Not on file Other Topics Concern  Not on file Social History Narrative  Not on file ALLERGIES: Onion and Statins-hmg-coa reductase inhibitors Review of Systems Constitutional: Negative for chills and fever. Gastrointestinal: Positive for abdominal distention. Negative for diarrhea, nausea and vomiting. Genitourinary: Negative for dysuria and hematuria. Musculoskeletal: Positive for back pain. Neurological: Positive for tremors. All other systems reviewed and are negative. Vitals:  
 03/21/19 1727 BP: (!) 186/108 Pulse: 78 Resp: 18 Temp: 97.8 °F (36.6 °C) SpO2: 98% Weight: 120.2 kg (265 lb) Height: 6' (1.829 m) Physical Exam  
Constitutional: He is oriented to person, place, and time. He appears well-developed. No distress. HENT:  
Head: Normocephalic and atraumatic. Eyes: Conjunctivae and EOM are normal. Scleral icterus is present. Neck: Normal range of motion. Neck supple. Cardiovascular: Normal rate, regular rhythm and normal heart sounds. Pulmonary/Chest: Effort normal and breath sounds normal. No respiratory distress. Abdominal: Soft. He exhibits distension (moderately). There is no tenderness. There is no guarding. A hernia is present. Easily reducible umbilical hernia with chronic overlaying ulcer Musculoskeletal: Normal range of motion. He exhibits no edema. Neurological: He is alert and oriented to person, place, and time. He exhibits normal muscle tone. Skin: Skin is warm and dry. Vitals reviewed. MDM Number of Diagnoses or Management Options Acute midline low back pain without sciatica:  
Alcohol withdrawal syndrome without complication [facility]): Ascites due to alcoholic hepatitis:  
  
Amount and/or Complexity of Data Reviewed Clinical lab tests: ordered and reviewed Tests in the radiology section of CPT®: ordered and reviewed Review and summarize past medical records: yes Patient Progress Patient progress: stable Pt. Presents to the ER with complaints of back pain. Pt. Found to be in etoh withdrawal. No obstruction or other acute issue seen on Ct. However, pt. Has significant ascites. Pt. To be evaluated for admission by the family medicine team. 
 
Procedures

## 2019-03-22 NOTE — ED NOTES
TRANSFER - OUT REPORT: 
 
Verbal report given to Ariane(name) on [facility].  being transferred to 3rd floor(unit) for routine progression of care Report consisted of patients Situation, Background, Assessment and  
Recommendations(SBAR). Information from the following report(s) SBAR, ED Summary, Intake/Output, MAR and Recent Results was reviewed with the receiving nurse. Lines:  
Peripheral IV 03/21/19 Right Forearm (Active) Site Assessment Clean, dry, & intact 3/21/2019  6:10 PM  
Phlebitis Assessment 0 3/21/2019  6:10 PM  
Infiltration Assessment 0 3/21/2019  6:10 PM  
Dressing Status Clean, dry, & intact 3/21/2019  6:10 PM  
Dressing Type Tape;Transparent 3/21/2019  6:10 PM  
Hub Color/Line Status Green;Flushed;Patent 3/21/2019  6:10 PM  
Action Taken Blood drawn 3/21/2019  6:10 PM  
  
 
Opportunity for questions and clarification was provided. Patient transported with: 
 Registered Nurse

## 2019-03-22 NOTE — PROGRESS NOTES
Bedside and Verbal shift change report given to Dk Harrison (oncoming nurse) by Swetha Pope (offgoing nurse). Report included the following information SBAR, Kardex, Intake/Output, MAR, Accordion, Recent Results and Cardiac Rhythm NSR.  
 
1230 Patient off unit for paracentesis Christiewilfred Patient returned to unit. Rec'd report from Isabell Moreland

## 2019-03-22 NOTE — ED NOTES
Blood cultures ordered and sent hour and half after Rocephin IV was given. Dr Manjit Gallagher made aware

## 2019-03-22 NOTE — PROGRESS NOTES
Reason for Admission:   Ascites RRAT Score:     13 Do you (patient/family) have any concerns for transition/discharge? No concerns at this time Plan for utilizing home health:   Not interested Likelihood of readmission? Red/High  
         
Transition of Care Plan:      
Pt lives alone, his main contact is his mother Rona Guerra cell is [phone]. Pt does not drive, he relies on his family for transportation. Pt has prescription coverage under his insurance plan, he gets his prescriptions filled at [facility]. Pt's PCP is Dr. Alberta Izquierdo. Pt has never had home health and does not have any DME. No other issues or concerns at this time. Thanks LIZBETH Fields Care Management Interventions PCP Verified by CM: Bryn Chen) Roberto Carlos Signup: No 
Discharge Durable Medical Equipment: No 
Physical Therapy Consult: No 
Occupational Therapy Consult: No 
Speech Therapy Consult: No 
Current Support Network: Lives Alone Plan discussed with Pt/Family/Caregiver: Yes Discharge Location Discharge Placement: Home

## 2019-03-22 NOTE — CONSULTS
Gastroenterology Consultation Note NAME: Jean Claude Gunn. : 1960 MRN: [id number] ATTG: Anna Sandoval MD PCP: Rita Pineda DO Date/Time:  3/22/2019 9:37 AM 
Subjective:  
REASON FOR CONSULT:     
Sterling Wright is a 66 y.o.  male who I was asked to see for decompensated alcoholic cirrhosis. Patient presents with complaints of worsening abdominal distention, back pain and umbilical hernia. Currently no family at bedside. He reports that over the past three day's his noticed increased in abdominal distention. Denies abdominal pain. Endorses difficulty breathing due to abdominal distention. No recent paracentesis. Nothing alleviates his discomfort. Also complains of umbilical hernia that seems to be increasing in size. Patient admits that he's been drinking. Averaging a 6 pack of beer per day. This is despite recommendations to abstain from alcohol due to his severe liver disease. Reports that he has been compliant with daily diuretic medication. Unclear if he is following a low sodium diet. Denies nausea, vomiting, melena or hematochezia. He came to ED with complaints and concern for alcohol withdrawal. Last alcoholic beverage was yesterday. In ED: CT scan reveals cirrhosis with large amount of ascites. Ammonia 88 but not clinical signs of hepatic encephalopathy. EGD 2018 Findings:  
Esophagus:Grade 4 varices mid and distal third esophagus with Gr 4 status at EG junction; these are not bleeding Stomach: enlarged folds with diffuse mucosa erythema entire stomach without bleeding Duodenum/jejunum: mass lesion bulb with much more firm consistency than would expect with varices; this is active oozing blood. Beyond bulb duodenum is normal 
Therapies:  pyloritek biopsy Biopsy duodenal mass Injection duodenal mass 2 cc 1:10,000 epinephrine with bicap cautery and application three bands before bleeding appears to stop Colonoscopy 2018 Impression: 
Normal colonoscopy to the cecum, with no evidence of neoplasia, diverticular disease, or mucosal abnormality. Past Medical History:  
Diagnosis Date  Adverse effect of anesthesia   
 pt reports waking up during colonoscopy/endoscopy  Alcohol abuse 6/1/2011  Anemia NEC  Anxiety  Ascites   
 has to have fluid drained occasionally  Cirrhosis of liver ([facility] [address]  Depression  Gastric ulcer, unspecified as acute or chronic, without mention of hemorrhage, perforation, or obstruction  GERD (gastroesophageal reflux disease)  Gout  Hypertension  Liver disease   
 cirrhosis  Obesity, Class II, BMI 35-39.9  Pneumonia  PUD (peptic ulcer disease)  Seizures ([facility] [address] 2013  
 from alcohol withdrawal  
 Stroke [facility]) 2013  
 per pt, possible mini stroke from alcohol withdrawal (same time as seizure) Past Surgical History:  
Procedure Laterality Date  COLONOSCOPY N/A 7/2/2018 COLONOSCOPY performed by Timmy Betancourt MD at [address] [facility] HX COLONOSCOPY  07/2018  HX ENDOSCOPY    
 also colonoscopy  HX GI  1998  
 rectal abscess surgery  HX GI  1998  
 rectal abscess surgery  HX HEENT  1980  
 wisdom teeth Social History Tobacco Use  Smoking status: Former Smoker  Smokeless tobacco: Current User Types: Chew  Tobacco comment: currently uses snuff Substance Use Topics  Alcohol use: Yes Alcohol/week: 25.2 oz Types: 42 Cans of beer per week Comment: pt states drinks about 6 cans of beer a day Family History Problem Relation Age of Onset  Asthma Mother  Other Father   
     history of fall multiple injuries  Hypertension Brother  Cancer Paternal Grandmother   
     uncertain if colon or stomach  Cancer Paternal Grandfather   
     stomach cancer- dx mid [de-identified] Allergies Allergen Reactions  Onion Nausea and Vomiting  Statins-Hmg-Coa Reductase Inhibitors Rash  
  rash Home Medications: 
Prior to Admission Medications Prescriptions Last Dose Informant Patient Reported? Taking? Venlafaxine 150 mg tr24  Parent Yes Yes Sig: Take 150 mg by mouth daily. allopurinol (ZYLOPRIM) 300 mg tablet  Parent No Yes Sig: TAKE 1 TABLET TWICE DAILY  
carvedilol (COREG) 3.125 mg tablet  Parent No Yes Sig: Take 1 Tab by mouth two (2) times daily (with meals). cyclobenzaprine (FLEXERIL) 10 mg tablet  Parent No Yes Sig: TAKE 1 TABLET THREE TIMES DAILY AS NEEDED  
furosemide (LASIX) 40 mg tablet  Parent No Yes Sig: Take 2 Tabs by mouth daily. hydrOXYzine HCl (ATARAX) 25 mg tablet  Parent Yes Yes Sig: Take 25 mg by mouth three (3) times daily. lactulose (CHRONULAC) 10 gram/15 mL solution  Parent Yes Yes Sig: Take 3 tsp by mouth two (2) times daily as needed (liver disease). mupirocin (BACTROBAN) 2 % ointment  Parent No Yes Sig: Apply  to affected area three (3) times daily. omeprazole (PRILOSEC) 40 mg capsule  Parent No Yes Sig: TAKE 1 CAPSULE TWICE DAILY potassium chloride SA (KLOR-CON SPRINKLE) 10 mEq capsule  Parent No Yes Sig: Take 1 Cap by mouth daily. prochlorperazine (COMPAZINE) 5 mg tablet  Parent No Yes Sig: TAKE 1 TABLET BY MOUTH EVERY 6 HOURS AS NEEDED FOR NAUSEA FOR UP TO 7 DAYS  
spironolactone (ALDACTONE) 25 mg tablet  Parent No Yes Sig: TAKE 1 TABLET TWICE DAILY  
testosterone cypionate (DEPOTESTOTERONE CYPIONATE) 200 mg/mL injection 3/20/2019 Parent No Yes Si mL by IntraMUSCular route once for 1 dose. Max Daily Amount: 400 mg. IM qmonth  
traZODone (DESYREL) 150 mg tablet  Parent No Yes Sig: TAKE 1 TABLET BY MOUTH NIGHTLY Facility-Administered Medications: None Hospital medications: 
Current Facility-Administered Medications Medication Dose Route Frequency  pantoprazole (PROTONIX) tablet 40 mg  40 mg Oral ACB  mupirocin (BACTROBAN) 2 % ointment   Topical Q12H  prochlorperazine (COMPAZINE) tablet 5 mg  5 mg Oral Q6H PRN  
 fentaNYL citrate (PF) injection 25 mcg  25 mcg IntraVENous Q4H PRN  
 magnesium sulfate 2 g/50 ml IVPB (premix or compounded)  2 g IntraVENous ONCE  
 LORazepam (ATIVAN) injection 4 mg  4 mg IntraVENous Q1H PRN  
 0.9% sodium chloride 5,460 mL with folic acid 1 mg, thiamine 100 mg, mvi, adult no. 4 with vit K 10 mL infusion   IntraVENous DAILY  cefTRIAXone (ROCEPHIN) 1 g in sterile water (preservative free) 10 mL IV syringe  1 g IntraVENous Q24H  
 allopurinol (ZYLOPRIM) tablet 300 mg  300 mg Oral BID  carvedilol (COREG) tablet 3.125 mg  3.125 mg Oral BID WITH MEALS  furosemide (LASIX) tablet 40 mg  40 mg Oral ACB&D  
 spironolactone (ALDACTONE) tablet 25 mg  25 mg Oral BID  venlafaxine-SR (EFFEXOR-XR) capsule 150 mg  150 mg Oral DAILY WITH BREAKFAST  sodium chloride (NS) flush 5-40 mL  5-40 mL IntraVENous Q8H  
 sodium chloride (NS) flush 5-40 mL  5-40 mL IntraVENous PRN  
 LORazepam (ATIVAN) injection 2 mg  2 mg IntraVENous Q1H PRN  
 
REVIEW OF SYSTEMS:   
Review of Systems - Negative except pertinent positives and negatives mentioned in HPI Objective: VITALS:   
Visit Vitals BP (!) 141/96 (BP 1 Location: Left arm, BP Patient Position: At rest) Pulse 88 Temp 97.8 °F (36.6 °C) Resp 22 Ht 6' (1.829 m) Wt 120 kg (264 lb 8.8 oz) SpO2 94% BMI 35.88 kg/m² Temp (24hrs), Av.9 °F (36.6 °C), Min:97.7 °F (36.5 °C), Max:98.3 °F (36.8 °C) PHYSICAL EXAM:  
General:    Alert, cooperative, no distress, appears older than stated Head:   Normocephalic, without obvious abnormality, atraumatic. Eyes:   Conjunctivae clear, Pupils are equal 
Throat:    Lips, mucosa, and tongue normal. 
Neck:  Supple, symmetrica Lungs:   CTA bilaterally. No wheezing/rhonchi/rales. Heart:   Regular rate and rhythm,  no murmur, rub or gallop. Abdomen: Bowel sounds active, distended, nontender. Large umbilical hernia with thin overlying skin - dark red color with centrally ulcerated area, hernia is soft and nontender. Extremities: No cyanosis or edema Skin:     Jaundice Psych:  Not depressed. Not anxious or agitated. Neurologic: Tremors; CN II-XI grossly intact, no focal deficits. AAOx3 
 
LAB DATA REVIEWED:   
Lab Results Component Value Date/Time WBC 5.1 03/22/2019 01:54 AM  
 Hemoglobin (POC) 14.3 03/13/2014 08:16 PM  
 HGB 10.7 (L) 03/22/2019 01:54 AM  
 Hematocrit (POC) 42 03/13/2014 08:16 PM  
 HCT 33.0 (L) 03/22/2019 01:54 AM  
 PLATELET 89 (L) 38/18/5199 01:54 AM  
 MCV 80.1 03/22/2019 01:54 AM  
 
Lab Results Component Value Date/Time ALT (SGPT) 24 03/22/2019 01:54 AM  
 AST (SGOT) 61 (H) 03/22/2019 01:54 AM  
 Alk. phosphatase 112 03/22/2019 01:54 AM  
 Bilirubin, direct 0.3 (H) 03/13/2014 08:15 PM  
 Bilirubin, total 5.4 (H) 03/22/2019 01:54 AM  
 
Lab Results Component Value Date/Time Sodium 136 03/22/2019 01:54 AM  
 Potassium 3.8 03/22/2019 01:54 AM  
 Chloride 105 03/22/2019 01:54 AM  
 CO2 25 03/22/2019 01:54 AM  
 Anion gap 6 03/22/2019 01:54 AM  
 Glucose 111 (H) 03/22/2019 01:54 AM  
 BUN 7 03/22/2019 01:54 AM  
 Creatinine 0.66 (L) 03/22/2019 01:54 AM  
 BUN/Creatinine ratio 11 (L) 03/22/2019 01:54 AM  
 GFR est AA >60 03/22/2019 01:54 AM  
 GFR est non-AA >60 03/22/2019 01:54 AM  
 Calcium 7.4 (L) 03/22/2019 01:54 AM  
 
Lab Results Component Value Date/Time Lipase 220 03/21/2019 06:02 PM  
 
Lab Results Component Value Date/Time INR 1.5 (H) 03/21/2019 06:02 PM  
 INR 1.7 (H) 11/29/2018 02:40 PM  
 INR 1.4 (H) 07/03/2018 04:12 AM  
 Prothrombin time 15.0 (H) 03/21/2019 06:02 PM  
 Prothrombin time 17.0 (H) 11/29/2018 02:40 PM  
 Prothrombin time 13.9 (H) 07/03/2018 04:12 AM  
 
 
IMAGING RESULTS: 
 []      I have personally reviewed the actual   []    CXR  []    CT  []     US Impression: Active Problems: 
  Ascites (3/21/2019) Decompensated cirrhosis Alcohol abuse Urinary tract infection Plan: 
- arrange therapeutic large volume paracentesis - IV albumin ordered for paracentesis (expected patient to have >5L removed 
- continue diuretics 
- on treatment for UTI 
- when diet advanced needs to low Na diet 
- continue alcohol withdrawal protocol 
- may need elective repair of umbilical hernia given the size and thinning of overlying skin has led to eschar and now with exposed ulcerated area (this is not without complications in patient with cirrhosis), for now agree with wound care consult 
- discussed with patient importance of abstaining for alcohol and this can lead to progression of liver disease and further complications Calculated MELD score 18 (6% 3 month mortality) Attending evaluation (Dr. Tamika Castano) to follow  
   
___________________________________________________ Care Plan discussed with: 
  [x]    Patient   []    Family   []    Nursing   []    Attending 
 ___________________________________________________ GI: Leonardo Miller PA-C  
            03/22/19 
            10:46 AM 
 
 
In the absence of his commitment towards for complete alcohol termination his liver disease will eventually be release towards his premature demise. GI on call available as needed over the weekend I have interviewed and examined patient with addendum to note above and formulation care plan to reflect my evaluation Rom Green M.D.

## 2019-03-22 NOTE — PROGRESS NOTES
TRANSFER - IN REPORT: 
 
Verbal report received from [facility], [address] [facility] (name) on Karla Romero.  being received from ED (unit) for routine progression of care Report consisted of patients Situation, Background, Assessment and  
Recommendations(SBAR). Information from the following report(s) SBAR, Kardex, ED Summary, MAR, Recent Results, Med Rec Status and Cardiac Rhythm NSR was reviewed with the receiving nurse. Opportunity for questions and clarification was provided. 0010  Introduced self as primary RN. Pt was assisted to restroom by ED RN. Pt came out and stated he had urinated on the floor. Urine cleaned up by PCT and housekeeping notified to clean floor. Initial assessment completed upon patients arrival to unit and care assumed.  VSS.  Pt c/o back 5/10 back pain. Plan for the evening discussed with pt and he verbalized understanding. Bed locked and in low position with call bell within reach. Bed alarm initiated.  Instructed pt to press call amezcua when needed. White board updated with this RN's name. [address] with Dr. Maday Fournier from [facility] regarding pt's c/o nausea. Per Dr. Maday Fournier, pt not to receive Zofran d/t prolonged QT but asks to place orders for compazine 5 mg PO Q6H PRN. 
 
0210  Compazine given PO for pt's c/o nausea. Pt assisted to restroom. Urine sample obtained and sent to lab. Bactroban applied to pt's ulcer at his umbilical hernia. [address] w/ Dr. Maday Fournier regarding pt's c/o back pain. Per Dr. Maday Fournier, order for Fentanyl to be placed. Upon entering pt's room to administer Fentanyl, he was found lying in bed and appears to be asleep. [id number]  Pt called out stating he needed help with adjusting himself in the bed. Upon entering pt's room he stated that he had just went to the restroom. He was lying in bed and appeared to need help adjusting himself up in the bed. He requested pain medication for his back pain.   He was very restless in the bed and was visibly shaking. 
 
0653  Pt had been given Ativan 2 mg IV about 20 min ago. He is now in and out of sleep. While in the room, pt awoke momentarily and asked if he had received his Ativan. Reassured pt that he did and he dozed back off. Seizure pads in place. Bed locked and in low position with call bell within reach. Bed alarm on.

## 2019-03-22 NOTE — H&P
[address]  Office [phone] Fax [phone] Admission H&P Name: Reji Beauchamp MRN: [id number]  Sex: Male YOB: 1960  Age: 66 y.o. PCP: Torey Angeles DO Source of Information: patient, medical records Chief complaint: worsening ascites and back pain History of Present Illness Reji Beauchamp is a 66 y.o. male with known HTN, gout, GI bleed, esophageal varices & ascites 2/2 to liver cirrhosis, alcohol abuse w/ hx of seizure from withdrawal (2013), HTN, GERD, peptic and gastric ulcer disease and depression/anxiety who presents to the ER complaining of back pain, abdominal distension, and concern for  EtOH withdrawal.  
  
 [facility] a [facility] of EtOH abuse in setting of marital discord and his wife leaving him In July, 2018. His parents are present in the room, and providing much of that patient's history. They report that the patient was at a [employer] event 6 days PTA and someone asked him about his wife. His parents report that they believe this lead to him drinking more. The patient's parents report that he then he started developing worsening abdominal distension over the last 2-3 days that progressed to back pain yesterday. The patient reported to the ED that his last drink was in the am of 3/20/2019. The patient normally drinks six 12 oz beers per day. The patient denied any CP, dyspnea, n/v, f/c, leg swelling, dizziness,  hematochezia, melena, or focal weakness. Of note the patient has a recent admission in Dec, 2018 for esophageal varices. No endoscopy was performed at that time d/t no signs of active GI bleed. In the ED: - Vitals - Temp 97.8 , pulse 78, /94, Resp 18, SpO2 98% R. A. 
- Labs -CBC: WBC 6.3, Hgb 11.6, Plt 123 
 - CMP s/f Na 134, K+ 3.3, creat 0.76, Ca 7.7; Bilirubin 5.0, AlkPhos 127, ALT 30, AST 71, [id number]  - Lipase: 220 
 - INR 1.5 
 -UA: positive nitrites and small LE, but negative for bacteria - Lactic Acid: 3.17 
 - Ammonia: 88 
 - Troponin < 0.05,  Imaging - - Treatment - 1000ml NS with VitK, folic acid, and thiamine; zofran 4mg IV x1,. Ativan 2mg x 1, Fentanyl 50mcg x 1  
 
 
ROS: Comprehensive ROS negative except as discussed in HPI Past Medical History:  
Diagnosis Date  Adverse effect of anesthesia   
 pt reports waking up during colonoscopy/endoscopy  Alcohol abuse 6/1/2011  Anemia NEC  Anxiety  Ascites   
 has to have fluid drained occasionally  Cirrhosis of liver ([facility] [address]  Depression  Gastric ulcer, unspecified as acute or chronic, without mention of hemorrhage, perforation, or obstruction  GERD (gastroesophageal reflux disease)  Gout  Hypertension  Liver disease   
 cirrhosis  Obesity, Class II, BMI 35-39.9  Pneumonia  PUD (peptic ulcer disease)  Seizures ([facility] [address] 2013  
 from alcohol withdrawal  
 Stroke [facility]) 2013  
 per pt, possible mini stroke from alcohol withdrawal (same time as seizure) Patient Vitals for the past 12 hrs: 
 Temp Pulse Resp BP SpO2  
03/21/19 2145  88  (!) 176/114 94 % 03/21/19 2015  87  (!) 163/111 93 % 03/21/19 1930  92  (!) 169/101 94 % 03/21/19 1926  88  (!) 171/113 97 % 03/21/19 1830  90 15 (!) 159/94 97 % 03/21/19 1727 97.8 °F (36.6 °C) 78 18 (!) 186/108 98 % Home Medications Prior to Admission medications Medication Sig Start Date End Date Taking? Authorizing Provider  
testosterone cypionate (DEPOTESTOTERONE CYPIONATE) 200 mg/mL injection 2 mL by IntraMUSCular route once for 1 dose. Max Daily Amount: 400 mg. IM qmonth 3/20/19 3/21/19 Yes Trinity Keith, DO  
mupirocin (BACTROBAN) 2 % ointment Apply  to affected area three (3) times daily. 3/20/19  Yes Trinity Keith, DO  
furosemide (LASIX) 40 mg tablet Take 2 Tabs by mouth daily.  3/20/19  Yes Trinity Keith, DO  
cyclobenzaprine (FLEXERIL) 10 mg tablet TAKE 1 TABLET THREE TIMES DAILY AS NEEDED 2/20/19  Yes Trinity Keith DO  
traZODone (DESYREL) 150 mg tablet TAKE 1 TABLET BY MOUTH NIGHTLY 2/20/19  Yes Trinity Keith DO  
prochlorperazine (COMPAZINE) 5 mg tablet TAKE 1 TABLET BY MOUTH EVERY 6 HOURS AS NEEDED FOR NAUSEA FOR UP TO 7 DAYS 12/2/18  Yes Alyssia Hollins DO  
potassium chloride SA (KLOR-CON SPRINKLE) 10 mEq capsule Take 1 Cap by mouth daily. 12/1/18  Yes Alyssia Hollins DO  
hydrOXYzine HCl (ATARAX) 25 mg tablet Take 25 mg by mouth three (3) times daily. Yes Other, MD Dillon  
lactulose (CHRONULAC) 10 gram/15 mL solution Take 3 tsp by mouth two (2) times daily as needed (liver disease). Yes Provider, Historical  
Venlafaxine 150 mg tr24 Take 150 mg by mouth daily. Yes Provider, Historical  
omeprazole (PRILOSEC) 40 mg capsule TAKE 1 CAPSULE TWICE DAILY 8/6/18  Yes Amy Esters., NP  
allopurinol (ZYLOPRIM) 300 mg tablet TAKE 1 TABLET TWICE DAILY 4/6/18  Yes Trinity Keith DO  
carvedilol (COREG) 3.125 mg tablet Take 1 Tab by mouth two (2) times daily (with meals). 4/6/18  Yes Trinity Keith DO  
spironolactone (ALDACTONE) 25 mg tablet TAKE 1 TABLET TWICE DAILY 4/6/18  Yes Caitlin Keith,  Allergies Allergies Allergen Reactions  Onion Nausea and Vomiting  Statins-Hmg-Coa Reductase Inhibitors Rash  
  rash Past Medical History:  
Diagnosis Date  Adverse effect of anesthesia   
 pt reports waking up during colonoscopy/endoscopy  Alcohol abuse 6/1/2011  Anemia NEC  Anxiety  Ascites   
 has to have fluid drained occasionally  Cirrhosis of liver ([facility] [address]  Depression  Gastric ulcer, unspecified as acute or chronic, without mention of hemorrhage, perforation, or obstruction  GERD (gastroesophageal reflux disease)  Gout  Hypertension  Liver disease   
 cirrhosis  Obesity, Class II, BMI 35-39.9  Pneumonia  PUD (peptic ulcer disease)  Seizures ([facility] [address] 2013  
 from alcohol withdrawal  
 Stroke [facility]) 2013  
 per pt, possible mini stroke from alcohol withdrawal (same time as seizure) Past Surgical History:  
Procedure Laterality Date  COLONOSCOPY N/A 7/2/2018 COLONOSCOPY performed by Ramona Fontaine MD at [address] HX COLONOSCOPY  07/2018  HX ENDOSCOPY    
 also colonoscopy  HX GI  1998  
 rectal abscess surgery  HX GI  1998  
 rectal abscess surgery  HX HEENT  1980  
 wisdom teeth Family History Problem Relation Age of Onset  Asthma Mother  Other Father   
     history of fall multiple injuries  Hypertension Brother  Cancer Paternal Grandmother   
     uncertain if colon or stomach  Cancer Paternal Grandfather   
     stomach cancer- dx mid [de-identified] Social History Social History Socioeconomic History  Marital status:  Spouse name: Not on file  Number of children: Not on file  Years of education: Not on file  Highest education level: Not on file Occupational History  Occupation: Landscaping Social Needs  Financial resource strain: Not on file  Food insecurity:  
  Worry: Not on file Inability: Not on file  Transportation needs:  
  Medical: Not on file Non-medical: Not on file Tobacco Use  Smoking status: Former Smoker  Smokeless tobacco: Current User Types: Chew  Tobacco comment: currently uses snuff Substance and Sexual Activity  Alcohol use: Yes Alcohol/week: 25.2 oz Types: 42 Cans of beer per week Comment: pt states drinks about 6 cans of beer a day  Drug use: No  
 Sexual activity: Yes  
  Partners: Female Lifestyle  Physical activity:  
  Days per week: Not on file Minutes per session: Not on file  Stress: Not on file Relationships  Social connections:  
  Talks on phone: Not on file Gets together: Not on file Attends Ca[employer] service: Not on file Active member of club or organization: Not on file   Attends meetings of clubs or organizations: Not on file Relationship status: Not on file  Intimate partner violence:  
  Fear of current or ex partner: Not on file Emotionally abused: Not on file Physically abused: Not on file Forced sexual activity: Not on file Other Topics Concern  Not on file Social History Narrative  Not on file Alcohol history: see HPI Smoking history: Former Smoker Illicit drug history: denies Living arrangement: patient lives alone. Ambulates: Independently Physical Exam 
Constitutional: He is oriented to person, place, and time. He appears well-developed. No distress. HENT:  
Head: Normocephalic and atraumatic. Eyes: Conjunctivae and EOM are normal. Scleral icterus is present. Neck: Normal range of motion. Neck supple. Cardiovascular: Normal rate, regular rhythm and normal heart sounds. Pulmonary/Chest: Effort normal and breath sounds normal. No respiratory distress. Abdominal: Soft. He exhibits distension (moderately). There is no tenderness. There is no guarding. Reducable umbilical hernia that has an overlying ulcer (see picture) Musculoskeletal: Normal range of motion. He exhibits no edema. Neurological: He is alert and oriented to person, place, and time. He exhibits normal muscle tone. Skin: Skin is warm and dry. Umbilical hernia with overlying ulcer O2 Device: Room air Laboratory Data Recent Results (from the past 8 hour(s)) TROPONIN I Collection Time: 03/21/19  6:02 PM  
Result Value Ref Range Troponin-I, Qt. <0.05 <0.05 ng/mL METABOLIC PANEL, COMPREHENSIVE Collection Time: 03/21/19  6:02 PM  
Result Value Ref Range Sodium 134 (L) 136 - 145 mmol/L Potassium 3.3 (L) 3.5 - 5.1 mmol/L Chloride 102 97 - 108 mmol/L  
 CO2 24 21 - 32 mmol/L Anion gap 8 5 - 15 mmol/L Glucose 91 65 - 100 mg/dL BUN 6 6 - 20 MG/DL Creatinine 0.76 0.70 - 1.30 MG/DL  
 BUN/Creatinine ratio 8 (L) 12 - 20  GFR est AA >60 >60 ml/min/1.73m2 GFR est non-AA >60 >60 ml/min/1.73m2 Calcium 7.7 (L) 8.5 - 10.1 MG/DL Bilirubin, total 5.0 (H) 0.2 - 1.0 MG/DL  
 ALT (SGPT) 30 12 - 78 U/L  
 AST (SGOT) 71 (H) 15 - 37 U/L Alk. phosphatase 127 (H) 45 - 117 U/L Protein, total 8.3 (H) 6.4 - 8.2 g/dL Albumin 2.6 (L) 3.5 - 5.0 g/dL Globulin 5.7 (H) 2.0 - 4.0 g/dL A-G Ratio 0.5 (L) 1.1 - 2.2    
CBC WITH AUTOMATED DIFF Collection Time: 03/21/19  6:02 PM  
Result Value Ref Range WBC 6.3 4.1 - 11.1 K/uL  
 RBC 4.39 4. 10 - 5.70 M/uL  
 HGB 11.6 (L) 12.1 - 17.0 g/dL HCT 36.1 (L) 36.6 - 50.3 % MCV 82.2 80.0 - 99.0 FL  
 MCH 26.4 26.0 - 34.0 PG  
 MCHC 32.1 30.0 - 36.5 g/dL RDW 22.3 (H) 11.5 - 14.5 % PLATELET 693 (L) 830 - 400 K/uL MPV 9.9 8.9 - 12.9 FL  
 NRBC 0.0 0  WBC ABSOLUTE NRBC 0.00 0.00 - 0.01 K/uL NEUTROPHILS 68 32 - 75 % LYMPHOCYTES 12 12 - 49 % MONOCYTES 12 5 - 13 % EOSINOPHILS 4 0 - 7 % BASOPHILS 3 (H) 0 - 1 % IMMATURE GRANULOCYTES 1 (H) 0.0 - 0.5 % ABS. NEUTROPHILS 4.1 1.8 - 8.0 K/UL  
 ABS. LYMPHOCYTES 0.8 0.8 - 3.5 K/UL  
 ABS. MONOCYTES 0.8 0.0 - 1.0 K/UL  
 ABS. EOSINOPHILS 0.3 0.0 - 0.4 K/UL  
 ABS. BASOPHILS 0.2 (H) 0.0 - 0.1 K/UL  
 ABS. IMM. GRANS. 0.1 (H) 0.00 - 0.04 K/UL  
 DF AUTOMATED    
 RBC COMMENTS ANISOCYTOSIS 1+ LIPASE Collection Time: 03/21/19  6:02 PM  
Result Value Ref Range Lipase 220 73 - 393 U/L  
CK W/ CKMB & INDEX Collection Time: 03/21/19  6:02 PM  
Result Value Ref Range  39 - 308 U/L  
 CK - MB 3.3 <3.6 NG/ML  
 CK-MB Index 2.2 0.0 - 2.5 NT-PRO BNP Collection Time: 03/21/19  6:02 PM  
Result Value Ref Range NT pro- (H) <125 PG/ML  
SAMPLES BEING HELD Collection Time: 03/21/19  6:02 PM  
Result Value Ref Range SAMPLES BEING HELD 1sst,1red,1blu COMMENT Add-on orders for these samples will be processed based on acceptable specimen integrity and analyte stability, which may vary by analyte. AMMONIA  
 Collection Time: 03/21/19  6:02 PM  
Result Value Ref Range Ammonia 88 (H) <32 UMOL/L  
PROTHROMBIN TIME + INR Collection Time: 03/21/19  6:02 PM  
Result Value Ref Range INR 1.5 (H) 0.9 - 1.1 Prothrombin time 15.0 (H) 9.0 - 11.1 sec PTT Collection Time: 03/21/19  6:02 PM  
Result Value Ref Range aPTT 34.7 (H) 22.1 - 32.0 sec  
 aPTT, therapeutic range     58.0 - 77.0 SECS  
URINALYSIS W/MICROSCOPIC Collection Time: 03/21/19  6:09 PM  
Result Value Ref Range Color DARK YELLOW Appearance CLEAR CLEAR Specific gravity 1.025 1.003 - 1.030    
 pH (UA) 5.5 5.0 - 8.0 Protein 30 (A) NEG mg/dL Glucose NEGATIVE  NEG mg/dL Ketone 15 (A) NEG mg/dL Blood MODERATE (A) NEG Urobilinogen 2.0 (H) 0.2 - 1.0 EU/dL Nitrites POSITIVE (A) NEG Leukocyte Esterase SMALL (A) NEG    
 WBC 0-4 0 - 4 /hpf  
 RBC 5-10 0 - 5 /hpf Epithelial cells FEW FEW /lpf Bacteria NEGATIVE  NEG /hpf URINE CULTURE HOLD SAMPLE Collection Time: 03/21/19  6:09 PM  
Result Value Ref Range Urine culture hold URINE ON HOLD IN MICROBIOLOGY DEPT FOR 3 DAYS. IF UNPRESERVED URINE IS SUBMITTED, IT CANNOT BE USED FOR ADDITIONAL TESTING AFTER 24 HRS, RECOLLECTION WILL BE REQUIRED. BILIRUBIN, CONFIRM Collection Time: 03/21/19  6:09 PM  
Result Value Ref Range Bilirubin UA, confirm POSITIVE (A) NEG    
POC LACTIC ACID Collection Time: 03/21/19  6:15 PM  
Result Value Ref Range Lactic Acid (POC) 3.17 (HH) 0.40 - 2.00 mmol/L Imaging CXR Results  (Last 48 hours) None CT Results  (Last 48 hours) 03/21/19 1920  CT ABD PELV W CONT Final result Impression:  IMPRESSION:  
   
1. Liver has a cirrhotic appearance. No focal masses are identified with in the  
liver although the enhancement is slightly heterogeneous. There are a few small gallstones. There is a large amount of ascites in the abdomen and pelvis.   
  
 Narrative:  EXAM: CT ABD PELV W CONT INDICATION: back pain, distended abd  
   
COMPARISON: 2018 CONTRAST: 100 mL of Isovue-370. TECHNIQUE:   
Following the uneventful intravenous administration of contrast, thin axial  
images were obtained through the abdomen and pelvis. Coronal and sagittal  
reconstructions were generated. Oral contrast was not administered. CT dose  
reduction was achieved through use of a standardized protocol tailored for this  
examination and automatic exposure control for dose modulation. FINDINGS: Report delayed by slowness of PACS  
LUNG BASES: There is slight basilar atelectasis on the right INCIDENTALLY IMAGED HEART AND MEDIASTINUM: There is questionable low density in  
the apex left ventricle could represent subendocardial infarction LIVER: Liver has a nodular contour consistent with cirrhosis. No definite mass  
lesions identified. There is large amount of ascites. Portal vein appears  
patent. GALLBLADDER: There are gallstones SPLEEN: Spleen measures 16.6 cm PANCREAS: No mass or ductal dilatation. ADRENALS: Unremarkable. KIDNEYS: No mass, calculus, or hydronephrosis. STOMACH: Unremarkable. SMALL BOWEL: No dilatation or wall thickening. COLON: No dilatation or wall thickening. APPENDIX: Not distended PERITONEUM: There is a large amount of ascites. There is umbilical hernia  
containing ascites RETROPERITONEUM: No lymphadenopathy or aortic aneurysm. REPRODUCTIVE ORGANS: Prostate gland is normal. The right testicle is located in  
the inguinal canal  
URINARY BLADDER: No mass or calculus. BONES: No destructive bone lesion. ADDITIONAL COMMENTS: N/A Assessment and Plan Cammie Chapman. is a 66 y.o. male who is admitted for  ascites in setting of alcoholic hepatic cirrhosis and chronic EtOH abuse c/f withdrawal 
 
 
Ascites in setting of alcoholic liver cirrhosis with history of esophageal varices: patient with worsening abdominal distension 2/2 ascites. CT c/w cirrhosis and ascites. Discussed patient with Dr. Rick Torrez who states that patient will likely require paracentesis on 3/22/19, but no need for albumin at this time. Recommends treating UTI as this can progress to SBP.  
- Resume home spironolactone 25mg/day - Resume home Furosemide 40 mg/day - Labs: CMP, PT, PTT and INR 
- GI consult (Dr. Maria Teresa Cannon) Esophageal varices in setting of alcoholic liver cirrhosis: patient admitted in Dec, 2018 and evaluated by Dr. Maria Teresa Cannon with decision to not perform endoscopy d/t no signs of active GI bleed 
- will monitor patient Lactic Acidosis: LA of 3.17 in Ed, treating UTI 
-trend LA 
 
EtOH Abuse: last drink reported in am of 3/20/2019, current usage reported as 6 beers per day. Patient received Ativan and Fentanyl in ED 
- CIWA with Ativan 
- EKG 
- UDS 
- Neuro checks q4h 
- seizure precautions 
- folate, vit B12 
- CBC, CMP, Mg and phosphorus daily - Goody bag 
- PT, PTT and INR 
 
UTI: patient found to have positive LE and Nitrites in urine, LA of 3.17, will treat with Ceftriaxone as this can progress to SBP (avoid flouroquinolones with QTc prolongation) - ceftriaxone 1gm daily 
- follow UCx for speciation and sensitivities - trend lactic acid Umbilical Hernia with Overlying Ulceration: ulceration was treated as outpatient with keflex - patient on ceftriaxone for UTI 
- wound care consult  
- continue mupirocin PUD/GERD: Has had GI bleeding in the past.  
- Resume home omeprazole 40 mg/day 
-CBC daily to monitor hgb. History of prior MI 
- Resume coreg 3.125 mg BID 
- Troponin, trend x3 
- EKG Hypertension: BP on admission 159/94.  
- Continuing home medications of Coreg, lasix, spironolactone - Will continue to monitor at this time and readjust as BP's trend. Thrombocytopenia: patient with hx of thrombocytopenia, followed by hematology (Dr. Chandni Ramon), Plt 123 in Ed 
-monitor CBC Anemia: Hbg 11.6 in ED, b/l Hgb is around 11 
- monitor CBC QTc Prolongation: EKG in Nov, 2018 showed QTc prolongation of 541 
- avoid QTc prolonging agents - obtain EKG Major Depression/ ROBY: Patient admitted to inpatient psychiatry unit in 8/2013 for depression with suicide ideation. Depression exacerbated since wife left him in July, 2018. Denies current suicidal ideation 
- continue home venlafaxine 150 mg/day 
- monitor for suicidal ideation Gout: stable 
- continue home allopurinol Obesity 
- PT with BMI of Body mass index is 35.94 kg/m². - Encouraging lifestyle modifications and further follow up outpatient. FEN/GI - Clear liquid diet. Activity - Out of bed with assistance DVT prophylaxis - SCDs GI prophylaxis - Protonix Fall prophylaxis - Fall precautions ordered. Disposition - Admit to Telemetry. Plan to d/c to Home. Code Status - Full, discussed with patient / caregivers. Next of Kin Name and Contact Hiram Carrizales: [phone], Delbert Dewitt [phone] Patient Phyllistine Priestly. will be discussed Dr. Michael Vanegas. 
 
10:37 PM, 03/21/19 Sofya Montiel MD 
Family Medicine Resident For Billing Chief Complaint Patient presents with  Back Pain Hospital Problems  Date Reviewed: 3/20/2019 Codes Class Noted POA Ascites ICD-10-CM: R18.8 ICD-9-CM: 789.59  3/21/2019 Unknown

## 2019-03-22 NOTE — ED NOTES
Pt Throughput: Receiving Charge RN made aware of patient's bed placement.  
 
Celena Carranza RN

## 2019-03-22 NOTE — WOUND CARE
66 y.o. male with PMH including HTN, gout, GI bleed, esophageal varices & liver cirrhosis with ascites. Social Hx includes alcohol abuse w/ hx of seizure from withdrawal (2013). Patient presented to the ER complaining of back pain, abdominal distension, and concern for  EtOH withdrawal. Wound care consulted for evaluation of an eroded umbilical hernia present on admission. Patient is resting in bed and had his lunch tray in front of him and his food still covered. Asked patient if he had planned to eat his lunch and he states he needs help with his tray. Proceeded with skin assessment before assisting patient with lunch tray set up. Parents arrived to bedside during assessment and helped patient with his meal after. Patient was not receptive to turning in bed for evaluation of gluteal region. Remaining head to toe assessment finds an umbilical hernia measuring approximately 3-4cm above the abdomen with an open 4x5cm abraded anterior surface. The wound bed has a 1.5x1.5 partial thickness eroded area with scant serous exudate. Surrounding that is dry resurfacing wound bed with crusted edges. The Raina-wound portion of the hernia is sorft to palpation and erythemetous. Surrounding skin is intact. Recommendation: 
Daily with skin care apply lotion to extremities and bony prominences for protection from friction/shear. Float heels and protect (with posey elbow/heel protectors). Turn Q2h while in bed (with turn team). Protect from lines and devices. When up in chair use a waffle cushion and reposition every 20 minutes. Suggest continuing with mupirocin to the open area of the umbilical hernia as ordered.   
 
Consult as needed,  
Lui Franklin

## 2019-03-22 NOTE — PROGRESS NOTES
[facility]: MED RECONCILIATION Information obtained from: Patient's mother (patient sleeping post Lorazepam), RX Query and RX on profile. Allergies: Onion and Statins-hmg-coa reductase inhibitors Comment: - Talked to patient's mother (patient sleeping post Lorazepam, mother makes him a pillbox). She states patient does not take his medications the way he is supposed to. She makes him take his morning meds (as she visits him every day), however not sure if patient is compliant with PM meds (patient lives by himself). Prior to Admission Medications:  
 
Medication Documentation Review Audit Reviewed by Rosa Hall PHARMD (Pharmacist) on 03/21/19 at 2159 Medication Sig Documenting Provider Last Dose Status Taking?  
allopurinol (ZYLOPRIM) 300 mg tablet TAKE 1 TABLET TWICE DAILY Trinity Keiht, DO  Active Yes  
carvedilol (COREG) 3.125 mg tablet Take 1 Tab by mouth two (2) times daily (with meals). Trinity Keith DO  Active Yes  
cyclobenzaprine (FLEXERIL) 10 mg tablet TAKE 1 TABLET THREE TIMES DAILY AS NEEDED Trinity Keith DO  Active Yes  
furosemide (LASIX) 40 mg tablet Take 2 Tabs by mouth daily. Maurice Ramirez, DO  Active Yes  
hydrOXYzine HCl (ATARAX) 25 mg tablet Take 25 mg by mouth three (3) times daily. Other, MD Dillon  Active Yes Med Note (Blu Valadez   Thu Nov 29, 2018  6:26 PM) Note: patient states he takes TID vs scripts directions of TID PRN to \"prevent anxiety attacks\" lactulose (CHRONULAC) 10 gram/15 mL solution Take 3 tsp by mouth two (2) times daily as needed (liver disease). Provider, Historical  Active Yes Med Note (Brandon Contreras   Wed Feb 20, 2019 10:49 AM) Pt states taking as needed 
  
mupirocin (BACTROBAN) 2 % ointment Apply  to affected area three (3) times daily.  Trinity Keith, DO  Active Yes  
omeprazole (PRILOSEC) 40 mg capsule TAKE 1 CAPSULE TWICE DAILY Oral Puls., NP  Active Yes  
potassium chloride SA (KLOR-CON SPRINKLE) 10 mEq capsule Take 1 Cap by mouth daily. Tameka Gurrola, DO  Active Yes  
prochlorperazine (COMPAZINE) 5 mg tablet TAKE 1 TABLET BY MOUTH EVERY 6 HOURS AS NEEDED FOR NAUSEA FOR UP TO 7 DAYS Alyssia Hollins, DO  Active Yes  
spironolactone (ALDACTONE) 25 mg tablet TAKE 1 TABLET TWICE DAILY Trinity Keith, DO  Active Yes  
testosterone cypionate (DEPOTESTOTERONE CYPIONATE) 200 mg/mL injection 2 mL by IntraMUSCular route once for 1 dose. Max Daily Amount: 400 mg. IM TheotiTrinity Gusman DO 3/20/2019 Active Yes  
traZODone (DESYREL) 150 mg tablet TAKE 1 TABLET BY MOUTH NIGHTLY Trinity Keith, DO  Active Yes Venlafaxine 150 mg tr24 Take 150 mg by mouth daily. Provider, Historical  Active Yes [address] [facility]   Contact: [phone]

## 2019-03-22 NOTE — PROGRESS NOTES
8929 [facility] care of patient in ultrasound, paracentesis in process, draining clear yellow fluid. Albumin infusion continues. 1415 Paracentesis procedure complete. Start time [id number]  Stop time [id number] 79. Total of Albumin 50 GM IV given, 61243 ml yellow turbid fluid drained from abdomen. Report to [place] via telephone. Gauze and tape bandage to RLQ dry and intact. Pt c/o abdominal pain when attempting to sit up, resolved with reposition. Orthostatic vitals post procedure WDL. To room 332 via stretcher with transporter.

## 2019-03-22 NOTE — PROGRESS NOTES
1600 Bedside shift change report given to BILLY Musa RN (oncoming nurse) by SHERI Boateng RN (offgoing nurse). Report included the following information SBAR, Kardex, ED Summary, OR Summary, Procedure Summary, Intake/Output, Accordion, Recent Results and Cardiac Rhythm NSR.  
1615 Patient found standing beside the bed stating that he wants to sit in the [place]. Patient is very impulsive and gets up to walk to the bathroom. Gait is unsteady. 1630 Patient up in the bathroom again urinating on the floor. 1703 Patient given Ativan IV at this time for agitation, restlessness and tremors. 1730 Condom cath placed on patient. 1759 Patient resting in bed with eyes closed and respirations are even and unlabored. Call bell is within reach and bed alarm is on.  
1930 Bedside shift change report given to [facility] RN (oncoming nurse) by billy Musa RN (offgoing nurse). Report included the following information SBAR, Kardex, ED Summary, OR Summary, Procedure Summary, Intake/Output, MAR, Accordion, Recent Results, Med Rec Status, Cardiac Rhythm NSR and Alarm Parameters .

## 2019-03-22 NOTE — PROGRESS NOTES
Pt brought to US for ordered paracentesis. Pt laying supine and able to tell RN that he is here to have fluid removed. Pt stated he has had this procedure done before. [address] Mendoza MD consented Pt. Pt is drowsy but responds appropriately to questioning. 1145 - Procedure started and Pt resting in bed. VS being monitored. 1206 - Rad RN called [place], [place] and she confirmed order for Albumin (see MAR) and no labs ordered for fluid. Pt resting and asking if he can eat lunch later. 1500 ml of clear yellow fluid removed so far and Pt tolerating well. 1225 - Catheter draining very slowly if at all. Claudio Kendall trying to troubleshoot but unable to get increased flow so going to get IR MD to assess/reposition. 1230 - Per Claudio Kendall MD has been notified and will be coming to US to assess. [address], MD in [address] [facility]. Pt repositioned and flow resuming. VS being monitored. Pt is cooperative and repositioning self. 
1842 - Pt sleeping mostly. Fluid has stopped draining again.   Claudio Kendall going to find IR MD

## 2019-03-22 NOTE — PROGRESS NOTES
Black Anurag Robina Ordoñez [address] Office [phone] Fax [phone] [address] Medicine Residency Attending Addendum: I saw and evaluated the patient on the day of the encounter with Dr. Bhanu Vargas, performing the abreu elements of the service. I discussed the findings, assessment and plan with the resident and agree with the resident's findings and plan as documented in the resident's note. Patient episode of nonsustained V. tach overnight. Does have a prolonged QTC over 500. Continue to monitor for alcohol withdrawal.  Appreciate GIs recommendations. Ajith Fracno MD, CAQSM, RMSK Assessment and Plan Leonardo Anaya is a 66 y.o. male who is admitted fo ascites in setting of alcoholic hepatic cirrhosis and chronic EtOH abuse c/f withdrawal 
 
Overnight events: 
- Lactic acid at 23:02 1.5, no longer trending 
- CIWA overnight 4,5,14 
- EKG: Sinus rhythm with PVCs and PACs, QT prolongation Ascites in setting of alcoholic liver cirrhosis with history of esophageal varices: patient with worsening abdominal distension 2/2 ascites. CT on admission with cirrhosis and ascites. Discussed patient with Dr. Rick Torrez who states that patient will likely require paracentesis on 3/22/19, but no need for albumin. Recommends treating UTI as this can progress to Spontaneous Bacterial Peritonitis. Patient followed by Dr. Maria Teresa Cannon during previous admission.  
- Cont home spironolactone 25mg/day 
- Cont home Furosemide 40 mg/day - Labs: CMP, PT, PTT and INR 
- GI consulted, appreciate recs (Dr. Maria Teresa Cannon) 
  
Esophageal varices in setting of alcoholic liver cirrhosis: patient admitted in Dec, 2018 and evaluated by Dr. Maria Teresa Cannon with decision to not perform endoscopy d/t no signs of active GI bleed 
- will monitor patient  
  
Lactic Acidosis: LA of 3.17 in Ed, treating UTI 
- Lactic acid at 23:02 1.5, no longer trending 
  
EtOH Abuse: last drink reported in am of 3/20/2019, current usage reported as 6 beers per day. Patient received Ativan and Fentanyl in ED 
- CIWA with Ativan, overnight 4,5,14 
- UDS, negative - Neuro checks q4h 
- seizure precautions - F/u folate, vit B12 
- CBC, CMP, Mg and phosphorus daily - Goody bag 
- PT, PTT and INR 
  
UTI: patient found to have positive LE and Nitrites in urine, LA of 3.17, will treat with Ceftriaxone as this can progress to SBP (avoid flouroquinolones with QTc prolongation) - ceftriaxone 1g daily 
- follow UCx for speciation and sensitivities - trend lactic acid 
  
Umbilical Hernia with Overlying Ulceration: ulceration was treated as outpatient with keflex - patient on ceftriaxone for UTI 
- wound care consult  
- continue mupirocin 
  
PUD/GERD: Has had GI bleeding in the past.  
- Resume home omeprazole 40 mg/day 
- CBC daily to monitor hgb.  
  
History of prior MI 
- Resume coreg 3.125 mg BID 
- Troponin, trend x3 
- EKG 
  
Hypertension: BP on admission 159/94.  
- Continuing home medications of Coreg, lasix, spironolactone - Will continue to monitor at this time and readjust as BP's trend. 
  
Thrombocytopenia: patient with hx of thrombocytopenia, followed by hematology (Dr. Tano Swift), Plt 123 in Ed 
-monitor CBC 
  
Anemia: Hbg 11.6 in ED, b/l Hgb is around 11 
- monitor CBC 
  
QTc Prolongation: EKG in Nov, 2018 showed QTc prolongation of 541 
- avoid QTc prolonging agents 
- EKG on 3/22: Sinus rhythm with PVCs and PACs, QT prolongation 
  
Major Depression/ ROBY: Patient admitted to inpatient psychiatry unit in 8/2013 for depression with suicide ideation. Depression exacerbated since wife left him in July, 2018. Denies current suicidal ideation 
- continue home venlafaxine 150 mg/day 
- monitor for suicidal ideation 
  
Gout: stable 
- continue home allopurinol 
  
Obesity 
- PT with BMI of Body mass index is 35.94 kg/m². - Encouraging lifestyle modifications and further follow up outpatient.  
  
  
FEN/GI - Clear liquid diet.   
Activity - Out of bed with assistance DVT prophylaxis - SCDs GI prophylaxis - Protonix Fall prophylaxis - Fall precautions ordered. Disposition - Admit to Telemetry. Plan to d/c to Home. Code Status - Full, discussed with patient / caregivers. Next of Kin Name and Contact Cristela Connors: [phone], Delaney Carson [phone] Donavan Rivero MD 
Family Medicine Resident Subjective / Objective Subjective Patient moving leg from side to side. Complaining of back pain 5/10 intensity. Patient had a BM yesterday. Urinating appropriately. Temp (24hrs), Av °F (36.7 °C), Min:97.7 °F (36.5 °C), Max:98.3 °F (36.8 °C) Objective Respiratory:   O2 Device: Room air Visit Vitals /64 (BP 1 Location: Left arm, BP Patient Position: At rest) Pulse 91 Temp 98.3 °F (36.8 °C) Resp 19 Ht 6' (1.829 m) Wt 264 lb 8.8 oz (120 kg) SpO2 96% BMI 35.88 kg/m² General:  Alert. Cooperative. Nailbeds with blood. Respiratory: CTAB. No w/r/r/c.  
Cardiovascular: RRR. Normal S1,S2. No m/r/g. Pulses 2+ throughout. GI: + bowel sounds. Nontender. No rebound tenderness or guarding. Distended, with umbilical hernia, erythematous and pruritic. Extremities:  No LE edema. Distal pulses intact. Musculoskeletal: Full ROM in all extremities. Skin: Warm, dry. No rashes. Neuro: CN II-XII grossly intact. Strength 5/5 in all extremities. I/O: 
 
 
CBC: 
Recent Labs  
  19 
0154 19 
1802 19 
1104 WBC 5.1 6.3 5.9 HGB 10.7* 11.6* 11.5* HCT 33.0* 36.1* 36.1*  
PLT 89* 123* 173 Metabolic Panel: 
Recent Labs  
  19 
0154 19 
1802  134* K 3.8 3.3*  
 102 CO2 25 24 BUN 7 6 CREA 0.66* 0.76 * 91  
CA 7.4* 7.7* MG 1.6  --   
PHOS 2.4*  --   
ALB 2.3* 2.6* SGOT 61* 71* ALT 24 30 INR  --  1.5* For Billing Chief Complaint Patient presents with  Back Pain Hospital Problems  Date Reviewed: 3/20/2019        Codes Class Noted POA Ascites ICD-10-CM: R18.8 ICD-9-CM: 789.59  3/21/2019 Unknown

## 2019-03-23 NOTE — PROGRESS NOTES
[facility] FAMILY MEDICINE RESIDENCY PROGRAM  
Daily Progress Note Date: 3/23/2019 [address] Medicine Residency Attending Addendum: I saw and evaluated the patient on the day of the encounter with Dr. Deandra Palma, performing the key elements of the service. I discussed the findings, assessment and plan with the resident and agree with the resident's findings and plan as documented in the resident's note. Cultures negative today. LFTs improving. Over 12 L drained yesterday from abdomen. We will continue to monitor umbilical hernia closely. The patient is a high risk for complications with surgery. If no improvement or worsening, will consult Surgery. Andrea Swanson MD, CAQSM, RMSK Assessment/Plan:  
Reji Robles. is a 66 y.o. male who is admitted for Ascites and Abdominal Pain Ascites in setting of alcoholic liver cirrhosis with history of esophageal varices:12.75L drained from abdomen yesterday. MELD Score is 18 corresponding to a 6% 3-month mortality risk.  
- Cont home spironolactone 25mg/day 
- Cont home Furosemide 40 mg/day 
- GI consulted, appreciate recs (Dr. Yanelis Perez) 
  
Esophageal varices in setting of alcoholic liver cirrhosis: patient admitted in Dec, 2018 and evaluated by Dr. Yanelis Perez with decision to not perform endoscopy d/t no signs of active GI bleed. Hs of variceal banding 
- will monitor patient for signs of bleeding 
  
Lactic Acidosis: RESOLVED 
- No further management needed  
  
EtOH Abuse with Concern for EtOH withdrawal: CIWA overnight 2 2 1. Patient did not require Ativan overnight. - seizure precautions  
- CBC, CMP, Mg and phosphorus daily 
- Daily Goody bag UTI vs Prostatis:  UA on presentation c/w UTI vs Prostatitis. VSS.  
- ceftriaxone 1g daily 
- follow UCx for speciation and sensitivities 
  
Umbilical Hernia with Overlying Ulceration: Hernia is reducible on exam. Very high risk surgical candidate for no emergent surgery given liver disease and ascites. - wound care consult, appreciate recommendations 
- continue mupirocin 
  
PUD/GERD: Has had GI bleeding in the past.  
- Resume home omeprazole 40 mg/day 
- CBC daily to monitor hgb.  
  
History of prior MI: STABLE. Trop neg x 3. EKG without ST changes and c/w previous EKG. - Resume coreg 3.125 mg BID 
  
Hypertension: BP on admission 159/94.  
- Continuing home medications of Coreg, lasix, spironolactone - Will continue to monitor at this time and readjust as BP's trend. 
  
Thrombocytopenia: likely 2/2 liver dysfunction. Patient followed by Dr Jean-Pierre Weiss, heme-Onc 
-Daily CBC 
  
Anemia: Chronic. Baseline Hgb ~11 
- Daily CBC 
  
QTc Prolongation: EKG on admission with QTc 521 
- avoid QTc prolonging agents 
  
Major Depression/ ROBY: STABLE. Hs of suicidal ideation with hospitalization 8/2013 
- continue home venlafaxine 150 mg/day 
- monitor for suicidal ideation 
  
Gout: stable 
- continue home allopurinol 
  
Obesity 
- PT with BMI of Body mass index is 35.94 kg/m². - Encouraging lifestyle modifications and further follow up outpatient.  
  
  
FEN/GI - GI Lite Activity - Out of bed with assistance DVT prophylaxis - SCDs GI prophylaxis - Protonix Fall prophylaxis - Fall precautions ordered. Disposition - Admit to Telemetry. Plan to d/c to Home. Code Status - Full, discussed with patient / caregivers. Next of Kin Name and Lennie Schilder: [phone], Jesika Nadia [phone] Patient to be discussed with Dr. Saul Crump, supervising physician. Jane Doherty MD 
Family Medicine Resident CC: \"When can I get some real food? \" Subjective No acute events overnight. Patient tolerated liquids yesterday without worsening abdominal pain. Denies chills, headaches, chest pain, shortness of breath, palpitations, abdominal pain, nausea and vomiting, and LE edema. Still with urinary frequency. Inpatient Medications Current Facility-Administered Medications Medication Dose Route Frequency  pantoprazole (PROTONIX) tablet 40 mg  40 mg Oral ACB  mupirocin (BACTROBAN) 2 % ointment   Topical Q12H  prochlorperazine (COMPAZINE) tablet 5 mg  5 mg Oral Q6H PRN  
 LORazepam (ATIVAN) injection 4 mg  4 mg IntraVENous Q1H PRN  
 0.9% sodium chloride 6,025 mL with folic acid 1 mg, thiamine 100 mg, mvi, adult no. 4 with vit K 10 mL infusion   IntraVENous DAILY  thiamine mononitrate (B-1) tablet 100 mg  100 mg Oral DAILY  cefTRIAXone (ROCEPHIN) 1 g in sterile water (preservative free) 10 mL IV syringe  1 g IntraVENous Q24H  
 allopurinol (ZYLOPRIM) tablet 300 mg  300 mg Oral BID  carvedilol (COREG) tablet 3.125 mg  3.125 mg Oral BID WITH MEALS  furosemide (LASIX) tablet 40 mg  40 mg Oral ACB&D  
 spironolactone (ALDACTONE) tablet 25 mg  25 mg Oral BID  venlafaxine-SR (EFFEXOR-XR) capsule 150 mg  150 mg Oral DAILY WITH BREAKFAST  sodium chloride (NS) flush 5-40 mL  5-40 mL IntraVENous Q8H  
 sodium chloride (NS) flush 5-40 mL  5-40 mL IntraVENous PRN  
 LORazepam (ATIVAN) injection 2 mg  2 mg IntraVENous Q1H PRN Allergies Allergies Allergen Reactions  Onion Nausea and Vomiting  Statins-Hmg-Coa Reductase Inhibitors Rash  
  rash Objective Vitals: 
Patient Vitals for the past 8 hrs: 
 Temp Pulse Resp BP SpO2  
03/23/19 0814 98.7 °F (37.1 °C) 97 18 122/84 97 % 03/23/19 0651  85     
03/23/19 0401 98.4 °F (36.9 °C) 78 18 125/76 96 % I/O: 
 
Intake/Output Summary (Last 24 hours) at 3/23/2019 1129 Last data filed at 3/23/2019 6535 Gross per 24 hour Intake 2396.25 ml Output 71557 ml Net -24097.75 ml Last shift: 
  03/23 0701 - 03/23 1900 In: -  
Out: 1000 [Urine:1000] Last 3 shifts: 
  03/21 1901 - 03/23 0700 In: 2876.3 [P.O.:1920; I.V.:956.3] Out: 61469 [Urine:850] Physical Exam: 
General: No acute distress. Alert & Oriented x 4. Cooperative. Head: Normocephalic. Atraumatic. Eyes:  Conjunctiva pink. Sclera white. PERRL.  
Nose:  Septum midline. Mucosa pink. No drainage. Throat: Mucosa pink. Moist mucous membranes. No tonsillar exudates or erythema. Palate movement equal bilaterally. Respiratory: CTAB. No w/r/r/c.  
Cardiovascular: RRR. Normal S1,S2. No m/r/g. Pulses 2+ throughout. GI: + bowel sounds. Moderately distended. Minimal tenderness to palpation throughout. Extremities: No edema. No palpable cord. No tenderness. Laboratory Data Recent Results (from the past 12 hour(s)) METABOLIC PANEL, COMPREHENSIVE Collection Time: 03/23/19 12:27 AM  
Result Value Ref Range Sodium 135 (L) 136 - 145 mmol/L Potassium 3.5 3.5 - 5.1 mmol/L Chloride 103 97 - 108 mmol/L  
 CO2 25 21 - 32 mmol/L Anion gap 7 5 - 15 mmol/L Glucose 80 65 - 100 mg/dL BUN 4 (L) 6 - 20 MG/DL Creatinine 0.56 (L) 0.70 - 1.30 MG/DL  
 BUN/Creatinine ratio 7 (L) 12 - 20 GFR est AA >60 >60 ml/min/1.73m2 GFR est non-AA >60 >60 ml/min/1.73m2 Calcium 7.2 (L) 8.5 - 10.1 MG/DL Bilirubin, total 4.5 (H) 0.2 - 1.0 MG/DL  
 ALT (SGPT) 19 12 - 78 U/L  
 AST (SGOT) 41 (H) 15 - 37 U/L Alk. phosphatase 93 45 - 117 U/L Protein, total 6.7 6.4 - 8.2 g/dL Albumin 2.4 (L) 3.5 - 5.0 g/dL Globulin 4.3 (H) 2.0 - 4.0 g/dL A-G Ratio 0.6 (L) 1.1 - 2.2    
CBC WITH AUTOMATED DIFF Collection Time: 03/23/19 12:27 AM  
Result Value Ref Range WBC 4.4 4.1 - 11.1 K/uL  
 RBC 3.82 (L) 4.10 - 5.70 M/uL  
 HGB 10.2 (L) 12.1 - 17.0 g/dL HCT 31.7 (L) 36.6 - 50.3 % MCV 83.0 80.0 - 99.0 FL  
 MCH 26.7 26.0 - 34.0 PG  
 MCHC 32.2 30.0 - 36.5 g/dL RDW 22.1 (H) 11.5 - 14.5 % PLATELET 76 (L) 269 - 400 K/uL MPV 10.5 8.9 - 12.9 FL  
 NRBC 0.0 0  WBC ABSOLUTE NRBC 0.00 0.00 - 0.01 K/uL NEUTROPHILS 65 32 - 75 % LYMPHOCYTES 14 12 - 49 % MONOCYTES 12 5 - 13 % EOSINOPHILS 6 0 - 7 % BASOPHILS 2 (H) 0 - 1 % IMMATURE GRANULOCYTES 1 (H) 0.0 - 0.5 % ABS. NEUTROPHILS 2.9 1.8 - 8.0 K/UL  
 ABS. LYMPHOCYTES 0.6 (L) 0.8 - 3.5 K/UL  
 ABS. MONOCYTES 0.5 0.0 - 1.0 K/UL  
 ABS. EOSINOPHILS 0.3 0.0 - 0.4 K/UL  
 ABS. BASOPHILS 0.1 0.0 - 0.1 K/UL  
 ABS. IMM. GRANS. 0.0 0.00 - 0.04 K/UL  
 DF SMEAR SCANNED    
 RBC COMMENTS ANISOCYTOSIS 1+ 
    
 RBC COMMENTS TARGET CELLS    
MAGNESIUM Collection Time: 03/23/19 12:27 AM  
Result Value Ref Range Magnesium 1.6 1.6 - 2.4 mg/dL PHOSPHORUS Collection Time: 03/23/19 12:27 AM  
Result Value Ref Range Phosphorus 1.9 (L) 2.6 - 4.7 MG/DL No New Imaging Hospital Problems: 
Hospital Problems  Date Reviewed: 3/20/2019 Codes Class Noted POA Ascites ICD-10-CM: R18.8 ICD-9-CM: 789.59  3/21/2019 Unknown Esophageal varices in alcoholic cirrhosis (HCC) PYK-20-PX: K70.30, I85.10 ICD-9-CM: 571.2, 456.21  7/2/2018 Yes Thrombocytopenia ([facility] ICD-10-CM: D69.6 ICD-9-CM: 287.5  10/17/2013 Yes Alcohol dependence with withdrawal ([facility] ICD-10-CM: S74.019 ICD-9-CM: 303.90, 291.81  7/25/2013 Yes HTN (hypertension) (Chronic) ICD-10-CM: I10 
ICD-9-CM: 401.9  6/1/2011 Yes

## 2019-03-23 NOTE — PROGRESS NOTES
Problem: Falls - Risk of 
Goal: *Absence of Falls Description Document Aamir Solomon Fall Risk and appropriate interventions in the flowsheet. Outcome: Progressing Towards Goal 
  
Problem: Patient Education: Go to Patient Education Activity Goal: Patient/Family Education Outcome: Progressing Towards Goal 
  
Problem: Pressure Injury - Risk of 
Goal: *Prevention of pressure injury Description Document Brenden Scale and appropriate interventions in the flowsheet. Outcome: Progressing Towards Goal 
  
Problem: Patient Education: Go to Patient Education Activity Goal: Patient/Family Education Outcome: Progressing Towards Goal

## 2019-03-23 NOTE — PROGRESS NOTES
0710- Bedside and Verbal shift change report given to MARY Hansen RN (oncoming nurse) by Alo Garcia RN (offgoing nurse). Report included the following information SBAR, Kardex, Intake/Output, MAR, Recent Results, Med Rec Status and Cardiac Rhythm NSR. Will assess 0800- CIWA score 2 
 
0912- Pt complaining of back pain 7/10. Given IV fentanyl. Asked MD rounds to reassess his pain management. Pt states he typically takes Advil at home to control pain. 1205- Patient's family at bedside voicing concerns about status of tremors and periodic confusion since pt states \"I am going home today\". Reassured family that I would follow up on discharge plan. CIWA score 2 
 
1244- Followed up with Family Practice about patient/family concerns. State they will visit patient/family to answer questions regarding plan of care/discharge.

## 2019-03-23 NOTE — DISCHARGE INSTRUCTIONS
HOME DISCHARGE INSTRUCTIONS    Jeanette Tran. / [id number] : 1960    Admission date: 3/21/2019 Discharge date: 3/23/2019     Please bring this form with you to show your care provider at your follow-up appointment. Primary care provider:  Shantanu Wesley DO    Discharging provider:  Myesha Westfall MD  - Family Medicine Resident  Bryce Ignacio MD - Attending, Family Medicine     You have been admitted to the hospital with the following diagnoses:    ACUTE DIAGNOSES:  Ascites [R18.8]  . . . . . . . . . . . . . . . . . . . . . . . . . . . . . . . . . . . . . . . . . . . . . . . . . . . . . . . . . . . . . . . . . . . . . . . Erlin Diego FOLLOW-UP CARE RECOMMENDATIONS:    - ABSTAIN from Alcohol. You were provided a list of local AA meetings prior to discharge. Continuing to drink alcohol will cause you to have additional abdominal pain and will further damage your liver. - Stick to a low salt (less than 2 grams or 2000mg of sodium daily) diet to keep as much fluid off of your abdomen as possible. - Talk with Dr Bayron Lara about how to better address your anxiety and depression.  You need to address these issues so you can care for yourself and your other medical problems    - See below for care of your hernia    MEDICATION CHANGES:     START Cipro 500mg twice daily for 28 days to treat prostatitis   CONTINUE TAKING all other medications as prescribed    Appointments  Follow-up Information     Follow up With Specialties Details Why Contact Info    Shantanu Wesley DO Family Practice Go on 3/28/2019 hospital followup @ 1:45pm [address]  [facility]  [address]  [phone]      Alicia Ziegler MD Gastroenterology Schedule an appointment as soon as possible for a visit GI followup  [facility] [address]      Angelo Monge MD Surgery Schedule an appointment as soon as possible for a visit for hernia evaluation [address] [address]  [phone]             Follow-up tests needed: NONE    Pending test results: At the time of your discharge the following test results are still pending: Final Blood Cultures   Please make sure you review these results with your outpatient follow-up provider(s). Specific symptoms to watch for: chest pain, shortness of breath, fever, chills, nausea, vomiting, diarrhea, change in mentation, falling, weakness, bleeding. DIET/what to eat:  Low salt diet    ACTIVITY:  Activity as tolerated    Wound care: Apply mupirocin ointment to hernia 2-3 times daily. As long as hernia can be pushed in it is stable. If it becomes very dark and/or exquisitely painful you should be seen in the emergency room right away. Equipment needed:  NONE    What to do if new or unexpected symptoms occur? If you experience any of the above symptoms (or should other concerns or questions arise after discharge) please call your primary care physician. Return to the emergency room if you cannot get hold of your doctor. · It is very important that you keep your follow-up appointment(s). · Please bring discharge papers, medication list (and/or medication bottles) to your follow-up appointments for review by your outpatient provider(s). · Please check the list of medications and be sure it includes every medication (even non-prescription medications) that your provider wants you to take. · It is important that you take the medication exactly as they are prescribed. · Keep your medication in the bottles provided by the pharmacist and keep a list of the medication names, dosages, and times to be taken in your wallet. · Do not take other medications without consulting your doctor.    · If you have any questions about your medications or other instructions, please talk to your nurse or care provider before you leave the hospital.     Information obtained by:     I understand that if any problems occur once I am at home I am to contact my physician. These instructions were explained to me and I had the opportunity to ask questions. I understand and acknowledge receipt of the instructions indicated above.                                                                                                                                                Physician's or R.N.'s Signature                                                                  Date/Time                                                                                                                                              Patient or Representative Signature                                                          Date/Time

## 2019-03-23 NOTE — DISCHARGE SUMMARY
[facility] [address]  Office [phone], Fax [phone] Discharge Summary Patient: Yaima Bal. MRN: [id number] YOB: 1960 Age: 66 y.o. Date of admission:  3/21/2019 Date of discharge:  3/23/2019 Primary care provider:  Doris Lee DO Admitting provider:  Cely Overton MD 
 
Discharging provider(s): Irene Mg MD - Family Medicine Resident Fabián Lunsford MD - Family Medicine Attending Consultations · Gastroenterology Procedures · Paracentesis 3/22/19 12.75L drained Discharge destination: Home to parents house. The patient is stable for discharge. Admission diagnosis · Ascites [R18.8] For PCP Follow-up:  
- Discuss medical management for alcohol dependence - Optimize treatment for severe depression - Review Final Blood Cultures MEDICATION CHANGES:  
- Cipro 500mg BID x 28 days for Prostatitis Presentation: HPI as per admitting provider: \"Lokesh Robertson Jr. is M 61 y. o. male with known HTN, gout, GI bleed, esophageal varices & ascites 2/2 to liver cirrhosis, alcohol abuse w/ hx of seizure from withdrawal (2013), HTN, GERD, peptic and gastric ulcer disease and depression/anxiety who presents to the ER complaining of back pain, abdominal distension, and concern for  EtOH withdrawal.  
  
Mr Anai Campos has a history of EtOH abuse in setting of marital discord and his wife leaving him In July, 2018. His parents are present in the room, and providing much of that patient's history. They report that the patient was at a [employer] event 6 days PTA and someone asked him about his wife. His parents report that they believe this lead to him drinking more. The patient's parents report that he then he started developing worsening abdominal distension over the last 2-3 days that progressed to back pain yesterday. The patient reported to the ED that his last drink was in the am of 3/20/2019.  The patient normally drinks six 12 oz beers per day. The patient denied any CP, dyspnea, n/v, f/c, leg swelling, dizziness,  hematochezia, melena, or focal weakness.  
  
Of note the patient has a recent admission in Dec, 2018 for esophageal varices. No endoscopy was performed at that time d/t no signs of active GI bleed.  
  
In the ED: - Vitals - Temp 97.8 , pulse 78, /94, Resp 18, SpO2 98% R. A. 
- Labs  
            -CBC: WBC 6.3, Hgb 11.6, Plt 123 
            - CMP s/f Na 134, K+ 3.3, creat 0.76, Ca 7.7; Bilirubin 5.0, AlkPhos 127, ALT 30, AST 71, [id number] - Lipase: 220 
            - INR 1.5 
            -UA: positive nitrites and small LE, but negative for bacteria - Lactic Acid: 3.17 
            - Ammonia: 88 
            - Troponin < 0.05,  Imaging - - Treatment - 1000ml NS with VitK, folic acid, and thiamine; zofran 4mg IV x1,.  Ativan 2mg x 1, Fentanyl 50mcg x 1\" Brief Hospital Course By Problem: 
 
Ascites in setting of alcoholic liver cirrhosis with history of esophageal varices:12.75L drained from abdomen 3/22/19. MELD Score is 18 corresponding to a 6% 3-month mortality risk. Child Phillips Score is 11 corresponding to 1-3 year survival and has an 82% preoperative mortality risk. 
- Cont home spironolactone 25mg/day 
- Cont home Furosemide 40 mg/day - Followup with Dr Karlee Rocha, Gastroenterology, in 1-2 weeks 
  
EtOH Abuse with Concern for EtOH withdrawal: CIWA 21 on arrival to ED then requiring Ativan multiple times over first 36 hours of admission with CIWAs of 1-2 prior to discharge. Patient with fine tremor at baseline. Discussed importance of abstaining from alcohol given severity of liver disease and provided list of closest AA meetings to patient's current address. Patient will reside at his parents' home upon discharge as parents worry he will not care for himself and not take medications appropriately.   
- Provided AA Meeting list 
- discuss management for EtOH dependence with PCP on followup 
  
Prostatis:  UA on presentation c/w UTI vs Prostatitis. UCx without growth. Patient complains of scrotal discomfort and urinary frequency c/w prostatitis. - Cipro 500mg BID x 41ITKA 
  
Umbilical Hernia with Overlying Ulceration: Hernia is reducible on exam. Very high risk surgical candidate for non emergent surgery given liver disease and ascites. MELD Score is 18 corresponding to a 6% 3-month mortality risk. Child Phillips Score is 11 corresponding to 1-3 year survival and has an 82% preoperative mortality risk. Discussed when to come to ED for signs of strangulation 
- continue mupirocin ointment to hernia 2-3 times daily - Referral for general surgery for conservative management and observation PUD/GERD: Stable. - Continue omeprazole 40 mg/day 
  
History of prior MI: STABLE. Trop neg x 3. EKG without ST changes and c/w previous EKG. - Continue Coreg 3.125 mg BID 
  
Hypertension: Stable throughout admission. 
- Continue home Coreg 3.125mg BID 
- Continue Lasix 40mg daily 
- Continue Spironolactone 25mg daily 
  Thrombocytopenia: likely 2/2 liver dysfunction. Patient followed by Dr Racheal Gutierrez, heme-Onc 
- Has followup with Dr Ray to review labs            
  
Anemia: Chronic. Baseline Hgb ~11. Followed by Heme-Onc. 
- Has followup with Heme-Onc 
- Routine CBC  
  
QTc Prolongation: EKG on admission with QTc 521 
- avoid QTc prolonging agents 
  
Major Depression/ ROBY: STABLE. Hs of suicidal ideation with hospitalization 8/2013. Patient denies SI/HI but endorses that he sleeps all day and has little motivation to take medications because of his depression. Essentail to address in order to optimize the remainder of his health issues. 
- Continue venlafaxine 150 mg/day - Discuss adjustments to medication and therapy with PCP 
  
Gout: stable 
- continue home allopurinol 300mg BID Final Discharge Diagnosis: Ascites with Chronic Alcoholic Cirrhosis Pending test results: At the time of your discharge the following test results are still pending: Final Blood Cultures. Please make sure you review these results with your outpatient follow-up provider(s). Specific symptoms to watch for: chest pain, shortness of breath, fever, chills, nausea, vomiting, diarrhea, change in mentation, falling, weakness, bleeding. DIET/what to eat:  Low sodium diet ACTIVITY:  Activity as tolerated Wound care: Mupirocin to hernia 2-3X daily Equipment needed:  NONE Follow-up Care: Follow-up Information Follow up With Specialties Details Why Contact Info Pamela Esquivel,  Family Practice Go on 3/28/2019 hospital followup @ 1:45pm N [address] [place] [address] [address] [address] [facility] [address] 
[phone] Princess Monge MD Gastroenterology Schedule an appointment as soon as possible for a visit GI followup  [address] 
[phone] Doc Henry MD Surgery Schedule an appointment as soon as possible for a visit for hernia evaluation [address] [place] [address] 
[phone] Recent Results (from the past 24 hour(s)) METABOLIC PANEL, COMPREHENSIVE Collection Time: 03/23/19 12:27 AM  
Result Value Ref Range Sodium 135 (L) 136 - 145 mmol/L Potassium 3.5 3.5 - 5.1 mmol/L Chloride 103 97 - 108 mmol/L  
 CO2 25 21 - 32 mmol/L Anion gap 7 5 - 15 mmol/L Glucose 80 65 - 100 mg/dL BUN 4 (L) 6 - 20 MG/DL Creatinine 0.56 (L) 0.70 - 1.30 MG/DL  
 BUN/Creatinine ratio 7 (L) 12 - 20 GFR est AA >60 >60 ml/min/1.73m2 GFR est non-AA >60 >60 ml/min/1.73m2 Calcium 7.2 (L) 8.5 - 10.1 MG/DL Bilirubin, total 4.5 (H) 0.2 - 1.0 MG/DL  
 ALT (SGPT) 19 12 - 78 U/L  
 AST (SGOT) 41 (H) 15 - 37 U/L Alk. phosphatase 93 45 - 117 U/L Protein, total 6.7 6.4 - 8.2 g/dL Albumin 2.4 (L) 3.5 - 5.0 g/dL Globulin 4.3 (H) 2.0 - 4.0 g/dL  A-G Ratio 0.6 (L) 1.1 - 2.2    
CBC WITH AUTOMATED DIFF  
 Collection Time: 03/23/19 12:27 AM  
Result Value Ref Range WBC 4.4 4.1 - 11.1 K/uL  
 RBC 3.82 (L) 4.10 - 5.70 M/uL  
 HGB 10.2 (L) 12.1 - 17.0 g/dL HCT 31.7 (L) 36.6 - 50.3 % MCV 83.0 80.0 - 99.0 FL  
 MCH 26.7 26.0 - 34.0 PG  
 MCHC 32.2 30.0 - 36.5 g/dL RDW 22.1 (H) 11.5 - 14.5 % PLATELET 76 (L) 178 - 400 K/uL MPV 10.5 8.9 - 12.9 FL  
 NRBC 0.0 0  WBC ABSOLUTE NRBC 0.00 0.00 - 0.01 K/uL NEUTROPHILS 65 32 - 75 % LYMPHOCYTES 14 12 - 49 % MONOCYTES 12 5 - 13 % EOSINOPHILS 6 0 - 7 % BASOPHILS 2 (H) 0 - 1 % IMMATURE GRANULOCYTES 1 (H) 0.0 - 0.5 % ABS. NEUTROPHILS 2.9 1.8 - 8.0 K/UL  
 ABS. LYMPHOCYTES 0.6 (L) 0.8 - 3.5 K/UL  
 ABS. MONOCYTES 0.5 0.0 - 1.0 K/UL  
 ABS. EOSINOPHILS 0.3 0.0 - 0.4 K/UL  
 ABS. BASOPHILS 0.1 0.0 - 0.1 K/UL  
 ABS. IMM. GRANS. 0.0 0.00 - 0.04 K/UL  
 DF SMEAR SCANNED    
 RBC COMMENTS ANISOCYTOSIS 1+ 
    
 RBC COMMENTS TARGET CELLS    
MAGNESIUM Collection Time: 03/23/19 12:27 AM  
Result Value Ref Range Magnesium 1.6 1.6 - 2.4 mg/dL PHOSPHORUS Collection Time: 03/23/19 12:27 AM  
Result Value Ref Range Phosphorus 1.9 (L) 2.6 - 4.7 MG/DL Discharge Medication List as of 3/23/2019  2:55 PM  
  
START taking these medications Details  
ciprofloxacin HCl (CIPRO) 500 mg tablet Take 1 Tab by mouth two (2) times a day for 28 days. Indications: prostatitis, Print, Disp-56 Tab, R-0  
  
  
CONTINUE these medications which have NOT CHANGED Details  
mupirocin (BACTROBAN) 2 % ointment Apply  to affected area three (3) times daily. , Normal, Disp-22 g, R-1  
  
furosemide (LASIX) 40 mg tablet Take 2 Tabs by mouth daily. , Normal, Disp-180 Tab, R-3  
  
cyclobenzaprine (FLEXERIL) 10 mg tablet TAKE 1 TABLET THREE TIMES DAILY AS NEEDED, Normal, Disp-270 Tab, R-2  
  
traZODone (DESYREL) 150 mg tablet TAKE 1 TABLET BY MOUTH NIGHTLY, Normal, Disp-90 Tab, R-3  
  
prochlorperazine (COMPAZINE) 5 mg tablet TAKE 1 TABLET BY MOUTH EVERY 6 HOURS AS NEEDED FOR NAUSEA FOR UP TO 7 DAYS, Normal**Patient requests 90 days supply**Disp-385 Tab, R-1  
  
potassium chloride SA (KLOR-CON SPRINKLE) 10 mEq capsule Take 1 Cap by mouth daily. , Normal, Disp-90 Cap, R-3  
  
hydrOXYzine HCl (ATARAX) 25 mg tablet Take 25 mg by mouth three (3) times daily. , Historical Med  
  
lactulose (CHRONULAC) 10 gram/15 mL solution Take 3 tsp by mouth two (2) times daily as needed (liver disease). , Historical Med  
  
Venlafaxine 150 mg tr24 Take 150 mg by mouth daily. , Historical Med  
  
omeprazole (PRILOSEC) 40 mg capsule TAKE 1 CAPSULE TWICE DAILY, Normal, Disp-180 Cap, R-3  
  
allopurinol (ZYLOPRIM) 300 mg tablet TAKE 1 TABLET TWICE DAILY, Normal, Disp-180 Tab, R-3  
  
carvedilol (COREG) 3.125 mg tablet Take 1 Tab by mouth two (2) times daily (with meals). , Normal, Disp-180 Tab, R-3  
  
spironolactone (ALDACTONE) 25 mg tablet TAKE 1 TABLET TWICE DAILY, Normal, Disp-180 Tab, R-3 STOP taking these medications  
  
 testosterone cypionate (DEPOTESTOTERONE CYPIONATE) 200 mg/mL injection Comments:  
Reason for Stopping:   
   
  
 
 
Admission imaging studies: 
 
Results from Hospital Encounter encounter on 03/21/19 [address] [facility] Narrative INDICATION: Ascites. The risks, benefits and alternatives of ultrasound guided therapeutic 
paracentesis were discussed with the patient. After all questions were answered 
and informed written consent was obtained, the patient was prepped and draped in 
the standard sterile fashion in a supine position. 1% lidocaine was used as 
local anesthetic. Using sonographic guidance, a 6 French One-step catheter was 
advanced into right lower quadrant of the abdomen. Approximately 12.75 liters of 
serous fluid was removed. There is no significant blood loss. There is no 
immediate complication. Patient was transferred to his inpatient bed in stable 
condition.  
  
 Impression IMPRESSION: Ultrasound guided therapeutic paracentesis. No complication. Results from [facility] Encounter encounter on 03/21/19 CT ABD PELV W CONT Narrative EXAM: CT ABD PELV W CONT INDICATION: back pain, distended abd 
 
COMPARISON: 2018 CONTRAST: 100 mL of Isovue-370. TECHNIQUE:  
Following the uneventful intravenous administration of contrast, thin axial 
images were obtained through the abdomen and pelvis. Coronal and sagittal 
reconstructions were generated. Oral contrast was not administered. CT dose 
reduction was achieved through use of a standardized protocol tailored for this 
examination and automatic exposure control for dose modulation. FINDINGS: Report delayed by slowness of PACS 
LUNG BASES: There is slight basilar atelectasis on the right INCIDENTALLY IMAGED HEART AND MEDIASTINUM: There is questionable low density in 
the apex left ventricle could represent subendocardial infarction LIVER: Liver has a nodular contour consistent with cirrhosis. No definite mass 
lesions identified. There is large amount of ascites. Portal vein appears 
patent. GALLBLADDER: There are gallstones SPLEEN: Spleen measures 16.6 cm PANCREAS: No mass or ductal dilatation. ADRENALS: Unremarkable. KIDNEYS: No mass, calculus, or hydronephrosis. STOMACH: Unremarkable. SMALL BOWEL: No dilatation or wall thickening. COLON: No dilatation or wall thickening. APPENDIX: Not distended PERITONEUM: There is a large amount of ascites. There is umbilical hernia 
containing ascites RETROPERITONEUM: No lymphadenopathy or aortic aneurysm. REPRODUCTIVE ORGANS: Prostate gland is normal. The right testicle is located in 
the inguinal canal 
URINARY BLADDER: No mass or calculus. BONES: No destructive bone lesion. ADDITIONAL COMMENTS: N/A Impression IMPRESSION: 
 
1. Liver has a cirrhotic appearance. No focal masses are identified with in the 
liver although the enhancement is slightly heterogeneous. There are a few small gallstones. There is a large amount of ascites in the abdomen and pelvis. No procedure found. 
 
 
------------------------------------------------------------------------------------------------------------------- Chronic Diagnoses:   
Problem List as of 3/23/2019 Date Reviewed: 3/20/2019 Codes Class Noted - Resolved Ascites ICD-10-CM: R18.8 ICD-9-CM: 789.59  3/21/2019 - Present History of GI bleed ICD-10-CM: Z87.19 ICD-9-CM: V12.79  11/30/2018 - Present Withdrawal symptoms, alcohol (HCC) ICD-10-CM: P31.517 ICD-9-CM: 291.81  11/29/2018 - Present Severe obesity with body mass index (BMI) of 35.0 to 39.9 with serious comorbidity ([facility] ICD-10-CM: E66.01 
ICD-9-CM: 278.01  7/27/2018 - Present Esophageal varices in alcoholic cirrhosis (HCC) RZC-71-[place]: K70.30, I85.10 ICD-9-CM: 571.2, 456.21  7/2/2018 - Present Duodenal mass ICD-10-CM: K31.89 ICD-9-CM: 537.89  7/2/2018 - Present Alcoholic cirrhosis ([facility] 75.) ULE-68-YQ: K70.30 ICD-9-CM: 571.2  4/6/2018 - Present Incarcerated umbilical hernia WSK-72-UT: K42.0 ICD-9-CM: 552.1  1/24/2018 - Present Overview Signed 1/24/2018  3:04 PM by Brian Camilo MD  
  Without gangrene or obstruction. Gallbladder calculus without cholecystitis ICD-10-CM: K80.20 ICD-9-CM: 574.20  7/21/2016 - Present Overview Signed 7/21/2016  1:18 PM by Jennyfer Argueta NP Seen on  7/1/16. Iron deficiency anemia ICD-10-CM: D50.9 ICD-9-CM: 280.9  3/3/2016 - Present Esophageal varices without bleeding (HCC) ICD-10-CM: I85.00 ICD-9-CM: 456.1  3/1/2016 - Present Prediabetes ICD-10-CM: R73.03 
ICD-9-CM: 790.29  1/11/2016 - Present Thrombocytopenia ([facility] ICD-10-CM: D69.6 ICD-9-CM: 287.5  10/17/2013 - Present Splenomegaly ICD-10-CM: R16.1 ICD-9-CM: 789.2  10/17/2013 - Present History of alcohol abuse ICD-10-CM: Z87.898 ICD-9-CM: 305.03  10/17/2013 - Present Hypogonadism, male ICD-10-CM: E29.1 ICD-9-CM: 257.2  10/10/2013 - Present Recurrent major depressive disorder ([place] [address].) ICD-10-CM: F33.9 ICD-9-CM: 296.30  8/11/2013 - Present Alcohol dependence with withdrawal ([place] [address].) ICD-10-CM: B02.900 ICD-9-CM: 303.90, 291.81  7/25/2013 - Present Anxiety ICD-10-CM: F41.9 ICD-9-CM: 300.00  7/25/2013 - Present Gout ICD-10-CM: M10.9 ICD-9-CM: 274.9  6/27/2011 - Present HTN (hypertension) (Chronic) ICD-10-CM: I10 
ICD-9-CM: 401.9  6/1/2011 - Present RESOLVED: Chest pain ICD-10-CM: R07.9 ICD-9-CM: 786.50  3/30/2018 - 11/30/2018 RESOLVED: Hyponatremia ICD-10-CM: E87.1 ICD-9-CM: 276.1  7/5/2016 - 11/30/2018 RESOLVED: Type 2 diabetes mellitus ([place] [address].) ICD-10-CM: E11.9 ICD-9-CM: 250.00  10/10/2013 - 11/5/2015 RESOLVED: Depression ICD-10-CM: F32.9 ICD-9-CM: 254  7/25/2013 - 11/30/2018 RESOLVED: GI bleed ICD-10-CM: K92.2 ICD-9-CM: 578.9  6/28/2011 - 11/30/2018 RESOLVED: Chest pain, unspecified ICD-10-CM: R07.9 ICD-9-CM: 786.50  6/28/2011 - 11/30/2018 RESOLVED: Hypokalemia ICD-10-CM: E87.6 ICD-9-CM: 276.8  6/1/2011 - 6/3/2011 RESOLVED: Alcohol abuse (Chronic) ICD-10-CM: F10.10 ICD-9-CM: 305.00  6/1/2011 - 7/25/2013 RESOLVED: Thrombocytopenia, unspecified ([place] [address].) ICD-10-CM: D69.6 ICD-9-CM: 287.5  6/1/2011 - 6/28/2011 RESOLVED: Nausea & vomiting ICD-10-CM: R11.2 ICD-9-CM: 787.01  6/1/2011 - 6/3/2011 RESOLVED: Diarrhea ICD-10-CM: R19.7 ICD-9-CM: 787.91  6/1/2011 - 6/3/2011 Signed:  
 
 Rolf Cantu MD 
 Family Medicine Resident 3/23/2019 Fanny Cabrera MD 
 Family Medicine Attending [address]  [facility] Res[facility] Attending Addendum: I saw and evaluated the patient on the day of the encounter with Dr. Rita Petty, performing the key elements of the service.   I discussed the findings, assessment and plan with the resident and agree with the resident's findings and plan as documented in the resident's note. Follow-up with general surgery for umbilical hernia. No emergent indications for surgery during admission. Patient is at risk of complications from abdominal surgery given his cirrhosis and ascites. Discussed in detail with patient about importance of abstaining from alcohol. Kuldeep Link MD, CAQSM, RMSK

## 2019-03-23 NOTE — PROGRESS NOTES
Change of Shift Report: 
 
1910:  Bedside and Verbal shift change report given to [address] [place] [address] Avenue, RN (oncoming nurse) by Nella Joy RN (offgoing nurse). Report included the following information SBAR, Kardex, ED Summary, MAR, Accordion, Recent Results and Cardiac Rhythm NSR. Shift Summary: 
 
1926:  Patient's vitals taken and patient assessment completed. Patient AOx3. Patient oriented to self, place, situation, but disoriented to time. Patient complaining of 4/10 pain in lower back and states that is his baseline. Patient CIWA score of 2.  
 
2255:  Patient's scheduled meds given. 0008:  Patient's vitals taken. Patient continually asking regarding when he can get food. Reminded patient he was still on a clear liquid diet. Patient CIWA score of 2. 0240:  Patient given CHG bath, new linens, and new gown. Patient's SCDs applied. Patient asking again regarding if he can get breakfast in the AM.  Told patient I would call his doctor and ask about it. 46Madi Allen to [facility] MD regarding patient request.  MD stated she would pass it along to day shift MDs.  
 
0401:  Patient's vitals taken. Patient resting comfortably. CIWA score of 1. Change of Shift Report: 
 
5747: Bedside and Verbal shift change report given to Ellen Ball RN and Lois Pardo RN (oncoming nurse) by [address] [place] [address] [address], RN (offgoing nurse). Report included the following information SBAR, Kardex, ED Summary, MAR, Accordion, Recent Results and Cardiac Rhythm NSR.

## 2019-03-23 NOTE — PROGRESS NOTES
Problem: Falls - Risk of 
Goal: *Absence of Falls Description Document Idania Nunez Fall Risk and appropriate interventions in the flowsheet. 3/23/2019 0415 by Joleen Ramos Outcome: Progressing Towards Goal 
3/23/2019 0247 by Allie Moran Na LEAH Outcome: Progressing Towards Goal 
  
Problem: Patient Education: Go to Patient Education Activity Goal: Patient/Family Education 3/23/2019 0415 by Allie Moran Na LEAH Outcome: Progressing Towards Goal 
3/23/2019 0247 by Joleen Ramos Outcome: Progressing Towards Goal 
  
Problem: Pressure Injury - Risk of 
Goal: *Prevention of pressure injury Description Document Brenden Scale and appropriate interventions in the flowsheet. 3/23/2019 0415 by Joleen Ramos Outcome: Progressing Towards Goal 
3/23/2019 0247 by Allie Moran Na LEAH Outcome: Progressing Towards Goal 
  
Problem: Patient Education: Go to Patient Education Activity Goal: Patient/Family Education 3/23/2019 0415 by Allie Moran Na LEAH Outcome: Progressing Towards Goal 
3/23/2019 0247 by Joleen Ramos Outcome: Progressing Towards Goal 
  
Problem: Alcohol Withdrawal 
Goal: *STG: Participates in treatment plan Outcome: Progressing Towards Goal 
Goal: *STG: Remains safe in hospital 
Outcome: Progressing Towards Goal 
Goal: *STG: Seeks staff when symptoms of withdrawal increase Outcome: Progressing Towards Goal 
Goal: *STG: Complies with medication therapy Outcome: Progressing Towards Goal 
Goal: *STG: Attends activities and groups Outcome: Progressing Towards Goal 
Goal: *STG: Will identify negative impact of chemical dependency including the use of tobacco, alcohol, and other substances Outcome: Progressing Towards Goal 
Goal: *STG: Verbalizes abstinence as an achievable goal 
Outcome: Progressing Towards Goal 
Goal: *STG: Agrees to participate in outpatient after care program to support ongoing mental health Outcome: Progressing Towards Goal 
Goal: *STG: Able to indentify relapse triggers including interpersonal/social and familial factors Outcome: Progressing Towards Goal 
Goal: *STG: Identify lifestyle changes to support long term sobriety such as vocation, employment, education, and legal issues Outcome: Progressing Towards Goal 
Goal: *STG: Maintains appropriate nutrition and hydration Outcome: Progressing Towards Goal 
Goal: *STG: Vital signs within defined limits Outcome: Progressing Towards Goal 
Goal: *STG/LTG: Relapse prevention plan in place to include housing/aftercare, leisure activities, and spirituality Outcome: Progressing Towards Goal 
Goal: Interventions Outcome: Progressing Towards Goal 
  
Problem: Patient Education: Go to Patient Education Activity Goal: Patient/Family Education Outcome: Progressing Towards Goal

## 2019-03-26 NOTE — PROGRESS NOTES
Hospital Discharge Follow-Up Date/Time:  3/26/2019 2:37 PM 
 
Patient was admitted to [place] [address] on 3/21/19 and discharged on 3/23/19 for ascites. The physician discharge summary was available at the time of outreach. Patient was contacted within 2 business days of discharge. Top Challenges reviewed with the provider Needs follow-up appointments with General Surgery to eval hernia, and GI. Should patient be seeing a liver specialist? Please consider referral to Dr. Kin Cordova. Final blood cultures pending at discharge - please review. Repeat CMP/Mag/Phos Will offer information for [facility] and [facility] 
Patient out of lasix - prescription called in to [place] [facility] on file. Method of communication with provider :chart routing Inpatient RRAT score: 15 Was this a readmission? no  
Patient stated reason for the readmission: n/a Attempted patient contact at phone number listed. Unable to reach patient. Reviewed inpatient care manager note which stated patient's mother is point of contact. Call placed to patient's mother. Verified name and  with mother as identifiers. Provided introduction to self, and explanation of the Nurse Navigator role. Requested to speak with patient. Mother reported patient's phone is not working. Mother stated she would give message to the patient, however, she was not confident that he would call back. Disease Specific:   N/A Summary of patient's top problems: 1. Ascites in setting of alcoholic liver cirrhosis with history of esophageal varices:12.75L drained from abdomen 3/22/19.  
- Cont home spironolactone 25mg/day 
- Cont home Furosemide 40 mg/day - Followup with Dr Kaykay Thompson, Gastroenterology, in 1-2 weeks 2. EtOH Abuse with Concern for EtOH withdrawal: CIWA 21 on arrival to ED then requiring Ativan multiple times over first 36 hours of admission.  Patient with fine tremor at baseline.  
- discuss management for EtOH dependence with PCP on followup 3. Prostatis:  UA on presentation c/w UTI vs Prostatitis. UCx without growth. Patient complains of scrotal discomfort and urinary frequency c/w prostatitis. - Cipro 500mg BID x 28days 4. Umbilical Hernia with Overlying Ulceration: Very high risk surgical candidate for non emergent surgery given liver disease and ascites. - continue mupirocin ointment to hernia 2-3 times daily - Referral for general surgery for conservative management and observation Home Health orders at discharge: none [address]: n/a Date of initial visit: n/a Durable Medical Equipment ordered/company: none Durable Medical Equipment received: n/a Barriers to care? depression, ineffective coping, lack of knowledge about disease, level of motivation, medication management, stages of grief, support system, utilization of services Advance Care Planning:  
Does patient have an Advance Directive:  not on file Medication(s):  
New Medications at Discharge: Cipro Changed Medications at Discharge: none Discontinued Medications at Discharge: none Medication reconciliation was performed with patient's mother, who helps manage patient's medications. Mother reported patient was out of lasix. Spoke with [employer] order pharmacy who reported the patient refused delivery. Medication called in to [place] for patient's mother to . Dr. Mitra Bonilla aware. Referral to Pharm D needed: no  
 
Current Outpatient Medications Medication Sig  ciprofloxacin HCl (CIPRO) 500 mg tablet Take 1 Tab by mouth two (2) times a day for 28 days. Indications: prostatitis  mupirocin (BACTROBAN) 2 % ointment Apply  to affected area three (3) times daily.  furosemide (LASIX) 40 mg tablet Take 2 Tabs by mouth daily.   
 cyclobenzaprine (FLEXERIL) 10 mg tablet TAKE 1 TABLET THREE TIMES DAILY AS NEEDED  
 traZODone (DESYREL) 150 mg tablet TAKE 1 TABLET BY MOUTH NIGHTLY  prochlorperazine (COMPAZINE) 5 mg tablet TAKE 1 TABLET BY MOUTH EVERY 6 HOURS AS NEEDED FOR NAUSEA FOR UP TO 7 DAYS  potassium chloride SA (KLOR-CON SPRINKLE) 10 mEq capsule Take 1 Cap by mouth daily.  hydrOXYzine HCl (ATARAX) 25 mg tablet Take 25 mg by mouth three (3) times daily.  lactulose (CHRONULAC) 10 gram/15 mL solution Take 3 tsp by mouth two (2) times daily as needed (liver disease).  Venlafaxine 150 mg tr24 Take 150 mg by mouth daily.  omeprazole (PRILOSEC) 40 mg capsule TAKE 1 CAPSULE TWICE DAILY  allopurinol (ZYLOPRIM) 300 mg tablet TAKE 1 TABLET TWICE DAILY  carvedilol (COREG) 3.125 mg tablet Take 1 Tab by mouth two (2) times daily (with meals).  spironolactone (ALDACTONE) 25 mg tablet TAKE 1 TABLET TWICE DAILY No current facility-administered medications for this visit. There are no discontinued medications. [facility] follow up appointment(s):  
Future Appointments Date Time Provider Arti Norris 3/28/2019  1:45 PM Cortes Keith DO [facility] SCHED  
4/17/2019  9:30 AM Catherine Richter DO [facility] SCHED  
6/6/2019 10:00 AM Jaspreet Ray MD [address] [place] [address],  [address] [facility] follow up appointment(s): General Surgery, GI [facility]:  out of service area Goals  Attends follow-up appointments as directed. 3/26/19 - Patient has PCP appointment 3/28/19. Parents to provide transportation. Patient will attend appointment. Also due to follow-ups with GI and general surgery. Mother states that she doesn't think the patient will want to schedule follow-ups. Will advise Dr. Mitra Bonilla to stress importance of seeing specialists for ongoing care and disease management. Will monitor if patient schedules and attends appointments. BISI  Supportive resources in place to maintain patient in the community   3/26/19 - Information printed for [facility] and [facility]. Will offer information to the patient. Also discussed with patient about seeking family counseling. Mother is experiencing a lot of caregiver stress and strain. Reports that patient's wife left him and his adult children do not associate with him. Parents are his only support system.  BISI

## 2019-04-05 PROBLEM — K42.9 UMBILICAL HERNIA WITHOUT OBSTRUCTION AND WITHOUT GANGRENE: Status: ACTIVE | Noted: 2018-01-24

## 2019-04-05 PROBLEM — F10.10 ETOH ABUSE: Status: ACTIVE | Noted: 2019-01-01

## 2019-04-05 NOTE — ED PROVIDER NOTES
66 y.o. male with past medical history significant for hypertension, anemia, GERD, gout, PUD, stroke and cirrhosis of liver who presents from [facility] via EMS with chief complaint of nausea. Pt states that last night he went to the [facility] to be admitted for inpatient EtOH detox. Upon arrival pt states he was given a bed, but was unable to see a doctor last night. Patient reports difficulty sleeping last night due to nausea, dry heaves, and tremors. Patient states he last drink was yesterday afternoon at 1400. Patient states he has been to rehab twice in the past for detox, however, states his wife left him last July and he relapsed. Since then, his EtOH use has been \"more than ever\", reporting he usually drinks \"1/2 gallon of bourbon every two days\". Patient reports a h/o liver cirrhosis, with noted abdominal distention. Patient denies any h/o DTs or seizures with EtOH withdrawal.  There are no other acute medical concerns at this time. Social hx: Former Smoker, but current smokeless tobacco user; Current ETOH abuse (0.5 gallon bourbon/ 2 days) PCP: Jessica Hutson DO Note written by Sarah Duarte, as dictated by Panfilo Day MD 6:37 AM 
 
The history is provided by the patient. No  was used. Past Medical History:  
Diagnosis Date  Adverse effect of anesthesia   
 pt reports waking up during colonoscopy/endoscopy  Alcohol abuse 6/1/2011  Anemia NEC  Anxiety  Ascites   
 has to have fluid drained occasionally  [address] of liver [address] [address]  Depression  Gastric ulcer, unspecified as acute or chronic, without mention of hemorrhage, perforation, or obstruction  GERD (gastroesophageal reflux disease)  Gout  Hypertension  Liver disease   
 cirrhosis  Obesity, Class II, BMI 35-39.9  Pneumonia  PUD (peptic ulcer disease)  Seizures ([address] 2013  
 from alcohol withdrawal  
 Stroke [facility]) 2013  
 per pt, possible mini stroke from alcohol withdrawal (same time as seizure) Past Surgical History:  
Procedure Laterality Date  COLONOSCOPY N/A 7/2/2018 COLONOSCOPY performed by Bryn Diaz MD at [address] HX COLONOSCOPY  07/2018  HX ENDOSCOPY    
 also colonoscopy  HX GI  1998  
 rectal abscess surgery  HX GI  1998  
 rectal abscess surgery  HX HEENT  1980  
 wisdom teeth Family History:  
Problem Relation Age of Onset  Asthma Mother  Other Father   
     history of fall multiple injuries  Hypertension Brother  Cancer Paternal Grandmother   
     uncertain if colon or stomach  Cancer Paternal Grandfather   
     stomach cancer- dx mid [de-identified] Social History Socioeconomic History  Marital status:  Spouse name: Not on file  Number of children: Not on file  Years of education: Not on file  Highest education level: Not on file Occupational History  Occupation: Landscaping Social Needs  Financial resource strain: Not on file  Food insecurity:  
  Worry: Not on file Inability: Not on file  Transportation needs:  
  Medical: Not on file Non-medical: Not on file Tobacco Use  Smoking status: Former Smoker  Smokeless tobacco: Current User Types: Chew  Tobacco comment: currently uses snuff Substance and Sexual Activity  Alcohol use: Yes Comment: 1/2 gallon bourbon every 2 days  Drug use: No  
 Sexual activity: Yes  
  Partners: Female Lifestyle  Physical activity:  
  Days per week: Not on file Minutes per session: Not on file  Stress: Not on file Relationships  Social connections:  
  Talks on phone: Not on file Gets together: Not on file Attends Buddhism service: Not on file Active member of club or organization: Not on file Attends meetings of clubs or organizations: Not on file Relationship status: Not on file  Intimate partner violence: Fear of current or ex partner: Not on file Emotionally abused: Not on file Physically abused: Not on file Forced sexual activity: Not on file Other Topics Concern  Not on file Social History Narrative  Not on file ALLERGIES: Onion and Statins-hmg-coa reductase inhibitors Review of Systems Constitutional: Negative for activity change, appetite change and fatigue. HENT: Negative for ear pain, facial swelling, sore throat and trouble swallowing. Eyes: Negative for pain, discharge and visual disturbance. Respiratory: Negative for chest tightness, shortness of breath and wheezing. Cardiovascular: Negative for chest pain and palpitations. Gastrointestinal: Positive for nausea. Negative for abdominal pain, blood in stool and vomiting.  
     (+) Dry heaves Genitourinary: Negative for difficulty urinating, flank pain and hematuria. Musculoskeletal: Negative for arthralgias, joint swelling, myalgias and neck pain. Skin: Negative for color change and rash. Neurological: Positive for tremors. Negative for dizziness, seizures, weakness, numbness and headaches. Hematological: Negative for adenopathy. Does not bruise/bleed easily. Psychiatric/Behavioral: Positive for behavioral problems. Negative for confusion and sleep disturbance. All other systems reviewed and are negative. Vitals:  
 04/05/19 2737 BP: 146/83 Pulse: 94 Resp: 20 Temp: 98.8 °F (37.1 °C) SpO2: 96% Weight: 117.9 kg (260 lb) Height: 6' (1.829 m) Physical Exam  
Constitutional: He is oriented to person, place, and time. He appears well-developed and well-nourished. No distress. Appears nauseated. HENT:  
Head: Normocephalic and atraumatic. Nose: Nose normal.  
Mouth/Throat: Oropharynx is clear and moist.  
Eyes: Pupils are equal, round, and reactive to light. Conjunctivae and EOM are normal. No scleral icterus. Right eye exhibits no nystagmus.  Left eye exhibits no nystagmus. No nystagmus. Neck: Normal range of motion. Neck supple. No JVD present. No tracheal deviation present. No thyromegaly present. No carotid bruits noted. Cardiovascular: Normal rate, regular rhythm, normal heart sounds and intact distal pulses. Exam reveals no gallop and no friction rub. No murmur heard. Pulmonary/Chest: Effort normal and breath sounds normal. No respiratory distress. He has no wheezes. He has no rales. He exhibits no tenderness. Abdominal: Soft. Bowel sounds are normal. He exhibits distension. He exhibits no mass. There is no tenderness. There is no rebound and no guarding. Abdomen is markedly protuberant. No abdominal tenderness. Musculoskeletal: Normal range of motion. He exhibits no edema or tenderness. Lymphadenopathy:  
  He has no cervical adenopathy. Neurological: He is alert and oriented to person, place, and time. He has normal strength. He displays no tremor. No cranial nerve deficit. No tremors noted. Skin: Skin is warm and dry. No rash noted. No erythema. No jaundice. Psychiatric: He has a normal mood and affect. His behavior is normal. Judgment and thought content normal.  
Nursing note and vitals reviewed. Note written by Sarah Mathis, as dictated by Bianka Song MD 6:37 AM 
 
MDM Number of Diagnoses or Management Options Alcoholic intoxication with complication [facility]): new and requires workup Ascites due to alcoholic cirrhosis [facility]): new and requires workup Intractable vomiting with nausea, unspecified vomiting type: new and requires workup Amount and/or Complexity of Data Reviewed Clinical lab tests: ordered and reviewed Decide to obtain previous medical records or to obtain history from someone other than the patient: yes Review and summarize past medical records: yes Discuss the patient with other providers: yes Risk of Complications, Morbidity, and/or Mortality Presenting problems: moderate Diagnostic procedures: moderate Management options: moderate Critical Care Total time providing critical care: < 30 minutes Patient Progress Patient progress: stable Procedures 6:43 AM  
Per nurse, she spoke with the patient's detox facility. They state the patient arrived there last night after the doctors had left, and was told that he would not be able to be seen until this morning, and not have access to any medications until 0800 this morning. Patient chose to stay there last night with this knowledge, without his home medications. 8:53 AM 
Discussed results with the patient. He continues to state his last drink was yesterday at 1400. He states he still feels shaky and nauseous. Discussed admission, which the patient would prefer. Will discuss the case with the hospitalist.  
 
CONSULT NOTE: 
9:09 AM Kenneth Pitts MD communicated with Dr. Toña Peterson, Consult for Hospitalist via [facility] Text. Discussed available diagnostic tests and clinical findings. He will see and admit the patient. 9:11 AM  
Dr. Toña Peterson at bedside.

## 2019-04-05 NOTE — PROGRESS NOTES
TRANSFER - IN REPORT: 
 
Verbal report received from Rose Marie Mejia RN(name) on [facility].  being received from 437(unit) for routine progression of care Report consisted of patients Situation, Background, Assessment and  
Recommendations(SBAR). Information from the following report(s) Procedure Summary was reviewed with the receiving nurse. Drainage tube to gravity chiang bag until drain completed; then nurse to pull drain. Opportunity for questions and clarification was provided. Assessment completed upon patients arrival to unit and care assumed.

## 2019-04-05 NOTE — H&P
Hospitalist Admission Note NAME: Jonathan Aden. :  1960 MRN:  [id number] Date/Time:  2019 9:24 AM 
 
Patient PCP: Beth Herring, DO 
______________________________________________________________________ Given the patient's current clinical presentation, I have a high level of concern for decompensation if discharged from the emergency department. Complex decision making was performed, which includes reviewing the patient's available past medical records, laboratory results, and x-ray films. My assessment of this patient's clinical condition and my plan of care is as follows. Assessment / Plan: 1. Acute etoh intoxication - admit to tele. Place on d5ns at 100 cc/hr. Add mvi, thiamine and folate. 2. Hx of cirrhosis with ascites - check inr for df calculation. 3. Thrombocytopenia - plts stable, fu oncology op. No signs of bleeding. 3. Pud - place on protonix iv daily. zofran prn for nausea. Code Status: Full Surrogate Decision Maker: Geena Baez [id number] DVT Prophylaxis: scd GI Prophylaxis: not indicated Baseline: lives at home and go to etoh rehab Subjective: CHIEF COMPLAINT: tremor HISTORY OF PRESENT ILLNESS:    
Geena Baez is a 66 y.o.  male who presents with nausea and tremor over the last 24 hrs. Pt reports etoh intake of 1/2 gallon boubon. Pt has a long history of etoh intake. He was referred to hepatology in the past for etoh cirrhosis but didn't show up for his appt. He reports nasuea with dry wretching. He denies any cp or sob. He has never been on phenobarb in the past,  He denies any seizure history We were asked to admit for work up and evaluation of the above problems. Past Medical History:  
Diagnosis Date  Adverse effect of anesthesia   
 pt reports waking up during colonoscopy/endoscopy  Alcohol abuse 2011  Anemia NEC  Anxiety  Ascites   
 has to have fluid drained occasionally  [facility]  Depression  Gastric ulcer, unspecified as acute or chronic, without mention of hemorrhage, perforation, or obstruction  GERD (gastroesophageal reflux disease)  Gout  Hypertension  Liver disease   
 cirrhosis  Obesity, Class II, BMI 35-39.9  Pneumonia  PUD (peptic ulcer disease)  Seizures ([facility] [address] 2013  
 from alcohol withdrawal  
 Stroke [facility]) 2013  
 per pt, possible mini stroke from alcohol withdrawal (same time as seizure) Past Surgical History:  
Procedure Laterality Date  COLONOSCOPY N/A 7/2/2018 COLONOSCOPY performed by Mir iVllela MD at [address] HX COLONOSCOPY  07/2018  HX ENDOSCOPY    
 also colonoscopy  HX GI  1998  
 rectal abscess surgery  HX GI  1998  
 rectal abscess surgery  HX HEENT  1980  
 wisdom teeth Social History Tobacco Use  Smoking status: Former Smoker  Smokeless tobacco: Current User Types: Chew  Tobacco comment: currently uses snuff Substance Use Topics  Alcohol use: Yes Comment: 1/2 gallon bourbon every 2 days Family History Problem Relation Age of Onset  Asthma Mother  Other Father   
     history of fall multiple injuries  Hypertension Brother  Cancer Paternal Grandmother   
     uncertain if colon or stomach  Cancer Paternal Grandfather   
     stomach cancer- dx mid [de-identified] Allergies Allergen Reactions  Onion Nausea and Vomiting  Statins-Hmg-Coa Reductase Inhibitors Rash  
  rash Prior to Admission medications Medication Sig Start Date End Date Taking? Authorizing Provider  
ciprofloxacin HCl (CIPRO) 500 mg tablet Take 1 Tab by mouth two (2) times a day for 28 days. Indications: prostatitis 3/23/19 4/20/19  Eleanor Duenas MD  
mupirocin [facility]) 2 % ointment Apply  to affected area three (3) times daily.  3/20/19   Trinity Keith DO  
furosemide (LASIX) 40 mg tablet Take 2 Tabs by mouth daily. 3/20/19   Norah Keith, DO  
cyclobenzaprine (FLEXERIL) 10 mg tablet TAKE 1 TABLET THREE TIMES DAILY AS NEEDED 2/20/19   Trinity Keith DO  
traZODone (DESYREL) 150 mg tablet TAKE 1 TABLET BY MOUTH NIGHTLY 2/20/19   Trinity Keith DO  
prochlorperazine (COMPAZINE) 5 mg tablet TAKE 1 TABLET BY MOUTH EVERY 6 HOURS AS NEEDED FOR NAUSEA FOR UP TO 7 DAYS 12/2/18   Alyssia Hollins DO  
potassium chloride SA (KLOR-CON SPRINKLE) 10 mEq capsule Take 1 Cap by mouth daily. 12/1/18   Alyssia Hollins DO  
hydrOXYzine HCl (ATARAX) 25 mg tablet Take 25 mg by mouth three (3) times daily. Other, MD Dillon  
lactulose (CHRONULAC) 10 gram/15 mL solution Take 3 tsp by mouth two (2) times daily as needed (liver disease). Provider, Historical  
Venlafaxine 150 mg tr24 Take 150 mg by mouth daily. Provider, Historical  
omeprazole (PRILOSEC) 40 mg capsule TAKE 1 CAPSULE TWICE DAILY 8/6/18   Giuliana Everett, AVELINO  
allopurinol (ZYLOPRIM) 300 mg tablet TAKE 1 TABLET TWICE DAILY 4/6/18   Trinity Keith DO  
carvedilol (COREG) 3.125 mg tablet Take 1 Tab by mouth two (2) times daily (with meals). 4/6/18   Norah Keith DO  
spironolactone (ALDACTONE) 25 mg tablet TAKE 1 TABLET TWICE DAILY 4/6/18   Trinity Keith DO  
 
 
REVIEW OF SYSTEMS:    
I am not able to complete the review of systems because: The patient is intubated and sedated The patient has altered mental status due to his acute medical problems The patient has baseline aphasia from prior stroke(s) The patient has baseline dementia and is not reliable historian The patient is in acute medical distress and unable to provide information Total of 12 systems reviewed as follows:   
   POSITIVE= underlined text  Negative = text not underlined General:  fever, chills, sweats, generalized weakness, weight loss/gain,  
   loss of appetite Eyes:    blurred vision, eye pain, loss of vision, double vision ENT:    rhinorrhea, pharyngitis Respiratory:   cough, sputum production, SOB, MAZA, wheezing, pleuritic pain  
Cardiology:   chest pain, palpitations, orthopnea, PND, edema, syncope Gastrointestinal:  abdominal pain , N/V, diarrhea, dysphagia, constipation, bleeding Genitourinary:  frequency, urgency, dysuria, hematuria, incontinence Muskuloskeletal :  arthralgia, myalgia, back pain Hematology:  easy bruising, nose or gum bleeding, lymphadenopathy Dermatological: rash, ulceration, pruritis, color change / jaundice Endocrine:   hot flashes or polydipsia Neurological:  headache, dizziness, confusion, focal weakness, paresthesia, Speech difficulties, memory loss, gait difficulty Psychological: Feelings of anxiety, depression, agitation Objective: VITALS:   
Visit Vitals /79 Pulse 94 Temp 98.2 °F (36.8 °C) Resp 25 Ht 6' (1.829 m) Wt 117.9 kg (260 lb) SpO2 95% BMI 35.26 kg/m² PHYSICAL EXAM: 
 
General:    Alert, cooperative, no distress, appears stated age. HEENT: Atraumatic, anicteric sclerae, pink conjunctivae No oral ulcers, mucosa moist, throat clear, dentition fair Neck:  Supple, symmetrical,  thyroid: non tender Lungs:   Clear to auscultation bilaterally. No Wheezing or Rhonchi. No rales. Chest wall:  No tenderness  No Accessory muscle use. Heart:   Regular  rhythm,  No  murmur   No edema Abdomen:   abd distending and ascitic Extremities: No cyanosis. No clubbing,   
  Skin turgor normal, Capillary refill normal, Radial dial pulse 2+ 
 
_______________________________________________________________________ Care Plan discussed with: 
  Comments Patient Family RN Care Manager Consultant:     
_______________________________________________________________________ Expected  Disposition:  
Home with Family HH/PT/OT/RN   
SNF/LTC   
YANNA   
________________________________________________________________________ TOTAL TIME:  30 Minutes Critical Care Provided     Minutes non procedure based Comments Reviewed previous records  
>50% of visit spent in counseling and coordination of care  Discussion with patient and/or family and questions answered 
  
 
________________________________________________________________________ Signed: Jonathan Casey, DO 
 
Procedures: see electronic medical records for all procedures/Xrays and details which were not copied into this note but were reviewed prior to creation of Plan. LAB DATA REVIEWED:   
Recent Results (from the past 24 hour(s)) CBC WITH AUTOMATED DIFF Collection Time: 04/05/19  6:38 AM  
Result Value Ref Range WBC 4.5 4.1 - 11.1 K/uL  
 RBC 4.22 4.10 - 5.70 M/uL  
 HGB 11.4 (L) 12.1 - 17.0 g/dL HCT 34.1 (L) 36.6 - 50.3 % MCV 80.8 80.0 - 99.0 FL  
 MCH 27.0 26.0 - 34.0 PG  
 MCHC 33.4 30.0 - 36.5 g/dL RDW 22.6 (H) 11.5 - 14.5 % PLATELET 86 (L) 149 - 400 K/uL MPV 9.9 8.9 - 12.9 FL  
 NRBC 0.0 0  WBC ABSOLUTE NRBC 0.00 0.00 - 0.01 K/uL NEUTROPHILS 65 32 - 75 % LYMPHOCYTES 17 12 - 49 % MONOCYTES 13 5 - 13 % EOSINOPHILS 3 0 - 7 % BASOPHILS 2 (H) 0 - 1 % IMMATURE GRANULOCYTES 0 0.0 - 0.5 % ABS. NEUTROPHILS 2.9 1.8 - 8.0 K/UL  
 ABS. LYMPHOCYTES 0.8 0.8 - 3.5 K/UL  
 ABS. MONOCYTES 0.6 0.0 - 1.0 K/UL  
 ABS. EOSINOPHILS 0.1 0.0 - 0.4 K/UL  
 ABS. BASOPHILS 0.1 0.0 - 0.1 K/UL  
 ABS. IMM. GRANS. 0.0 0.00 - 0.04 K/UL  
 DF SMEAR SCANNED    
 RBC COMMENTS ANISOCYTOSIS 2+ 
    
 RBC COMMENTS MACK CELLS 
PRESENT 
    
METABOLIC PANEL, COMPREHENSIVE Collection Time: 04/05/19  6:38 AM  
Result Value Ref Range Sodium 135 (L) 136 - 145 mmol/L Potassium 3.1 (L) 3.5 - 5.1 mmol/L Chloride 100 97 - 108 mmol/L  
 CO2 27 21 - 32 mmol/L Anion gap 8 5 - 15 mmol/L Glucose 94 65 - 100 mg/dL BUN 3 (L) 6 - 20 MG/DL Creatinine 0.68 (L) 0.70 - 1.30 MG/DL  
 BUN/Creatinine ratio 4 (L) 12 - 20 GFR est AA >60 >60 ml/min/1.73m2  GFR est non-AA >60 >60 ml/min/1.73m2 Calcium 7.9 (L) 8.5 - 10.1 MG/DL Bilirubin, total 3.7 (H) 0.2 - 1.0 MG/DL  
 ALT (SGPT) 24 12 - 78 U/L  
 AST (SGOT) 67 (H) 15 - 37 U/L Alk. phosphatase 160 (H) 45 - 117 U/L Protein, total 7.7 6.4 - 8.2 g/dL Albumin 2.4 (L) 3.5 - 5.0 g/dL Globulin 5.3 (H) 2.0 - 4.0 g/dL A-G Ratio 0.5 (L) 1.1 - 2.2 SAMPLES BEING HELD Collection Time: 04/05/19  6:38 AM  
Result Value Ref Range SAMPLES BEING HELD 1RED 1BLU   
 COMMENT Add-on orders for these samples will be processed based on acceptable specimen integrity and analyte stability, which may vary by analyte. ETHYL ALCOHOL Collection Time: 04/05/19  6:38 AM  
Result Value Ref Range ALCOHOL(ETHYL),SERUM 142 (H) <10 MG/DL  
LIPASE Collection Time: 04/05/19  7:00 AM  
Result Value Ref Range Lipase 187 73 - 393 U/L  
DRUG SCREEN, URINE Collection Time: 04/05/19  8:03 AM  
Result Value Ref Range AMPHETAMINES NEGATIVE  NEG    
 BARBITURATES NEGATIVE  NEG BENZODIAZEPINES NEGATIVE  NEG    
 COCAINE NEGATIVE  NEG METHADONE NEGATIVE  NEG    
 OPIATES NEGATIVE  NEG    
 PCP(PHENCYCLIDINE) NEGATIVE  NEG    
 THC (TH-CANNABINOL) NEGATIVE  NEG Drug screen comment (NOTE) URINALYSIS W/ RFLX MICROSCOPIC Collection Time: 04/05/19  8:03 AM  
Result Value Ref Range Color DARK YELLOW Appearance CLEAR CLEAR Specific gravity 1.012 1.003 - 1.030    
 pH (UA) 6.5 5.0 - 8.0 Protein NEGATIVE  NEG mg/dL Glucose NEGATIVE  NEG mg/dL Ketone TRACE (A) NEG mg/dL Blood SMALL (A) NEG Urobilinogen 4.0 (H) 0.2 - 1.0 EU/dL Nitrites NEGATIVE  NEG Leukocyte Esterase NEGATIVE  NEG    
 WBC 0-4 0 - 4 /hpf  
 RBC 5-10 0 - 5 /hpf Epithelial cells FEW FEW /lpf Bacteria NEGATIVE  NEG /hpf Hyaline cast 0-2 0 - 5 /lpf URINE CULTURE HOLD SAMPLE Collection Time: 04/05/19  8:03 AM  
Result Value Ref Range Urine culture hold   URINE ON HOLD IN MICROBIOLOGY DEPT FOR 3 DAYS. IF UNPRESERVED URINE IS SUBMITTED, IT CANNOT BE USED FOR ADDITIONAL TESTING AFTER 24 HRS, RECOLLECTION WILL BE REQUIRED. BILIRUBIN, CONFIRM Collection Time: 04/05/19  8:03 AM  
Result Value Ref Range  Bilirubin UA, confirm NEGATIVE  NEG

## 2019-04-05 NOTE — ED TRIAGE NOTES
Pt arrives via EMS from the [facility] suffering from alcohol withdrawal symptoms:  Shaking, nausea, vomiting. Last drink was 1400 on 4/4. Pt drinks approx 1/2 gallon bourbon every 2 days. Denies SI/HI.

## 2019-04-05 NOTE — ED NOTES
Pt is very anxious. Hyperventilating, moaning. CIWA 19. Calmed and reassured. When someone is present patient, symptoms almost completely dissapate. Medicated as ordered. Will continue to monitor

## 2019-04-05 NOTE — CONSULTS
Patient seen at request of Dr. Vince Parmar. Information obtained from patient and review of chart. Otelia Pallas. is an 66 y.o. male who was recently admitted with acute alcohol intoxication. Mr. Orlin Quiroz recently consumed 1/2 gallon of bourbon. He subsequently developed nausea and tremors. He has a h/o cirrhosis. Found to have Grade 4 varices in the mid and distal third of the esophagus and Grade 4 varices at the the EG junction on EGD in 7/2018. Mr. Orlin Quiroz was admitted on 3/21/2019 with back pain, abdominal pain and concern for alcohol withdrawal. CT Scan abdomen/pelvis with IV contrast at that time - Liver has a cirrhotic appearance. No focal masses are identified with in the liver although the enhancement is slightly heterogeneous. There are a few small gallstones. There is a large amount of ascites in the abdomen and pelvis. An umbilical hernia containing ascites was also noted. Mr. Orlin Quiroz subsequently underwent ultrasound guided thoracentesis of approximately 12.75 liters of serous fluid. Asked to see Mr. Orlin Quiroz for evaluation of the umbilical hernia. He believes that the hernia is becoming larger and more bothersome to him. No associated nausea or vomitting at this time. Allergies - Onion and Statins-hmg-coa reductase inhibitors Meds - Reviewed. PMH -  
Past Medical History:  
Diagnosis Date  Adverse effect of anesthesia   
 pt reports waking up during colonoscopy/endoscopy  Alcohol abuse 6/1/2011  Anemia NEC  Anxiety  Ascites   
 has to have fluid drained occasionally  Cirrhosis of liver ([address]  Depression  Gastric ulcer, unspecified as acute or chronic, without mention of hemorrhage, perforation, or obstruction  GERD (gastroesophageal reflux disease)  Gout  Hypertension  Liver disease   
 cirrhosis  Obesity, Class II, BMI 35-39.9  Pneumonia  PUD (peptic ulcer disease)  Seizures ([address] 2013  
 from alcohol withdrawal  
 Stroke [facility]) 2013 per pt, possible mini stroke from alcohol withdrawal (same time as seizure) PSH -  
Past Surgical History:  
Procedure Laterality Date  COLONOSCOPY N/A 7/2/2018 COLONOSCOPY performed by Yanelis Rivas MD at [address] HX COLONOSCOPY  07/2018  HX ENDOSCOPY    
 also colonoscopy  HX GI  1998  
 rectal abscess surgery  HX GI  1998  
 rectal abscess surgery  HX HEENT  1980  
 wisdom teeth Fam Hx -  
Family History Problem Relation Age of Onset  Asthma Mother  Other Father   
     history of fall multiple injuries  Hypertension Brother  Cancer Paternal Grandmother   
     uncertain if colon or stomach  Cancer Paternal Grandfather   
     stomach cancer- dx mid [de-identified] Soc Hx -  
Social History Tobacco Use  Smoking status: Former Smoker  Smokeless tobacco: Current User Types: Chew  Tobacco comment: currently uses snuff Substance Use Topics  Alcohol use: Yes Comment: 1/2 gallon bourbon every 2 days Patient is a well developed, well nourished man in no acute distress. Visit Vitals /90 (BP 1 Location: Right arm, BP Patient Position: Supine) Pulse 89 Temp 97.7 °F (36.5 °C) Resp 16 Ht 6' (1.829 m) Wt 260 lb (117.9 kg) SpO2 96% Comment: Room air BMI 35.26 kg/m² HEENT: Icteric. Neck: Supple without palpable lymphadenopathy. Cor: RRR. Lungs: Clear to auscultation bilaterally. Abd: Soft. Distended. Non tender. No guarding or rebound. Umbilical hernia - reducible. Ext: Trace edema. Neuro: Grossly Non focal.  
Skin: The skin over the umbilicus is de-epithelialized. No exposed bowel. No ascitic leak. Labs - Recent Results (from the past 24 hour(s)) CBC WITH AUTOMATED DIFF Collection Time: 04/05/19  6:38 AM  
Result Value Ref Range WBC 4.5 4.1 - 11.1 K/uL  
 RBC 4.22 4.10 - 5.70 M/uL  
 HGB 11.4 (L) 12.1 - 17.0 g/dL HCT 34.1 (L) 36.6 - 50.3 %  MCV 80.8 80.0 - 99.0 FL  
 MCH 27.0 26.0 - 34.0 PG  
 MCHC 33.4 30.0 - 36.5 g/dL RDW 22.6 (H) 11.5 - 14.5 % PLATELET 86 (L) 814 - 400 K/uL MPV 9.9 8.9 - 12.9 FL  
 NRBC 0.0 0  WBC ABSOLUTE NRBC 0.00 0.00 - 0.01 K/uL NEUTROPHILS 65 32 - 75 % LYMPHOCYTES 17 12 - 49 % MONOCYTES 13 5 - 13 % EOSINOPHILS 3 0 - 7 % BASOPHILS 2 (H) 0 - 1 % IMMATURE GRANULOCYTES 0 0.0 - 0.5 % ABS. NEUTROPHILS 2.9 1.8 - 8.0 K/UL  
 ABS. LYMPHOCYTES 0.8 0.8 - 3.5 K/UL  
 ABS. MONOCYTES 0.6 0.0 - 1.0 K/UL  
 ABS. EOSINOPHILS 0.1 0.0 - 0.4 K/UL  
 ABS. BASOPHILS 0.1 0.0 - 0.1 K/UL  
 ABS. IMM. GRANS. 0.0 0.00 - 0.04 K/UL  
 DF SMEAR SCANNED    
 RBC COMMENTS ANISOCYTOSIS 2+ 
    
 RBC COMMENTS MACK CELLS 
PRESENT 
    
METABOLIC PANEL, COMPREHENSIVE Collection Time: 04/05/19  6:38 AM  
Result Value Ref Range Sodium 135 (L) 136 - 145 mmol/L Potassium 3.1 (L) 3.5 - 5.1 mmol/L Chloride 100 97 - 108 mmol/L  
 CO2 27 21 - 32 mmol/L Anion gap 8 5 - 15 mmol/L Glucose 94 65 - 100 mg/dL BUN 3 (L) 6 - 20 MG/DL Creatinine 0.68 (L) 0.70 - 1.30 MG/DL  
 BUN/Creatinine ratio 4 (L) 12 - 20 GFR est AA >60 >60 ml/min/1.73m2 GFR est non-AA >60 >60 ml/min/1.73m2 Calcium 7.9 (L) 8.5 - 10.1 MG/DL Bilirubin, total 3.7 (H) 0.2 - 1.0 MG/DL  
 ALT (SGPT) 24 12 - 78 U/L  
 AST (SGOT) 67 (H) 15 - 37 U/L Alk. phosphatase 160 (H) 45 - 117 U/L Protein, total 7.7 6.4 - 8.2 g/dL Albumin 2.4 (L) 3.5 - 5.0 g/dL Globulin 5.3 (H) 2.0 - 4.0 g/dL A-G Ratio 0.5 (L) 1.1 - 2.2 SAMPLES BEING HELD Collection Time: 04/05/19  6:38 AM  
Result Value Ref Range SAMPLES BEING HELD 1RED 1BLU   
 COMMENT Add-on orders for these samples will be processed based on acceptable specimen integrity and analyte stability, which may vary by analyte. ETHYL ALCOHOL Collection Time: 04/05/19  6:38 AM  
Result Value Ref Range ALCOHOL(ETHYL),SERUM 142 (H) <10 MG/DL PROTHROMBIN TIME + INR  Collection Time: 04/05/19 6:38 AM  
Result Value Ref Range INR 1.5 (H) 0.9 - 1.1 Prothrombin time 14.6 (H) 9.0 - 11.1 sec LIPASE Collection Time: 04/05/19  7:00 AM  
Result Value Ref Range Lipase 187 73 - 393 U/L  
DRUG SCREEN, URINE Collection Time: 04/05/19  8:03 AM  
Result Value Ref Range AMPHETAMINES NEGATIVE  NEG    
 BARBITURATES NEGATIVE  NEG BENZODIAZEPINES NEGATIVE  NEG    
 COCAINE NEGATIVE  NEG METHADONE NEGATIVE  NEG    
 OPIATES NEGATIVE  NEG    
 PCP(PHENCYCLIDINE) NEGATIVE  NEG    
 THC (TH-CANNABINOL) NEGATIVE  NEG Drug screen comment (NOTE) URINALYSIS W/ RFLX MICROSCOPIC Collection Time: 04/05/19  8:03 AM  
Result Value Ref Range Color DARK YELLOW Appearance CLEAR CLEAR Specific gravity 1.012 1.003 - 1.030    
 pH (UA) 6.5 5.0 - 8.0 Protein NEGATIVE  NEG mg/dL Glucose NEGATIVE  NEG mg/dL Ketone TRACE (A) NEG mg/dL Blood SMALL (A) NEG Urobilinogen 4.0 (H) 0.2 - 1.0 EU/dL Nitrites NEGATIVE  NEG Leukocyte Esterase NEGATIVE  NEG    
 WBC 0-4 0 - 4 /hpf  
 RBC 5-10 0 - 5 /hpf Epithelial cells FEW FEW /lpf Bacteria NEGATIVE  NEG /hpf Hyaline cast 0-2 0 - 5 /lpf URINE CULTURE HOLD SAMPLE Collection Time: 04/05/19  8:03 AM  
Result Value Ref Range Urine culture hold URINE ON HOLD IN MICROBIOLOGY DEPT FOR 3 DAYS. IF UNPRESERVED URINE IS SUBMITTED, IT CANNOT BE USED FOR ADDITIONAL TESTING AFTER 24 HRS, RECOLLECTION WILL BE REQUIRED. BILIRUBIN, CONFIRM Collection Time: 04/05/19  8:03 AM  
Result Value Ref Range Bilirubin UA, confirm NEGATIVE  NEG    
 
CT Scan from 3/23/2019 - Reviewed. Imp: Mr. Leeann Ny is a 66 y.o. male with cirrhosis, ascites and an umbilical hernia. Plan: 1. At this point in time, do not believe that there is an indication for umbilical hernia repair as the hernia is reducible. 2. Xeroform gauze to de-epithelialized area of skin at umbilicus. 3. Ultrasound guided paracentesis. 4. GI to see. 5. Plans per Dr. Marcus Martin. Following.

## 2019-04-05 NOTE — ED NOTES
0430: Patient remains alert and oriented x 4. Moves all extremities. Pt still displays tremors to upper and lower extremities. NSR on the cardiac monitor, 93% on RA. Fluids infusing. CIWA - 11; 2mg Ativan given. Patient resting on stretcher. VSS. No further interventions. Visit Vitals /84 (BP 1 Location: Right arm, BP Patient Position: At rest;Sitting) Pulse 91 Temp 98.5 °F (36.9 °C) Resp 20 Ht 6' (1.829 m) Wt 117.9 kg (260 lb) SpO2 93% BMI 35.26 kg/m²

## 2019-04-05 NOTE — PROGRESS NOTES
Goals  Attends follow-up appointments as directed. 3/26/19 - Patient has PCP appointment 3/28/19. Parents to provide transportation. Patient will attend appointment. Also due to follow-ups with GI and general surgery. Mother states that she doesn't think the patient will want to schedule follow-ups. Will advise Dr. Waldemar Limon to stress importance of seeing specialists for ongoing care and disease management. Will monitor if patient schedules and attends appointments. BISI 
 
4/3/19 - Noted that patient was \"no show\" at PCP appointment. Attempted to contact patient without success. Dr. Waldemar Limon aware. BISI  Supportive resources in place to maintain patient in the community 3/26/19 - Information printed for [facility] for [address] [place]. Flavia Abbasi and [facility] program. Will offer information to the patient. Also discussed with patient about seeking family counseling. Mother is experiencing a lot of caregiver stress and strain. Reports that patient's wife left him and his adult children do not associate with him. Parents are his only support system.  BISI

## 2019-04-05 NOTE — CONSULTS
[facility] 
[address] [facility] [address] [place], [address] 
[phone] GI CONSULTATION NOTE Carlos Alberto Colindres AGACNP-BC Work Cell: [phone] NAME:  Mehul Mcginnis. :   1960 MRN:   [id number] Referring Provider: Dr. Annamarie Baxter Consult Date: 2019 Chief Complaint: Tremors History of Present Illness:  Patient is a 66 y.o. who is seen in consultation at the request of Dr. Annamarie Baxter for cirrhosis. Mr. Mary Berry has a PMH as detailed below including alcoholic cirrhosis. He presented to the hospital with tremors, nausea and vomiting. Onset of symptoms was yesterday. He has long-standing history of alcohol abuse. Drinks 1/2 gallon of liquor every 2 days which he has been doing for 13 years. He denies any abdominal pain; however reports recurrent progressive distention/tightness since having large volume paracentesis 3/22. He has known umbilical hernia. He was seen by surgery whom are not currently recommending repair. CT 3/21 with cirrhosis and large volume ascites. Paracentesis 3/22 with 12. 75L removed. Last alcoholic drink was Thursday afternoon. He reportedly had 10 oz of bourbon. Parents at bedside also report patient follows with Hematology for rare blood clotting disorder. He states he was told \"he could bleed out on the table if he had surgery. \" 
 
EGD 2018 Findings:  
Esophagus:Grade 4 varices mid and distal third esophagus with Gr 4 status at EG junction; these are not bleeding Stomach: enlarged folds with diffuse mucosa erythema entire stomach without bleeding Duodenum/jejunum: mass lesion bulb with much more firm consistency than would expect with varices; this is active oozing blood.  Beyond bulb duodenum is normal 
Therapies:  pyloritek biopsy Biopsy duodenal mass - benign Injection duodenal mass 2 cc 1:10,000 epinephrine with bicap cautery and application three bands before bleeding appears to stop 
  
Colonoscopy July 2018  
Impression: 
Normal colonoscopy to the cecum, with no evidence of neoplasia, diverticular disease, or mucosal abnormality. PMH: 
Past Medical History:  
Diagnosis Date  Adverse effect of anesthesia   
 pt reports waking up during colonoscopy/endoscopy  Alcohol abuse 6/1/2011  Anemia NEC  Anxiety  Ascites   
 has to have fluid drained occasionally  Cirrhosis of liver ([facility] [address]  Depression  Gastric ulcer, unspecified as acute or chronic, without mention of hemorrhage, perforation, or obstruction  GERD (gastroesophageal reflux disease)  Gout  Hypertension  Liver disease   
 cirrhosis  Obesity, Class II, BMI 35-39.9  Pneumonia  PUD (peptic ulcer disease)  Seizures ([facility] [address] 2013  
 from alcohol withdrawal  
 Stroke [facility]) 2013  
 per pt, possible mini stroke from alcohol withdrawal (same time as seizure) PSH: 
Past Surgical History:  
Procedure Laterality Date  COLONOSCOPY N/A 7/2/2018 COLONOSCOPY performed by Bryn Diaz MD at [address] [facility] HX COLONOSCOPY  07/2018  HX ENDOSCOPY    
 also colonoscopy  HX GI  1998  
 rectal abscess surgery  HX GI  1998  
 rectal abscess surgery  HX HEENT  1980  
 wisdom teeth Allergies: Allergies Allergen Reactions  Onion Nausea and Vomiting  Statins-Hmg-Coa Reductase Inhibitors Rash  
  rash Home Medications: 
Prior to Admission Medications Prescriptions Last Dose Informant Patient Reported? Taking? Venlafaxine 150 mg tr24 4/4/2019 at Unknown time Parent Yes Yes Sig: Take 150 mg by mouth daily. allopurinol (ZYLOPRIM) 300 mg tablet 4/4/2019 at Unknown time Parent No Yes Sig: TAKE 1 TABLET TWICE DAILY  
carvedilol (COREG) 3.125 mg tablet 4/4/2019 at Unknown time Parent No Yes Sig: Take 1 Tab by mouth two (2) times daily (with meals). ciprofloxacin HCl (CIPRO) 500 mg tablet 4/4/2019 at Unknown time  No Yes Sig: Take 1 Tab by mouth two (2) times a day for 28 days. Indications: prostatitis  
cyclobenzaprine (FLEXERIL) 10 mg tablet 4/4/2019 at Unknown time Parent No Yes Sig: TAKE 1 TABLET THREE TIMES DAILY AS NEEDED  
furosemide (LASIX) 40 mg tablet 4/4/2019 at Unknown time Parent No Yes Sig: Take 2 Tabs by mouth daily. hydrOXYzine HCl (ATARAX) 25 mg tablet 4/4/2019 at Unknown time Parent Yes Yes Sig: Take 25 mg by mouth three (3) times daily. lactulose (CHRONULAC) 10 gram/15 mL solution 3/5/2019 at Unknown time Parent Yes Yes Sig: Take 3 tsp by mouth two (2) times daily as needed (liver disease). mupirocin (BACTROBAN) 2 % ointment  Parent No Yes Sig: Apply  to affected area three (3) times daily. omeprazole (PRILOSEC) 40 mg capsule 4/4/2019 at Unknown time Parent No Yes Sig: TAKE 1 CAPSULE TWICE DAILY potassium chloride SA (KLOR-CON SPRINKLE) 10 mEq capsule 4/4/2019 at Unknown time Parent No Yes Sig: Take 1 Cap by mouth daily. prochlorperazine (COMPAZINE) 5 mg tablet 3/29/2019 at Unknown time Parent No Yes Sig: TAKE 1 TABLET BY MOUTH EVERY 6 HOURS AS NEEDED FOR NAUSEA FOR UP TO 7 DAYS  
spironolactone (ALDACTONE) 25 mg tablet 4/4/2019 at Unknown time Parent No Yes Sig: TAKE 1 TABLET TWICE DAILY  
traZODone (DESYREL) 150 mg tablet 3/29/2019 at Unknown time Parent No Yes Sig: TAKE 1 TABLET BY MOUTH NIGHTLY Facility-Administered Medications: None Hospital Medications: 
Current Facility-Administered Medications Medication Dose Route Frequency  chlordiazePOXIDE (LIBRIUM) capsule 25 mg  25 mg Oral Q4H PRN  
 LORazepam (ATIVAN) injection 2 mg  2 mg IntraVENous Q1H PRN  
 dextrose 5% and 0.9% NaCl infusion  100 mL/hr IntraVENous CONTINUOUS  
 thiamine HCL (B-1) tablet 100 mg  100 mg Oral DAILY  folic acid (FOLVITE) tablet 1 mg  1 mg Oral DAILY  therapeutic multivitamin (THERAGRAN) tablet 1 Tab  1 Tab Oral DAILY  ondansetron (ZOFRAN) injection 4 mg  4 mg IntraVENous Q4H PRN  
 allopurinol (ZYLOPRIM) tablet 300 mg  300 mg Oral Q12H  carvedilol (COREG) tablet 3.125 mg  3.125 mg Oral BID WITH MEALS  cyclobenzaprine (FLEXERIL) tablet 10 mg  10 mg Oral TID PRN  
 lactulose (CHRONULAC) 10 gram/15 mL solution 10 g  10 g Oral BID PRN  pantoprazole (PROTONIX) tablet 40 mg  40 mg Oral ACB  venlafaxine-SR (EFFEXOR-XR) capsule 150 mg  150 mg Oral DAILY  furosemide (LASIX) tablet 80 mg  80 mg Oral DAILY  hydrOXYzine HCl (ATARAX) tablet 25 mg  25 mg Oral TID  spironolactone (ALDACTONE) tablet 25 mg  25 mg Oral BID Social History: 
Social History Tobacco Use  Smoking status: Former Smoker  Smokeless tobacco: Current User Types: Chew  Tobacco comment: currently uses snuff Substance Use Topics  Alcohol use: Yes Comment: 1/2 gallon bourbon every 2 days Family History: 
Family History Problem Relation Age of Onset  Asthma Mother  Other Father   
     history of fall multiple injuries  Hypertension Brother  Cancer Paternal Grandmother   
     uncertain if colon or stomach  Cancer Paternal Grandfather   
     stomach cancer- dx mid [de-identified] Review of Systems: 
 
Constitutional: negative fever, negative chills, negative weight loss Eyes:   negative visual changes ENT:   negative sore throat, tongue or lip swelling Respiratory:  negative cough, negative dyspnea Cards:  negative for chest pain, palpitations, lower extremity edema GI:   See HPI 
:  negative for frequency, dysuria Integument:  negative for rash and pruritus Heme:  negative for easy bruising and gum/nose bleeding Musculoskel: negative for myalgias, back pain and muscle weakness Neuro: negative for headaches, dizziness, vertigo Psych:  negative for feelings of anxiety, depression Objective:  
 
Patient Vitals for the past 8 hrs: 
 BP Temp Pulse Resp SpO2 Height Weight 04/05/19 1123 171/90 97.7 °F (36.5 °C) 89 16 96 %    
04/05/19 0951 144/84 98.5 °F (36.9 °C) 91 20 93 %    
04/05/19 0900 124/79  94 25 95 %    
04/05/19 0800 125/42 98.2 °F (36.8 °C) 93 21 98 %    
04/05/19 0730 143/85  (!) 104 24 93 %    
04/05/19 0633 146/83 98.8 °F (37.1 °C) 94 20 96 % 6' (1.829 m) 117.9 kg (260 lb) No intake/output data recorded. No intake/output data recorded. PHYSICAL EXAM: 
General: WD, WN. Drowsy, cooperative, jaundiced, no acute distress.   
HEENT: NC, Atraumatic. PERRLA, EOMI. Scleral icterus. Lungs:  CTA Bilaterally. No Wheezing/Rhonchi/Rales. Heart:  Regular rate and rhythm, No murmur, No Rubs, No Gallops Abdomen: Soft, protuberant, distended, non-tender, large umbilical hernia w/ dressing present.  +Bowel sounds, no HSM Neurologic:  No acute neurological distress. Psych:   Not anxious nor agitated. Data Review Recent Labs 04/05/19 
7994 WBC 4.5 HGB 11.4* HCT 34.1*  
PLT 86* Recent Labs 04/05/19 
7011 * K 3.1*  
 CO2 27 BUN 3*  
CREA 0.68* GLU 94  
CA 7.9* Recent Labs 04/05/19 
0700 04/05/19 
7877 SGOT  --  67* AP  --  160* TP  --  7.7 ALB  --  2.4*  
GLOB  --  5.3*  
LPSE 187  --   
 
Recent Labs 04/05/19 
2774 INR 1.5* PTP 14.6* Imaging studies reviewed Assessment: 1. Decompensated alcoholic cirrhosis w/ ascites 2. Alcohol withdrawal   
3. Esophageal varices s/p banding 4. Alcohol abuse - on-going 5. Umbilical hernia Patient Active Problem List  
Diagnosis Code  
 HTN (hypertension) I10  
 Gout M10.9  Alcohol dependence with withdrawal ([facility] [address] F10.239  Anxiety F41.9  Recurrent major depressive disorder ([facility] [address] F33.9  Hypogonadism, male E29.1  Thrombocytopenia ([facility] [address] D69.6  Splenomegaly R16.1  History of alcohol abuse Z87.898  Prediabetes R73.03  
 Esophageal varices without bleeding (HCC) I85.00  Iron deficiency anemia D50.9  Gallbladder calculus without cholecystitis K80.20  Umbilical hernia without obstruction and without gangrene K42.9  [facility] K70.30  Esophageal varices in alcoholic cirrhosis (HCC) P03.08, I85.10  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  Severe obesity with body mass index (BMI) of 35.0 to 39.9 with serious comorbidity (HCC) E66.01  
 Withdrawal symptoms, alcohol (HCC) F10.239  
 History of GI bleed Z87.19  
 Ascites R18.8  ETOH abuse F10.10 Plan:  
-Supportive management per primary team 
-Multivitamin, thiamine, folic acid 
-Continue diuretics  
-Paracentesis - if removing more than 6-8L, need to give albumin (ordered) -Need repeat EGD for esophageal varices at some point 
-CIWA protocol and monitor tremors - may need higher level of care 
-Hepatology consult 
-Discussed with Dr. Leopold How 
-Will follow along with you 
-Thank you kindly for allowing us to participate in the care of this patient I have personally reviewed the history and independently examined the patient. I have reviewed the chart and agree with the documentation recorded by the Mid Level Provider, including the assessment, treatment plan, and disposition. ASSESSMENT AND PLAN: He is here after his parents brought him to sober house yesterday. He was sent to the hospital to get through withdrawal period, with plans to return to sober house after this time. He need paracentesis w/ albumin. No active bleeding.  Needs repeat endoscopy with banding likely as outpatient, though will defer to Dr. Tiffanie Wild MD

## 2019-04-05 NOTE — PROGRESS NOTES
Goals Addressed This Visit's Progress  Attends follow-up appointments as directed. 3/26/19 - Patient has PCP appointment 3/28/19. Parents to provide transportation. Patient will attend appointment. Also due to follow-ups with GI and general surgery. Mother states that she doesn't think the patient will want to schedule follow-ups. Will advise Dr. Lino Madrigal to stress importance of seeing specialists for ongoing care and disease management. Will monitor if patient schedules and attends appointments. BISI 
 
4/3/19 - Noted that patient was \"no show\" at PCP appointment. Attempted to contact patient without success. Dr. Lino Madrigal aware. BISI 
 
4/5/19 - Received notification that patient has been readmitted for ETOH abuse and ascites. ED provider report notes that the patient went to the [facility] on 4/4 to be admitted for inpatient ETOH detox after doctor hours. Went to the ED for nausea, dry heaves, and tremors. NN will monitor for discharge planning. Will suggest patient discharge directly to rehab post hospitalization.  BISI

## 2019-04-05 NOTE — PROGRESS NOTES
1515 pm- Called to US to assist with inpatient paracentesis. Previous consent obtained by family for procedure. Waiting for Dr. Nona Trammell to start procedure. 1642 pm- Report called to Robin Ingram RN with SBAR report.

## 2019-04-05 NOTE — ED NOTES
1015: TRANSFER - OUT REPORT: 
 
Verbal report given to Kedar Cohen RN  (name) on [place].  being transferred to [facility] (unit) for routine progression of care Report consisted of patients Situation, Background, Assessment and  
Recommendations(SBAR). Information from the following report(s) SBAR, Kardex, ED Summary, MAR, Recent Results and Cardiac Rhythm NSR was reviewed with the receiving nurse. Lines:  
Peripheral IV 04/05/19 Left Forearm (Active) Opportunity for questions and clarification was provided. Patient transported with: 
Monitor Medic 
 
1026: Reassessment - Patient is currently resting. Patient states, [place] is better\". Patient states that, he is \"little anxious, but i'm can rest.\" Tremors has decreased.  CIWA - 8

## 2019-04-05 NOTE — WOUND CARE
Wound Care Note:  
 
New consult placed by physician request for hernia; Dr. Dolly Sultana at bedside during assessment. Chart shows: 
Admitted for umbilical hernia without obstruction and without gangrene Past Medical History:  
Diagnosis Date  Adverse effect of anesthesia   
 pt reports waking up during colonoscopy/endoscopy  Alcohol abuse 6/1/2011  Anemia NEC  Anxiety  Ascites   
 has to have fluid drained occasionally  Cirrhosis of liver ([facility] [address]  Depression  Gastric ulcer, unspecified as acute or chronic, without mention of hemorrhage, perforation, or obstruction  GERD (gastroesophageal reflux disease)  Gout  Hypertension  Liver disease   
 cirrhosis  Obesity, Class II, BMI 35-39.9  Pneumonia  PUD (peptic ulcer disease)  Seizures ([facility] [address] 2013  
 from alcohol withdrawal  
 Stroke [facility]) 2013  
 per pt, possible mini stroke from alcohol withdrawal (same time as seizure) WBC = 4.5 on 4/5/19 Admitted from home Assessment:  
Patient is groggy, but  A&O x 4, communicative, continent with some assistance needed in repositioning. Verbal consent given for wound photography Bed: Total Care Patient has a condom catheter. Diet: Regular Right heel, bilateral buttocks, and sacral skin intact and without erythema. 1. POA abdomen with open wound measuring 4.3 cm x 5 cm x 0.1 cm and protrudes 4 cm, wound bed is 70% pink, 20% yellow and 10% maroon on the lateral aspect, wanda-wound with hyperpigmentation, wound edges are open. 2.  POA left heel with hyperpigmented with slight lavender hue area measuring 1 cm x 0.5 cm x 0 cm, no open area, no drainage, wanda-wound intact. Left open to air. Orders received from Dr. Dolly Sultana Patient repositioned supine.  
  
Recommendations:   
Abdomen- Every other day cleanse with normal saline, wipe wound bed clean and dry, apply a whole piece of Xeroform gauze that has been folded to fit wound, cover with a couple of 4 x 4's and tape to secure. Skin Care & Pressure Prevention: 
Minimize layers of linen/pads under patient to optimize support surface. Turn/reposition approximately every 2 hours and offload heels. Manage incontinence / promote continence Discussed above plan with patient & Ariana Lima RN Transition of Care: Plan to follow as needed while admitted to hospital. 
 
LIYA Hodge, RN, [facility], [facility]. 
office [phone] 
pager [phone] or call  to page

## 2019-04-05 NOTE — ED NOTES
2493: Verbal shift change report given to Carmen Stevenson RN (oncoming nurse) by Diane Rodriguez RN (offgoing nurse). Report included the following information SBAR, Kardex, ED Summary, [facility] and Recent Results. 0809: Patient is alert and oriented x 4. Sinus tach with rare PVC on the cardiac monitor, NSR 95% on RA. Tremors noted to lower and upper extremities. Patient denies any audible or visual disturbances, CIWA - 9. Call bell within reach, bed-wheels locked, side rails up x 2 
 
0908: Patient reports that he is nauseous, MD informed. 0913: Hospitalist at the bedside.

## 2019-04-05 NOTE — PROGRESS NOTES
Admission Medication Reconciliation: 
 
Information obtained from: Mother: [phone] Significant PMH/Disease States:  
Past Medical History:  
Diagnosis Date Adverse effect of anesthesia   
 pt reports waking up during colonoscopy/endoscopy Alcohol abuse 6/1/2011 Anemia NEC Anxiety Ascites   
 has to have fluid drained occasionally Cirrhosis of liver ([facility] [address] Depression Gastric ulcer, unspecified as acute or chronic, without mention of hemorrhage, perforation, or obstruction GERD (gastroesophageal reflux disease) Gout Hypertension Liver disease   
 cirrhosis Obesity, Class II, BMI 35-39.9 Pneumonia PUD (peptic ulcer disease) Seizures ([facility] [address] 2013  
 from alcohol withdrawal  
 Stroke [facility]) 2013  
 per pt, possible mini stroke from alcohol withdrawal (same time as seizure) Chief Complaint for this Admission:  Alcohol problem Allergies:  Onion and Statins-hmg-coa reductase inhibitors Prior to Admission Medications:  
Prior to Admission Medications Prescriptions Last Dose Informant Patient Reported? Taking? Venlafaxine 150 mg tr24 4/4/2019 at Unknown time Parent Yes Yes Sig: Take 150 mg by mouth daily. allopurinol (ZYLOPRIM) 300 mg tablet 4/4/2019 at Unknown time Parent No Yes Sig: TAKE 1 TABLET TWICE DAILY  
carvedilol (COREG) 3.125 mg tablet 4/4/2019 at Unknown time Parent No Yes Sig: Take 1 Tab by mouth two (2) times daily (with meals). ciprofloxacin HCl (CIPRO) 500 mg tablet 4/4/2019 at Unknown time  No Yes Sig: Take 1 Tab by mouth two (2) times a day for 28 days. Indications: prostatitis  
cyclobenzaprine (FLEXERIL) 10 mg tablet 4/4/2019 at Unknown time Parent No Yes Sig: TAKE 1 TABLET THREE TIMES DAILY AS NEEDED  
furosemide (LASIX) 40 mg tablet 4/4/2019 at Unknown time Parent No Yes Sig: Take 2 Tabs by mouth daily. hydrOXYzine HCl (ATARAX) 25 mg tablet 4/4/2019 at Unknown time Parent Yes Yes Sig: Take 25 mg by mouth three (3) times daily. lactulose (CHRONULAC) 10 gram/15 mL solution 3/5/2019 at Unknown time Parent Yes Yes Sig: Take 3 tsp by mouth two (2) times daily as needed (liver disease). mupirocin (BACTROBAN) 2 % ointment  Parent No Yes Sig: Apply  to affected area three (3) times daily. omeprazole (PRILOSEC) 40 mg capsule 4/4/2019 at Unknown time Parent No Yes Sig: TAKE 1 CAPSULE TWICE DAILY potassium chloride SA (KLOR-CON SPRINKLE) 10 mEq capsule 4/4/2019 at Unknown time Parent No Yes Sig: Take 1 Cap by mouth daily. prochlorperazine (COMPAZINE) 5 mg tablet 3/29/2019 at Unknown time Parent No Yes Sig: TAKE 1 TABLET BY MOUTH EVERY 6 HOURS AS NEEDED FOR NAUSEA FOR UP TO 7 DAYS  
spironolactone (ALDACTONE) 25 mg tablet 4/4/2019 at Unknown time Parent No Yes Sig: TAKE 1 TABLET TWICE DAILY  
traZODone (DESYREL) 150 mg tablet 3/29/2019 at Unknown time Parent No Yes Sig: TAKE 1 TABLET BY MOUTH NIGHTLY Facility-Administered Medications: None Comments/Recommendations: Mother provided all information as patient was unable to articulate his medication regimen. Note: 
ETOH: half gallon bourbon every other day--recommend CIWA protocol, banana bag, hydration, close monitoring for withdrawal especially given his extensive ETOH use. 2. Lactulose: mom states he doesn't use all the time 3. Mupirocin: per Mom \"for his naval hernia\" No changes except to update administration times. Thank you for allowing me to participate in the care of your patient. Yumiko Casarez PharmD, RN #[id number]

## 2019-04-06 NOTE — PROGRESS NOTES
Hospitalist Progress Note Rick Garcia MD 
Answering service: [phone] OR [phone] from in house phone Date of Service:  2019 NAME:  Mitchel Alvarado :  1960 MRN:  [id number] Admission Summary:  
66 y.o M with PMH of chronic severe alcoholism,liver cirrhosis,chronic anemia,thromboctyopenia,gastric ulcer,variceal bleeding was sent to the hospital because of alcohol withdrawal symptoms from a rehab Interval history / Subjective:  
  
States that he feels shaky and has alcohol withdrawal symptoms. Was saying he wants to go home and not alcohol rehab. Appears confused. Trying to get off bed Assessment & Plan: #Alcohol withdrawal with chronic alcoholism: 
-Etoh level at admission 143. 
-continue CIWA protocol with prn Iv ativan 
-required 10 mg ativan today 
-seizure precautions 
-advised to quit drinking alcohol 
-thiamine,folic acid #Hypokalemia and hypomagnesemia:replete #Alcoholic liver cirrhosis :Ascites: 
-heavy alcohol use -drinks 1/2 gallon of whiskey every 2 days,last drink per patient was Thursday. -CT abdomen shows liver cirrhosis 
-MELD score 18 child class C 
-s/p temporary  peritoneal cath placement for ascites - approx 11 lt removed. Clammped the catheter today 
-continue IV lasix and spironolactone 
-Fluids studies - white count 135. No SBP 
-hepatologist following 
-check ammonia #Umbilical hernia: 
-reducible. No surgical intervention required per gen surgey 
-continue local wound care as he has excoriated skin # Chronic thrombocytopenia:due to cirrhosis #Chronic anemia: h/o varices - monitor Hb Code status: full DVT prophylaxis: SCD Care Plan discussed with:patient,nurse,patients at bed side Disposition: TBD Hospital Problems  Date Reviewed: 2019 Codes Class Noted POA  
 ETOH abuse ICD-10-CM: F10.10 ICD-9-CM: 305.00  2019 Unknown  Umbilical hernia without obstruction and without gangrene ICD-10-CM: K42.9 ICD-9-CM: 553.1  1/24/2018 Unknown Overview Signed 1/24/2018  3:04 PM by Shandra Mao MD  
  Without gangrene or obstruction. Review of Systems: As per HPI Vital Signs:  
 Last 24hrs VS reviewed since prior progress note. Most recent are: 
Visit Vitals /67 Pulse 100 Temp 98.5 °F (36.9 °C) Resp 18 Ht 6' (1.829 m) Wt 118 kg (260 lb 1.6 oz) SpO2 98% BMI 35.28 kg/m² Intake/Output Summary (Last 24 hours) at 4/6/2019 1813 Last data filed at 4/6/2019 5839 Gross per 24 hour Intake  Output 44865 ml Net -20260 ml Physical Examination:  
 
 
     
Constitutional:  middle age male   
ENT:  Oral mucous moist, oropharynx benign. Neck supple, Resp:  CTA bilaterally. No wheezing/rhonchi/rales. No accessory muscle use CV:  Regular rhythm, mild tachycardia, no murmurs, gallops, rubs GI:  Soft, reducible umbilical hernia with excorieated skin Musculoskeletal:  1-2+ LE jason,a Neurologic:  alert and oriented to himself,place. moves all extremities spontaneosuly Psych:  Appears anxious. Data Review:  
 Review and/or order of clinical lab test 
Review and/or order of tests in the radiology section of CPT Review and/or order of tests in the medicine section of CPT Labs:  
 
Recent Labs 04/06/19 
0400 04/05/19 
1060 WBC 4.1 4.5 HGB 10.4* 11.4* HCT 31.3* 34.1*  
PLT 60* 86* Recent Labs 04/06/19 
0400 04/05/19 
3385 * 135* K 3.1* 3.1*  
 100 CO2 26 27 BUN 5* 3*  
CREA 0.74 0.68* GLU 97 94 CA 7.5* 7.9*  
MG 1.6  --   
 
Recent Labs 04/06/19 
0400 04/05/19 
0700 04/05/19 
1944 SGOT 48*  --  67* ALT 19  --  24  
*  --  160* TBILI 4.2*  --  3.7* TP 6.9  --  7.7 ALB 2.3*  --  2.4*  
GLOB 4.6*  --  5.3*  
LPSE  --  187  --   
 
Recent Labs 04/05/19 
7204 INR 1.5* PTP 14.6* No results for input(s): FE, TIBC, PSAT, FERR in the last 72 hours. Lab Results Component Value Date/Time Folate 13.7 03/22/2019 01:54 AM  
  
No results for input(s): PH, PCO2, PO2 in the last 72 hours. No results for input(s): CPK, CKNDX, TROIQ in the last 72 hours. No lab exists for component: CPKMB Lab Results Component Value Date/Time Cholesterol, total 116 09/08/2017 09:48 AM  
 HDL Cholesterol 40 09/08/2017 09:48 AM  
 LDL, calculated 64 09/08/2017 09:48 AM  
 Triglyceride 61 09/08/2017 09:48 AM  
 CHOL/HDL Ratio 7.6 (H) 07/25/2013 04:15 AM  
 
Lab Results Component Value Date/Time Glucose (POC) 118 (H) 07/07/2016 07:11 AM  
 Glucose (POC) 189 (H) 03/13/2014 08:16 PM  
 Glucose (POC) 118 (H) 08/10/2013 05:45 PM  
 Glucose (POC) 230 (H) 08/09/2013 08:18 PM  
 Glucose (POC) 143 (H) 07/24/2013 01:56 PM  
 Glucose  10/14/2014 10:50 AM  
 Glucose  08/14/2014 07:32 AM  
 
Lab Results Component Value Date/Time Color DARK YELLOW 04/05/2019 08:03 AM  
 Appearance CLEAR 04/05/2019 08:03 AM  
 Specific gravity 1.012 04/05/2019 08:03 AM  
 Specific gravity 1.025 03/21/2019 06:09 PM  
 pH (UA) 6.5 04/05/2019 08:03 AM  
 Protein NEGATIVE  04/05/2019 08:03 AM  
 Glucose NEGATIVE  04/05/2019 08:03 AM  
 Ketone TRACE (A) 04/05/2019 08:03 AM  
 Bilirubin NEGATIVE  05/16/2014 09:00 PM  
 Urobilinogen 4.0 (H) 04/05/2019 08:03 AM  
 Nitrites NEGATIVE  04/05/2019 08:03 AM  
 Leukocyte Esterase NEGATIVE  04/05/2019 08:03 AM  
 Epithelial cells FEW 04/05/2019 08:03 AM  
 Bacteria NEGATIVE  04/05/2019 08:03 AM  
 WBC 0-4 04/05/2019 08:03 AM  
 RBC 5-10 04/05/2019 08:03 AM  
 
 
 
Medications Reviewed:  
 
Current Facility-Administered Medications Medication Dose Route Frequency  albumin human 25% (BUMINATE) solution 12.5 g  12.5 g IntraVENous Q6H  
 [START ON 4/7/2019] spironolactone (ALDACTONE) tablet 200 mg  200 mg Oral DAILY  LORazepam (ATIVAN) injection 2 mg  2 mg IntraVENous Q1H PRN  thiamine HCL (B-1) tablet 100 mg  100 mg Oral DAILY  folic acid (FOLVITE) tablet 1 mg  1 mg Oral DAILY  therapeutic multivitamin (THERAGRAN) tablet 1 Tab  1 Tab Oral DAILY  ondansetron (ZOFRAN) injection 4 mg  4 mg IntraVENous Q4H PRN  
 allopurinol (ZYLOPRIM) tablet 300 mg  300 mg Oral Q12H  carvedilol (COREG) tablet 3.125 mg  3.125 mg Oral BID WITH MEALS  cyclobenzaprine (FLEXERIL) tablet 10 mg  10 mg Oral TID PRN  
 lactulose (CHRONULAC) 10 gram/15 mL solution 10 g  10 g Oral BID PRN  pantoprazole (PROTONIX) tablet 40 mg  40 mg Oral ACB  venlafaxine-SR (EFFEXOR-XR) capsule 150 mg  150 mg Oral DAILY  furosemide (LASIX) tablet 80 mg  80 mg Oral DAILY  hydrOXYzine HCl (ATARAX) tablet 25 mg  25 mg Oral TID  hydrALAZINE (APRESOLINE) 20 mg/mL injection 20 mg  20 mg IntraVENous Q6H PRN  
 
______________________________________________________________________ EXPECTED LENGTH OF STAY: - - - 
ACTUAL LENGTH OF STAY:          1 Tian Brewster MD

## 2019-04-06 NOTE — PROGRESS NOTES
Progress Note Patient: Mehul Wong MRN: [id number]  SSN: [id number] YOB: 1960  Age: 66 y.o. Sex: male Admit Date: 2019 Umbilical hernia Subjective: No acute surgical issues. Pt resting in bed. Tolerating diet. Umbilical hernia is easily reducible. Objective:  
 
Visit Vitals /70 (BP 1 Location: Right arm, BP Patient Position: At rest) Pulse 98 Temp 98.7 °F (37.1 °C) Resp 18 Ht 6' (1.829 m) Wt 260 lb 1.6 oz (118 kg) SpO2 96% BMI 35.28 kg/m² Temp (24hrs), Av.5 °F (36.9 °C), Min:97.7 °F (36.5 °C), Max:98.8 °F (37.1 °C) Physical Exam:   
Gen:  NAD Pulm:  Unlabored Abd:  S/moderately distended/mild TTP with reducible umbilical hernia. 3 cm excoriated skin over hernia site Recent Results (from the past 24 hour(s)) CELL COUNT, BODY FLUID Collection Time: 19  4:05 PM  
Result Value Ref Range BODY FLUID TYPE PERITONEAL FLUID FLUID COLOR YELLOW    
 FLUID APPEARANCE CLOUDY FLUID RBC CT. >100 (H) 0 /cu mm FLUID NUCLEATED CELLS 135 /cu mm  
 FLD NEUTROPHILS 3 (A) NRRE % FLD LYMPHS 37 (A) NRRE % FLD MONO/MACROPHAGES 60 (A) NRRE % ALBUMIN, FLUID Collection Time: 19  4:05 PM  
Result Value Ref Range Fluid Type: PERITONEAL FLUID Albumin, body fld. 0.4 g/dL PROTEIN TOTAL, FLUID Collection Time: 19  4:05 PM  
Result Value Ref Range Fluid Type: PERITONEAL FLUID Protein total, body fld. 0.8 g/dL GLUCOSE, FLUID Collection Time: 19  4:05 PM  
Result Value Ref Range Fluid Type: PERITONEAL FLUID Glucose, body fld. 121 MG/DL  
CULTURE, BODY FLUID W GRAM STAIN Collection Time: 19  4:05 PM  
Result Value Ref Range Special Requests: NO SPECIAL REQUESTS    
 GRAM STAIN NO WBC'S SEEN    
 GRAM STAIN NO ORGANISMS SEEN Culture result: PENDING   
CBC W/O DIFF Collection Time: 19  4:00 AM  
Result Value Ref Range  WBC 4.1 4.1 - 11.1 K/uL  
 RBC 3.80 (L) 4.10 - 5.70 M/uL  
 HGB 10.4 (L) 12.1 - 17.0 g/dL HCT 31.3 (L) 36.6 - 50.3 % MCV 82.4 80.0 - 99.0 FL  
 MCH 27.4 26.0 - 34.0 PG  
 MCHC 33.2 30.0 - 36.5 g/dL RDW 22.4 (H) 11.5 - 14.5 % PLATELET 60 (L) 446 - 400 K/uL MPV 9.7 8.9 - 12.9 FL  
 NRBC 0.0 0  WBC ABSOLUTE NRBC 0.00 0.00 - 0.01 K/uL METABOLIC PANEL, COMPREHENSIVE Collection Time: 04/06/19  4:00 AM  
Result Value Ref Range Sodium 134 (L) 136 - 145 mmol/L Potassium 3.1 (L) 3.5 - 5.1 mmol/L Chloride 102 97 - 108 mmol/L  
 CO2 26 21 - 32 mmol/L Anion gap 6 5 - 15 mmol/L Glucose 97 65 - 100 mg/dL BUN 5 (L) 6 - 20 MG/DL Creatinine 0.74 0.70 - 1.30 MG/DL  
 BUN/Creatinine ratio 7 (L) 12 - 20 GFR est AA >60 >60 ml/min/1.73m2 GFR est non-AA >60 >60 ml/min/1.73m2 Calcium 7.5 (L) 8.5 - 10.1 MG/DL Bilirubin, total 4.2 (H) 0.2 - 1.0 MG/DL  
 ALT (SGPT) 19 12 - 78 U/L  
 AST (SGOT) 48 (H) 15 - 37 U/L Alk. phosphatase 135 (H) 45 - 117 U/L Protein, total 6.9 6.4 - 8.2 g/dL Albumin 2.3 (L) 3.5 - 5.0 g/dL Globulin 4.6 (H) 2.0 - 4.0 g/dL A-G Ratio 0.5 (L) 1.1 - 2.2 Assessment:  
 
Hospital Problems  Date Reviewed: 4/5/2019 Codes Class Noted POA  
 ETOH abuse ICD-10-CM: F10.10 ICD-9-CM: 305.00  4/5/2019 Unknown Umbilical hernia without obstruction and without gangrene ICD-10-CM: K42.9 ICD-9-CM: 553.1  1/24/2018 Unknown Overview Signed 1/24/2018  3:04 PM by Kadeem Flores MD  
  Without gangrene or obstruction. Plan/Recommendations/Medical Decision Making: - Umbilical hernia:  No acute indication for operation. Hernia can be performed as outpatient once patient gets over acute issues - Recommend keeping paracentesis catheter in at least for another day. May clamp if output is high - Diet as tolerated - Local wound care to midline umbilical hernia site with hydrogel.  
 
Signed By: Molly Schwartz MD   
 April 6, 2019

## 2019-04-06 NOTE — CONSULTS
[address] [facility] Paulette Santi Howe MD, Sydnee Kirk, FAASLD AVELINO Pereira, MADHAVI Bergeron, [facility]   AVELINO Myles NP [address] [facility] 136 
  at [address] 
  [address] [place] [address], [address] [facility], [place]  [address]. 
  [phone] FAX: [address] 
  [facility] 
  [address], [address] [facility], [address] 
  [phone] FAX: [phone] HEPATOLOGY CONSULT NOTE I was asked to see this patient in consultation by Manolo Nova NP for management of alcoholic liver disease. I have reviewed the Emergency room note, Hospital admission note, Notes by all other physicians who have seen the patient during this hospitalization to date. I have reviewed the problem list and the reason for this hospitalization. I have reviewed the allergies and the medications the patient was taking at home prior to this hospitalization. HISTORY: 
The patient is a 66year old  male with a long history of excessive alcohol abuse. He has gone through alcohol rehab twice. He consumes 1/2 gallon of whiskey every 2 days. He came to the ED because of nausea, vomiting, tremor and weakness. In his room today he still has tremor and weakness. He is alert and orietned x 2. Not sure of time. ASSESSMENT AND PLAN: 
Alcohol liver disease The diagnosis is based upon imaging, laboratory studies, a history of consuming alcohol in excess. The histologic severity has not been defined. The patient has not had any alcohol for 3 days. He is being monitored for alcohol withdrawal and DTs. Alcohol hepatitis There is elevation in liver transaminases in a pattern consistent with alcohol. There is an elevation in TBILI to 4.2 mg. The INR is prolonged to 1.5. The DF is under 32. The alcoholic hepatitis is mild and does not require steroids. 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 The diagnosis of cirrhosis is based upon imaging, laboratory studies, The CTP is 10. Child class C. The MELD score is 16. Ascites Ascites is being treated with diuretics and paracentesis. Will continue the current dose of diuretics at step 2. The renal function is normal and ascites should be able to be controled with diuretics. Screening for Esophageal varices The patient has esophageal varices with prior bleeding. EGD is being performed on a regular basis to band varices. The last EGD to assess for varices was performed in 7/2018. Will need repeat EGD to assess for varices. Will try to do this just prior to DC. Anemia This is due to multifactorial causes including portal hypertension with chronic GI blood loss, bone marrow suppression secondary to alcohol. Will obtain FE panel to assess for iron stores. Thrombocytopenia This is secondary to cirrhosis. There is no evidence of overt bleeding. No treatment is required. Counseling for alcohol in patients with chronic liver disease The patient has cirrhosis and was advised to be abstinent from all alcohol including non-alcoholic beer which does contain some alcohol. SYSTEM REVIEW: 
Constitution systems: Negative for fever, chills, weight gain, weight loss. Eyes: Negative for visual changes. ENT: Negative for sore throat, painful swallowing. Respiratory: Negative for cough, hemoptysis, SOB. Cardiology: Negative for chest pain, palpitations. GI:  Negative for constipation or diarrhea. : Negative for urinary frequency, dysuria, hematuria, nocturia. Skin: Negative for rash. Hematology: Negative for easy bruising, blood clots. Musculo-skelatal: Negative for back pain, muscle pain, weakness.  
Neurologic: Negative for headaches, dizziness, vertigo, memory problems not related to HE. 
Psychology: Negative for anxiety, depression. FAMILY HISTORY: 
The father is alive and healthy. The mother is alive and healthy. There is no family history of liver disease. SOCIAL HISTORY: 
The patient is . The patient has 2 children, and 2 grandchildren. The patient has never used tobacco products. The patient has previously consumed alcohol in excess. The patient used to work as  and other jobs. PHYSICAL EXAMINATION: 
VS: per nursing note General:  Ill appearing Eyes:  Sclera anicteric. ENT:  No oral lesions. Thyroid normal. 
Nodes:  No adenopathy. Skin:  No spider angiomata. No jaundice. Respiratory:  Lungs clear to auscultation. Cardiovascular:  Regular heart rate. Abdomen:  Soft non-tender, No obvious ascites. Paracentesis catheter Extremities:  No lower extremity edema. No muscle wasting. Neurologic:  Alert and oriented. Lethargic. Cranial nerves grossly intact. No asterixis. LABORATORY: 
Results for Obed Miller (MRN [id number]) as of 4/6/2019 14:36 Ref. Range 3/23/2019 00:27 4/5/2019 06:38 4/6/2019 04:00 WBC Latest Ref Range: 4.1 - 11.1 K/uL 4.4 4.5 4.1 HGB Latest Ref Range: 12.1 - 17.0 g/dL 10.2 (L) 11.4 (L) 10.4 (L) PLATELET Latest Ref Range: 150 - 400 K/uL 76 (L) 86 (L) 60 (L) INR Latest Ref Range: 0.9 - 1.1    1.5 (H) Sodium Latest Ref Range: 136 - 145 mmol/L 135 (L) 135 (L) 134 (L) Potassium Latest Ref Range: 3.5 - 5.1 mmol/L 3.5 3.1 (L) 3.1 (L) Chloride Latest Ref Range: 97 - 108 mmol/L 103 100 102 CO2 Latest Ref Range: 21 - 32 mmol/L 25 27 26 Glucose Latest Ref Range: 65 - 100 mg/dL 80 94 97 BUN Latest Ref Range: 6 - 20 MG/DL 4 (L) 3 (L) 5 (L) Creatinine Latest Ref Range: 0.70 - 1.30 MG/DL 0.56 (L) 0.68 (L) 0.74 Bilirubin, total Latest Ref Range: 0.2 - 1.0 MG/DL 4.5 (H) 3.7 (H) 4.2 (H) Albumin Latest Ref Range: 3.5 - 5.0 g/dL 2.4 (L) 2.4 (L) 2.3 (L) ALT (SGPT) Latest Ref Range: 12 - 78 U/L 19 24 19 AST Latest Ref Range: 15 - 37 U/L 41 (H) 67 (H) 48 (H) Alk. phosphatase Latest Ref Range: 45 - 117 U/L 93 160 (H) 135 (H) RADIOLOGY: 
Ultrasound of liver. Echogenic consistent with cirrhosis. Moderate ascites. Paracentesis catheter placed. MD [facility] [address] [place] [address], [address] [place]  [address]. 
[phone] [address] [place] [address]

## 2019-04-07 NOTE — CONSULTS
Name: Sofia Quarles. Hospital: [address]  
: 1960 Admit Date: 2019 Phone: [phone]  Room: [facility]/ PCP: Radha Viramontes DO  MRN: [id number] Date: 2019  Code: Full Code Chart and notes reviewed. Data reviewed. I review the patient's current medications in the medical record at each encounter. I have evaluated and examined the patient. HPI: 
 
8:41 AM    
 
History was obtained from patient. I was asked by Chao Gaspar DO to see Sofia Quarles. in consultation for Intensivist. 
 
History of Present Illness: 
 
Presented to the ED on  with alcohol withdrawal, tremors, N/V. He stated that he drinks a gallon of [place] every 2 days. His last drink was in the afternoon on . He was hospitalized 2018 and 2019 for issues secondary to his ETOH abuse. He was transferred up to ICU last night due to elevated CIWA scale. He was started on Precedex very breifly, but then turned off throughout the night and treated with Ativan. 2 mg Ativan given this morning and he is very comfortable. He denies any pain. No SOB, cough, wheezing. He states that he is cold and he does have some tremors. He is drowsy, but responsive and alert and oriented. No N/V at this time. He is calm and cooperative. Afebrile BP and HR stable Oxygen saturations 98% on 2L NC 
WBC 3.7 HgB 9.7 Platelet 55 (decreasing) K 2.8 Ammonia : 118 Paracentesis  with drain placement Past Medical History:  
Diagnosis Date  Adverse effect of anesthesia   
 pt reports waking up during colonoscopy/endoscopy  Alcohol abuse 2011  Anemia NEC  Anxiety  Ascites   
 has to have fluid drained occasionally  Cirrhosis of liver ([facility] [address]  Depression  Gastric ulcer, unspecified as acute or chronic, without mention of hemorrhage, perforation, or obstruction  GERD (gastroesophageal reflux disease)  Gout  Hypertension  Liver disease cirrhosis  Obesity, Class II, BMI 35-39.9  Pneumonia  PUD (peptic ulcer disease)  Seizures ([facility] [address] 2013  
 from alcohol withdrawal  
 Stroke [facility]) 2013  
 per pt, possible mini stroke from alcohol withdrawal (same time as seizure) Past Surgical History:  
Procedure Laterality Date  COLONOSCOPY N/A 7/2/2018 COLONOSCOPY performed by Papi Deleon MD at [address] HX COLONOSCOPY  07/2018  HX ENDOSCOPY    
 also colonoscopy  HX GI  1998  
 rectal abscess surgery  HX GI  1998  
 rectal abscess surgery  HX HEENT  1980  
 wisdom teeth Family History Problem Relation Age of Onset  Asthma Mother  Other Father   
     history of fall multiple injuries  Hypertension Brother  Cancer Paternal Grandmother   
     uncertain if colon or stomach  Cancer Paternal Grandfather   
     stomach cancer- dx mid [de-identified] Social History Tobacco Use  Smoking status: Former Smoker  Smokeless tobacco: Current User Types: Chew  Tobacco comment: currently uses snuff Substance Use Topics  Alcohol use: Yes Comment: 1/2 gallon bourbon every 2 days Allergies Allergen Reactions  Onion Nausea and Vomiting  Statins-Hmg-Coa Reductase Inhibitors Rash  
  rash Current Facility-Administered Medications Medication Dose Route Frequency  0.9% sodium chloride infusion  75 mL/hr IntraVENous CONTINUOUS  
 spironolactone (ALDACTONE) tablet 200 mg  200 mg Oral DAILY  LORazepam (ATIVAN) injection 2 mg  2 mg IntraVENous Q1H PRN  
 LORazepam (ATIVAN) injection 4 mg  4 mg IntraVENous Q1H PRN  
 dexmedeTOMidine (PRECEDEX) 400 mcg in 0.9% sodium chloride 100 mL infusion  0.2-1.4 mcg/kg/hr IntraVENous TITRATE  LORazepam (ATIVAN) injection 2 mg  2 mg IntraVENous Q1H PRN  thiamine HCL (B-1) tablet 100 mg  100 mg Oral DAILY  folic acid (FOLVITE) tablet 1 mg  1 mg Oral DAILY  therapeutic multivitamin (THERAGRAN) tablet 1 Tab  1 Tab Oral DAILY  ondansetron (ZOFRAN) injection 4 mg  4 mg IntraVENous Q4H PRN  
 allopurinol (ZYLOPRIM) tablet 300 mg  300 mg Oral Q12H  carvedilol (COREG) tablet 3.125 mg  3.125 mg Oral BID WITH MEALS  cyclobenzaprine (FLEXERIL) tablet 10 mg  10 mg Oral TID PRN  
 lactulose (CHRONULAC) 10 gram/15 mL solution 10 g  10 g Oral BID PRN  pantoprazole (PROTONIX) tablet 40 mg  40 mg Oral ACB  venlafaxine-SR (EFFEXOR-XR) capsule 150 mg  150 mg Oral DAILY  furosemide (LASIX) tablet 80 mg  80 mg Oral DAILY  hydrOXYzine HCl (ATARAX) tablet 25 mg  25 mg Oral TID  hydrALAZINE (APRESOLINE) 20 mg/mL injection 20 mg  20 mg IntraVENous Q6H PRN  
 
 
 
REVIEW OF SYSTEMS  
12 point ROS negative except as stated in the HPI. Physical Exam:  
Visit Vitals /72 Pulse 77 Temp 97.9 °F (36.6 °C) Resp 19 Ht 6' (1.829 m) Wt 108.7 kg (239 lb 10.2 oz) SpO2 98% BMI 32.50 kg/m² General:  Alert, cooperative, no distress, appears stated age. Head:  Normocephalic, without obvious abnormality, atraumatic. Eyes:  Conjunctivae/corneas clear. Nose: Nares normal. Septum midline. Mucosa normal.   
Throat: Lips, mucosa, and tongue normal.   
Neck: Supple, symmetrical, trachea midline, no adenopathy. Lungs:   Clear to auscultation bilaterally; diminished bs bilateral bases Chest wall:  No tenderness or deformity. Heart:  Regular rate and rhythm, S1, S2 normal, no murmur, click, rub or gallop. Abdomen:   Soft, non-tender. Bowel sounds normal. No masses,  No organomegaly. Extremities: Extremities normal, atraumatic, no cyanosis or edema. Pulses: 2+ and symmetric all extremities. Skin: Skin color, texture, turgor normal. No rashes or lesions Lymph nodes: Cervical, supraclavicular nodes normal.  
Neurologic: Grossly nonfocal  
 
 
Lab Results Component Value Date/Time  Sodium 136 04/07/2019 07:09 AM  
 Potassium 2.8 (L) 04/07/2019 07:09 AM  
 Chloride 103 04/07/2019 07:09 AM  
 CO2 27 04/07/2019 07:09 AM  
 BUN 4 (L) 04/07/2019 07:09 AM  
 Creatinine 0.70 04/07/2019 07:09 AM  
 Glucose 88 04/07/2019 07:09 AM  
 Calcium 7.3 (L) 04/07/2019 07:09 AM  
 Magnesium 1.6 04/06/2019 04:00 AM  
 Phosphorus 1.9 (L) 03/23/2019 12:27 AM  
 Lactic acid 1.5 03/21/2019 11:02 PM  
 
 
Lab Results Component Value Date/Time WBC 3.7 (L) 04/07/2019 04:16 AM  
 HGB 9.7 (L) 04/07/2019 04:16 AM  
 PLATELET 55 (L) 92/06/4387 04:16 AM  
 MCV 84.0 04/07/2019 04:16 AM  
 
 
Lab Results Component Value Date/Time INR 1.5 (H) 04/05/2019 06:38 AM  
 aPTT 34.7 (H) 03/21/2019 06:02 PM  
 AST (SGOT) 33 04/07/2019 07:09 AM  
 Alk. phosphatase 112 04/07/2019 07:09 AM  
 Protein, total 6.1 (L) 04/07/2019 07:09 AM  
 Albumin 2.2 (L) 04/07/2019 07:09 AM  
 Globulin 3.9 04/07/2019 07:09 AM  
 
 
Lab Results Component Value Date/Time Iron 61 01/10/2019 08:20 AM  
 TIBC 212 (L) 01/10/2019 08:20 AM  
 Iron % saturation 29 01/10/2019 08:20 AM  
 Ferritin 66 01/10/2019 08:20 AM  
 
 
Lab Results Component Value Date/Time TSH 2.490 09/08/2017 09:48 AM  
  
 
No results found for: PH, PHI, PCO2, PCO2I, PO2, PO2I, HCO3, HCO3I, FIO2, FIO2I Lab Results Component Value Date/Time  03/21/2019 06:02 PM  
 CK-MB Index 2.2 03/21/2019 06:02 PM  
 Troponin-I, Qt. <0.05 03/22/2019 01:54 AM  
  
 
Lab Results Component Value Date/Time Culture result: NO GROWTH AFTER 18 HOURS 04/05/2019 04:05 PM  
 Culture result: NO GROWTH 6 DAYS 03/21/2019 11:02 PM  
 Culture result: NO GROWTH 6 DAYS 03/21/2019 11:02 PM  
 
 
Lab Results Component Value Date/Time HEP B CORE Ab, IgM Negative 06/02/2011 04:08 AM  
 Hep B surface Ag Negative 06/02/2011 04:08 AM  
 HEPATITIS A Ab, IgM Negative 06/02/2011 04:08 AM  
 Hepatitis C Ab Index 0.14 06/02/2011 04:08 AM  
 
 
Lab Results Component Value Date/Time  03/21/2019 06:02 PM  
 CK 29 (L) 06/29/2011 04:30 AM  
 CK 27 (L) 06/29/2011 04:30 AM  
 
 
Lab Results Component Value Date/Time Color DARK YELLOW 04/05/2019 08:03 AM  
 Appearance CLEAR 04/05/2019 08:03 AM  
 Specific gravity 1.025 03/21/2019 06:09 PM  
 pH (UA) 6.5 04/05/2019 08:03 AM  
 Protein NEGATIVE  04/05/2019 08:03 AM  
 Glucose NEGATIVE  04/05/2019 08:03 AM  
 Ketone TRACE (A) 04/05/2019 08:03 AM  
 Bilirubin NEGATIVE  05/16/2014 09:00 PM  
 Blood SMALL (A) 04/05/2019 08:03 AM  
 Urobilinogen 4.0 (H) 04/05/2019 08:03 AM  
 Nitrites NEGATIVE  04/05/2019 08:03 AM  
 Leukocyte Esterase NEGATIVE  04/05/2019 08:03 AM  
 WBC 0-4 04/05/2019 08:03 AM  
 RBC 5-10 04/05/2019 08:03 AM  
 Bacteria NEGATIVE  04/05/2019 08:03 AM  
 
 
IMPRESSION 
· ETOH withdrawal 
· Liver cirrhosis with ascites s/p peritoneal catheter · Esophogeal varices s/p banding · Umbilical Hernia · Depression/Anxiety · Thrombocytopenia: no signs of bleeding · PUD · Anemia PLAN 
· Maintain oxygen saturations > 90%; currently on 2L NC; wean as tolerated · Replete K 
· Continue diuresis · CIWA protocol · Thiamine, folic acid, multivitamin · Add lactulose today; Hepatology/GI to dose daily · Hepatology, GS, and GI following; repeat EGD outpatient per GI 
· GI prophylaxis: Protonix · Tx to [facility] Thank you for allowing us to participate in the care of this patient. Patient stable from a pulmonary standpoint. Will place transfer orders to [facility]. Will sign off and see again if needed.  
 
Bethanne Harada, NP

## 2019-04-07 NOTE — PROGRESS NOTES
GI PROGRESS NOTE 
 
NAME:             Osiel Meeks. :              1960 MRN:              [id number] Admit Date:     2019 Todays Date:  2019 Subjective:  
 
    
Abdominal pain: none Nausea: mild nausea Vomiting: none Tremors continue Review of Systems - Respiratory ROS: no cough, shortness of breath, or wheezing Cardiovascular ROS: no chest pain or dyspnea on exertion Medications-reviewed Current Facility-Administered Medications Medication Dose Route Frequency  [START ON 2019] spironolactone (ALDACTONE) tablet 200 mg  200 mg Oral DAILY  LORazepam (ATIVAN) injection 2 mg  2 mg IntraVENous Q1H PRN  thiamine HCL (B-1) tablet 100 mg  100 mg Oral DAILY  folic acid (FOLVITE) tablet 1 mg  1 mg Oral DAILY  therapeutic multivitamin (THERAGRAN) tablet 1 Tab  1 Tab Oral DAILY  ondansetron (ZOFRAN) injection 4 mg  4 mg IntraVENous Q4H PRN  
 allopurinol (ZYLOPRIM) tablet 300 mg  300 mg Oral Q12H  carvedilol (COREG) tablet 3.125 mg  3.125 mg Oral BID WITH MEALS  cyclobenzaprine (FLEXERIL) tablet 10 mg  10 mg Oral TID PRN  
 lactulose (CHRONULAC) 10 gram/15 mL solution 10 g  10 g Oral BID PRN  pantoprazole (PROTONIX) tablet 40 mg  40 mg Oral ACB  venlafaxine-SR (EFFEXOR-XR) capsule 150 mg  150 mg Oral DAILY  furosemide (LASIX) tablet 80 mg  80 mg Oral DAILY  hydrOXYzine HCl (ATARAX) tablet 25 mg  25 mg Oral TID  hydrALAZINE (APRESOLINE) 20 mg/mL injection 20 mg  20 mg IntraVENous Q6H PRN Objective:  
 
Patient Vitals for the past 8 hrs: 
 BP Temp Pulse Resp SpO2  
19 1911 132/60 98 °F (36.7 °C) 84 18 99 % 19 1552 123/67 98.5 °F (36.9 °C) 100 18 98 % No intake/output data recorded.  07 -  1900 In: -  
Out: 20367 [Urine:2000; Drains:17854] EXAM:   
Visit Vitals /60 Pulse 84 Temp 98 °F (36.7 °C) Resp 18 Ht 6' (1.829 m) Wt 118 kg (260 lb 1.6 oz) SpO2 99% BMI 35.28 kg/m² General appearance: fatigued, cooperative, mild distress, appears stated age, flushed, moderately obese, disoriented, answering appropriately Lungs: clear to auscultation bilaterally Heart: regular rate and rhythm, S1, S2 normal, no murmur, click, rub or gallop Abdomen: soft, non-tender, ascites Bowel sounds normal. No masses,  no organomegaly Data Review Recent Labs 04/06/19 
0400 04/05/19 
1832 WBC 4.1 4.5 HGB 10.4* 11.4* HCT 31.3* 34.1*  
PLT 60* 86* Recent Labs 04/06/19 
0400 04/05/19 
4614 * 135* K 3.1* 3.1*  
 100 CO2 26 27 BUN 5* 3*  
CREA 0.74 0.68* GLU 97 94 CA 7.5* 7.9* Recent Labs 04/06/19 
0400 04/05/19 
0700 04/05/19 
0517 SGOT 48*  --  67* *  --  160* TP 6.9  --  7.7 ALB 2.3*  --  2.4*  
GLOB 4.6*  --  5.3*  
LPSE  --  187  --   
 
 
 
 
 
                
Assessment: 1. Decompensated alcoholic cirrhosis w/ ascites 2. Alcohol withdrawal continuing 3. Esophageal varices s/p banding 4. Alcohol abuse - on-going 5. Umbilical hernia Active Problems: 
  Umbilical hernia without obstruction and without gangrene (1/24/2018) Overview: Without gangrene or obstruction. ETOH abuse (4/5/2019) Plan:  
-Supportive management per primary team 
-Multivitamin, thiamine, folic acid, ativan 
-Continue diuretics  
-Paracentesis - if removing more than 6-8L, need to give albumin (ordered) -Need repeat EGD for esophageal varices at some point 
-WA protocol and monitor tremors - may need higher level of care Nuno Lucio MD

## 2019-04-07 NOTE — PROGRESS NOTES
2300 Verbal shift change report given to N. Leola Babinski RN (oncoming nurse) by Elmo Ragland RN (offgoing nurse). Report included the following information SBAR, Kardex, ED Summary, Procedure Summary, Intake/Output, Recent Results, Med Rec Status and Cardiac Rhythm NSR/sinus tachycardia. Patient alert and Oriented to self, disoriented to year, place and situation. Patient attempting to get out of bed to get water, agitated/restless Patient follows commands, speech mumbled with accent Patient continues to be agitated and attempting to get out of bed 
2329 Precedex started to help calm patient  
0000 Dr. Tania Hale called about flank drain and umbilical pain, unclamped drain Drained 550 mls tammy/yellow fluid 0144Precedex titrated down as patient is drowsy 6339 Precedex turned off for light sedation and soft blood pressures. 6096 Dr. Dennis Avery called for soft blood pressures and to request goodie bag, Normal saline ordered @ 75 mls/hour Patient drowsy, responds to voice, occasionally oriented to year/president Oriented to self No Ativan given during shift, pt too drowsy, will continue to let wake up before administering ativan.

## 2019-04-07 NOTE — PROGRESS NOTES
[address] 
  
  Alvin Flaherty MD, Mayda Nichole Cite Aidan Marcus, [place]  
  
  April Argentina Suki, MADHAVI Mojica, [facility]   AVELINO Walters NP [address] [facility] [address] 
  at [facility] 
  [address] [facility], [address] [address]  22. 
  [phone] FAX: [address] 
  [facility] 
  [address], [address] [facility], [address] 
  [phone] FAX: [phone]  
  
  
  
HEPATOLOGY PROGRESS NOTE The patient is a 66year old  male with a long history of excessive alcohol abuse. He has gone through alcohol rehab twice. He consumes 1/2 gallon of whiskey every 2 days. He came to the ED because of nausea, vomiting, tremor and weakness. Events of last evening noted. He is now in the ICU. Had been on precidex for aggitation but now just on PRN ativan.   
   
ASSESSMENT AND PLAN: 
Alcohol liver disease The diagnosis is based upon imaging, laboratory studies, a history of consuming alcohol in excess. The histologic severity has not been defined. The patient has not had any alcohol for 3 days. He is being monitored for alcohol withdrawal and DTs.   
Alcohol hepatitis There is elevation in liver transaminases in a pattern consistent with alcohol. There is an elevation in TBILI to 4.2 mg. The INR is prolonged to 1.5. The DF is under 32. The alcoholic hepatitis is mild and does not require steroids. Alcoholic hepatitis typically worsens after hospitalization and stopping alcohol. Please check INR until we know this is stable and not rising so we can track severity of ETOHitis.    
I have ordered INR for AM x 2. 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 The diagnosis of cirrhosis is based upon imaging, laboratory studies, The CTP is 10. Child class C. The MELD score is 16. 
  
Ascites Ascites is being treated with diuretics and paracentesis. No more drainage. Will remove paracentesis catheter today. Will continue the current dose of diuretics at step 2. The renal function is normal and ascites should be able to be controled with diuretics. 
  
Screening for Esophageal varices The patient has esophageal varices with prior bleeding. EGD is being performed on a regular basis to band varices. The last EGD to assess for varices was performed in 7/2018. Will need repeat EGD to assess for varices. Will try to do this just prior to DC. 
  
Anemia This is due to multifactorial causes including portal hypertension with chronic GI blood loss, bone marrow suppression secondary to alcohol. Will obtain FE panel to assess for iron stores. 
  
Thrombocytopenia This is secondary to cirrhosis. There is no evidence of overt bleeding. No treatment is required. 
  
Counseling for alcohol in patients with chronic liver disease The patient has cirrhosis and was advised to be abstinent from all alcohol including non-alcoholic beer which does contain some alcohol. 
  
  
PHYSICAL EXAMINATION: 
VS: per nursing note General:  Ill appearing Eyes:  Sclera anicteric. ENT:  No oral lesions. Thyroid normal. 
Nodes:  No adenopathy. Skin:  No spider angiomata. No jaundice. Respiratory:  Lungs clear to auscultation. Cardiovascular:  Regular heart rate. Abdomen:  Soft non-tender, No obvious ascites. Paracentesis catheter Extremities:  No lower extremity edema. No muscle wasting. Neurologic:  Alert and oriented. Lethargic. Cranial nerves grossly intact. No asterixis. 
  
LABORATORY: 
Results for Bishop Hart (MRN [id number]) as of 4/7/2019 14:35 Ref. Range 4/5/2019 06:38 4/6/2019 04:00 4/7/2019 07:09 WBC Latest Ref Range: 4.1 - 11.1 K/uL 4.5 4.1 3.7 (L) HGB Latest Ref Range: 12.1 - 17.0 g/dL 11.4 (L) 10.4 (L) 9.7 (L) PLATELET Latest Ref Range: 150 - 400 K/uL 86 (L) 60 (L) 55 (L) INR Latest Ref Range: 0.9 - 1.1   1.5 (H) Sodium Latest Ref Range: 136 - 145 mmol/L 135 (L) 134 (L) 136 Potassium Latest Ref Range: 3.5 - 5.1 mmol/L 3.1 (L) 3.1 (L) 2.8 (L) Chloride Latest Ref Range: 97 - 108 mmol/L 100 102 103 CO2 Latest Ref Range: 21 - 32 mmol/L 27 26 27 Glucose Latest Ref Range: 65 - 100 mg/dL 94 97 88 BUN Latest Ref Range: 6 - 20 MG/DL 3 (L) 5 (L) 4 (L) Creatinine Latest Ref Range: 0.70 - 1.30 MG/DL 0.68 (L) 0.74 0.70 Bilirubin, total Latest Ref Range: 0.2 - 1.0 MG/DL 3.7 (H) 4.2 (H) 4.3 (H) Albumin Latest Ref Range: 3.5 - 5.0 g/dL 2.4 (L) 2.3 (L) 2.2 (L) ALT (SGPT) Latest Ref Range: 12 - 78 U/L 24 19 14 AST Latest Ref Range: 15 - 37 U/L 67 (H) 48 (H) 33 Alk. phosphatase Latest Ref Range: 45 - 117 U/L 160 (H) 135 (H) 112 Ammonia Latest Ref Range: <32 UMOL/L  118 (H) MD Black Reyna Deputado [facility] [address] [place] [address], [address] [facility], [address]  [address]. 
[phone] [facility] [address]

## 2019-04-08 NOTE — ROUTINE PROCESS
1530: Bedside and Verbal shift change report given to Deacon Segura RN (oncoming nurse) by Elham Wise RN (offgoing nurse). Report included the following information SBAR, Kardex, ED Summary, Intake/Output, MAR, Recent Results, Med Rec Status, Cardiac Rhythm SR and Alarm Parameters . 1930: Bedside and Verbal shift change report given to Via Ese Gee RN (oncoming nurse) by Deacon Segura RN (offgoing nurse). Report included the following information SBAR, Kardex, ED Summary, Intake/Output, MAR, Recent Results, Med Rec Status, Cardiac Rhythm SR and Alarm Parameters .

## 2019-04-08 NOTE — CDMP QUERY
Patient with alcoholic cirrhosis admitted with Alcohol Withdrawal, noted to have Na 132/134. If possible, please document in progress notes and d/c summary if you are evaluating and/or treating any of the following: 
 
=> Mild Hyponatremia (POA) 
=> Other explanation of clinical findings 
=> Clinically Undetermined (no explanation for clinical findings) The medical record reflects the following: 
   Risk Factors: ETOH, cirrhosis with ascites diuretics Clinical Indicators: Na 132/134 Treatment: goody bag, monitor labs Thank you, Doc Manifold, RN 
[phone]

## 2019-04-08 NOTE — PROGRESS NOTES
Problem: Pressure Injury - Risk of 
Goal: *Prevention of pressure injury Description Document Brenden Scale and appropriate interventions in the flowsheet. Offload heels Turn approximately every 2 hours Outcome: Progressing Towards Goal 
  
Problem: Patient Education: Go to Patient Education Activity Goal: Patient/Family Education Outcome: Progressing Towards Goal 
  
Problem: Falls - Risk of 
Goal: *Absence of Falls Description Document Luke English Fall Risk and appropriate interventions in the flowsheet. Outcome: Progressing Towards Goal 
  
Problem: Patient Education: Go to Patient Education Activity Goal: Patient/Family Education Outcome: Progressing Towards Goal

## 2019-04-08 NOTE — PROGRESS NOTES
2000: Assumed care of the pt. Pt resting in bed, complaining of hunger. Pt is alert and oriented to self, location, and situation, disoriented to time. Shift summary: Pt CIWA scores remained <12, Ativan administered PRN. Neuro status remains unchanged. Paracentesis drain to gravity entire shift, drained ~3700 mL.

## 2019-04-08 NOTE — PROGRESS NOTES
Reason for Admission:   Sent to the ED with ETOH withdrawal CIWA 19. RRAT Score:     17 Do you (patient/family) have any concerns for transition/discharge? Not at this time Plan for utilizing home health: TBD Current Advanced Directive/Advance Care Plan:  Not on file, Father Ray Askew [phone] is [facility] Likelihood of readmission? Moderate r/t diagnosis Transition of Care Plan:    Home Care manager met with patient to explain role and discuss discharge planning. Patient was at  [address] [place] [address] [address]  For ETOH detox , but said he is not ready for that program and his plan will be to return to his home. Per patient he lives alone but has supportive parents. He sees Dr Amparo Carmona monthly and gets his medications through [facility]. Per patient he gets disability in the amount of $2,000.00 a month. Care management will follow for potential discharge needs. Rosie Hercules RN,CRM Care Management Interventions PCP Verified by CM: Yes(Dr Keith) Palliative Care Criteria Met (RRAT>21 & CHF Dx)?: No 
MyChart Signup: Yes Discharge Durable Medical Equipment: No 
Physical Therapy Consult: No 
Occupational Therapy Consult: No 
Speech Therapy Consult: No 
Current Support Network: Lives Alone Confirm Follow Up Transport: Family(Father: Ray Askew [phone]) Plan discussed with Pt/Family/Caregiver: Yes Discharge Location Discharge Placement: Home

## 2019-04-08 NOTE — PROGRESS NOTES
[address] 
[address] [facility] [address] [place], [address] 
[phone] GI PROGRESS NOTE [facility] 
 
 
NAME:   Mira Helms :    1960 MRN:    [id number] Assessment/Plan 1. Decompensated alcoholic cirrhosis w/ ascites s/p paracentesis and diuretics. -will defer management to Dr. Michel Castillo 2. Alcohol withdrawal   
-[facility] protocol 3. Esophageal varices s/p banding-last EGD in 2018. Will need endoscopy for PPX banding in near future - either prior to dc or close follow up outpatient. He has previously followed w/ Dr. Rodrigo Kelly - unclear previously if he was compliant with follow ups, etc.  
4. Alcohol abuse - on-going 5. Umbilical hernia Patient Active Problem List  
Diagnosis Code  
 HTN (hypertension) I10  
 Gout M10.9  Alcohol dependence with withdrawal ([address] F10.239  Anxiety F41.9  Recurrent major depressive disorder ([address] F33.9  Hypogonadism, male E29.1  Thrombocytopenia ([facility] [address] D69.6  Splenomegaly R16.1  History of alcohol abuse Z87.898  Prediabetes R73.03  
 Esophageal varices without bleeding (HCC) I85.00  Iron deficiency anemia D50.9  Gallbladder calculus without cholecystitis K80.20  Umbilical hernia without obstruction and without gangrene K42.9  Alcoholic cirrhosis (HCC) P39.70  Esophageal varices in alcoholic cirrhosis (HCC) L47.32, I85.10  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  Severe obesity with body mass index (BMI) of 35.0 to 39.9 with serious comorbidity (HCC) E66.01  
 Withdrawal symptoms, alcohol (HCC) F10.239  
 History of GI bleed Z87.19  
 Ascites R18.8  ETOH abuse F10.10 Subjective:  
 
Drowsy. Off precedex. Denies abd. Pain, nausea or vomiting. Objective: VITALS:  
Last 24hrs VS reviewed since prior hospitalist progress note. Most recent are: 
Visit Vitals /76 Pulse 84 Temp 98 °F (36.7 °C) Resp 23 Ht 6' (1.829 m) Wt 103.9 kg (229 lb 0.9 oz)  
SpO2 94% BMI 31.07 kg/m² Intake/Output Summary (Last 24 hours) at 4/8/2019 1517 Last data filed at 4/8/2019 1300 Gross per 24 hour Intake 1165 ml Output 9690 ml Net -8525 ml PHYSICAL EXAM: 
General:          WD, WN. Drowsy, cooperative, jaundiced, no acute distress.   
HEENT:           NC, Atraumatic. PERRLA, EOMI. Scleral icterus. Lungs:             CTA Bilaterally. No Wheezing/Rhonchi/Rales. Heart:              Regular rate and rhythm, No murmur, No Rubs, No Gallops Abdomen:        Soft, protuberant, distended, non-tender, large umbilical hernia w/ dressing present.  +Bowel sounds, no HSM. Pleural drain right lower abd. Neurologic:      No acute neurological distress. Psych:             Not anxious nor agitated. 
  
 
 
 
  
Lab Data Recent Results (from the past 12 hour(s)) PROTHROMBIN TIME + INR Collection Time: 04/08/19  3:50 AM  
Result Value Ref Range INR 1.7 (H) 0.9 - 1.1 Prothrombin time 17.2 (H) 9.0 - 11.1 sec METABOLIC PANEL, BASIC Collection Time: 04/08/19  3:50 AM  
Result Value Ref Range Sodium 138 136 - 145 mmol/L Potassium 3.3 (L) 3.5 - 5.1 mmol/L Chloride 107 97 - 108 mmol/L  
 CO2 25 21 - 32 mmol/L Anion gap 6 5 - 15 mmol/L Glucose 109 (H) 65 - 100 mg/dL BUN 4 (L) 6 - 20 MG/DL Creatinine 0.80 0.70 - 1.30 MG/DL  
 BUN/Creatinine ratio 5 (L) 12 - 20 GFR est AA >60 >60 ml/min/1.73m2 GFR est non-AA >60 >60 ml/min/1.73m2 Calcium 8.0 (L) 8.5 - 10.1 MG/DL MAGNESIUM Collection Time: 04/08/19  3:50 AM  
Result Value Ref Range Magnesium 1.7 1.6 - 2.4 mg/dL PHOSPHORUS Collection Time: 04/08/19  3:50 AM  
Result Value Ref Range Phosphorus 2.7 2.6 - 4.7 MG/DL  
CBC WITH AUTOMATED DIFF Collection Time: 04/08/19  3:50 AM  
Result Value Ref Range WBC 5.9 4.1 - 11.1 K/uL  
 RBC 3.77 (L) 4.10 - 5.70 M/uL  
 HGB 10.2 (L) 12.1 - 17.0 g/dL HCT 32.0 (L) 36.6 - 50.3 %  MCV 84.9 80.0 - 99.0 FL  
 MCH 27.1 26.0 - 34.0 PG  
 MCHC 31.9 30.0 - 36.5 g/dL RDW 22.3 (H) 11.5 - 14.5 % PLATELET 69 (L) 008 - 400 K/uL MPV 11.3 8.9 - 12.9 FL  
 NRBC 0.0 0  WBC ABSOLUTE NRBC 0.00 0.00 - 0.01 K/uL NEUTROPHILS 69 32 - 75 % LYMPHOCYTES 12 12 - 49 % MONOCYTES 13 5 - 13 % EOSINOPHILS 4 0 - 7 % BASOPHILS 1 0 - 1 % IMMATURE GRANULOCYTES 1 (H) 0.0 - 0.5 % ABS. NEUTROPHILS 4.1 1.8 - 8.0 K/UL  
 ABS. LYMPHOCYTES 0.7 (L) 0.8 - 3.5 K/UL  
 ABS. MONOCYTES 0.8 0.0 - 1.0 K/UL  
 ABS. EOSINOPHILS 0.3 0.0 - 0.4 K/UL  
 ABS. BASOPHILS 0.1 0.0 - 0.1 K/UL  
 ABS. IMM. GRANS. 0.0 0.00 - 0.04 K/UL  
 DF AUTOMATED Medications: Reviewed PMH/SH reviewed - no change compared to H&P Mid-Level Provider: Shavonne Bell NP Date/Time:  4/8/2019

## 2019-04-08 NOTE — PROGRESS NOTES
Recent events - noted. Mr. Danyelle Russ appears comfortable this PM. Tm 98.7 HR: 78 BP: 128/81 Resp Rate: 24 92% sat on room air. Intake/Output Summary (Last 24 hours) at 4/8/2019 1308 Last data filed at 4/8/2019 0800 Gross per 24 hour Intake 1165 ml Output 9790 ml Net -8625 ml Exam: Cor: RRR. Lungs: Bilateral breath sounds. Clear to auscultation. Abd: Soft. Non distended. Non tender. No guarding or rebound. Umbilical hernia reducible. Wound is clean. Labs:  
Recent Results (from the past 12 hour(s)) PROTHROMBIN TIME + INR Collection Time: 04/08/19  3:50 AM  
Result Value Ref Range INR 1.7 (H) 0.9 - 1.1 Prothrombin time 17.2 (H) 9.0 - 11.1 sec METABOLIC PANEL, BASIC Collection Time: 04/08/19  3:50 AM  
Result Value Ref Range Sodium 138 136 - 145 mmol/L Potassium 3.3 (L) 3.5 - 5.1 mmol/L Chloride 107 97 - 108 mmol/L  
 CO2 25 21 - 32 mmol/L Anion gap 6 5 - 15 mmol/L Glucose 109 (H) 65 - 100 mg/dL BUN 4 (L) 6 - 20 MG/DL Creatinine 0.80 0.70 - 1.30 MG/DL  
 BUN/Creatinine ratio 5 (L) 12 - 20 GFR est AA >60 >60 ml/min/1.73m2 GFR est non-AA >60 >60 ml/min/1.73m2 Calcium 8.0 (L) 8.5 - 10.1 MG/DL MAGNESIUM Collection Time: 04/08/19  3:50 AM  
Result Value Ref Range Magnesium 1.7 1.6 - 2.4 mg/dL PHOSPHORUS Collection Time: 04/08/19  3:50 AM  
Result Value Ref Range Phosphorus 2.7 2.6 - 4.7 MG/DL  
CBC WITH AUTOMATED DIFF Collection Time: 04/08/19  3:50 AM  
Result Value Ref Range WBC 5.9 4.1 - 11.1 K/uL  
 RBC 3.77 (L) 4.10 - 5.70 M/uL  
 HGB 10.2 (L) 12.1 - 17.0 g/dL HCT 32.0 (L) 36.6 - 50.3 % MCV 84.9 80.0 - 99.0 FL  
 MCH 27.1 26.0 - 34.0 PG  
 MCHC 31.9 30.0 - 36.5 g/dL RDW 22.3 (H) 11.5 - 14.5 % PLATELET 69 (L) 116 - 400 K/uL MPV 11.3 8.9 - 12.9 FL  
 NRBC 0.0 0  WBC ABSOLUTE NRBC 0.00 0.00 - 0.01 K/uL NEUTROPHILS 69 32 - 75 %  LYMPHOCYTES 12 12 - 49 % MONOCYTES 13 5 - 13 % EOSINOPHILS 4 0 - 7 % BASOPHILS 1 0 - 1 % IMMATURE GRANULOCYTES 1 (H) 0.0 - 0.5 % ABS. NEUTROPHILS 4.1 1.8 - 8.0 K/UL  
 ABS. LYMPHOCYTES 0.7 (L) 0.8 - 3.5 K/UL  
 ABS. MONOCYTES 0.8 0.0 - 1.0 K/UL  
 ABS. EOSINOPHILS 0.3 0.0 - 0.4 K/UL  
 ABS. BASOPHILS 0.1 0.0 - 0.1 K/UL  
 ABS. IMM. GRANS. 0.0 0.00 - 0.04 K/UL  
 DF AUTOMATED At this point in time, do not believe that there is an indication for umbilical hernia repair. Diet as tolerated. Continue local wound care. Hepatology input - noted. Plans per Dr. Judy Mart. Following.

## 2019-04-08 NOTE — PROGRESS NOTES
Hospitalist Progress Note Wilfrid He MD 
Answering service: [phone] OR [phone] from in house phone Date of Service:  2019 NAME:  Mimi Verde :  1960 MRN:  [id number] Admission Summary:  
66 y.o M with PMH of chronic severe alcoholism,liver cirrhosis,chronic anemia,thromboctyopenia,gastric ulcer,variceal bleeding was sent to the hospital because of alcohol withdrawal symptoms from a rehab Interval history / Subjective:  
  
States that he still has tremors. Alert and oriented X himself,place. Was eating lunch. Assessment & Plan: #Alcohol withdrawal with chronic alcoholism: 
-Etoh level at admission 143. 
-continue CIWA protocol with prn Iv ativan - CIWA scores ranging b/w 8-12 
-off precedex since  
-seizure precautions 
-advised to quit drinking alcohol -will need to go to alcoholic rehab program at discharge 
-thiamine,folic acid #Hypokalemia and hypomagnesemia:replete as needed #Alcoholic liver cirrhosis :Ascites: 
-heavy alcohol use -drinks 1/2 gallon of whiskey every 2 days,last drink per patient was Thursday. -CT abdomen shows liver cirrhosis 
-MELD score 18 child class C 
-s/p temporary  peritoneal cath placement for ascites -still draining 3 lt in last 24 hrs. Clamp the catheter today and continue diuretics. Hopefully can remove cath tomorrow. 
-will continue 40 mg lasix and  spironolactone  
-Fluids studies - white count 135. No SBP 
-hepatologist following 
-Ammonia 118 - scheduled lactulose added 
-will need EGD prior to discharge for varices reassessment #Umbilical hernia: 
-reducible. No surgical intervention required per gen surgey 
-continue local wound care as he has excoriated skin # Chronic thrombocytopenia:due to cirrhosis #Chronic anemia: h/o varices - monitor Hb Code status: full DVT prophylaxis: SCD Care Plan discussed with:patient,nurse, Disposition: TBD  
 
Hospital Problems  Date Reviewed: 4/5/2019 Codes Class Noted POA  
 ETOH abuse ICD-10-CM: F10.10 ICD-9-CM: 305.00  4/5/2019 Unknown Umbilical hernia without obstruction and without gangrene ICD-10-CM: K42.9 ICD-9-CM: 553.1  1/24/2018 Unknown Overview Signed 1/24/2018  3:04 PM by Shakila Covington MD  
  Without gangrene or obstruction. Review of Systems: As per HPI Vital Signs:  
 Last 24hrs VS reviewed since prior progress note. Most recent are: 
Visit Vitals /76 Pulse 84 Temp 98 °F (36.7 °C) Resp 23 Ht 6' (1.829 m) Wt 103.9 kg (229 lb 0.9 oz) SpO2 94% BMI 31.07 kg/m² Intake/Output Summary (Last 24 hours) at 4/8/2019 1542 Last data filed at 4/8/2019 1300 Gross per 24 hour Intake 1165 ml Output 9690 ml Net -8525 ml Physical Examination:  
 
 
     
Constitutional:  middle age male   
ENT:  Oral mucous moist, oropharynx benign. Neck supple, Resp:  CTA bilaterally. No wheezing/rhonchi/rales. No accessory muscle use CV:  Regular rhythm,normal S1 S2  
 GI:  Soft, reducible umbilical hernia with excorieated skin ,distended abdomen. Musculoskeletal:  1-2+ LE edema Neurologic:  alert and oriented to himself,place. moves all extremities,tremors present Data Review:  
 Review and/or order of clinical lab test 
Review and/or order of tests in the medicine section of [facility] Labs:  
 
Recent Labs 04/08/19 
0350 04/07/19 
1874 WBC 5.9 3.7* HGB 10.2* 9.7* HCT 32.0* 30.0* PLT 69* 55* Recent Labs 04/08/19 
0350 04/07/19 
0709 04/06/19 
2035 04/06/19 
0400  136 132* 134* K 3.3* 2.8* 3.1* 3.1*  
 103 99 102 CO2 25 27 28 26 BUN 4* 4* 4* 5*  
CREA 0.80 0.70 0.81 0.74 * 88 87 97  
CA 8.0* 7.3* 7.7* 7.5* MG 1.7 1.8  --  1.6 PHOS 2.7  --   --   --   
 
Recent Labs 04/07/19 
0709 04/06/19 
0400 SGOT 33 48* ALT 14 19  135* TBILI 4.3* 4.2* TP 6. 1* 6.9 ALB 2.2* 2.3*  
GLOB 3.9 4.6* Recent Labs 04/08/19 
0350 INR 1.7* PTP 17.2* No results for input(s): FE, TIBC, PSAT, FERR in the last 72 hours. Lab Results Component Value Date/Time Folate 13.7 03/22/2019 01:54 AM  
  
No results for input(s): PH, PCO2, PO2 in the last 72 hours. No results for input(s): CPK, CKNDX, TROIQ in the last 72 hours. No lab exists for component: CPKMB Lab Results Component Value Date/Time Cholesterol, total 116 09/08/2017 09:48 AM  
 HDL Cholesterol 40 09/08/2017 09:48 AM  
 LDL, calculated 64 09/08/2017 09:48 AM  
 Triglyceride 61 09/08/2017 09:48 AM  
 CHOL/HDL Ratio 7.6 (H) 07/25/2013 04:15 AM  
 
Lab Results Component Value Date/Time Glucose (POC) 118 (H) 07/07/2016 07:11 AM  
 Glucose (POC) 189 (H) 03/13/2014 08:16 PM  
 Glucose (POC) 118 (H) 08/10/2013 05:45 PM  
 Glucose (POC) 230 (H) 08/09/2013 08:18 PM  
 Glucose (POC) 143 (H) 07/24/2013 01:56 PM  
 Glucose  10/14/2014 10:50 AM  
 Glucose  08/14/2014 07:32 AM  
 
Lab Results Component Value Date/Time Color DARK YELLOW 04/05/2019 08:03 AM  
 Appearance CLEAR 04/05/2019 08:03 AM  
 Specific gravity 1.012 04/05/2019 08:03 AM  
 Specific gravity 1.025 03/21/2019 06:09 PM  
 pH (UA) 6.5 04/05/2019 08:03 AM  
 Protein NEGATIVE  04/05/2019 08:03 AM  
 Glucose NEGATIVE  04/05/2019 08:03 AM  
 Ketone TRACE (A) 04/05/2019 08:03 AM  
 Bilirubin NEGATIVE  05/16/2014 09:00 PM  
 Urobilinogen 4.0 (H) 04/05/2019 08:03 AM  
 Nitrites NEGATIVE  04/05/2019 08:03 AM  
 Leukocyte Esterase NEGATIVE  04/05/2019 08:03 AM  
 Epithelial cells FEW 04/05/2019 08:03 AM  
 Bacteria NEGATIVE  04/05/2019 08:03 AM  
 WBC 0-4 04/05/2019 08:03 AM  
 RBC 5-10 04/05/2019 08:03 AM  
 
 
 
Medications Reviewed:  
 
Current Facility-Administered Medications Medication Dose Route Frequency  potassium chloride SR (KLOR-CON 10) tablet 40 mEq  40 mEq Oral BID  [START ON 4/9/2019] thiamine HCL (B-1) tablet 100 mg  100 mg Oral DAILY  [START ON 1/3/1120] folic acid (FOLVITE) tablet 1 mg  1 mg Oral DAILY  lactulose (CHRONULAC) solution 20 g  20 g Oral TID  furosemide (LASIX) tablet 40 mg  40 mg Oral DAILY  spironolactone (ALDACTONE) tablet 100 mg  100 mg Oral DAILY  LORazepam (ATIVAN) injection 2 mg  2 mg IntraVENous Q1H PRN  
 LORazepam (ATIVAN) injection 4 mg  4 mg IntraVENous Q1H PRN  
 ondansetron (ZOFRAN) injection 4 mg  4 mg IntraVENous Q4H PRN  
 allopurinol (ZYLOPRIM) tablet 300 mg  300 mg Oral Q12H  cyclobenzaprine (FLEXERIL) tablet 10 mg  10 mg Oral TID PRN  pantoprazole (PROTONIX) tablet 40 mg  40 mg Oral ACB  venlafaxine-SR (EFFEXOR-XR) capsule 150 mg  150 mg Oral DAILY  hydrOXYzine HCl (ATARAX) tablet 25 mg  25 mg Oral TID  hydrALAZINE (APRESOLINE) 20 mg/mL injection 20 mg  20 mg IntraVENous Q6H PRN  
 
______________________________________________________________________ EXPECTED LENGTH OF STAY: 2d 7h 
ACTUAL LENGTH OF STAY:          3 Beth Cheung MD

## 2019-04-09 NOTE — ROUTINE PROCESS
----- Message from Chelsie Kenny sent at 8/27/2018  3:16 PM CDT -----  Contact: call pt at [phone]  Patient would like to get test results    Name of test (lab, mammo, etc.):   Labs and CT scan    Date of test:  08/21/ and 08/23/2018    Ordering provider: broderick    Where was the test performed:  [place] [facility] [place]     Would you prefer a response via [employer]?:  no    Comments:     TRANSFER - IN REPORT: 
 
Verbal report received from Dimas(name) on Anisa Blunt.  being received from ICU(unit) for routine progression of care Report consisted of patients Situation, Background, Assessment and  
Recommendations(SBAR). Information from the following report(s) SBAR, Kardex, Procedure Summary, Intake/Output, MAR, Accordion, Recent Results and Cardiac Rhythm NSR was reviewed with the receiving nurse. Opportunity for questions and clarification was provided. Assessment completed upon patients arrival to unit and care assumed.

## 2019-04-09 NOTE — PROGRESS NOTES
0730: Bedside and Verbal shift change report given to Romina Rowan RN (oncoming nurse) by Kenzie العراقي RN (offgoing nurse). Report included the following information SBAR, Kardex, ED Summary, OR Summary, Intake/Output, MAR, Accordion, Recent Results, Med Rec Status and Cardiac Rhythm NSR.

## 2019-04-09 NOTE — ROUTINE PROCESS
Bedside and Verbal shift change report given to TY (oncoming nurse) by Kevin Felton (offgoing nurse). Report included the following information SBAR, Kardex, Intake/Output, MAR, Accordion, Recent Results and Cardiac Rhythm NSR.

## 2019-04-09 NOTE — PROGRESS NOTES
Problem: Mobility Impaired (Adult and Pediatric) Goal: *Acute Goals and Plan of Care (Insert Text) Description Physical Therapy Goals Initiated 4/9/2019 1. Patient will move from supine to sit and sit to supine  and scoot up and down in bed with modified independence within 7 day(s). 2.  Patient will transfer from bed to chair and chair to bed with modified independence using the least restrictive device within 7 day(s). 3.  Patient will perform sit to stand with modified independence within 7 day(s). 4.  Patient will ambulate with modified independence for 300 feet with the least restrictive device within 7 day(s). 5.  Patient will ascend/descend 2 stairs with handrail(s) with modified independence within 7 day(s). Outcome: Progressing Towards Goal 
 
PHYSICAL THERAPY EVALUATION Patient: Thomas Storm (62 y.o. male) Date: 4/9/2019 Primary Diagnosis: ETOH abuse [F10.10] Precautions:   Fall, Bed Alarm, Seizure ASSESSMENT :  
Based on the objective data described below, patient presents with impaired functional mobility as compared to baseline level 2* generalized weakness, tremors, decreased tolerance to activity, increased processing time, impaired balance, and impaired gait following admission for EtOH withdrawal. Prior to this admission, pt reports that he lived at home alone and was indep with all ADLs and mobility w/o use of AD. Per chart, he was attending an alcohol rehab program at a [facility]. Today, he required up to min A x2 for sit<>stands and initiation of short distance ambulation with B HHA and physical assist for path guidance, stability, and obstacle avoidance. Quick fatigue and poor insight overall leading to increased falls risk. Hopeful that he will continue to progress well as he medically improves however may require short stay physical rehab prior to returning to alcohol rehab. Will continue to follow.   
The following are barriers to independence while in acute care:  
-Cognitive and/or behavioral: attention to task, processing, safety awareness and insight into deficits 
-Medical condition: strength, functional endurance, standing balance and medical history   
-Other:    
 
The patient will benefit from skilled acute intervention to address the above impairments and their rehabilitation potential is considered to be Good Discharge recommendations: To be determined between the prior selections, based on patient progress during hospital stay Patient's barriers to discharging home, in addition to above impairments: lives alone 
level of physical assist required to maintain patient safety. Equipment recommendations for successful discharge (if) home: tbd PLAN : 
Recommendations and Planned Interventions: bed mobility training, transfer training, gait training, therapeutic exercises, neuromuscular re-education, patient and family training/education and therapeutic activities Frequency/Duration: Patient will be followed by physical therapy  4 times a week to address goals. SUBJECTIVE:  
Patient stated ?i can't get my crackers open . ? OBJECTIVE DATA SUMMARY:  
HISTORY:   
Past Medical History:  
Diagnosis Date Adverse effect of anesthesia   
 pt reports waking up during colonoscopy/endoscopy Alcohol abuse 6/1/2011 Anemia NEC Anxiety Ascites   
 has to have fluid drained occasionally Cirrhosis of liver ([facility] [address] Depression Gastric ulcer, unspecified as acute or chronic, without mention of hemorrhage, perforation, or obstruction GERD (gastroesophageal reflux disease) Gout Hypertension Liver disease   
 cirrhosis Obesity, Class II, BMI 35-39.9 Pneumonia PUD (peptic ulcer disease) Seizures ([facility] [address] 2013  
 from alcohol withdrawal  
 Stroke [facility]) 2013  
 per pt, possible mini stroke from alcohol withdrawal (same time as seizure) Past Surgical History:  
Procedure Laterality Date COLONOSCOPY N/A 7/2/2018 COLONOSCOPY performed by Marilu Vallejo MD at [place] [address] COLONOSCOPY  07/2018 HX ENDOSCOPY    
 also colonoscopy HX GI  1998  
 rectal abscess surgery HX GI  1998  
 rectal abscess surgery HX HEENT  1980  
 wisdom teeth Prior Level of Function/Home Situation: lives at home alone; indep at baseline Personal factors and/or comorbidities impacting plan of care: PMHx Home Situation Home Environment: Private residence # Steps to Enter: 2 Rails to Enter: Yes One/Two Story Residence: One story Living Alone: Yes Support Systems: Family member(s) Patient Expects to be Discharged to[de-identified] Unknown Current DME Used/Available at Home: Cane, straight Tub or Shower Type: Shower EXAMINATION/PRESENTATION/DECISION MAKING:  
Critical Behavior: 
Neurologic State: Alert Orientation Level: Oriented X4 Cognition: Follows commands, Decreased command following Safety/Judgement: Awareness of environment, Insight into deficits Hearing: Auditory Auditory Impairment: None Skin:  intact where exposed Edema: none noted Range Of Motion: 
AROM: Generally decreased, functional 
  
 
Strength:   
Strength: Generally decreased, functional 
 
  
Tone & Sensation:  
 
Sensation: Intact Coordination: 
Coordination: Generally decreased, functional 
Vision:  
Tracking: Able to track stimulus in all quadrants w/o difficulty Diplopia: No 
Functional Mobility: 
Bed Mobility: 
Supine to Sit: Contact guard assistance Transfers: 
Sit to Stand: Assist x2;Minimum assistance Stand to Sit: Assist x2;Minimum assistance Bed to Chair: Assist x2;Minimum assistance Balance:  
Sitting: Impaired; Without support Sitting - Static: Good (unsupported) Standing: With support; Impaired Standing - Static: Fair;Constant support Standing - Dynamic : Fair Ambulation/Gait Training: 
Distance (ft): 50 Feet (ft) Assistive Device: Gait belt(B HHA) Ambulation - Level of Assistance: Assist x2;Minimal assistance Gait Description (WDL): Exceptions to [facility] Gait Abnormalities: Altered arm swing;Decreased step clearance; Path deviations; Shuffling gait Base of Support: Narrowed Speed/Gabbie: Shuffled;Fluctuations Step Length: Right shortened;Left shortened Functional Measure: 
Tinetti test: 
 
Sitting Balance: 1 Arises: 0 Attempts to Rise: 0 Immediate Standing Balance: 0 Standing Balance: 0 Nudged: 0 Eyes Closed: 0 Turn 360 Degrees - Continuous/Discontinuous: 0 Turn 360 Degrees - Steady/Unsteady: 0 Sitting Down: 1 Balance Score: 2 Indication of Gait: 1 
R Step Length/Height: 1 L Step Length/Height: 1 
R Foot Clearance: 1 L Foot Clearance: 1 Step Symmetry: 1 Step Continuity: 0 Path: 0 Trunk: 0 Walking Time: 1 Gait Score: 7 Total Score: 9 Tinetti Tool Score Risk of Falls 
<19 = High Fall Risk 19-24 = Moderate Fall Risk 25-28 = Low Fall Risk Tinetti ME. Performance-Oriented Assessment of Mobility Problems in Elderly Patients. [address]; [id number]. (Scoring Description: PT Bulletin Feb. 10, 1993) Older adults: Symone Adorno et al, 2009; n = [address] [place] elderly evaluated with ABC, RAMLIA, ADL, and IADL) · Mean RAMILA score for males aged 69-68 years = 26.21(3.40) · Mean RAMILA score for females age 69-68 years = 25.16(4.30) · Mean RAMILA score for males over 80 years = 23.29(6.02) · Mean RAMILA score for females over 80 years = 17.20(8.32) Physical Therapy Evaluation Charge Determination History Examination Presentation Decision-Making HIGH Complexity :3+ comorbidities / personal factors will impact the outcome/ POC  MEDIUM Complexity : 3 Standardized tests and measures addressing body structure, function, activity limitation and / or participation in recreation  LOW Complexity : Stable, uncomplicated  HIGH Complexity : FOTO score of 1- 25 Based on the above components, the patient evaluation is determined to be of the following complexity level: LOW Activity Tolerance:  
Good Please refer to the flowsheet for vital signs taken during this treatment. After treatment patient left:  
Up in chair Bed alarm/tab alert on Call light within reach RN notified COMMUNICATION/EDUCATION:  
The patient?s plan of care was discussed with: Occupational Therapist and Registered Nurse. Fall prevention education was provided and the patient/caregiver indicated understanding., Patient/family have participated as able in goal setting and plan of care. and Patient/family agree to work toward stated goals and plan of care. Thank you for this referral. 
Dwain Sandoval, PT, DPT Time Calculation: 17 mins
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
## 2019-04-09 NOTE — PROGRESS NOTES
Problem: Self Care Deficits Care Plan (Adult) Goal: *Acute Goals and Plan of Care (Insert Text) Description Occupational Therapy Goals Initiated 4/9/2019 1. Patient will perform standing ADLs at sink with supervision/set-up within 7 day(s). 2.  Patient will perform upper body dressing and lower body dressing with supervision/set-up within 7 day(s). 3.  Patient will perform bathing with supervision/set-up within 7 day(s). 4.  Patient will perform toilet transfers with supervision/set-up within 7 day(s). 5.  Patient will perform all aspects of toileting with supervision/set-up within 7 day(s). Outcome: Progressing Towards Goal 
 
OCCUPATIONAL THERAPY EVALUATION Patient: Capo Vera. (62 y.o. male) Date: 4/9/2019 Primary Diagnosis: ETOH abuse [F10.10] Precautions:   Fall, Bed Alarm ASSESSMENT : 
Based on the objective data described below, patient presents with Setup upper body ADLs, Moderate Assistance lower body ADLs, and min A x 2 via HHA for functional mobility. Pt admitted for ascites, ETOH cirrhosis. Pt admitted from ETOH rehab program at [facility]. This date, pt oriented x 4 and followed one step commands with increased time. Increased tremors noted, impacting pt's fine motor skills to manage opening of food containers and utensils during food to mouth. He is functioning below his independent ADL baseline but expect to improve. Pt would continue to beenfit from further ETOH rehab but may require short therapy rehab stay prior. Noted in chart, pt wants to return home rather than [place]. The following are barriers to ADL independence while in acute care:  
- Cognitive and/or behavioral: safety awareness and insight into deficits - Medical condition: standing balance and coordination   
- Other:    
 
Patient will benefit from skilled acute intervention to address the above impairments. Patient?s rehabilitation potential is considered to be Good Discharge recommendations: To be determined between the prior selections, based on patient progress during hospital stay If above is not an option then recommend: pt would benefit from continuation of his ETOH rehab program 
 
Barriers to discharging home, in addition to above listed impairments: lives alone 
level of physical assist required to maintain patient safety. Pt with strong ETOH abuse hx Equipment recommendations for successful discharge (if) home: none PLAN : 
Recommendations and Planned Interventions: self care training, balance training and endurance activities Frequency/Duration: Patient will be followed by occupational therapy 4 times a week to address goals. SUBJECTIVE:  
Patient stated ? I need to use the bathroom. ? OBJECTIVE DATA SUMMARY:  
HISTORY:  
Past Medical History:  
Diagnosis Date Adverse effect of anesthesia   
 pt reports waking up during colonoscopy/endoscopy Alcohol abuse 6/1/2011 Anemia NEC Anxiety Ascites   
 has to have fluid drained occasionally Cirrhosis of liver ([facility] [address] Depression Gastric ulcer, unspecified as acute or chronic, without mention of hemorrhage, perforation, or obstruction GERD (gastroesophageal reflux disease) Gout Hypertension Liver disease   
 cirrhosis Obesity, Class II, BMI 35-39.9 Pneumonia PUD (peptic ulcer disease) Seizures ([facility] [address] 2013  
 from alcohol withdrawal  
 Stroke [facility]) 2013  
 per pt, possible mini stroke from alcohol withdrawal (same time as seizure) Past Surgical History:  
Procedure Laterality Date COLONOSCOPY N/A 7/2/2018 COLONOSCOPY performed by Katelin Hurst MD at [place] [address] COLONOSCOPY  07/2018 HX ENDOSCOPY    
 also colonoscopy HX GI  1998  
 rectal abscess surgery HX GI  1998  
 rectal abscess surgery HX HEENT  1980  
 wisdom teeth Prior Level of Function/Environment/Context: lives alone.  Was at [facility] for ETOH rehab prior to admission. Pt ambulates without device, independent. He no longer works and is no disability. Occupations in which the patient is/was successful, what are the barriers preventing that success:  
Performance Patterns (routines, roles, habits, and rituals):  
Personal Interests and/or values:  
Expanded or extensive additional review of patient history: ETOH abuse,seizures, CVA (related to alcohol) Home Situation Home Environment: Private residence # Steps to Enter: 2 One/Two Story Residence: One story Living Alone: Yes Support Systems: Family member(s) Patient Expects to be Discharged to[de-identified] Private residence Current DME Used/Available at Home: Cane, straight Tub or Shower Type: Shower Hand dominance: Right EXAMINATION OF PERFORMANCE DEFICITS: 
Cognitive/Behavioral Status: 
Neurologic State: Alert Orientation Level: Oriented X4 Cognition: Decreased attention/concentration; Follows commands Perception: Appears intact Perseveration: No perseveration noted Safety/Judgement: Awareness of environment; Fall prevention Skin: intact Edema: none noted Hearing: Auditory Auditory Impairment: None Vision/Perceptual:   
Tracking: Able to track stimulus in all quadrants w/o difficulty Range of Motion: 
 
AROM: Generally decreased, functional 
  
  
  
  
  
  
  
 
Strength: 
 
Strength: Generally decreased, functional 
  
  
  
  
 
Coordination: 
Coordination: Generally decreased, functional(intention tremors 2* ETOH) Fine Motor Skills-Upper: Left Impaired;Right Impaired(ETOH tremors, intention) Gross Motor Skills-Upper: Left Intact; Right Intact Tone & Sensation: 
 
  
Sensation: Intact Balance: 
Sitting: Impaired; Without support Sitting - Static: Good (unsupported) Standing: Impaired; With support Standing - Static: Constant support; Fair 
Standing - Dynamic : Fair Functional Mobility and Transfers for ADLs: 
Bed Mobility: 
Supine to Sit: Contact guard assistance Transfers: 
Sit to Stand: Minimum assistance;Assist x2; Additional time Stand to Sit: Minimum assistance;Assist x2 Bed to Chair: Minimum assistance;Assist x2 Toilet Transfer : Minimum assistance;Assist x2(pt incontinent on floor prior to toilet transfer) ADL Assessment: 
Feeding: Setup;Minimum assistance(A for fork to mouth, opening of containers) Oral Facial Hygiene/Grooming: Minimum assistance Bathing: Moderate assistance(decreased dynamic balance) Upper Body Dressing: Setup Lower Body Dressing: Moderate assistance(don socks, doffed seated) Toileting: Minimum assistance(standing balance) ADL Intervention and task modifications: 
  
 
 Pt was CG for bed mobility, min A x 2 to stand via HHA with cues to push from bed. Pt completed transfer to chair then toilet with MIN A X2. Pt incontinent of urine prior to reaching commode, required A for brief management. Setup to open containers on table tray, min A intermittently to bring fork to mouth 2* tremors. Cognitive Retraining Safety/Judgement: Awareness of environment; Fall prevention Functional Measure: 
Barthel Index: 
 
Bathin Bladder: 5 Bowels: 10 
Groomin Dressin Feedin Mobility: 10 Stairs: 0 Toilet Use: 5 Transfer (Bed to Chair and Back): 10 Total: 50/100 Percentage of impairment  
0% 1-19% 20-39% 40-59% 60-79% 80-99% 100% Barthel Score 0-100 100 99-80 79-60 59-40 20-39 1-19 
 0 The Barthel ADL Index: Guidelines 1. The index should be used as a record of what a patient does, not as a record of what a patient could do. 2. The main aim is to establish degree of independence from any help, physical or verbal, however minor and for whatever reason. 3. The need for supervision renders the patient not independent. 4. A patient's performance should be established using the best available evidence.  Asking the patient, friends/relatives and nurses are the usual sources, but direct observation and common sense are also important. However direct testing is not needed. 5. Usually the patient's performance over the preceding 24-48 hours is important, but occasionally longer periods will be relevant. 6. Middle categories imply that the patient supplies over 50 per cent of the effort. 7. Use of aids to be independent is allowed. Windy Yu., Barthel, D.W. (7352). Functional evaluation: the Barthel Index. [address] [facility] (14)2. Stan Hamlin ramesh JESSIKA Carpenter, Lane Love., Clau Hicks., [place], [address] [facility] (1999). Measuring the change indisability after inpatient rehabilitation; comparison of the responsiveness of the Barthel Index and Functional [place] Measure. Journal of Neurology, Neurosurgery, and Psychiatry, 66(4), 335-111. Marta Koehler NDARLING.A, SHAYNE Curran, & Kaitlin Mckinnon, M.A. (2004.) Assessment of post-stroke quality of life in cost-effectiveness studies: The usefulness of the Barthel Index and the EuroQoL-5D. [facility], 13, 249-04 Occupational Therapy Evaluation Charge Determination History Examination Decision-Making LOW Complexity : Brief history review  LOW Complexity : 1-3 performance deficits relating to physical, cognitive , or psychosocial skils that result in activity limitations and / or participation restrictions  MEDIUM Complexity : Patient may present with comorbidities that affect occupational performnce. Miniml to moderate modification of tasks or assistance (eg, physical or verbal ) with assesment(s) is necessary to enable patient to complete evaluation Based on the above components, the patient evaluation is determined to be of the following complexity level: LOW Activity Tolerance:  
Good Please refer to the flowsheet for vital signs taken during this treatment. After treatment patient left:  
Up in chair Bed alarm/tab alert on 
RN notified COMMUNICATION/EDUCATION:  
The patient?s plan of care was discussed with: Physical Therapist, Registered Nurse and Rehabilitation Attendant. Patient/family have participated as able in goal setting and plan of care. This patient?s plan of care is appropriate for delegation to YAK[facility]. Thank you for this referral. 
Vernell Vaughn OT Time Calculation: 27 mins

## 2019-04-09 NOTE — PROGRESS NOTES
Hospitalist Progress Note Vadim Kramer MD 
Answering service: [phone] OR [phone] from in house phone Date of Service:  2019 NAME:  Roya Patino :  1960 MRN:  [id number] Admission Summary:  
66 y.o M with PMH of chronic severe alcoholism,liver cirrhosis,chronic anemia,thromboctyopenia,gastric ulcer,variceal bleeding was sent to the hospital because of alcohol withdrawal symptoms from a rehab Interval history / Subjective:  
  
still has tremors. Alert and oriented X 2 Assessment & Plan: #Alcohol withdrawal with chronic alcoholism: 
-Etoh level at admission 143. 
-continue CIWA protocol with prn Iv ativan  
-off precedex since  
-seizure precautions 
-advised to quit drinking alcohol  
-will need to go to alcoholic rehab program at discharge 
-thiamine,folic acid #Hypokalemia and hypomagnesemia:replete as needed #Alcoholic liver cirrhosis with ascitis 
-heavy alcohol use -drinks 1/2 gallon of whiskey every 2 days,last drink per patient was Thursday. -CT abdomen shows liver cirrhosis 
-MELD score 18 child class C 
-s/p temporary  peritoneal cath placement for ascites  
-still draining 3 lt in last 24 hrs. Clamp the catheter today and continue diuretics. Hopefully can remove cath tomorrow. 
-will continue 40 mg lasix and  spironolactone  
-Fluids studies - white count 135. No SBP 
-hepatologist following 
-Ammonia 118 - scheduled lactulose added 
-will need EGD prior to discharge for varices reassessment #Umbilical hernia: 
-reducible. No surgical intervention required per gen surgey 
-continue local wound care as he has excoriated skin # Chronic thrombocytopenia:due to cirrhosis #Chronic anemia: h/o varices - monitor Hb # CDMP hyponatremia mild Code status: full DVT prophylaxis: SCD Care Plan discussed with:patient,nurse, Disposition: TBD Hospital Problems  Date Reviewed: 4/5/2019 Codes Class Noted POA  
 ETOH abuse ICD-10-CM: F10.10 ICD-9-CM: 305.00  4/5/2019 Unknown Umbilical hernia without obstruction and without gangrene ICD-10-CM: K42.9 ICD-9-CM: 553.1  1/24/2018 Unknown Overview Signed 1/24/2018  3:04 PM by Holly Guillen MD  
  Without gangrene or obstruction. Review of Systems: As per HPI Vital Signs:  
 Last 24hrs VS reviewed since prior progress note. Most recent are: 
Visit Vitals /62 (BP 1 Location: Right arm, BP Patient Position: At rest) Pulse 80 Temp 98.4 °F (36.9 °C) Resp 20 Ht 6' (1.829 m) Wt 102.1 kg (225 lb 1.4 oz) SpO2 97% BMI 30.53 kg/m² Intake/Output Summary (Last 24 hours) at 4/9/2019 1425 Last data filed at 4/9/2019 1200 Gross per 24 hour Intake 0 ml Output 1000 ml Net -1000 ml Physical Examination:  
 
 
     
Constitutional:  middle age male   
ENT:  Oral mucous moist, Resp:  CTA bilaterally. CV:  Regular rhythm,normal S1 S2  
 GI:  Soft, reducible umbilical hernia with excorieated skin ,distended abdomen. Musculoskeletal:  1-2+ LE edema Neurologic:  alert and oriented to himself,place. moves all extremities,tremors present Data Review:  
 Review and/or order of clinical lab test 
Review and/or order of tests in the medicine section of CPT Us Paracentesis Abd W Imaging Result Date: 4/5/2019 IMPRESSION: 1. Successful ultrasound guided placement of an abdominal  catheter for diagnostic and large volume paracentesis. 2. Specimen sent for laboratory as requested. 3. Catheter is being held in by adhesive device and cannot easily removed by anybody when finished draining to gravity. Labs:  
 
Recent Labs 04/08/19 
0350 04/07/19 
2848 WBC 5.9 3.7* HGB 10.2* 9.7* HCT 32.0* 30.0* PLT 69* 55* Recent Labs 04/09/19 2050 04/08/19 
0350 04/07/19 
5792 * 138 136  
K 3.8 3.3* 2.8*  
 107 103 CO2 24 25 27 BUN 5* 4* 4*  
CREA 0.72 0.80 0.70 GLU 85 109* 88  
CA 8.3* 8.0* 7.3*  
MG 1.7 1.7 1.8 PHOS  --  2.7  --   
 
Recent Labs 04/07/19 
8894 SGOT 33 ALT 14  
 TBILI 4.3* TP 6.1* ALB 2.2*  
GLOB 3.9 Recent Labs 04/08/19 
0350 INR 1.7* PTP 17.2* No results for input(s): FE, TIBC, PSAT, FERR in the last 72 hours. Lab Results Component Value Date/Time Folate 13.7 03/22/2019 01:54 AM  
  
No results for input(s): PH, PCO2, PO2 in the last 72 hours. No results for input(s): CPK, CKNDX, TROIQ in the last 72 hours. No lab exists for component: CPKMB Lab Results Component Value Date/Time Cholesterol, total 116 09/08/2017 09:48 AM  
 HDL Cholesterol 40 09/08/2017 09:48 AM  
 LDL, calculated 64 09/08/2017 09:48 AM  
 Triglyceride 61 09/08/2017 09:48 AM  
 CHOL/HDL Ratio 7.6 (H) 07/25/2013 04:15 AM  
 
Lab Results Component Value Date/Time Glucose (POC) 118 (H) 07/07/2016 07:11 AM  
 Glucose (POC) 189 (H) 03/13/2014 08:16 PM  
 Glucose (POC) 118 (H) 08/10/2013 05:45 PM  
 Glucose (POC) 230 (H) 08/09/2013 08:18 PM  
 Glucose (POC) 143 (H) 07/24/2013 01:56 PM  
 Glucose  10/14/2014 10:50 AM  
 Glucose  08/14/2014 07:32 AM  
 
Lab Results Component Value Date/Time  Color DARK YELLOW 04/05/2019 08:03 AM  
 Appearance CLEAR 04/05/2019 08:03 AM  
 Specific gravity 1.012 04/05/2019 08:03 AM  
 Specific gravity 1.025 03/21/2019 06:09 PM  
 pH (UA) 6.5 04/05/2019 08:03 AM  
 Protein NEGATIVE  04/05/2019 08:03 AM  
 Glucose NEGATIVE  04/05/2019 08:03 AM  
 Ketone TRACE (A) 04/05/2019 08:03 AM  
 Bilirubin NEGATIVE  05/16/2014 09:00 PM  
 Urobilinogen 4.0 (H) 04/05/2019 08:03 AM  
 Nitrites NEGATIVE  04/05/2019 08:03 AM  
 Leukocyte Esterase NEGATIVE  04/05/2019 08:03 AM  
 Epithelial cells FEW 04/05/2019 08:03 AM  
 Bacteria NEGATIVE  04/05/2019 08:03 AM  
 WBC 0-4 04/05/2019 08:03 AM  
 RBC 5-10 04/05/2019 08:03 AM  
 
 
 
Medications Reviewed:  
 
Current Facility-Administered Medications Medication Dose Route Frequency  thiamine HCL (B-1) tablet 100 mg  100 mg Oral DAILY  folic acid (FOLVITE) tablet 1 mg  1 mg Oral DAILY  lactulose (CHRONULAC) solution 20 g  20 g Oral TID  furosemide (LASIX) tablet 40 mg  40 mg Oral DAILY  spironolactone (ALDACTONE) tablet 100 mg  100 mg Oral DAILY  LORazepam (ATIVAN) injection 2 mg  2 mg IntraVENous Q1H PRN  
 LORazepam (ATIVAN) injection 4 mg  4 mg IntraVENous Q1H PRN  
 ondansetron (ZOFRAN) injection 4 mg  4 mg IntraVENous Q4H PRN  
 allopurinol (ZYLOPRIM) tablet 300 mg  300 mg Oral Q12H  cyclobenzaprine (FLEXERIL) tablet 10 mg  10 mg Oral TID PRN  pantoprazole (PROTONIX) tablet 40 mg  40 mg Oral ACB  venlafaxine-SR (EFFEXOR-XR) capsule 150 mg  150 mg Oral DAILY  hydrOXYzine HCl (ATARAX) tablet 25 mg  25 mg Oral TID  hydrALAZINE (APRESOLINE) 20 mg/mL injection 20 mg  20 mg IntraVENous Q6H PRN  
 
______________________________________________________________________ EXPECTED LENGTH OF STAY: 3d 7h 
ACTUAL LENGTH OF STAY:          4 Donnell Weir MD

## 2019-04-09 NOTE — PROGRESS NOTES
[address] [facility] 
  
  Jw Guajardo MD, Colp, Cite Aidan Marcus, [facility]  
  
Cassandra Siddiqui, AVELINO Carlisle, MADHAVI Pacheco, [facility]   Pb Petersen, NP 
Ann Crouch, AVELINO Todd [address] [facility] 
[address] [facility], [address] 
[address], [address] 
  [phone] 
  FAX: [phone] [address] [place] [address] 
[address] [place] [address], [address] 
[place], [address] [facility] 
  [phone] 
  FAX: [phone]  
  
  
 HEPATOLOGY PROGRESS NOTE The patient is a 66year old  male with a long history of excessive alcohol abuse. [facility] has gone through alcohol rehab twice.  He consumes 1/2 gallon of whiskey every 2 days.  He came to the ED because of nausea, vomiting, tremor and weakness. 
  
He is still in the ICU. He is much more alert. Still some tremor. 
 
   
ASSESSMENT AND PLAN: 
Alcohol liver disease The diagnosis is based upon imaging, laboratory studies, a history of consuming alcohol in excess. The histologic severity has not been defined.   
The patient has not had any alcohol for 3 days. He is being monitored for alcohol withdrawal and DTs.   
Alcohol hepatitis There is elevation in liver transaminases in a pattern consistent with alcohol. There is an elevation in TBILI to 4.2 mg. The INR is prolonged to 1.5. The DF is under 28.  The alcoholic hepatitis is mild and does not require steroids. 
  
Alcoholic hepatitis typically worsens after hospitalization and stopping alcohol. We need to recheck hepatic panel and INR to see if DF coming down or increasing and this will need to be treated. 
  
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    
The diagnosis of cirrhosis is based upon imaging, laboratory studies,  
The CTP is 10.  Child class C.  The MELD score is 16. 
  
Ascites Ascites is being treated with diuretics and paracentesis.    
The paracentesis catheter is clamped. Will open this back up and drain what has accumulated. Will pull the catheter later today or in AM. Will continue the current dose of diuretics at step 2. The renal function is normal and ascites should be able to be controled with diuretics. 
  
Screening for Esophageal varices The patient has esophageal varices with prior bleeding.   
EGD is being performed on a regular basis to band varices. The last EGD to assess for varices was performed in 7/2018. Will need repeat EGD to assess for varices.  Will try to do this just prior to DC. 
  
Anemia This is due to multifactorial causes including portal hypertension with chronic GI blood loss, bone marrow suppression secondary to alcohol. Will obtain FE panel to assess for iron stores. 
  
Thrombocytopenia This is secondary to cirrhosis. There is no evidence of overt bleeding.   
No treatment is required. 
  
Counseling for alcohol in patients with chronic liver disease The patient has cirrhosis and was advised to be abstinent from all alcohol including non-alcoholic beer which does contain some alcohol. 
  
  
PHYSICAL EXAMINATION: 
VS: per nursing note General:  Ill appearing Eyes:  Sclera anicteric. ENT:  No oral lesions.  Thyroid normal. 
Nodes:  No adenopathy. Skin:  No spider angiomata.  No jaundice. Respiratory:  Lungs clear to auscultation. Cardiovascular:  Regular heart rate. Abdomen:  Soft non-tender, No obvious ascites.  Paracentesis catheter Extremities:  No lower extremity edema.  No muscle wasting. Neurologic:  Alert and oriented.  Lethargic.  Cranial nerves grossly intact.  No asterixis. 
  
LABORATORY: 
Results for Tomy Naidu (MRN [id number]) as of 4/9/2019 08:18 Ref. Range 4/6/2019 04:00 4/7/2019 04:16 4/8/2019 03:50 4/9/2019 06:18 WBC Latest Ref Range: 4.1 - 11.1 K/uL 4.1 3.7 (L) 5.9 HGB Latest Ref Range: 12.1 - 17.0 g/dL 10.4 (L) 9.7 (L) 10.2 (L) PLATELET Latest Ref Range: 150 - 400 K/uL 60 (L) 55 (L) 69 (L) INR Latest Ref Range: 0.9 - 1.1     1.7 (H) Sodium Latest Ref Range: 136 - 145 mmol/L 134 (L)  138 134 (L) Potassium Latest Ref Range: 3.5 - 5.1 mmol/L 3.1 (L)  3.3 (L) 3.8 Chloride Latest Ref Range: 97 - 108 mmol/L 102  107 107 CO2 Latest Ref Range: 21 - 32 mmol/L 26  25 24 Glucose Latest Ref Range: 65 - 100 mg/dL 97  109 (H) 85 BUN Latest Ref Range: 6 - 20 MG/DL 5 (L)  4 (L) 5 (L) Creatinine Latest Ref Range: 0.70 - 1.30 MG/DL 0.74  0.80 0.72 Bilirubin, total Latest Ref Range: 0.2 - 1.0 MG/DL 4.2 (H) Albumin Latest Ref Range: 3.5 - 5.0 g/dL 2.3 (L) ALT (SGPT) Latest Ref Range: 12 - 78 U/L 19 AST Latest Ref Range: 15 - 37 U/L 48 (H) Alk. phosphatase Latest Ref Range: 45 - 117 U/L 135 (H) Ammonia Latest Ref Range: <32 UMOL/L MD Black Molina Deputado [facility] [address] [address], [address] Almaz Howard  22. 
[phone] [address] [place] [address]

## 2019-04-09 NOTE — PROGRESS NOTES
Problem: Falls - Risk of 
Goal: *Absence of Falls Description Document Charla Palma Fall Risk and appropriate interventions in the flowsheet. Outcome: Progressing Towards Goal 
  
Problem: Patient Education: Go to Patient Education Activity Goal: Patient/Family Education Outcome: Progressing Towards Goal

## 2019-04-09 NOTE — PROGRESS NOTES
Spiritual Care Assessment/Progress Note [address] 
 
 
NAME: Flavio Burkett MRN: [id number] AGE: 66 y.o. SEX: male Adventist Affiliation: Non Church  
Language: [place] 4/9/2019     Total Time (in minutes): 7 Spiritual Assessment begun in [facility] 7S1 INTENSIVE CARE through conversation with: 
  
    [x]Patient        [] Family    [] Friend(s) Reason for Consult: Initial/Spiritual assessment, critical care Spiritual beliefs: (Please include comment if needed) 
   [] Identifies with a deanna tradition:     
   [] Supported by a deanna community:        
   [] Claims no spiritual orientation:       
   [] Seeking spiritual identity:            
   [] Adheres to an individual form of spirituality:       
   [x] Not able to assess:                   
 
    
Identified resources for coping:  
   [] Prayer                           
   [] Music                  [] Guided Imagery 
   [] Family/friends                 [] Pet visits [] Devotional reading                         [x] Unknown 
   [] Other:                                          
 
 
Interventions offered during this visit: (See comments for more details) Plan of Care: 
 
 [] Support spiritual and/or cultural needs  
 [] Support AMD and/or advance care planning process    
 [] Support grieving process 
 [] Coordinate Rites and/or Rituals  
 [] Coordination with community clergy [] No spiritual needs identified at this time 
 [] Detailed Plan of Care below (See Comments)  [] Make referral to Music Therapy 
[] Make referral to Pet Therapy    
[] Make referral to Addiction services 
[] Make referral to [facility] 
[] Make referral to Spiritual Care Partner 
[] No future visits requested       
[x] Follow up visits as needed Comments:  for initial visit. After several attempts  was unable to connect with pt. Please contact [phone] Alexy Eaton for further support.   follow up as needed. Maryse Bullock, MACE 
 [phone]

## 2019-04-10 NOTE — PROGRESS NOTES
Problem: Mobility Impaired (Adult and Pediatric) Goal: *Acute Goals and Plan of Care (Insert Text) Description Physical Therapy Goals Initiated 4/9/2019 1. Patient will move from supine to sit and sit to supine  and scoot up and down in bed with modified independence within 7 day(s). 2.  Patient will transfer from bed to chair and chair to bed with modified independence using the least restrictive device within 7 day(s). 3.  Patient will perform sit to stand with modified independence within 7 day(s). 4.  Patient will ambulate with modified independence for 300 feet with the least restrictive device within 7 day(s). 5.  Patient will ascend/descend 2 stairs with handrail(s) with modified independence within 7 day(s). Outcome: Progressing Towards Goal 
 PHYSICAL THERAPY TREATMENT Patient: Mimi Verde (62 y.o. male) Date: 4/10/2019 Diagnosis: ETOH abuse [F10.10] <principal problem not specified> Precautions: Fall, Bed Alarm, Seizure Chart, physical therapy assessment, plan of care and goals were reviewed. ASSESSMENT: 
Based on the objective data described below, the patient presents with Minimum assistance and Assist x2 sit to stand and stand to sit transfers. Gait training completed at Minimum assistance and Assist x2, 100 feet and using a gait belt. The following are barriers to independence while in acute care:  
-Cognitive and/or behavioral: orientation, processing and safety awareness 
-Medical condition: strength, functional endurance, standing balance and cardiac tolerance   
-Other:    
 
Prior level of function: Independent PLAN: 
Patient continues to benefit from skilled intervention to address the above impairments. Continue treatment per established plan of care. Recommend with staff: En Deshpande Recommend next PT session: Gait training,Monitor pulse rate. B-LE and B-UE exercises. PNF Discharge recommendations: Rehab at inpatient facility: patient can tolerate 3 hours of therapy (to regain functional baseline patient requires rehab) If above is not an option then recommend:TBD by Pt progression during admittance Patient's barriers to discharging home, in addition to above impairments: lives alone 
family availability to assist 
level of physical assist required to maintain patient safety. Equipment recommendations for successful discharge (if) home: SUBJECTIVE:  
Patient stated \"This has been the hardest withdrawal.? OBJECTIVE DATA SUMMARY:  
Critical Behavior: 
Neurologic State: Alert Orientation Level: Oriented to person, Oriented to situation, Oriented to time, Disoriented to place Cognition: Follows commands Safety/Judgement: Awareness of environment, Insight into deficits Functional Mobility Training: 
Bed Mobility: 
  
Supine to Sit: Contact guard assistance Transfers: 
Sit to Stand: Minimum assistance Stand to Sit: Minimum assistance Bed to Chair: Contact guard assistance Balance: 
Sitting: Impaired; Without support Sitting - Static: Fair (occasional) Standing: Impaired; With support Standing - Static: Constant support; Fair 
Standing - Dynamic : Fair Ambulation/Gait Training: 
Distance (ft): 100 Feet (ft) Assistive Device: Gait belt Ambulation - Level of Assistance: Minimal assistance;Assist x2 Gait Abnormalities: Decreased step clearance Base of Support: Narrowed Speed/Gabbie: Fluctuations Step Length: Left shortened;Right shortened Stairs: Therapeutic Exercises:  
 
Pain: No pain was reported at this time Activity Tolerance:  
Fair Please refer to the flowsheet for vital signs taken during this treatment. After treatment patient left:  
Up in chair Bed alarm/tab alert on Call light within reach RN notified COMMUNICATION/COLLABORATION:  
The patient?s plan of care was discussed with: Registered Nurse Roselyn Cevallos Time Calculation: 15 mins

## 2019-04-10 NOTE — PROGRESS NOTES
04/10/19 1800 Vital Signs Temp 98.3 °F (36.8 °C) Temp Source Oral  
Pulse (Heart Rate) (!) 115 Heart Rate Source Monitor Cardiac Rhythm PVC; Sinus Tach Resp Rate 20  
O2 Sat (%) 95 % Level of Consciousness Alert  
BP (!) 118/91 MAP (Calculated) 100 BP 1 Method Automatic  
BP 1 Location Left arm BP Patient Position At rest;Head of bed elevated (Comment degrees) MEWS Score 3 Elevated MEWS due to elevated HR, which has been the norm for pt during admission. CIWA does not warrant ativan administration at this time. Patient reports no pain and is showing no signs of distress, he is sleeping currently. Will continue to  monitor closely.

## 2019-04-10 NOTE — PROGRESS NOTES
Mr. Des Zaman has no complaints today. Tm 98.6 HR: 98 BP: 110/76 Resp Rate: 22 96% sat on room air. Intake/Output Summary (Last 24 hours) at 4/10/2019 1623 Last data filed at 4/10/2019 1433 Gross per 24 hour Intake 380 ml Output 1725 ml Net -1345 ml Exam: Cor: RRR. Lungs: Bilateral breath sounds. Clear to auscultation. Abd: Soft. Non tender. Slightly distended. Umbilical hernia remains reducible. Skin excoriated. Labs: No results found for this or any previous visit (from the past 12 hour(s)). At this point in time, do not believe that there is an indication for hernia repair as the hernia is reducible. Continue local wound care to skin at umbilicus. Diet as tolerated. Hepatology following. Plans per Dr. Avinash Rodriguez.

## 2019-04-10 NOTE — PROGRESS NOTES
Hospitalist Progress Note Karin Perez MD 
Answering service: [phone] OR [phone] from in house phone Date of Service:  4/10/2019 NAME:  Juan Briscoe. :  1960 MRN:  [id number] Admission Summary:  
66 y.o M with PMH of chronic severe alcoholism,liver cirrhosis,chronic anemia,thromboctyopenia,gastric ulcer,variceal bleeding was sent to the hospital because of alcohol withdrawal symptoms from a rehab Interval history / Subjective:  
 Still has tremors. Alert and oriented X 2 Checked umbilical hernia, spoke to mom and dad Assessment & Plan: #Alcohol withdrawal with chronic alcoholism: 
-Etoh level at admission 143. 
-continue CIWA protocol with prn Iv ativan  
-off precedex since  
-seizure precautions 
-advised to quit drinking alcohol  
-will need to go to alcoholic rehab program at discharge 
-thiamine,folic acid #Hypokalemia and hypomagnesemia: replete as needed #Alcoholic liver cirrhosis with ascitis 
-heavy alcohol use -drinks 1/2 gallon of whiskey every 2 days,last drink per patient was Thursday. -CT abdomen shows liver cirrhosis 
-MELD score 18 child class C 
-s/p temporary  peritoneal cath placement for ascites  
-still draining 3 lt in last 24 hrs. Cont diuretics  Hopefully can remove cath tomorrow. 
-will continue 40 mg lasix and  spironolactone  
-Fluids studies - white count 135. No SBP 
-hepatologist following 
-Ammonia 118 - scheduled lactulose added 
-will need EGD prior to discharge for varices reassessment #Umbilical hernia: 
-reducible. No surgical intervention required per gen surgey 
-continue local wound care as he has excoriated skin # Chronic thrombocytopenia:due to cirrhosis #Chronic anemia: h/o varices - monitor Hb # CDMP hyponatremia mild Code status: Full DVT prophylaxis: SCD Care Plan discussed with:patient,nurse and parents Disposition: [facility] Problems  Date Reviewed: 4/5/2019 Codes Class Noted POA  
 ETOH abuse ICD-10-CM: F10.10 ICD-9-CM: 305.00  4/5/2019 Unknown Umbilical hernia without obstruction and without gangrene ICD-10-CM: K42.9 ICD-9-CM: 553.1  1/24/2018 Unknown Overview Signed 1/24/2018  3:04 PM by Yuli Mclain MD  
  Without gangrene or obstruction. Review of Systems: As per HPI Vital Signs:  
 Last 24hrs VS reviewed since prior progress note. Most recent are: 
Visit Vitals /80 (BP 1 Location: Right arm, BP Patient Position: At rest;Head of bed elevated (Comment degrees)) Pulse (!) 108 Temp 98 °F (36.7 °C) Resp 18 Ht 6' (1.829 m) Wt 97.5 kg (214 lb 15.2 oz) SpO2 95% BMI 29.15 kg/m² Intake/Output Summary (Last 24 hours) at 4/10/2019 1210 Last data filed at 4/10/2019 1133 Gross per 24 hour Intake 120 ml Output 1100 ml Net -980 ml Physical Examination:  
 
 
     
Constitutional:  middle age male   
ENT:  Oral mucous moist, Resp:  CTA bilaterally. CV:  Regular rhythm,normal S1 S2  
 GI:  Soft, reducible umbilical hernia with excorieated skin ,distended abdomen. Musculoskeletal:  1-2+ LE edema Neurologic:  alert and oriented to himself,place. moves all extremities,tremors present Data Review:  
 Review and/or order of clinical lab test 
Review and/or order of tests in the medicine section of CPT Us Paracentesis Abd W Imaging Result Date: 4/5/2019 IMPRESSION: 1. Successful ultrasound guided placement of an abdominal  catheter for diagnostic and large volume paracentesis. 2. Specimen sent for laboratory as requested. 3. Catheter is being held in by adhesive device and cannot easily removed by anybody when finished draining to gravity. Labs:  
 
Recent Labs 04/08/19 
0350 WBC 5.9 HGB 10.2* HCT 32.0*  
PLT 69* Recent Labs 04/09/19 
0556 04/08/19 
0350 * 138  
K 3.8 3.3*  
 107 CO2 24 25 BUN 5* 4*  
CREA 0.72 0.80 GLU 85 109* CA 8.3* 8.0*  
MG 1.7 1.7 PHOS  --  2.7 No results for input(s): SGOT, GPT, ALT, AP, TBIL, TBILI, TP, ALB, GLOB, GGT, AML, LPSE in the last 72 hours. No lab exists for component: AMYP, HLPSE Recent Labs 04/08/19 
0350 INR 1.7* PTP 17.2* No results for input(s): FE, TIBC, PSAT, FERR in the last 72 hours. Lab Results Component Value Date/Time Folate 13.7 03/22/2019 01:54 AM  
  
No results for input(s): PH, PCO2, PO2 in the last 72 hours. No results for input(s): CPK, CKNDX, TROIQ in the last 72 hours. No lab exists for component: CPKMB Lab Results Component Value Date/Time Cholesterol, total 116 09/08/2017 09:48 AM  
 HDL Cholesterol 40 09/08/2017 09:48 AM  
 LDL, calculated 64 09/08/2017 09:48 AM  
 Triglyceride 61 09/08/2017 09:48 AM  
 CHOL/HDL Ratio 7.6 (H) 07/25/2013 04:15 AM  
 
Lab Results Component Value Date/Time Glucose (POC) 118 (H) 07/07/2016 07:11 AM  
 Glucose (POC) 189 (H) 03/13/2014 08:16 PM  
 Glucose (POC) 118 (H) 08/10/2013 05:45 PM  
 Glucose (POC) 230 (H) 08/09/2013 08:18 PM  
 Glucose (POC) 143 (H) 07/24/2013 01:56 PM  
 Glucose  10/14/2014 10:50 AM  
 Glucose  08/14/2014 07:32 AM  
 
Lab Results Component Value Date/Time  Color DARK YELLOW 04/05/2019 08:03 AM  
 Appearance CLEAR 04/05/2019 08:03 AM  
 Specific gravity 1.012 04/05/2019 08:03 AM  
 Specific gravity 1.025 03/21/2019 06:09 PM  
 pH (UA) 6.5 04/05/2019 08:03 AM  
 Protein NEGATIVE  04/05/2019 08:03 AM  
 Glucose NEGATIVE  04/05/2019 08:03 AM  
 Ketone TRACE (A) 04/05/2019 08:03 AM  
 Bilirubin NEGATIVE  05/16/2014 09:00 PM  
 Urobilinogen 4.0 (H) 04/05/2019 08:03 AM  
 Nitrites NEGATIVE  04/05/2019 08:03 AM  
 Leukocyte Esterase NEGATIVE  04/05/2019 08:03 AM  
 Epithelial cells FEW 04/05/2019 08:03 AM  
 Bacteria NEGATIVE  04/05/2019 08:03 AM  
 WBC 0-4 04/05/2019 08:03 AM  
 RBC 5-10 04/05/2019 08:03 AM  
 
 
 
Medications Reviewed:  
 
Current Facility-Administered Medications Medication Dose Route Frequency  lactulose (CHRONULAC) 10 gram/15 mL solution 30 mL  20 g Oral TID  thiamine HCL (B-1) tablet 100 mg  100 mg Oral DAILY  folic acid (FOLVITE) tablet 1 mg  1 mg Oral DAILY  furosemide (LASIX) tablet 40 mg  40 mg Oral DAILY  spironolactone (ALDACTONE) tablet 100 mg  100 mg Oral DAILY  LORazepam (ATIVAN) injection 2 mg  2 mg IntraVENous Q1H PRN  
 LORazepam (ATIVAN) injection 4 mg  4 mg IntraVENous Q1H PRN  
 ondansetron (ZOFRAN) injection 4 mg  4 mg IntraVENous Q4H PRN  
 allopurinol (ZYLOPRIM) tablet 300 mg  300 mg Oral Q12H  cyclobenzaprine (FLEXERIL) tablet 10 mg  10 mg Oral TID PRN  pantoprazole (PROTONIX) tablet 40 mg  40 mg Oral ACB  venlafaxine-SR (EFFEXOR-XR) capsule 150 mg  150 mg Oral DAILY  hydrOXYzine HCl (ATARAX) tablet 25 mg  25 mg Oral TID  hydrALAZINE (APRESOLINE) 20 mg/mL injection 20 mg  20 mg IntraVENous Q6H PRN  
 
______________________________________________________________________ EXPECTED LENGTH OF STAY: 3d 7h 
ACTUAL LENGTH OF STAY:          5 Valencia Garcia MD

## 2019-04-10 NOTE — PROGRESS NOTES
Problem: Falls - Risk of 
Goal: *Absence of Falls Description Document Pebbles Brar Fall Risk and appropriate interventions in the flowsheet. Outcome: Progressing Towards Goal 
  
Problem: Alcohol Withdrawal 
Goal: *STG: Participates in treatment plan Outcome: Progressing Towards Goal 
Goal: *STG: Remains safe in hospital 
Outcome: Progressing Towards Goal 
Goal: *STG: Seeks staff when symptoms of withdrawal increase Outcome: Progressing Towards Goal 
Goal: *STG: Complies with medication therapy Outcome: Progressing Towards Goal 
Goal: *STG: Attends activities and groups Outcome: Progressing Towards Goal 
Goal: *STG: Will identify negative impact of chemical dependency including the use of tobacco, alcohol, and other substances Outcome: Progressing Towards Goal 
Goal: *STG: Verbalizes abstinence as an achievable goal 
Outcome: Progressing Towards Goal 
Goal: *STG: Agrees to participate in outpatient after care program to support ongoing mental health Outcome: Progressing Towards Goal 
Goal: *STG: Able to indentify relapse triggers including interpersonal/social and familial factors Outcome: Progressing Towards Goal 
Goal: *STG: Identify lifestyle changes to support long term sobriety such as vocation, employment, education, and legal issues Outcome: Progressing Towards Goal 
Goal: *STG: Maintains appropriate nutrition and hydration Outcome: Progressing Towards Goal 
Goal: *STG: Vital signs within defined limits Outcome: Progressing Towards Goal 
Goal: *STG/LTG: Relapse prevention plan in place to include housing/aftercare, leisure activities, and spirituality Outcome: Progressing Towards Goal 
Goal: Interventions Outcome: Progressing Towards Goal

## 2019-04-10 NOTE — PROGRESS NOTES
Bedside shift change report given to Ethel Cross RN (oncoming nurse) by Luisa Gordon RN (offgoing nurse).  Report included the following information SBAR, MAR, Recent Results and Cardiac Rhythm SR.

## 2019-04-10 NOTE — PROGRESS NOTES
Pt pulled peritoneal cath that was placed 4/7. Notified Dr. Elaine Kilpatrick, he said to leave the drain out for now. Site is clean and dry. Output was 625 mL.

## 2019-04-10 NOTE — WOUND CARE
Wound Care Note: Follow-up visit for abdominal wound Chart shows: 
Admitted for umbilical hernia without obstruction and without gangrene and ETOH abuse Past Medical History:  
Diagnosis Date  Adverse effect of anesthesia   
 pt reports waking up during colonoscopy/endoscopy  Alcohol abuse 6/1/2011  Anemia NEC  Anxiety  Ascites   
 has to have fluid drained occasionally  Cirrhosis of liver ([facility] [address]  Depression  Gastric ulcer, unspecified as acute or chronic, without mention of hemorrhage, perforation, or obstruction  GERD (gastroesophageal reflux disease)  Gout  Hypertension  Liver disease   
 cirrhosis  Obesity, Class II, BMI 35-39.9  Pneumonia  PUD (peptic ulcer disease)  Seizures ([facility] [address] 2013  
 from alcohol withdrawal  
 Stroke [facility]) 2013  
 per pt, possible mini stroke from alcohol withdrawal (same time as seizure) WBC = 5.9 on 4/8/19 Admitted from home Assessment:  
Patient is groggy, slow to answer questions, minimal communication, incontinent with some assistance needed in repositioning. Bed: Total care Patient wearing briefs for incontinence Diet: Cardiac regular; 2 grams sodium Bilateral heels skin intact with blanchable erythema. Bilateral buttocks and sacral skin intact and without erythema. 1. POA abdominal wound is much smaller, abdomen has paracentesis catheter in place and abdomen is less distended, hernia is also less distended, wound measures 2.1 cm x 2.5 cm x 0.1 cm, wound bed is 90% pink and 10% superficial yellow, no longer maroon discoloration and wanda-wound is no longer hyperpigmented, small sero/sang drainage, wanda-wound intact, wound edges are open. Xeroform gauze and gauze dressing applied. 2.  POA left heel hyperpigmentation has resolved. Offloaded Patient repositioned on right side Heels offloaded on pillows.  
  
Recommendations: Continue with current wound care. Abdomen-  daily cleanse with normal saline, wipe wound bed clean and dry, apply hydrogel to umbilical wound cover with a couple of 4 x 4's and tape to secure. (orders from Dr. Armen Tiwari) Skin Care & Pressure Prevention: 
Minimize layers of linen/pads under patient to optimize support surface. Turn/reposition approximately every 2 hours and offload heels. Manage incontinence / promote continence Discussed above plan with patient & Latonia Willis RN Transition of Care: Plan to follow as needed while admitted to hospital. 
 
MARIA R WaltersN, RN, [facility] 
office [phone] 
pager [phone] or call  to page

## 2019-04-10 NOTE — ROUTINE PROCESS
Bedside and Verbal shift change report given to Ty (oncoming nurse) by Gian Taylor (offgoing nurse). Report included the following information SBAR, Kardex, Procedure Summary, Intake/Output, MAR, Accordion, Recent Results and Cardiac Rhythm NSR- Sinus Tach.

## 2019-04-10 NOTE — PROGRESS NOTES
Occupational Therapy Chart reviewed. attempted to see pt for OT txt. Pt had just returned to bed after working with PT as pt was not safe to leave up in chair (2* repeatedly attempting to get out of chair). Pt drowsy and falling asleep while holding cup. Parents at bedside. Will continue to follow.  David Mccall, OT

## 2019-04-10 NOTE — PROGRESS NOTES
CM follow up. CM met with patient and his parents at the bedside. Patient was too lethargic/sedated to participate. Parents requested outpatient substance abuse treatment resource information for [facility]. CM provided printed information about [facility] outpatient substance abuse program to parents as requested, with mother noting that the location is near their residence. Parents observed that patient has significant debility at present. CM noted that PT and OT evaluations are pending with their recommendations dependent on clinical progression.

## 2019-04-11 NOTE — PROGRESS NOTES
Bedside shift change report given to [address] [place] [address] (oncoming nurse) by Elian Gil RN (offgoing nurse). Report included the following information SBAR, MAR, Recent Results and Cardiac Rhythm SR-ST.

## 2019-04-11 NOTE — PROGRESS NOTES
CM reviewed chart and noted therapy will be evaluating patient. Cm assessment indicates patient lives alone but has supportive parents ( father Ray Askew [phone])  Patient was at the [address] [place] [address] [address] for ETOH detox but is planning to return home. Family provided with outpatient substance abuse programs in the [place] area. Cm will follow and assist with any needs that arise.

## 2019-04-11 NOTE — PROGRESS NOTES
Problem: Falls - Risk of 
Goal: *Absence of Falls Description Document Ursula Graham Fall Risk and appropriate interventions in the flowsheet. Outcome: Progressing Towards Goal 
  
Problem: Patient Education: Go to Patient Education Activity Goal: Patient/Family Education Outcome: Progressing Towards Goal 
  
Problem: Alcohol Withdrawal 
Goal: *STG: Remains safe in hospital 
Outcome: Progressing Towards Goal 
Goal: *STG: Seeks staff when symptoms of withdrawal increase Outcome: Progressing Towards Goal 
Goal: *STG: Complies with medication therapy Outcome: Progressing Towards Goal

## 2019-04-11 NOTE — PROGRESS NOTES
Problem: Mobility Impaired (Adult and Pediatric) Goal: *Acute Goals and Plan of Care (Insert Text) Description Physical Therapy Goals Initiated 4/9/2019 1. Patient will move from supine to sit and sit to supine  and scoot up and down in bed with modified independence within 7 day(s). 2.  Patient will transfer from bed to chair and chair to bed with modified independence using the least restrictive device within 7 day(s). 3.  Patient will perform sit to stand with modified independence within 7 day(s). 4.  Patient will ambulate with modified independence for 300 feet with the least restrictive device within 7 day(s). 5.  Patient will ascend/descend 2 stairs with handrail(s) with modified independence within 7 day(s). Outcome: Progressing Towards Goal 
 PHYSICAL THERAPY TREATMENT Patient: Lindsay Santos (62 y.o. male) Date: 4/11/2019 Diagnosis: ETOH abuse [F10.10] <principal problem not specified> Precautions: Fall, Bed Alarm, Seizure Chart, physical therapy assessment, plan of care and goals were reviewed. ASSESSMENT: 
Chart reviewed, Rn approved, patient received lying in bed agreeable to work with therapy. Patient required Roselia to come to sitting edge of bed and Roselia to transfer sit<>stand. Patient ambulated with B HHA and assist due to instability. Patient with fluctuating gait velocity, shuffled gait, and narrow KHALIDA. Patient had 1 large LOB with an episode of scissoring, requiring assist to correct. Patient also noted to have incontinent episode during ambulation, therefor returned to room and assisted patient with washing and changing gown. Worked on static standing balance while performing ADL tasks at the sink. Patient fatigued quickly and had to take seated rest breaks. Session ended with patient sitting up in a chair with all needs met.      
The following are barriers to independence while in acute care:  
-Cognitive and/or behavioral: command following, initiation, sequencing, safety awareness and insight into deficits 
-Medical condition: strength, functional endurance, standing balance, motor planning and coordination   
-Other:    
 
Prior level of function:  Prior to this admission, pt reports that he lived at home alone and was indep with all ADLs and mobility w/o use of AD. Per chart, [facility] PLAN: 
Patient continues to benefit from skilled intervention to address the above impairments. Continue treatment per established plan of care. Discharge recommendations: Rehab at inpatient facility: patient can tolerate 3 hours of therapy (to regain functional baseline patient requires rehab) If above is not an option then recommend: Home health (to increase independence and safety) 24 hour skilled services 24 supervision 
physical assist during all functional mobility Patient's barriers to discharging home, in addition to above impairments: lives alone 
total assist driving to follow up medical appointment(s)/groceries/obtain medication 
entry and exit into the home 
level of physical assist required to maintain patient safety. Equipment recommendations for successful discharge (if) home: TBD by rehab SUBJECTIVE:  
Patient stated Fayrene Pretzel you single?  OBJECTIVE DATA SUMMARY:  
Critical Behavior: 
Neurologic State: Alert Orientation Level: Oriented to person, Oriented to situation, Oriented to time, Disoriented to place(states he is at [facility]) Cognition: Memory loss, Poor safety awareness Safety/Judgement: Awareness of environment, Insight into deficits Functional Mobility Training: 
Bed Mobility: 
  
Supine to Sit: Minimum assistance Transfers: 
Sit to Stand: Minimum assistance Stand to Sit: Minimum assistance Bed to Chair: Minimum assistance Balance: 
Sitting: Impaired; Without support Sitting - Static: Fair (occasional) Sitting - Dynamic: Poor (constant support) Standing: Impaired; With support Standing - Static: Poor;Constant support Standing - Dynamic : Poor;Constant support Ambulation/Gait Training: 
Distance (ft): 100 Feet (ft)(x2 reps with standing rest break) Assistive Device: Gait belt(B HHA) Ambulation - Level of Assistance: Minimal assistance;Assist x2; Moderate assistance Gait Abnormalities: Decreased step clearance; Altered arm swing;Shuffling gait;Trunk sway increased; Path deviations Base of Support: Narrowed(scissoring during LOB) Speed/Gabbie: Fluctuations Step Length: Left shortened;Right shortened Activity Tolerance:  
Fair Please refer to the flowsheet for vital signs taken during this treatment. After treatment patient left:  
Up in chair Bed alarm/tab alert on Call light within reach RN notified Family at bedside COMMUNICATION/COLLABORATION:  
The patients plan of care was discussed with: Registered Nurse Marialuisa Ca PT, DPT Time Calculation: 40 mins

## 2019-04-11 NOTE — PROGRESS NOTES
Hospitalist Progress Note Gustabo Garcia MD 
Answering service: [phone] OR [phone] from in house phone Date of Service:  2019 NAME:  Slade Armijo. :  1960 MRN:  [id number] Admission Summary:  
66 y.o M with PMH of chronic severe alcoholism,liver cirrhosis,chronic anemia,thromboctyopenia,gastric ulcer,variceal bleeding was sent to the hospital because of alcohol withdrawal symptoms from a rehab Interval history / Subjective:  
First encounter Patient seen and examined by the bedside Labs, images and notes reviewed Patient is comfortable, more AAOx3, afebrile Very lethargic but wants to walk and is awaiting PT/OT eval 
No NVD Asks for ativan RTC 
appetite is poor Cont to have tremors Discussed with nursing staff, no acute issues overnight, orders reviewed. DW parents by the bedside Checked umbilical hernia Assessment & Plan: #Alcohol withdrawal with chronic alcoholism: 
-Etoh level at admission 143. 
-continue CIWA protocol with prn Iv ativan  
-off precedex since  
-seizure precautions 
-advised to quit drinking alcohol  
-will need to go to alcoholic rehab program at discharge 
-thiamine,folic acid, MVI #Hypokalemia and hypomagnesemia: replete as needed #Alcoholic liver cirrhosis with ascitis 
-heavy alcohol use -drinks 1/2 gallon of whiskey every 2 days,last drink per patient was Thursday. -CT abdomen shows liver cirrhosis 
-MELD score 18 child class C 
-s/p temporary  peritoneal cath placement for ascites which was removed by the patient yesterday. - continue 40 mg lasix and  spironolactone  
-Fluids studies - white count 135. No SBP 
-hepatologist following 
-Ammonia 118 - scheduled lactulose added, repeat labs in am 
- GISELA [place], [place] and wont be able to do EGD prior to Monday.  He is OK if patient gets discharged and gets EGD as outpatient with him. #Umbilical hernia with superficial skin wound:POA 
-reducible. No surgical intervention required per gen raul 
-continue local wound care as he has excoriated skin # Chronic thrombocytopenia:due to cirrhosis #Chronic anemia: h/o varices - monitor Hb # CDMP hyponatremia mild Code status: Full DVT prophylaxis: SCD Care Plan discussed with:patient,nurse and parents Disposition: TBD Repeat labs in am 
Awaiting final plan depending on PT/OT evals Hospital Problems  Date Reviewed: 4/5/2019 Codes Class Noted POA  
 ETOH abuse ICD-10-CM: F10.10 ICD-9-CM: 305.00  4/5/2019 Unknown Umbilical hernia without obstruction and without gangrene ICD-10-CM: K42.9 ICD-9-CM: 553.1  1/24/2018 Unknown Overview Signed 1/24/2018  3:04 PM by Johnny Natarajan MD  
  Without gangrene or obstruction. Review of Systems: As per HPI Vital Signs:  
 Last 24hrs VS reviewed since prior progress note. Most recent are: 
Visit Vitals /84 Pulse 92 Temp 98 °F (36.7 °C) Resp 20 Ht 6' (1.829 m) Wt 97.3 kg (214 lb 8.1 oz) SpO2 97% BMI 29.09 kg/m² No intake or output data in the 24 hours ending 04/11/19 1520 Physical Examination:  
 
 
     
Constitutional:  middle age male   
ENT:  Oral mucous moist, Resp:  CTA bilaterally. CV:  Regular rhythm,normal S1 S2  
 GI:  Soft, reducible umbilical hernia with excorieated skin ,distended abdomen. Musculoskeletal:  1-2+ LE edema Neurologic:  alert and oriented to himself,place. moves all extremities,tremors present Data Review:  
 
 
Us Paracentesis Abd W Imaging Result Date: 4/5/2019 IMPRESSION: 1. Successful ultrasound guided placement of an abdominal  catheter for diagnostic and large volume paracentesis. 2. Specimen sent for laboratory as requested.  3. Catheter is being held in by adhesive device and cannot easily removed by anybody when finished draining to gravity. Labs:  
 
No results for input(s): WBC, HGB, HCT, PLT, HGBEXT, HCTEXT, PLTEXT, HGBEXT, HCTEXT, PLTEXT in the last 72 hours. Recent Labs 04/09/19 
7210 * K 3.8  CO2 24 BUN 5*  
CREA 0.72 GLU 85  
CA 8.3*  
MG 1.7 No results for input(s): SGOT, GPT, ALT, AP, TBIL, TBILI, TP, ALB, GLOB, GGT, AML, LPSE in the last 72 hours. No lab exists for component: AMYP, HLPSE No results for input(s): INR, PTP, APTT in the last 72 hours. No lab exists for component: INREXT, INREXT No results for input(s): FE, TIBC, PSAT, FERR in the last 72 hours. Lab Results Component Value Date/Time Folate 13.7 03/22/2019 01:54 AM  
  
No results for input(s): PH, PCO2, PO2 in the last 72 hours. No results for input(s): CPK, CKNDX, TROIQ in the last 72 hours. No lab exists for component: CPKMB Lab Results Component Value Date/Time Cholesterol, total 116 09/08/2017 09:48 AM  
 HDL Cholesterol 40 09/08/2017 09:48 AM  
 LDL, calculated 64 09/08/2017 09:48 AM  
 Triglyceride 61 09/08/2017 09:48 AM  
 CHOL/HDL Ratio 7.6 (H) 07/25/2013 04:15 AM  
 
Lab Results Component Value Date/Time Glucose (POC) 118 (H) 07/07/2016 07:11 AM  
 Glucose (POC) 189 (H) 03/13/2014 08:16 PM  
 Glucose (POC) 118 (H) 08/10/2013 05:45 PM  
 Glucose (POC) 230 (H) 08/09/2013 08:18 PM  
 Glucose (POC) 143 (H) 07/24/2013 01:56 PM  
 Glucose  10/14/2014 10:50 AM  
 Glucose  08/14/2014 07:32 AM  
 
Lab Results Component Value Date/Time  Color DARK YELLOW 04/05/2019 08:03 AM  
 Appearance CLEAR 04/05/2019 08:03 AM  
 Specific gravity 1.012 04/05/2019 08:03 AM  
 Specific gravity 1.025 03/21/2019 06:09 PM  
 pH (UA) 6.5 04/05/2019 08:03 AM  
 Protein NEGATIVE  04/05/2019 08:03 AM  
 Glucose NEGATIVE  04/05/2019 08:03 AM  
 Ketone TRACE (A) 04/05/2019 08:03 AM  
 Bilirubin NEGATIVE  05/16/2014 09:00 PM  
 Urobilinogen 4.0 (H) 04/05/2019 08:03 AM  
 Nitrites NEGATIVE  04/05/2019 08:03 AM  
 Leukocyte Esterase NEGATIVE  04/05/2019 08:03 AM  
 Epithelial cells FEW 04/05/2019 08:03 AM  
 Bacteria NEGATIVE  04/05/2019 08:03 AM  
 WBC 0-4 04/05/2019 08:03 AM  
 RBC 5-10 04/05/2019 08:03 AM  
 
 
 
Medications Reviewed:  
 
Current Facility-Administered Medications Medication Dose Route Frequency  nicotine (NICODERM CQ) 21 mg/24 hr patch 1 Patch  1 Patch TransDERmal DAILY  lactulose (CHRONULAC) 10 gram/15 mL solution 30 mL  20 g Oral TID  thiamine HCL (B-1) tablet 100 mg  100 mg Oral DAILY  folic acid (FOLVITE) tablet 1 mg  1 mg Oral DAILY  furosemide (LASIX) tablet 40 mg  40 mg Oral DAILY  spironolactone (ALDACTONE) tablet 100 mg  100 mg Oral DAILY  LORazepam (ATIVAN) injection 2 mg  2 mg IntraVENous Q1H PRN  
 LORazepam (ATIVAN) injection 4 mg  4 mg IntraVENous Q1H PRN  
 ondansetron (ZOFRAN) injection 4 mg  4 mg IntraVENous Q4H PRN  
 allopurinol (ZYLOPRIM) tablet 300 mg  300 mg Oral Q12H  cyclobenzaprine (FLEXERIL) tablet 10 mg  10 mg Oral TID PRN  pantoprazole (PROTONIX) tablet 40 mg  40 mg Oral ACB  venlafaxine-SR (EFFEXOR-XR) capsule 150 mg  150 mg Oral DAILY  hydrOXYzine HCl (ATARAX) tablet 25 mg  25 mg Oral TID  hydrALAZINE (APRESOLINE) 20 mg/mL injection 20 mg  20 mg IntraVENous Q6H PRN  
 
______________________________________________________________________ EXPECTED LENGTH OF STAY: 3d 7h 
ACTUAL LENGTH OF STAY:          6 Abraham Goldsmith MD

## 2019-04-12 NOTE — PROGRESS NOTES
Bedside and Verbal shift change report given to Hernan Doe RN (oncoming nurse) by Terence Wood RN (offgoing nurse). Report included the following information SBAR, Kardex, Intake/Output, MAR, Recent Results and Cardiac Rhythm NSR-ST.

## 2019-04-12 NOTE — PROGRESS NOTES
NUTRITION Pt seen for:    
[x]         LOS 
RECOMMENDATIONS:  
1. Add daily MVI, continue thiamine 2. Daily weights with hx of ascites - standing scale only SUBJECTIVE/OBJECTIVE: Information obtained from:  RN 
 
Pt admitted for ETOH abuse. PMHx: HTN, gout, ETOH abuse, cirrhosis. Ascites on admit with over 22 liter out from peritoneal drain this admit. Hepatology following. Pt visited but barely opening eyes and continues to be napping. RN reports good appetite with no issues noted. Since eating well at low nutrition risk. Continue thiamine. Add daily MVI. Diet:  Cardiac, 2gm Na Supplements: none Intake: [x]           Good     []           Fair      []           Poor Patient Vitals for the past 100 hrs: 
 % Diet Eaten 04/10/19 1200 90 % 04/10/19 0800 85 % Weight Changes:  
[x]            Loss - but with over 22 liters out from peritoneal drain this admission (per documentation). Wt Readings:  
04/12/19 92.6 kg (204 lb 2.3 oz) 03/23/19 112.6 kg (248 lb 3.2 oz)  
03/20/19 121.1 kg (267 lb)  
02/20/19 120.7 kg (266 lb)  
01/10/19 120.7 kg (266 lb) Nutrition Problems Identified: 
[x]      None PLAN:  
[]           Obtained/adjusted food preferences/tolerances and/or snacks options []           Dislikes supplements will try a substitution []           Modify diet for food allergies []           Adjust texture due to difficulty chewing [x]    Continue current diet 
[]           Educated patient [x]           Add Supplements/snack Rescreen:  []            At Nutrition Risk [x]            Not at Nutrition Risk, rescreen per screening protocol Parker Boudreaux Pager #[phone] or [phone]

## 2019-04-12 NOTE — PROGRESS NOTES
Cm met with patient and talked with his mother Vasiliy Thompson (cell [phone]) about discharge plan for patient. Therapy is recommending inpatient rehab-- Patient and parents ( mother Vasiliy Thompson  [phone]) and father Chris Vazquez [phone]) are in agreement with inpatient rehab. [address] was chosen. Referral sent to the facility. If patient is not accepted/authorized to go to [facility] then [facility] will be choice-- [facility] choice for [facility]. Patient will be discharged from rehab to his parents home [address] ( [phone]) Patients father is having shoulder replacement next week. He will not be able to assist patient physically at all. CM will follow for transition of care planning. .. Inpatient rehab/ [facility] referral noted- patient is not safe to go home with [facility] at this time. Georgie Petersen UPDATE 2 pm  Patient was denied by [address] [place] [address] for inpatient rehab. A referral was sent via [employer] to sn[facility] choice [facility] [phone]. UPDATE 3:45 pm  Patient denied admission to [facility]. Cm left message for patient's mother to call CM regarding choice. UPDATE 6 pm  CM talked with patient's mother, Vasiliy Thompson [phone], about denials from inpatient rehab and [facility]. Sn[facility] discussed and [facility] [phone]. Referral sent via [employer] to the facility. Awaiting response.

## 2019-04-12 NOTE — PROGRESS NOTES
Hospitalist Progress Note Carmel Noble MD 
Answering service: [phone] OR [phone] from in house phone Date of Service:  2019 NAME:  Mira Helms :  1960 MRN:  [id number] Admission Summary:  
66 y.o M with PMH of chronic severe alcoholism,liver cirrhosis,chronic anemia,thromboctyopenia,gastric ulcer,variceal bleeding was sent to the hospital because of alcohol withdrawal symptoms from a rehab Interval history / Subjective:  
 
Patient seen and examined by the bedside Labs, images and notes reviewed Patient is comfortable, much more awake, cont to have\" tremors\" at rest, participated in PT, recommending rehab. No abdominal pain, no NVD, no cough Denies any chest pain 
afebrile Discussed with nursing staff, no acute issues overnight, orders reviewed. Assessment & Plan: #Alcohol withdrawal with chronic alcoholism:resolved 
-Etoh level at admission 143. 
-continue CIWA protocol with prn Iv ativan  
-off precedex since  
-seizure precautions 
-advised to quit drinking alcohol  
-will need to go to alcoholic rehab program at discharge 
-thiamine,folic acid, MVI #Hypokalemia and hypomagnesemia: 
Resolved Repeat labs in am 
 
#Alcoholic liver cirrhosis with ascitis 
-heavy alcohol use -drinks 1/2 gallon of whiskey every 2 days,last drink per patient was Thursday. -CT abdomen shows liver cirrhosis 
-MELD score 18 child class C 
-s/p temporary  peritoneal cath placement for ascites which was removed by the patient 4/10/19. 
- continue 40 mg lasix and  spironolactone  
-Ammonia levels improved on current lactulose dose 
- DW Dr. Michel Castillo, [place] reasons and wont be able to do EGD prior to Monday. He is OK if patient gets discharged and gets EGD as outpatient with him. # Debility and ambulatory dysfunction DW CM and PT, recs rehab, referrals made #Umbilical hernia with superficial skin wound:POA 
-reducible. No surgical intervention required per gen raul 
-continue local wound care as he has excoriated skin # Chronic thrombocytopenia:due to cirrhosis #Chronic anemia: h/o varices - monitor Hb # Chronic hyponatremia,mild- stable Code status: Full DVT prophylaxis: SCD Care Plan discussed with:patient,nurse and parents Disposition: TBD Repeat labs in am 
Awaiting final plan depending on PT/OT evals Hospital Problems  Date Reviewed: 4/5/2019 Codes Class Noted POA  
 ETOH abuse ICD-10-CM: F10.10 ICD-9-CM: 305.00  4/5/2019 Unknown Umbilical hernia without obstruction and without gangrene ICD-10-CM: K42.9 ICD-9-CM: 553.1  1/24/2018 Unknown Overview Signed 1/24/2018  3:04 PM by Kadeem Flores MD  
  Without gangrene or obstruction. Review of Systems: As per HPI Vital Signs:  
 Last 24hrs VS reviewed since prior progress note. Most recent are: 
Visit Vitals /71 (BP 1 Location: Right arm, BP Patient Position: At rest) Pulse 98 Temp 98 °F (36.7 °C) Resp 18 Ht 6' (1.829 m) Wt 92.6 kg (204 lb 2.3 oz) SpO2 100% BMI 27.69 kg/m² No intake or output data in the 24 hours ending 04/12/19 1443 Physical Examination:  
 
 
     
Constitutional:  middle age male   
ENT:  Oral mucous moist, Resp:  CTA bilaterally. CV:  Regular rhythm,normal S1 S2  
 GI:  Soft, reducible umbilical hernia with excorieated skin ,distended abdomen. Musculoskeletal:  1-2+ LE edema Neurologic:  alert and oriented to himself,place. moves all extremities,tremors present Data Review:  
 
 
Us Paracentesis Abd W Imaging Result Date: 4/5/2019 IMPRESSION: 1. Successful ultrasound guided placement of an abdominal  catheter for diagnostic and large volume paracentesis. 2. Specimen sent for laboratory as requested.  3. Catheter is being held in by adhesive device and cannot easily removed by anybody when finished draining to gravity. Labs:  
 
Recent Labs 04/12/19 
0200 WBC 6.3 HGB 11.2* HCT 34.8* PLT 88* Recent Labs 04/12/19 0200 * K 3.6  CO2 25 BUN 5*  
CREA 0.78 * CA 8.5 MG 1.7 Recent Labs 04/12/19 
0200 TBILI 2.5* Recent Labs 04/12/19 0200 INR 1.6* PTP 15.8* No results for input(s): FE, TIBC, PSAT, FERR in the last 72 hours. Lab Results Component Value Date/Time Folate 13.7 03/22/2019 01:54 AM  
  
No results for input(s): PH, PCO2, PO2 in the last 72 hours. No results for input(s): CPK, CKNDX, TROIQ in the last 72 hours. No lab exists for component: CPKMB Lab Results Component Value Date/Time Cholesterol, total 116 09/08/2017 09:48 AM  
 HDL Cholesterol 40 09/08/2017 09:48 AM  
 LDL, calculated 64 09/08/2017 09:48 AM  
 Triglyceride 61 09/08/2017 09:48 AM  
 CHOL/HDL Ratio 7.6 (H) 07/25/2013 04:15 AM  
 
Lab Results Component Value Date/Time Glucose (POC) 140 (H) 04/11/2019 04:40 PM  
 Glucose (POC) 118 (H) 07/07/2016 07:11 AM  
 Glucose (POC) 189 (H) 03/13/2014 08:16 PM  
 Glucose (POC) 118 (H) 08/10/2013 05:45 PM  
 Glucose (POC) 230 (H) 08/09/2013 08:18 PM  
 Glucose (POC) 143 (H) 07/24/2013 01:56 PM  
 Glucose  10/14/2014 10:50 AM  
 Glucose  08/14/2014 07:32 AM  
 
Lab Results Component Value Date/Time  Color DARK YELLOW 04/05/2019 08:03 AM  
 Appearance CLEAR 04/05/2019 08:03 AM  
 Specific gravity 1.012 04/05/2019 08:03 AM  
 Specific gravity 1.025 03/21/2019 06:09 PM  
 pH (UA) 6.5 04/05/2019 08:03 AM  
 Protein NEGATIVE  04/05/2019 08:03 AM  
 Glucose NEGATIVE  04/05/2019 08:03 AM  
 Ketone TRACE (A) 04/05/2019 08:03 AM  
 Bilirubin NEGATIVE  05/16/2014 09:00 PM  
 Urobilinogen 4.0 (H) 04/05/2019 08:03 AM  
 Nitrites NEGATIVE  04/05/2019 08:03 AM  
 Leukocyte Esterase NEGATIVE  04/05/2019 08:03 AM  
 Epithelial cells FEW 04/05/2019 08:03 AM  
 Bacteria NEGATIVE  04/05/2019 08:03 AM  
 WBC 0-4 04/05/2019 08:03 AM  
 RBC 5-10 04/05/2019 08:03 AM  
 
 
 
Medications Reviewed:  
 
Current Facility-Administered Medications Medication Dose Route Frequency  nicotine (NICODERM CQ) 21 mg/24 hr patch 1 Patch  1 Patch TransDERmal DAILY  lactulose (CHRONULAC) 10 gram/15 mL solution 30 mL  20 g Oral TID  thiamine HCL (B-1) tablet 100 mg  100 mg Oral DAILY  folic acid (FOLVITE) tablet 1 mg  1 mg Oral DAILY  furosemide (LASIX) tablet 40 mg  40 mg Oral DAILY  spironolactone (ALDACTONE) tablet 100 mg  100 mg Oral DAILY  LORazepam (ATIVAN) injection 2 mg  2 mg IntraVENous Q1H PRN  
 LORazepam (ATIVAN) injection 4 mg  4 mg IntraVENous Q1H PRN  
 ondansetron (ZOFRAN) injection 4 mg  4 mg IntraVENous Q4H PRN  
 allopurinol (ZYLOPRIM) tablet 300 mg  300 mg Oral Q12H  cyclobenzaprine (FLEXERIL) tablet 10 mg  10 mg Oral TID PRN  pantoprazole (PROTONIX) tablet 40 mg  40 mg Oral ACB  venlafaxine-SR (EFFEXOR-XR) capsule 150 mg  150 mg Oral DAILY  hydrOXYzine HCl (ATARAX) tablet 25 mg  25 mg Oral TID  hydrALAZINE (APRESOLINE) 20 mg/mL injection 20 mg  20 mg IntraVENous Q6H PRN  
 
______________________________________________________________________ EXPECTED LENGTH OF STAY: 3d 7h 
ACTUAL LENGTH OF STAY:          7 Lara Fam MD

## 2019-04-12 NOTE — PROGRESS NOTES
Bedside shift change report given to Ofelia Avina RN (oncoming nurse) by Sarita alfonso RN (offgoing nurse). Report included the following information SBAR, Kardex, Intake/Output, MAR, Recent Results and Cardiac Rhythm NSR.

## 2019-04-12 NOTE — PROGRESS NOTES
I have read and agree with Elliott Jesus, student nurse, assessment and documentation as her preceptor.

## 2019-04-13 NOTE — PROGRESS NOTES
Bedside shift change report given to Scooby Lackey RN (oncoming nurse) by Lori Truong RN (offgoing nurse). Report included the following information SBAR, Kardex, Intake/Output, MAR, Recent Results and Cardiac Rhythm NSR.

## 2019-04-13 NOTE — PROGRESS NOTES
Hospitalist Progress Note Hill Hyman MD 
Answering service: [phone] OR [phone] from in house phone Date of Service:  2019 NAME:  Alicia Gautam :  1960 MRN:  [id number] Admission Summary:  
65 Y/O gentleman with a PMH significant for chronic severe alcoholism,liver cirrhosis,chronic anemia,thrombocytopenia,gastric ulcers,variceal bleeding was sent to the hospital from Rehab due to  alcohol withdrawal symptoms. Interval history / Subjective:  
 
CC: Alcohol Withdrawal. 
 
Patient was in no acute distress on am rounds but appeared Tremulous. Assessment & Plan: 1. Alcohol withdrawal with chronic alcoholism. -ETOH evel at admission 143. -Off Precedex. 
-continue CIWA protocol with prn Iv ativan  
-seizure precautions 
-advised to quit drinking alcohol  
-will need to go to alcohol Rehab program at discharge 
-thiamine,folic acid, MVI 2. Resolved Hypokalemia and hypomagnesemia. 3.Alcoholic liver cirrhosis with ascitis 
-heavy alcohol use -drinks 1/2 gallon of whiskey every 2 days. -CT abdomen shows liver cirrhosis 
-MELD score 18 child class C 
-s/p temporary  peritoneal cath placement for ascites which was removed by the patient 4/10/19. 
- continue 40 mg lasix and  spironolactone  
-Ammonia levels improved on current lactulose dose 
-Per Dr. Bharath Castle,  out of the [place] for personal reasons and wont be able to do EGD prior to Monday. He is OK if patient gets discharged and gets EGD as outpatient with him. 4. Debility and ambulatory dysfunction PT/OT as tolerated. 5. Umbilical hernia with superficial skin wound present on admission. 
-Reducible. No surgical intervention required per gen surgey 
-Daily wound care. 6. Chronic Anemia and thrombocytopenia due to cirrhosis. 7.  Probable multifactorial Hyponatremia. 8. Prophylaxis: DVT (SCDs).  
 
9. Directives: Full Code with a guarded prognosis. D/W patient. 10. Plan: Patient will benefit from Inpatient Rehab program on discharge. D/W patient. D/W RN. Hospital Problems  Date Reviewed: 4/5/2019 Codes Class Noted POA  
 ETOH abuse ICD-10-CM: F10.10 ICD-9-CM: 305.00  4/5/2019 Unknown Umbilical hernia without obstruction and without gangrene ICD-10-CM: K42.9 ICD-9-CM: 553.1  1/24/2018 Unknown Overview Signed 1/24/2018  3:04 PM by Danis Sebastian MD  
  Without gangrene or obstruction. Review of Systems: As per HPI Vital Signs:  
 Last 24hrs VS reviewed since prior progress note. Most recent are: 
Visit Vitals /54 (BP 1 Location: Right arm, BP Patient Position: At rest) Pulse 76 Temp 98 °F (36.7 °C) Resp 18 Ht 6' (1.829 m) Wt 101 kg (222 lb 10.6 oz) SpO2 100% BMI 30.20 kg/m² Intake/Output Summary (Last 24 hours) at 4/13/2019 1611 Last data filed at 4/13/2019 7708 Gross per 24 hour Intake  Output 1 ml Net -1 ml Physical Examination:  
 
 
     
Constitutional:  middle age male   
ENT:  Oral mucous moist, Resp:  CTA bilaterally. CV:  Regular rhythm,normal S1 S2  
 GI:  Soft, reducible umbilical hernia with excorieated skin ,distended abdomen. Musculoskeletal:  1-2+ LE edema Neurologic:  alert and oriented to himself,place. moves all extremities,tremors present Data Review:  
 
 
Us Paracentesis Abd W Imaging Result Date: 4/5/2019 IMPRESSION: 1. Successful ultrasound guided placement of an abdominal  catheter for diagnostic and large volume paracentesis. 2. Specimen sent for laboratory as requested. 3. Catheter is being held in by adhesive device and cannot easily removed by anybody when finished draining to gravity. Labs:  
 
Recent Labs 04/13/19 0143 04/12/19 0200 WBC 7.2 6.3 HGB 11.4* 11.2* HCT 34.3* 34.8*  
* 88* Recent Labs 04/13/19 0143 04/12/19 0200 * 135* K 3.8 3.6  103 CO2 23 25 BUN 5* 5*  
CREA 0.72 0.78 GLU 89 109* CA 8.5 8.5 MG 1.6 1.7 Recent Labs 04/13/19 
0143 04/12/19 
0200 TBILI 2.9* 2.5* Recent Labs 04/13/19 
0143 04/12/19 
0200 INR 1.6* 1.6* PTP 15.4* 15.8* No results for input(s): FE, TIBC, PSAT, FERR in the last 72 hours. Lab Results Component Value Date/Time Folate 13.7 03/22/2019 01:54 AM  
  
No results for input(s): PH, PCO2, PO2 in the last 72 hours. No results for input(s): CPK, CKNDX, TROIQ in the last 72 hours. No lab exists for component: CPKMB Lab Results Component Value Date/Time Cholesterol, total 116 09/08/2017 09:48 AM  
 HDL Cholesterol 40 09/08/2017 09:48 AM  
 LDL, calculated 64 09/08/2017 09:48 AM  
 Triglyceride 61 09/08/2017 09:48 AM  
 CHOL/HDL Ratio 7.6 (H) 07/25/2013 04:15 AM  
 
Lab Results Component Value Date/Time Glucose (POC) 107 (H) 04/12/2019 09:25 PM  
 Glucose (POC) 140 (H) 04/11/2019 04:40 PM  
 Glucose (POC) 118 (H) 07/07/2016 07:11 AM  
 Glucose (POC) 189 (H) 03/13/2014 08:16 PM  
 Glucose (POC) 118 (H) 08/10/2013 05:45 PM  
 Glucose (POC) 230 (H) 08/09/2013 08:18 PM  
 Glucose (POC) 143 (H) 07/24/2013 01:56 PM  
 Glucose  10/14/2014 10:50 AM  
 Glucose  08/14/2014 07:32 AM  
 
Lab Results Component Value Date/Time  Color DARK YELLOW 04/05/2019 08:03 AM  
 Appearance CLEAR 04/05/2019 08:03 AM  
 Specific gravity 1.012 04/05/2019 08:03 AM  
 Specific gravity 1.025 03/21/2019 06:09 PM  
 pH (UA) 6.5 04/05/2019 08:03 AM  
 Protein NEGATIVE  04/05/2019 08:03 AM  
 Glucose NEGATIVE  04/05/2019 08:03 AM  
 Ketone TRACE (A) 04/05/2019 08:03 AM  
 Bilirubin NEGATIVE  05/16/2014 09:00 PM  
 Urobilinogen 4.0 (H) 04/05/2019 08:03 AM  
 Nitrites NEGATIVE  04/05/2019 08:03 AM  
 Leukocyte Esterase NEGATIVE  04/05/2019 08:03 AM  
 Epithelial cells FEW 04/05/2019 08:03 AM  
 Bacteria NEGATIVE  04/05/2019 08:03 AM  
 WBC 0-4 04/05/2019 08:03 AM  
 RBC 5-10 04/05/2019 08:03 AM  
 
 
 
Medications Reviewed:  
 
Current Facility-Administered Medications Medication Dose Route Frequency  nicotine (NICODERM CQ) 21 mg/24 hr patch 1 Patch  1 Patch TransDERmal DAILY  lactulose (CHRONULAC) 10 gram/15 mL solution 30 mL  20 g Oral TID  thiamine HCL (B-1) tablet 100 mg  100 mg Oral DAILY  folic acid (FOLVITE) tablet 1 mg  1 mg Oral DAILY  furosemide (LASIX) tablet 40 mg  40 mg Oral DAILY  spironolactone (ALDACTONE) tablet 100 mg  100 mg Oral DAILY  LORazepam (ATIVAN) injection 2 mg  2 mg IntraVENous Q1H PRN  
 LORazepam (ATIVAN) injection 4 mg  4 mg IntraVENous Q1H PRN  
 ondansetron (ZOFRAN) injection 4 mg  4 mg IntraVENous Q4H PRN  
 allopurinol (ZYLOPRIM) tablet 300 mg  300 mg Oral Q12H  cyclobenzaprine (FLEXERIL) tablet 10 mg  10 mg Oral TID PRN  pantoprazole (PROTONIX) tablet 40 mg  40 mg Oral ACB  venlafaxine-SR (EFFEXOR-XR) capsule 150 mg  150 mg Oral DAILY  hydrOXYzine HCl (ATARAX) tablet 25 mg  25 mg Oral TID  hydrALAZINE (APRESOLINE) 20 mg/mL injection 20 mg  20 mg IntraVENous Q6H PRN  
 
______________________________________________________________________ EXPECTED LENGTH OF STAY: 3d 7h 
ACTUAL LENGTH OF STAY:          8 Aaron Cummings MD

## 2019-04-13 NOTE — PROGRESS NOTES
Problem: Pressure Injury - Risk of 
Goal: *Prevention of pressure injury Description Document Brenden Scale and appropriate interventions in the flowsheet. Offload heels Turn approximately every 2 hours Outcome: Progressing Towards Goal 
  
Problem: Falls - Risk of 
Goal: *Absence of Falls Description Document Easter Getting Fall Risk and appropriate interventions in the flowsheet. Outcome: Progressing Towards Goal 
  
Problem: Alcohol Withdrawal 
Goal: *STG: Remains safe in hospital 
Outcome: Progressing Towards Goal 
Goal: *STG: Maintains appropriate nutrition and hydration Outcome: Progressing Towards Goal 
Goal: *STG: Vital signs within defined limits Outcome: Progressing Towards Goal

## 2019-04-14 NOTE — PROGRESS NOTES
Hospitalist Progress Note Aaron Cummings MD 
Answering service: [phone] OR [phone] from in house phone Date of Service:  2019 NAME:  Anisa Suarez. :  1960 MRN:  [id number] Admission Summary:  
67 Y/O gentleman with a PMH significant for chronic severe alcoholism,liver cirrhosis,chronic anemia,thrombocytopenia,gastric ulcers,variceal bleeding was sent to the hospital from Rehab due to  alcohol withdrawal symptoms. Interval history / Subjective:  
 
CC: Alcohol Withdrawal. 
 
Patient was very sleepy and unable to give any pertinent history on am rounds. All overnight events reviewed in their entirety. Assessment & Plan: 1. Alcohol Use Disorder with active withdrawals present on admission. -ETOH level at admission 143. -Off Precedex. 
-continue CIWA protocol with prn Ativan  
-seizure precautions 
-Daily Multivitamin/mineral, Thiamine and Folic Acid. 
-Cessation counselling done. 
-will need to go to alcohol Rehab program at discharge 2. Resolved Hypokalemia and hypomagnesemia. 3.Alcoholic liver cirrhosis with ascites 
-heavy alcohol use -drinks 1/2 gallon of whiskey every 2 days. -CT abdomen shows liver cirrhosis 
-MELD score 18 child class C 
-s/p temporary  peritoneal cath placement for ascites which was removed by the patient 4/10/19. 
- continue 40 mg lasix and  spironolactone  
-Ammonia levels improved on current lactulose dose 
-Per Dr. Clarita Hernandez,  [place] reasons and wont be able to do EGD prior to Monday. 4. Debility and ambulatory dysfunction PT/OT as tolerated. 5. Umbilical hernia with superficial skin wound present on admission. 
-Reducible. No surgical intervention required per gen surgey 
-Daily wound care. 6. Chronic Anemia and thrombocytopenia due to cirrhosis. 7.  Probable multifactorial Hyponatremia. 8. Prophylaxis: DVT (SCDs).  
 
9. Directives: Full Code with a guarded prognosis. D/W patient. 10. Plan: Patient will benefit from Inpatient Rehab program on discharge. D/W patient. Hospital Problems  Date Reviewed: 4/5/2019 Codes Class Noted POA  
 ETOH abuse ICD-10-CM: F10.10 ICD-9-CM: 305.00  4/5/2019 Unknown Umbilical hernia without obstruction and without gangrene ICD-10-CM: K42.9 ICD-9-CM: 553.1  1/24/2018 Unknown Overview Signed 1/24/2018  3:04 PM by Ralph Lemon MD  
  Without gangrene or obstruction. Review of Systems: As per HPI Vital Signs:  
 Last 24hrs VS reviewed since prior progress note. Most recent are: 
Visit Vitals /82 (BP 1 Location: Right arm, BP Patient Position: At rest) Pulse 90 Temp 98.7 °F (37.1 °C) Resp 18 Ht 6' (1.829 m) Wt 101.8 kg (224 lb 6.9 oz) SpO2 94% BMI 30.44 kg/m² No intake or output data in the 24 hours ending 04/14/19 0958 Physical Examination:  
 
 
     
Constitutional:  Chronically ill-appearing. ENT:  Oral mucous moist, Resp:  Decreased air entry Bibasilarly. CV:  Regular rhythm,normal S1 S2  
 GI:  Soft, reducible umbilical hernia with excoriated skin ,distended abdomen. Musculoskeletal:  Bipedal edema. Neurologic:  Sleepy but arousable. Data Review:  
 
 
Us Paracentesis Abd W Imaging Result Date: 4/5/2019 IMPRESSION: 1. Successful ultrasound guided placement of an abdominal  catheter for diagnostic and large volume paracentesis. 2. Specimen sent for laboratory as requested. 3. Catheter is being held in by adhesive device and cannot easily removed by anybody when finished draining to gravity. Labs:  
 
Recent Labs 04/14/19 0130 04/13/19 0143 WBC 5.0 7.2 HGB 11.4* 11.4* HCT 34.9* 34.3*  
PLT 86* 105* Recent Labs 04/14/19 0130 04/13/19 0143 04/12/19 
0200 * 134* 135* K 3.8 3.8 3.6  102 103 CO2 24 23 25 BUN 6 5* 5*  
CREA 0.81 0.72 0.78 * 89 109* CA 8.2* 8.5 8.5 MG  --  1.6 1.7 Recent Labs 04/14/19 0130 04/13/19 0143 04/12/19 
0200 SGOT 32  --   --   
ALT 21  --   --   
  --   --   
TBILI 2.6*  2.5* 2.9* 2.5* TP 7.2  --   --   
ALB 2.5*  --   --   
GLOB 4.7*  --   --   
 
Recent Labs 04/14/19 0130 04/13/19 0143 04/12/19 0200 INR 1.5* 1.6* 1.6* PTP 15.1* 15.4* 15.8* No results for input(s): FE, TIBC, PSAT, FERR in the last 72 hours. Lab Results Component Value Date/Time Folate 13.7 03/22/2019 01:54 AM  
  
No results for input(s): PH, PCO2, PO2 in the last 72 hours. No results for input(s): CPK, CKNDX, TROIQ in the last 72 hours. No lab exists for component: CPKMB Lab Results Component Value Date/Time Cholesterol, total 116 09/08/2017 09:48 AM  
 HDL Cholesterol 40 09/08/2017 09:48 AM  
 LDL, calculated 64 09/08/2017 09:48 AM  
 Triglyceride 61 09/08/2017 09:48 AM  
 CHOL/HDL Ratio 7.6 (H) 07/25/2013 04:15 AM  
 
Lab Results Component Value Date/Time Glucose (POC) 107 (H) 04/12/2019 09:25 PM  
 Glucose (POC) 140 (H) 04/11/2019 04:40 PM  
 Glucose (POC) 118 (H) 07/07/2016 07:11 AM  
 Glucose (POC) 189 (H) 03/13/2014 08:16 PM  
 Glucose (POC) 118 (H) 08/10/2013 05:45 PM  
 Glucose (POC) 230 (H) 08/09/2013 08:18 PM  
 Glucose (POC) 143 (H) 07/24/2013 01:56 PM  
 Glucose  10/14/2014 10:50 AM  
 Glucose  08/14/2014 07:32 AM  
 
Lab Results Component Value Date/Time  Color DARK YELLOW 04/05/2019 08:03 AM  
 Appearance CLEAR 04/05/2019 08:03 AM  
 Specific gravity 1.012 04/05/2019 08:03 AM  
 Specific gravity 1.025 03/21/2019 06:09 PM  
 pH (UA) 6.5 04/05/2019 08:03 AM  
 Protein NEGATIVE  04/05/2019 08:03 AM  
 Glucose NEGATIVE  04/05/2019 08:03 AM  
 Ketone TRACE (A) 04/05/2019 08:03 AM  
 Bilirubin NEGATIVE  05/16/2014 09:00 PM  
 Urobilinogen 4.0 (H) 04/05/2019 08:03 AM  
 Nitrites NEGATIVE  04/05/2019 08:03 AM  
 Leukocyte Esterase NEGATIVE  04/05/2019 08:03 AM  
 Epithelial cells FEW 04/05/2019 08:03 AM  
 Bacteria NEGATIVE  04/05/2019 08:03 AM  
 WBC 0-4 04/05/2019 08:03 AM  
 RBC 5-10 04/05/2019 08:03 AM  
 
 
 
Medications Reviewed:  
 
Current Facility-Administered Medications Medication Dose Route Frequency  nicotine (NICODERM CQ) 21 mg/24 hr patch 1 Patch  1 Patch TransDERmal DAILY  lactulose (CHRONULAC) 10 gram/15 mL solution 30 mL  20 g Oral TID  thiamine HCL (B-1) tablet 100 mg  100 mg Oral DAILY  folic acid (FOLVITE) tablet 1 mg  1 mg Oral DAILY  furosemide (LASIX) tablet 40 mg  40 mg Oral DAILY  spironolactone (ALDACTONE) tablet 100 mg  100 mg Oral DAILY  LORazepam (ATIVAN) injection 2 mg  2 mg IntraVENous Q1H PRN  
 LORazepam (ATIVAN) injection 4 mg  4 mg IntraVENous Q1H PRN  
 ondansetron (ZOFRAN) injection 4 mg  4 mg IntraVENous Q4H PRN  
 allopurinol (ZYLOPRIM) tablet 300 mg  300 mg Oral Q12H  cyclobenzaprine (FLEXERIL) tablet 10 mg  10 mg Oral TID PRN  pantoprazole (PROTONIX) tablet 40 mg  40 mg Oral ACB  venlafaxine-SR (EFFEXOR-XR) capsule 150 mg  150 mg Oral DAILY  hydrOXYzine HCl (ATARAX) tablet 25 mg  25 mg Oral TID  hydrALAZINE (APRESOLINE) 20 mg/mL injection 20 mg  20 mg IntraVENous Q6H PRN  
 
______________________________________________________________________ EXPECTED LENGTH OF STAY: 3d 7h 
ACTUAL LENGTH OF STAY:          9 Stephanie Harris MD

## 2019-04-14 NOTE — PROGRESS NOTES
CM met with patient, mother Galina Prescott [phone] and father, Satish Melgar [phone] in patient's room to discuss discharge planning. Patient has been denied from [facility] and [facility]. Cm explained that referral to [facility] is pending ([phone]). Patient said he really does not want to go to rehab but knows he cannot return home alone and must be able to ambulate safely when he goes to his parents [address], [address] [place] [address]   [phone]) as is parents cannot assist him physically. Patient will need authorization from insurance for [facility] placement.

## 2019-04-14 NOTE — PROGRESS NOTES
Problem: Pressure Injury - Risk of 
Goal: *Prevention of pressure injury Description Document Brenden Scale and appropriate interventions in the flowsheet. Offload heels Turn approximately every 2 hours Outcome: Progressing Towards Goal 
  
Problem: Falls - Risk of 
Goal: *Absence of Falls Description Document Sonya Spraguedaniel Fall Risk and appropriate interventions in the flowsheet. Outcome: Progressing Towards Goal 
  
Problem: Alcohol Withdrawal 
Goal: *STG: Remains safe in hospital 
Outcome: Progressing Towards Goal 
Goal: *STG: Seeks staff when symptoms of withdrawal increase Outcome: Progressing Towards Goal 
Goal: *STG: Complies with medication therapy Outcome: Progressing Towards Goal 
Goal: Interventions Outcome: Progressing Towards Goal

## 2019-04-14 NOTE — PROGRESS NOTES
Bedside shift change report given to Marcie Joiner RN (oncoming nurse) by Dallas Duval RN (offgoing nurse). Report included the following information SBAR, Kardex, Intake/Output, MAR, Recent Results and Cardiac Rhythm NSR.

## 2019-04-15 NOTE — PROGRESS NOTES
Hospitalist Progress Note Waldemar Bustamante MD 
Answering service: [phone] from in house phone Date of Service:  4/15/2019 NAME:  Jonathan Aden. :  1960 MRN:  [id number] Admission Summary:  
65 Y/O gentleman with a PMH significant for chronic severe alcoholism,liver cirrhosis,chronic anemia,thrombocytopenia,gastric ulcers,variceal bleeding was sent to the hospital from Rehab due to  alcohol withdrawal symptoms. Interval history / Subjective:  
 
CC: Alcohol Withdrawal. 
 
No new issues No pain, nausea or vomiting Assessment & Plan: Alcohol Use Disorder with active withdrawals present on admission. -ETOH level at admission 143. -Off Precedex. 
-continue CIWA protocol with prn Ativan  
-seizure precautions 
-Daily Multivitamin/mineral, Thiamine and Folic Acid. 
-Cessation counselling done. 
-will need to go to alcohol Rehab program at discharge Resolved Hypokalemia and hypomagnesemia. Alcoholic liver cirrhosis with ascites 
-heavy alcohol use -drinks 1/2 gallon of whiskey every 2 days. -CT abdomen shows liver cirrhosis 
-MELD score 18 child class C 
-s/p temporary  peritoneal cath placement for ascites which was removed by the patient 4/10/19. 
- continue 40 mg lasix and  spironolactone  
-Ammonia levels improved on current lactulose dose 
-Outpatient follow up with Dr Ever Diaz Debility and ambulatory dysfunction PT/OT as tolerated. Umbilical hernia with superficial skin wound present on admission. 
-Reducible. No surgical intervention required per gen surgey 
-Daily wound care. Chronic Anemia and thrombocytopenia due to cirrhosis. Probable multifactorial Hyponatremia. Prophylaxis: DVT (SCDs). Directives: Full Code with a guarded prognosis. D/W patient. Plan: Patient will benefit from Inpatient Rehab program on discharge. [facility] if CIWA remains <8 [facility] Problems  Date Reviewed: 4/5/2019 Codes Class Noted POA  
 ETOH abuse ICD-10-CM: F10.10 ICD-9-CM: 305.00  4/5/2019 Unknown Umbilical hernia without obstruction and without gangrene ICD-10-CM: K42.9 ICD-9-CM: 553.1  1/24/2018 Unknown Overview Signed 1/24/2018  3:04 PM by Nery Flores MD  
  Without gangrene or obstruction. Review of Systems: As per HPI Vital Signs:  
 Last 24hrs VS reviewed since prior progress note. Most recent are: 
Visit Vitals /72 (BP 1 Location: Right arm, BP Patient Position: At rest) Pulse 89 Temp 98 °F (36.7 °C) Resp 20 Ht 6' (1.829 m) Wt 98.1 kg (216 lb 4.3 oz) SpO2 98% BMI 29.33 kg/m² No intake or output data in the 24 hours ending 04/15/19 1258 Physical Examination:  
 
 
     
Constitutional:  Chronically ill-appearing. ENT:  Oral mucous moist, Resp:  Decreased air entry Bibasilarly. CV:  Regular rhythm,normal S1 S2  
 GI:  Soft, reducible umbilical hernia with excoriated skin ,distended abdomen. Musculoskeletal:  Bipedal edema. Neurologic:  Sleepy but arousable. Data Review:  
 
 
Us Paracentesis Abd W Imaging Result Date: 4/5/2019 IMPRESSION: 1. Successful ultrasound guided placement of an abdominal  catheter for diagnostic and large volume paracentesis. 2. Specimen sent for laboratory as requested. 3. Catheter is being held in by adhesive device and cannot easily removed by anybody when finished draining to gravity. Labs:  
 
Recent Labs 04/14/19 0130 04/13/19 0143 WBC 5.0 7.2 HGB 11.4* 11.4* HCT 34.9* 34.3*  
PLT 86* 105* Recent Labs 04/15/19 
0300 04/14/19 
0130 04/13/19 
0143 * 132* 134* K 3.7 3.8 3.8  101 102 CO2 22 24 23 BUN 5* 6 5* CREA 0.74 0.81 0.72 * 111* 89  
CA 8.7 8.2* 8.5 MG  --   --  1.6 Recent Labs 04/15/19 
0300 04/14/19 
0130 04/13/19 
0143 SGOT 35 32  --   
ALT 20 21  --   
 113 -- TBILI 2.1* 2.6*  2.5* 2.9*  
TP 7.0 7.2  --   
ALB 2.5* 2.5*  --   
GLOB 4.5* 4.7*  --   
 
Recent Labs 04/14/19 
0130 04/13/19 
0143 INR 1.5* 1.6* PTP 15.1* 15.4* No results for input(s): FE, TIBC, PSAT, FERR in the last 72 hours. Lab Results Component Value Date/Time Folate 13.7 03/22/2019 01:54 AM  
  
No results for input(s): PH, PCO2, PO2 in the last 72 hours. No results for input(s): CPK, CKNDX, TROIQ in the last 72 hours. No lab exists for component: CPKMB Lab Results Component Value Date/Time Cholesterol, total 116 09/08/2017 09:48 AM  
 HDL Cholesterol 40 09/08/2017 09:48 AM  
 LDL, calculated 64 09/08/2017 09:48 AM  
 Triglyceride 61 09/08/2017 09:48 AM  
 CHOL/HDL Ratio 7.6 (H) 07/25/2013 04:15 AM  
 
Lab Results Component Value Date/Time Glucose (POC) 107 (H) 04/12/2019 09:25 PM  
 Glucose (POC) 140 (H) 04/11/2019 04:40 PM  
 Glucose (POC) 118 (H) 07/07/2016 07:11 AM  
 Glucose (POC) 189 (H) 03/13/2014 08:16 PM  
 Glucose (POC) 118 (H) 08/10/2013 05:45 PM  
 Glucose (POC) 230 (H) 08/09/2013 08:18 PM  
 Glucose (POC) 143 (H) 07/24/2013 01:56 PM  
 Glucose  10/14/2014 10:50 AM  
 Glucose  08/14/2014 07:32 AM  
 
Lab Results Component Value Date/Time  Color DARK YELLOW 04/05/2019 08:03 AM  
 Appearance CLEAR 04/05/2019 08:03 AM  
 Specific gravity 1.012 04/05/2019 08:03 AM  
 Specific gravity 1.025 03/21/2019 06:09 PM  
 pH (UA) 6.5 04/05/2019 08:03 AM  
 Protein NEGATIVE  04/05/2019 08:03 AM  
 Glucose NEGATIVE  04/05/2019 08:03 AM  
 Ketone TRACE (A) 04/05/2019 08:03 AM  
 Bilirubin NEGATIVE  05/16/2014 09:00 PM  
 Urobilinogen 4.0 (H) 04/05/2019 08:03 AM  
 Nitrites NEGATIVE  04/05/2019 08:03 AM  
 Leukocyte Esterase NEGATIVE  04/05/2019 08:03 AM  
 Epithelial cells FEW 04/05/2019 08:03 AM  
 Bacteria NEGATIVE  04/05/2019 08:03 AM  
 WBC 0-4 04/05/2019 08:03 AM  
 RBC 5-10 04/05/2019 08:03 AM  
 
 
 
Medications Reviewed:  
 
Current Facility-Administered Medications Medication Dose Route Frequency  nicotine (NICODERM CQ) 21 mg/24 hr patch 1 Patch  1 Patch TransDERmal DAILY  lactulose (CHRONULAC) 10 gram/15 mL solution 30 mL  20 g Oral TID  thiamine HCL (B-1) tablet 100 mg  100 mg Oral DAILY  folic acid (FOLVITE) tablet 1 mg  1 mg Oral DAILY  furosemide (LASIX) tablet 40 mg  40 mg Oral DAILY  spironolactone (ALDACTONE) tablet 100 mg  100 mg Oral DAILY  LORazepam (ATIVAN) injection 2 mg  2 mg IntraVENous Q1H PRN  
 LORazepam (ATIVAN) injection 4 mg  4 mg IntraVENous Q1H PRN  
 ondansetron (ZOFRAN) injection 4 mg  4 mg IntraVENous Q4H PRN  
 allopurinol (ZYLOPRIM) tablet 300 mg  300 mg Oral Q12H  cyclobenzaprine (FLEXERIL) tablet 10 mg  10 mg Oral TID PRN  pantoprazole (PROTONIX) tablet 40 mg  40 mg Oral ACB  venlafaxine-SR (EFFEXOR-XR) capsule 150 mg  150 mg Oral DAILY  hydrOXYzine HCl (ATARAX) tablet 25 mg  25 mg Oral TID  hydrALAZINE (APRESOLINE) 20 mg/mL injection 20 mg  20 mg IntraVENous Q6H PRN  
 
______________________________________________________________________ EXPECTED LENGTH OF STAY: 3d 7h 
ACTUAL LENGTH OF STAY:          10 Grady Galo MD

## 2019-04-15 NOTE — PROGRESS NOTES
The following information was submitted to [facility] [employer] ([employer]) for initial authorization: 
Face Sheet/Demographics (Include: Usual living situation) History and Physical 
Last 24 hours of MD and RN notes (Include: Current Level of Functioning; cognitive status) PT/OT/ST Evaluations and/or notes within the last 48 hours MAR 
MD orders (Include: Attending or ordering MDs name and phone #; skilled nursing needs; Wound care/etc) Projected Date of Transfer to SNF Requested SNF 
SNF Point of Contact and Phone # 
CM Point of Contact and Phone # NPI # for SNF

## 2019-04-15 NOTE — PROGRESS NOTES
CHARANJIT talked with Geo Edouard in admissions at [facility] [phone] regarding patient being admitted to the facility. She is reviewing the medicals and will call Cm back with decision. [employer] authorization has been started by Roger Avendaño CM. Today therapy notes  faxed to [facility] at [phone]. Patient's mother is Dariana Fernandez [phone] and father Linus Ron [phone] are contacts and will be contacted when patient is accepted by a facility and auth secured. Mother voiced concern to CM and nurse that patient's clothing including wallet is not in his room. Patient advocacy contacted by charge nurse , Michael Mohr. UPDATE 1:10 pm  Geo Edouard in admissions called CM with several questions about patient-- ie CIWA for the past 48 hours. Patient's Max Hesham answered Coby's questions. Katty Mamie said she will call CM when decision is made. Katty Hatch called CM and the facility will accept patient if the CIWA score is less than 8   At this time it is 9. Also patient would need to be on oral Ativan. . Not IV 
 
 
 
UPDATE [place] [facility]  [place]16/19  [id number]. Cm talked with Geo Edouard and informed her of the authorization and that the CIWA score was 6 this afternoon. Patient's nurse Raesergey Dahl documented this in a progress note. Katty Hatch can secure it. She would like an updated score in the am.  Cm will call her with results and hopefully the facility will accept patient. Patient will need ambulance transport.

## 2019-04-15 NOTE — PROGRESS NOTES
Problem: Self Care Deficits Care Plan (Adult) Goal: *Acute Goals and Plan of Care (Insert Text) Description Occupational Therapy Goals Initiated 4/9/2019 1. Patient will perform standing ADLs at sink with supervision/set-up within 7 day(s). 2.  Patient will perform upper body dressing and lower body dressing with supervision/set-up within 7 day(s). 3.  Patient will perform bathing with supervision/set-up within 7 day(s). 4.  Patient will perform toilet transfers with supervision/set-up within 7 day(s). 5.  Patient will perform all aspects of toileting with supervision/set-up within 7 day(s). Outcome: Progressing Towards Goal 
  
OCCUPATIONAL THERAPY TREATMENT Patient: Mehul Wong (62 y.o. male) Date: 4/15/2019 Diagnosis: ETOH abuse [F10.10] <principal problem not specified> Precautions: Fall, Bed Alarm, Seizure Chart, occupational therapy assessment, plan of care, and goals were reviewed. ASSESSMENT:  
The patient presents with Minimum assistance upper body ADLs, Moderate Assistance lower body ADLs, Minimum assistance for supine-sit, Minimum assistance for sit-stand, Minimum assistance for toilet transfer, and Minimum assistance/ steadying ambulating within room with RW. Patient remains significantly below functional baseline. The following are barriers to ADL independence while in acute care:  
- Cognitive and/or behavioral: processing, initiation, safety awareness and insight into deficits, poor RW management requiring max verbal and tactile cues. - Medical condition: ROM, strength, functional reach, functional endurance, sitting balance, standing balance and coordination   
- Other:    
 
Prior level of function: independent with ADLs and IADLs, ambulatory with no AD PLAN: 
Patient continues to benefit from skilled intervention to address the above impairments. Continue treatment per established plan of care.  
 
Discharge recommendations: Rehab at inpatient facility: patient can tolerate 3 hours of therapy (to regain functional baseline patient requires rehab) If above is not an option then recommend: Rehab at skilled nursing facility (SNF) (to regain functional baseline patient requires rehab) Barriers to discharging home, in addition to above listed impairments: level of physical assist required to maintain patient safety, increased fall risk, poor safety awareness, poor insight SUBJECTIVE:  
Patient stated ? I just want to walk around by myself and pack up my things and get out of here. ? OBJECTIVE DATA SUMMARY:  
Cognitive/Behavioral Status: 
Neurologic State: Alert Orientation Level: Oriented X4 Cognition: Follows commands;Poor safety awareness Perception: Appears intact Perseveration: No perseveration noted Safety/Judgement: Decreased awareness of need for safety;Decreased awareness of need for assistance;Decreased insight into deficits Functional Mobility and Transfers for ADLs: 
Bed Mobility: 
Sit to Supine: Minimum assistance; Additional time(poor initiation) Transfers: 
Sit to Stand: Minimum assistance Functional Transfers Toilet Transfer : Minimum assistance(BSC) Balance: 
Sitting: Impaired Sitting - Static: Good (unsupported) Sitting - Dynamic: Fair (occasional) Standing: Impaired Standing - Static: Fair;Constant support Standing - Dynamic : Fair ADL Intervention: 
  
 
Grooming Washing Face: Minimum assistance(for steadying, standing at sink) Brushing Teeth: Minimum assistance(for steadying, standing at sink) Upper Body Dressing Assistance Hospital Gown: Minimum  assistance Lower Body Dressing Assistance Socks: Moderate assistance(able to doff in tailor sit, assistance to don d/t coord) Cognitive Retraining Safety/Judgement: Decreased awareness of need for safety;Decreased awareness of need for assistance;Decreased insight into deficits Pain: 
Patient does not report pain Activity Tolerance: VSS After treatment patient left:  
Supine in bed Alarm activated Call bell within reach RN notified COMMUNICATION/COLLABORATION:  
The patient?s plan of care was discussed with: Physical Therapist, Registered Nurse and  Dada Pugh OT Time Calculation: 32 mins

## 2019-04-15 NOTE — PROGRESS NOTES
04/15/19 1300 CIWA/ETOH Withdrawal Scale Nausea and Vomiting 1 Tactile Disturbances 0 Tremor 3 Auditory Disturbances 0 Paroxysmal Sweats 0 Visual Disturbances 0 Anxiety 2 Headache, Fullness in Head 0 Agitation 0 Orientation and Clouding of Sensorium 0  
CIWA-Ar Total 6

## 2019-04-15 NOTE — PROGRESS NOTES
Problem: Mobility Impaired (Adult and Pediatric) Goal: *Acute Goals and Plan of Care (Insert Text) Description Physical Therapy Goals Initiated 4/9/2019 1. Patient will move from supine to sit and sit to supine  and scoot up and down in bed with modified independence within 7 day(s). 2.  Patient will transfer from bed to chair and chair to bed with modified independence using the least restrictive device within 7 day(s). 3.  Patient will perform sit to stand with modified independence within 7 day(s). 4.  Patient will ambulate with modified independence for 300 feet with the least restrictive device within 7 day(s). 5.  Patient will ascend/descend 2 stairs with handrail(s) with modified independence within 7 day(s). Outcome: Progressing Towards Goal 
  
PHYSICAL THERAPY TREATMENT Patient: Jackie Perry (62 y.o. male) Date: 4/15/2019 Diagnosis: ETOH abuse [F10.10] <principal problem not specified> Precautions: Fall, Bed Alarm, Seizure Chart, physical therapy assessment, plan of care and goals were reviewed. ASSESSMENT: 
OT completed visit approx 30 minutes prior and provided reported. Noted patient to be with improved mobility during this PT visit as compared with OT session this morning. Based on the objective data described below, the patient presents Modified independent level for bed mobility and stand by/ contact guard assistance for transfers. Gait training completed at Contact guard/ Minimum assistance, 300 feet and using no device. Patient with episodes of unsteadiness causing him to veer left and have LOB. He requires assistance to stabilize and improve gait quality. Patient will benefit from SNF for balance training and gait training to progress to independence prior to returning home where he lives alone.    
The following are barriers to independence while in acute care:  
-Cognitive and/or behavioral: attention to task, sequencing, safety awareness and insight into deficits 
-Medical condition: standing balance and medical history   
-Other:    
 
Prior level of function: Independent ADLs and ambulation with no device. PLAN: 
Patient continues to benefit from skilled intervention to address the above impairments. Continue treatment per established plan of care. Recommend with staff: Complete as able in order to maintain strength, endurance and independence: OOB to chair 3x/day and walking daily with CGA assist. Thank you for your assistance. Recommend next PT session: Balance training, continue to progress to gait with no device as appropriate Discharge recommendations: Rehab at skilled nursing facility (SNF) (to regain functional baseline patient requires rehab) If above is not an option then recommend: Home health (to increase independence and safety) 24 supervision To be determined between the prior selections, based on patient progress during hospital stay Patient's barriers to discharging home, in addition to above impairments: lives alone 
family availability to assist 
family availability for education/training to then follow up at home 
level of physical assist required to maintain patient safety. Equipment recommendations for successful discharge (if) home: Defer to rehab SUBJECTIVE:  
Patient stated ? I'm going home today, to my house. I am a 66year old man and I'm going home. ? OBJECTIVE DATA SUMMARY:  
Critical Behavior: 
Neurologic State: Alert Orientation Level: Oriented X4 Cognition: Follows commands, Poor safety awareness Safety/Judgement: Decreased awareness of need for safety, Decreased awareness of need for assistance, Decreased insight into deficits Functional Mobility Training: 
Bed Mobility: 
  
Supine to Sit: Modified independent Sit to Supine: Modified independent Transfers: 
Sit to Stand: Stand-by assistance;Contact guard assistance Stand to Sit: Stand-by assistance;Contact guard assistance Balance: 
Sitting: Intact Sitting - Static: Good (unsupported) Sitting - Dynamic: Good (unsupported) Standing: Impaired Standing - Static: Fair Standing - Dynamic : Fair Ambulation/Gait Training: 
Distance (ft): 300 Feet (ft) Assistive Device: Gait belt; Other (comment)(no device ) Ambulation - Level of Assistance: Contact guard assistance;Minimal assistance Gait Abnormalities: Decreased step clearance Base of Support: Narrowed Speed/Gabbie: Fluctuations Step Length: Right shortened;Left shortened Episodes of unsteadiness and veering left requiring minimum assistance to prevent falling. Cues provided throughout to increase bilateral strides and widen base of support with patient correcting and balance improved with this. Does not maintain throughout. Attempted gait training with single point cane. Patient with poor sequencing and increased unsteadiness with this. Switched back to the rolling walker which patient reports veers and requested to gait train without. Pain: 
Pre treatment: 0 /10 During treatment: 0/10 Post treatment:  0/10 Activity Tolerance:  
Good Please refer to the flowsheet for vital signs taken during this treatment. After treatment patient left:  
Supine in bed Bed alarm/tab alert on Bed in low position Call light within reach RN notified Side rails x 2  
 
COMMUNICATION/COLLABORATION:  
The patient?s plan of care was discussed with: Occupational Therapist 
 
Merle Do, PT

## 2019-04-15 NOTE — PROGRESS NOTES
No specific c/o today. Tm 98.7 HR: 86 BP: 109/85 Resp Rate: 20 97% sat on room air. No intake or output data in the 24 hours ending 04/15/19 4593 Exam: Cor: RRR. Lungs: Bilateral breath sounds. Clear to auscultation. Abd: Soft. Non distended. Non tender. No guarding or rebound. Umbilical hernia reducible. Wound at umbilicus is healing. Labs: No results found for this or any previous visit (from the past 12 hour(s)). Do not believe that there is an indication for hernia repair at this time as hernia is reducible. Continue local wound care. Diet as tolerated. Plans per Dr. Ash Parker. Following.

## 2019-04-15 NOTE — PROGRESS NOTES
Problem: Pressure Injury - Risk of 
Goal: *Prevention of pressure injury Description Document Brenden Scale and appropriate interventions in the flowsheet. Offload heels Turn approximately every 2 hours Outcome: Progressing Towards Goal 
  
Problem: Patient Education: Go to Patient Education Activity Goal: Patient/Family Education Outcome: Progressing Towards Goal 
  
Problem: Falls - Risk of 
Goal: *Absence of Falls Description Document Chelsey Melgar Fall Risk and appropriate interventions in the flowsheet. Outcome: Progressing Towards Goal 
Note:  
Fall Risk Interventions: 
Mobility Interventions: Bed/chair exit alarm, Patient to call before getting OOB, PT Consult for mobility concerns Mentation Interventions: Adequate sleep, hydration, pain control, Bed/chair exit alarm, Door open when patient unattended, More frequent rounding, Reorient patient Medication Interventions: Bed/chair exit alarm, Patient to call before getting OOB, Teach patient to arise slowly Elimination Interventions: Bed/chair exit alarm, Call light in reach, Patient to call for help with toileting needs Problem: Patient Education: Go to Patient Education Activity Goal: Patient/Family Education Outcome: Progressing Towards Goal 
  
Problem: Patient Education: Go to Patient Education Activity Goal: Patient/Family Education Outcome: Progressing Towards Goal 
  
Problem: Patient Education: Go to Patient Education Activity Goal: Patient/Family Education Outcome: Progressing Towards Goal 
  
Problem: Alcohol Withdrawal 
Goal: *STG: Participates in treatment plan Outcome: Progressing Towards Goal 
Goal: *STG: Remains safe in hospital 
Outcome: Progressing Towards Goal 
Goal: *STG: Seeks staff when symptoms of withdrawal increase Outcome: Progressing Towards Goal 
Goal: *STG: Complies with medication therapy Outcome: Progressing Towards Goal 
Goal: *STG: Attends activities and groups Outcome: Progressing Towards Goal 
Goal: *STG: Will identify negative impact of chemical dependency including the use of tobacco, alcohol, and other substances Outcome: Progressing Towards Goal 
Goal: *STG: Verbalizes abstinence as an achievable goal 
Outcome: Progressing Towards Goal 
Goal: *STG: Agrees to participate in outpatient after care program to support ongoing mental health Outcome: Progressing Towards Goal 
Goal: *STG: Able to indentify relapse triggers including interpersonal/social and familial factors Outcome: Progressing Towards Goal 
Goal: *STG: Identify lifestyle changes to support long term sobriety such as vocation, employment, education, and legal issues Outcome: Progressing Towards Goal 
Goal: *STG: Maintains appropriate nutrition and hydration Outcome: Progressing Towards Goal 
Goal: *STG: Vital signs within defined limits Outcome: Progressing Towards Goal 
Goal: *STG/LTG: Relapse prevention plan in place to include housing/aftercare, leisure activities, and spirituality Outcome: Progressing Towards Goal 
Goal: Interventions Outcome: Progressing Towards Goal 
  
Problem: Patient Education: Go to Patient Education Activity Goal: Patient/Family Education Outcome: Progressing Towards Goal 
  
Problem: Alcohol Withdrawal 
Goal: *STG: Participates in treatment plan Outcome: Progressing Towards Goal 
Goal: *STG: Remains safe in hospital 
Outcome: Progressing Towards Goal 
Goal: *STG: Seeks staff when symptoms of withdrawal increase Outcome: Progressing Towards Goal 
Goal: *STG: Complies with medication therapy Outcome: Progressing Towards Goal 
Goal: *STG: Attends activities and groups Outcome: Progressing Towards Goal 
Goal: *STG: Will identify negative impact of chemical dependency including the use of tobacco, alcohol, and other substances Outcome: Progressing Towards Goal 
Goal: *STG: Verbalizes abstinence as an achievable goal 
Outcome: Progressing Towards Goal 
Goal: *STG: Agrees to participate in outpatient after care program to support ongoing mental health Outcome: Progressing Towards Goal 
Goal: *STG: Able to indentify relapse triggers including interpersonal/social and familial factors Outcome: Progressing Towards Goal 
Goal: *STG: Identify lifestyle changes to support long term sobriety such as vocation, employment, education, and legal issues Outcome: Progressing Towards Goal 
Goal: *STG: Maintains appropriate nutrition and hydration Outcome: Progressing Towards Goal 
Goal: *STG: Vital signs within defined limits Outcome: Progressing Towards Goal 
Goal: *STG/LTG: Relapse prevention plan in place to include housing/aftercare, leisure activities, and spirituality Outcome: Progressing Towards Goal 
Goal: Interventions Outcome: Progressing Towards Goal

## 2019-04-16 NOTE — PROGRESS NOTES
Problem: Pressure Injury - Risk of 
Goal: *Prevention of pressure injury Description Document Brenden Scale and appropriate interventions in the flowsheet. Offload heels Turn approximately every 2 hours Outcome: Progressing Towards Goal 
  
Problem: Patient Education: Go to Patient Education Activity Goal: Patient/Family Education Outcome: Progressing Towards Goal 
  
Problem: Falls - Risk of 
Goal: *Absence of Falls Description Document Misty Latin Fall Risk and appropriate interventions in the flowsheet. Outcome: Progressing Towards Goal 
Note:  
Fall Risk Interventions: 
Mobility Interventions: Bed/chair exit alarm, Patient to call before getting OOB, Strengthening exercises (ROM-active/passive), Communicate number of staff needed for ambulation/transfer Mentation Interventions: Adequate sleep, hydration, pain control, Bed/chair exit alarm, Room close to nurse's station, Update white board Medication Interventions: Bed/chair exit alarm, Patient to call before getting OOB, Teach patient to arise slowly Elimination Interventions: Bed/chair exit alarm, Call light in reach, Patient to call for help with toileting needs, Toileting schedule/hourly rounds Problem: Patient Education: Go to Patient Education Activity Goal: Patient/Family Education Outcome: Progressing Towards Goal 
  
Problem: Patient Education: Go to Patient Education Activity Goal: Patient/Family Education Outcome: Progressing Towards Goal 
  
Problem: Alcohol Withdrawal 
Goal: *STG: Participates in treatment plan Outcome: Progressing Towards Goal 
Goal: *STG: Remains safe in hospital 
Outcome: Progressing Towards Goal 
Goal: *STG: Seeks staff when symptoms of withdrawal increase Outcome: Progressing Towards Goal 
Goal: *STG: Complies with medication therapy Outcome: Progressing Towards Goal 
Goal: *STG: Attends activities and groups Outcome: Progressing Towards Goal 
Goal: *STG: Will identify negative impact of chemical dependency including the use of tobacco, alcohol, and other substances Outcome: Progressing Towards Goal 
Goal: *STG: Verbalizes abstinence as an achievable goal 
Outcome: Progressing Towards Goal 
Goal: *STG: Agrees to participate in outpatient after care program to support ongoing mental health Outcome: Progressing Towards Goal 
Goal: *STG: Able to indentify relapse triggers including interpersonal/social and familial factors Outcome: Progressing Towards Goal 
Goal: *STG: Identify lifestyle changes to support long term sobriety such as vocation, employment, education, and legal issues Outcome: Progressing Towards Goal 
Goal: *STG: Maintains appropriate nutrition and hydration Outcome: Progressing Towards Goal 
Goal: *STG: Vital signs within defined limits Outcome: Progressing Towards Goal 
Goal: *STG/LTG: Relapse prevention plan in place to include housing/aftercare, leisure activities, and spirituality Outcome: Progressing Towards Goal 
Goal: Interventions Outcome: Progressing Towards Goal 
  
Problem: Alcohol Withdrawal 
Goal: *STG: Participates in treatment plan Outcome: Progressing Towards Goal 
Goal: *STG: Remains safe in hospital 
Outcome: Progressing Towards Goal 
Goal: *STG: Seeks staff when symptoms of withdrawal increase Outcome: Progressing Towards Goal 
Goal: *STG: Complies with medication therapy Outcome: Progressing Towards Goal 
Goal: *STG: Attends activities and groups Outcome: Progressing Towards Goal 
Goal: *STG: Will identify negative impact of chemical dependency including the use of tobacco, alcohol, and other substances Outcome: Progressing Towards Goal 
Goal: *STG: Verbalizes abstinence as an achievable goal 
Outcome: Progressing Towards Goal 
Goal: *STG: Agrees to participate in outpatient after care program to support ongoing mental health Outcome: Progressing Towards Goal 
Goal: *STG: Able to indentify relapse triggers including interpersonal/social and familial factors Outcome: Progressing Towards Goal 
Goal: *STG: Identify lifestyle changes to support long term sobriety such as vocation, employment, education, and legal issues Outcome: Progressing Towards Goal 
Goal: *STG: Maintains appropriate nutrition and hydration Outcome: Progressing Towards Goal 
Goal: *STG: Vital signs within defined limits Outcome: Progressing Towards Goal 
Goal: *STG/LTG: Relapse prevention plan in place to include housing/aftercare, leisure activities, and spirituality Outcome: Progressing Towards Goal 
Goal: Interventions Outcome: Progressing Towards Goal 
  
Problem: Pain Goal: *Control of Pain Outcome: Progressing Towards Goal

## 2019-04-16 NOTE — PROGRESS NOTES
Bedside and Verbal shift change report given to Kaleigh England RN (oncoming nurse) by Skylar Naidu RN (offgoing nurse). Report included the following information SBAR, Kardex, ED Summary, Procedure Summary, Intake/Output, MAR, Accordion, Recent Results and Med Rec Status.

## 2019-04-16 NOTE — DISCHARGE INSTRUCTIONS
Discharge SNF/Rehab Instructions/LTAC       PATIENT ID: Mimi Verde MRN: [id number]   YOB: 1960    DATE OF ADMISSION: 4/5/2019  6:15 AM    DATE OF DISCHARGE: 4/16/2019    PRIMARY CARE PROVIDER: Eddy Lopez DO       ATTENDING PHYSICIAN: Saintclair Mola, MD  DISCHARGING PROVIDER: Leo Erickson MD     To contact this individual call [phone] and ask the  to page. If unavailable ask to be transferred the Adult Hospitalist Department. CONSULTATIONS: IP CONSULT TO GENERAL SURGERY  IP CONSULT TO GASTROENTEROLOGY  IP CONSULT TO HEPATOLOGY  IP CONSULT TO PSYCHIATRY    PROCEDURES/SURGERIES: * No surgery found *    ADMITTING [address] [place] [address] COURSE:   Alcohol Use Disorder with active withdrawals present on admission. -ETOH level at admission 143. -Off Precedex.  -continue CIWA protocol with prn Ativan   -seizure precautions  -Daily Multivitamin/mineral, Thiamine and Folic Acid.  -Cessation counselling done.  -will need to go to alcohol Rehab program at discharge    Resolved Hypokalemia and hypomagnesemia. Alcoholic liver cirrhosis with ascites  -heavy alcohol use -drinks 1/2 gallon of whiskey every 2 days. -CT abdomen shows liver cirrhosis  -MELD score 18 child class C  -s/p temporary  peritoneal cath placement for ascites which was removed by the patient 4/10/19.  - continue 40 mg lasix and  spironolactone   -Ammonia levels improved on current lactulose dose  -Outpatient follow up with Dr Jomar Raza and ambulatory dysfunction  PT/OT as tolerated. Umbilical hernia with superficial skin wound present on admission.  -Reducible. No surgical intervention required per gen surgey  -Daily wound care. Chronic Anemia and thrombocytopenia due to cirrhosis. Probable multifactorial Hyponatremia. DISCHARGE DIAGNOSES / PLAN:      1. Alcohol withdrawal  2.  Alcoholic liver cirrhosis       PENDING TEST RESULTS:   At the time of discharge the following test results are still pending: none    FOLLOW UP APPOINTMENTS:    Follow-up Information     Follow up With Specialties Details Why Contact Info    [facility] [address]) [address] [address] [phone]      Mustapha Bobo DO Family Practice In 1 week  [address] [facility] [place] [phone]             ADDITIONAL CARE RECOMMENDATIONS:   Follow up with PMD  Follow up with Dr Patel Shelter: Cardiac Diet    ACTIVITY: Activity as tolerated      DISCHARGE MEDICATIONS:   See Medication Reconciliation Form      NOTIFY YOUR PHYSICIAN FOR ANY OF THE FOLLOWING:   Fever over 101 degrees for 24 hours. Chest pain, shortness of breath, fever, chills, nausea, vomiting, diarrhea, change in mentation, falling, weakness, bleeding. Severe pain or pain not relieved by medications. Or, any other signs or symptoms that you may have questions about.     DISPOSITION:    Home With:   OT  PT  HH  RN      x SNF/Inpatient Rehab/LTAC    Independent/assisted living    Hospice    Other:       PATIENT CONDITION AT DISCHARGE:     Functional status    Poor    x Deconditioned     Independent      Cognition    x Lucid     Forgetful     Dementia      Catheters/lines (plus indication)    Cervantes     PICC     PEG    x None      Code status    x Full code     DNR      PHYSICAL EXAMINATION AT DISCHARGE:  Please see progress note      CHRONIC MEDICAL DIAGNOSES:  Problem List as of 4/16/2019 Date Reviewed: 4/15/2019          Codes Class Noted - Resolved    * (Principal) ETOH abuse ICD-10-CM: F10.10  ICD-9-CM: 305.00  4/5/2019 - Present        Ascites ICD-10-CM: R18.8  ICD-9-CM: 789.59  3/21/2019 - Present        History of GI bleed ICD-10-CM: Z87.19  ICD-9-CM: V12.79  11/30/2018 - Present        Withdrawal symptoms, alcohol ([facility] [address] ICD-10-CM: L05.844  ICD-9-CM: 291.81  11/29/2018 - Present        Severe obesity with body mass index (BMI) of 35.0 to 39.9 with serious comorbidity ([facility] [address] ICD-10-CM: E66.01  ICD-9-CM: 278.01  7/27/2018 - Present        Esophageal varices in alcoholic cirrhosis ([place] [address].) [id number]: K70.30, I85.10  ICD-9-CM: 571.2, 456.21  7/2/2018 - Present        Duodenal mass ICD-10-CM: K31.89  ICD-9-CM: 537.89  7/2/2018 - Present        Alcoholic cirrhosis ([place] [address].) AQO-00-TL: K70.30  ICD-9-CM: 571.2  4/6/2018 - Present        Umbilical hernia without obstruction and without gangrene ICD-10-CM: K42.9  ICD-9-CM: 553.1  1/24/2018 - Present    Overview Signed 1/24/2018  3:04 PM by Joy Hammond MD     Without gangrene or obstruction. Gallbladder calculus without cholecystitis ICD-10-CM: K80.20  ICD-9-CM: 574.20  7/21/2016 - Present    Overview Signed 7/21/2016  1:18 PM by Mazin Sanon NP     Seen on  7/1/16.               Iron deficiency anemia ICD-10-CM: D50.9  ICD-9-CM: 280.9  3/3/2016 - Present        Esophageal varices without bleeding ([place] [address].) ICD-10-CM: I85.00  ICD-9-CM: 456.1  3/1/2016 - Present        Prediabetes ICD-10-CM: R73.03  ICD-9-CM: 790.29  1/11/2016 - Present        Thrombocytopenia ([facility] ICD-10-CM: D69.6  ICD-9-CM: 287.5  10/17/2013 - Present        Splenomegaly ICD-10-CM: R16.1  ICD-9-CM: 789.2  10/17/2013 - Present        History of alcohol abuse ICD-10-CM: Z87.898  ICD-9-CM: 305.03  10/17/2013 - Present        Hypogonadism, male ICD-10-CM: E29.1  ICD-9-CM: 257.2  10/10/2013 - Present        Recurrent major depressive disorder ([facility] ICD-10-CM: F33.9  ICD-9-CM: 296.30  8/11/2013 - Present        Alcohol dependence with withdrawal ([facility] [address] ICD-10-CM: F10.239  ICD-9-CM: 303.90, 291.81  7/25/2013 - Present        Anxiety ICD-10-CM: F41.9  ICD-9-CM: 300.00  7/25/2013 - Present        Gout ICD-10-CM: M10.9  ICD-9-CM: 274.9  6/27/2011 - Present        HTN (hypertension) (Chronic) ICD-10-CM: I10  ICD-9-CM: 401.9  6/1/2011 - Present        RESOLVED: Chest pain ICD-10-CM: R07.9  ICD-9-CM: 786.50  3/30/2018 - 11/30/2018        RESOLVED: Hyponatremia ICD-10-CM: E87.1  ICD-9-CM: 276.1  7/5/2016 - 11/30/2018        RESOLVED: Type 2 diabetes mellitus ([facility] ICD-10-CM: E11.9  ICD-9-CM: 250.00  10/10/2013 - 11/5/2015        RESOLVED: Depression ICD-10-CM: F32.9  ICD-9-CM: 760  7/25/2013 - 11/30/2018        RESOLVED: GI bleed ICD-10-CM: K92.2  ICD-9-CM: 578.9  6/28/2011 - 11/30/2018        RESOLVED: Chest pain, unspecified ICD-10-CM: R07.9  ICD-9-CM: 786.50  6/28/2011 - 11/30/2018        RESOLVED: Hypokalemia ICD-10-CM: E87.6  ICD-9-CM: 276.8  6/1/2011 - 6/3/2011        RESOLVED: Alcohol abuse (Chronic) ICD-10-CM: F10.10  ICD-9-CM: 305.00  6/1/2011 - 7/25/2013        RESOLVED: Thrombocytopenia, unspecified ([facility] ICD-10-CM: D69.6  ICD-9-CM: 287.5  6/1/2011 - 6/28/2011        RESOLVED: Nausea & vomiting ICD-10-CM: R11.2  ICD-9-CM: 787.01  6/1/2011 - 6/3/2011        RESOLVED: Diarrhea ICD-10-CM: R19.7  ICD-9-CM: 787.91  6/1/2011 - 6/3/2011                CDMP Checked:   Yes x     PROBLEM LIST Updated:  Yes x         Signed:   Grady Galo MD  4/16/2019  11:36 AM

## 2019-04-16 NOTE — PROGRESS NOTES
Problem: Pressure Injury - Risk of 
Goal: *Prevention of pressure injury Description Document Brenden Scale and appropriate interventions in the flowsheet. Offload heels Turn approximately every 2 hours Outcome: Progressing Towards Goal 
  
Problem: Patient Education: Go to Patient Education Activity Goal: Patient/Family Education Outcome: Progressing Towards Goal 
  
Problem: Falls - Risk of 
Goal: *Absence of Falls Description Document Helen Chente Fall Risk and appropriate interventions in the flowsheet. Outcome: Progressing Towards Goal 
  
Problem: Patient Education: Go to Patient Education Activity Goal: Patient/Family Education Outcome: Progressing Towards Goal 
  
Problem: Patient Education: Go to Patient Education Activity Goal: Patient/Family Education Outcome: Progressing Towards Goal 
  
Problem: Patient Education: Go to Patient Education Activity Goal: Patient/Family Education Outcome: Progressing Towards Goal 
  
Problem: Alcohol Withdrawal 
Goal: *STG: Participates in treatment plan Outcome: Progressing Towards Goal 
Goal: *STG: Remains safe in hospital 
Outcome: Progressing Towards Goal 
Goal: *STG: Seeks staff when symptoms of withdrawal increase Outcome: Progressing Towards Goal 
Goal: *STG: Complies with medication therapy Outcome: Progressing Towards Goal 
Goal: *STG: Attends activities and groups Outcome: Progressing Towards Goal 
Goal: *STG: Will identify negative impact of chemical dependency including the use of tobacco, alcohol, and other substances Outcome: Progressing Towards Goal 
Goal: *STG: Verbalizes abstinence as an achievable goal 
Outcome: Progressing Towards Goal 
Goal: *STG: Agrees to participate in outpatient after care program to support ongoing mental health Outcome: Progressing Towards Goal 
Goal: *STG: Able to indentify relapse triggers including interpersonal/social and familial factors Outcome: Progressing Towards Goal 
Goal: *STG: Identify lifestyle changes to support long term sobriety such as vocation, employment, education, and legal issues Outcome: Progressing Towards Goal 
Goal: *STG: Maintains appropriate nutrition and hydration Outcome: Progressing Towards Goal 
Goal: *STG: Vital signs within defined limits Outcome: Progressing Towards Goal 
Goal: *STG/LTG: Relapse prevention plan in place to include housing/aftercare, leisure activities, and spirituality Outcome: Progressing Towards Goal 
Goal: Interventions Outcome: Progressing Towards Goal 
  
Problem: Patient Education: Go to Patient Education Activity Goal: Patient/Family Education Outcome: Progressing Towards Goal 
  
Problem: Alcohol Withdrawal 
Goal: *STG: Participates in treatment plan Outcome: Progressing Towards Goal 
Goal: *STG: Remains safe in hospital 
Outcome: Progressing Towards Goal 
Goal: *STG: Seeks staff when symptoms of withdrawal increase Outcome: Progressing Towards Goal 
Goal: *STG: Complies with medication therapy Outcome: Progressing Towards Goal 
Goal: *STG: Attends activities and groups Outcome: Progressing Towards Goal 
Goal: *STG: Will identify negative impact of chemical dependency including the use of tobacco, alcohol, and other substances Outcome: Progressing Towards Goal 
Goal: *STG: Verbalizes abstinence as an achievable goal 
Outcome: Progressing Towards Goal 
Goal: *STG: Agrees to participate in outpatient after care program to support ongoing mental health Outcome: Progressing Towards Goal 
Goal: *STG: Able to indentify relapse triggers including interpersonal/social and familial factors Outcome: Progressing Towards Goal 
Goal: *STG: Identify lifestyle changes to support long term sobriety such as vocation, employment, education, and legal issues Outcome: Progressing Towards Goal 
Goal: *STG: Maintains appropriate nutrition and hydration Outcome: Progressing Towards Goal 
Goal: *STG: Vital signs within defined limits Outcome: Progressing Towards Goal Goal: *STG/LTG: Relapse prevention plan in place to include housing/aftercare, leisure activities, and spirituality Outcome: Progressing Towards Goal 
Goal: Interventions Outcome: Progressing Towards Goal 
  
Problem: Pain Goal: *Control of Pain Outcome: Progressing Towards Goal

## 2019-04-16 NOTE — PROGRESS NOTES
04/16/19 1000 CIWA/ETOH Withdrawal Scale Nausea and Vomiting 0 Tactile Disturbances 0 Tremor 2 Auditory Disturbances 0 Paroxysmal Sweats 0 Visual Disturbances 0 Anxiety 1 Headache, Fullness in Head 0 Agitation 1 Orientation and Clouding of Sensorium 0  
CIWA-Ar Total 4

## 2019-04-16 NOTE — PROGRESS NOTES
Bedside and Verbal shift change report given to Hazel Thomas RN (oncoming nurse) by Albaro Valle RN (offgoing nurse).  Report included the following information SBAR, Kardex, MAR, Med Rec Status and Cardiac Rhythm SR.

## 2019-04-16 NOTE — PROGRESS NOTES
Problem: Pressure Injury - Risk of 
Goal: *Prevention of pressure injury Description Document Brenden Scale and appropriate interventions in the flowsheet. Offload heels Turn approximately every 2 hours 4/16/2019 0252 by Gaudencio Paniagua RN Outcome: Progressing Towards Goal 
4/16/2019 0249 by Gaudencio Paniagua RN Outcome: Progressing Towards Goal 
  
Problem: Patient Education: Go to Patient Education Activity Goal: Patient/Family Education 4/16/2019 0252 by Gaudencio Paniagua RN Outcome: Progressing Towards Goal 
4/16/2019 0249 by Gaudencio Paniagua RN Outcome: Progressing Towards Goal 
  
Problem: Falls - Risk of 
Goal: *Absence of Falls Description Document Luke English Fall Risk and appropriate interventions in the flowsheet. 4/16/2019 0252 by Gaudencio Paniagua RN Outcome: Progressing Towards Goal 
4/16/2019 0249 by Gaudencio Paniagua RN Outcome: Progressing Towards Goal 
  
Problem: Patient Education: Go to Patient Education Activity Goal: Patient/Family Education 4/16/2019 0252 by Gaudencio Paniagua RN Outcome: Progressing Towards Goal 
4/16/2019 0249 by Gaudencio Paniagua RN Outcome: Progressing Towards Goal 
  
Problem: Patient Education: Go to Patient Education Activity Goal: Patient/Family Education 4/16/2019 0252 by Gaudencio Paniagua RN Outcome: Progressing Towards Goal 
4/16/2019 0249 by Gaudencio Paniagua RN Outcome: Progressing Towards Goal 
  
Problem: Patient Education: Go to Patient Education Activity Goal: Patient/Family Education 4/16/2019 0252 by Gaudencio Paniagua RN Outcome: Progressing Towards Goal 
4/16/2019 0249 by Gaudencio Paniagua RN Outcome: Progressing Towards Goal 
  
Problem: Alcohol Withdrawal 
Goal: *STG: Participates in treatment plan 4/16/2019 0252 by Gaudencio Paniagua RN Outcome: Progressing Towards Goal 
4/16/2019 0249 by Gaudencio Paniagua RN Outcome: Progressing Towards Goal 
Goal: *STG: Remains safe in hospital 
4/16/2019 0252 by Gaudencio Paniagua RN Outcome: Progressing Towards Goal 
4/16/2019 0249 by Glendia Leventhal, RN Outcome: Progressing Towards Goal 
Goal: *STG: Seeks staff when symptoms of withdrawal increase 4/16/2019 0252 by Glendia Leventhal, RN Outcome: Progressing Towards Goal 
4/16/2019 0249 by Glendia Leventhal, RN Outcome: Progressing Towards Goal 
Goal: *STG: Complies with medication therapy 4/16/2019 0252 by Glendia Leventhal, RN Outcome: Progressing Towards Goal 
4/16/2019 0249 by Glendia Leventhal, RN Outcome: Progressing Towards Goal 
Goal: *STG: Attends activities and groups 4/16/2019 0252 by Glendia Leventhal, RN Outcome: Progressing Towards Goal 
4/16/2019 0249 by Glendia Leventhal, RN Outcome: Progressing Towards Goal 
Goal: *STG: Will identify negative impact of chemical dependency including the use of tobacco, alcohol, and other substances 4/16/2019 0252 by Glendia Leventhal, RN Outcome: Progressing Towards Goal 
4/16/2019 0249 by Glendia Leventhal, RN Outcome: Progressing Towards Goal 
Goal: *STG: Verbalizes abstinence as an achievable goal 
4/16/2019 0252 by Glendia Leventhal, RN Outcome: Progressing Towards Goal 
4/16/2019 0249 by Glendia Leventhal, RN Outcome: Progressing Towards Goal 
Goal: *STG: Agrees to participate in outpatient after care program to support ongoing mental health 4/16/2019 0252 by Glendia Leventhal, RN Outcome: Progressing Towards Goal 
4/16/2019 0249 by Glendia Leventhal, RN Outcome: Progressing Towards Goal 
Goal: *STG: Able to indentify relapse triggers including interpersonal/social and familial factors 4/16/2019 0252 by Glendia Leventhal, RN Outcome: Progressing Towards Goal 
4/16/2019 0249 by Glendia Leventhal, RN Outcome: Progressing Towards Goal 
Goal: *STG: Identify lifestyle changes to support long term sobriety such as vocation, employment, education, and legal issues 4/16/2019 0252 by Glendia Leventhal, RN Outcome: Progressing Towards Goal 
4/16/2019 0249 by Glendia Leventhal, RN Outcome: Progressing Towards Goal Goal: *STG: Maintains appropriate nutrition and hydration 4/16/2019 0252 by Glendia Leventhal, RN Outcome: Progressing Towards Goal 
4/16/2019 0249 by Glendia Leventhal, RN Outcome: Progressing Towards Goal 
Goal: *STG: Vital signs within defined limits 4/16/2019 0252 by Glendia Leventhal, RN Outcome: Progressing Towards Goal 
4/16/2019 0249 by Glendia Leventhal, RN Outcome: Progressing Towards Goal 
Goal: *STG/LTG: Relapse prevention plan in place to include housing/aftercare, leisure activities, and spirituality 4/16/2019 0252 by Glendia Leventhal, RN Outcome: Progressing Towards Goal 
4/16/2019 0249 by Glendia Leventhal, RN Outcome: Progressing Towards Goal 
Goal: Interventions 4/16/2019 0252 by Glendia Leventhal, RN Outcome: Progressing Towards Goal 
4/16/2019 0249 by Glendia Leventhal, RN Outcome: Progressing Towards Goal 
  
Problem: Patient Education: Go to Patient Education Activity Goal: Patient/Family Education 4/16/2019 0252 by Glendia Leventhal, RN Outcome: Progressing Towards Goal 
4/16/2019 0249 by Glendia Leventhal, RN Outcome: Progressing Towards Goal 
  
Problem: Alcohol Withdrawal 
Goal: *STG: Participates in treatment plan 4/16/2019 0252 by Glendia Leventhal, RN Outcome: Progressing Towards Goal 
4/16/2019 0249 by Glendia Leventhal, RN Outcome: Progressing Towards Goal 
Goal: *STG: Remains safe in hospital 
4/16/2019 0252 by Glendia Leventhal, RN Outcome: Progressing Towards Goal 
4/16/2019 0249 by Glendia Leventhal, RN Outcome: Progressing Towards Goal 
Goal: *STG: Seeks staff when symptoms of withdrawal increase 4/16/2019 0252 by Glendia Leventhal, RN Outcome: Progressing Towards Goal 
4/16/2019 0249 by Glendia Leventhal, RN Outcome: Progressing Towards Goal 
Goal: *STG: Complies with medication therapy 4/16/2019 0252 by Glendia Leventhal, RN Outcome: Progressing Towards Goal 
4/16/2019 0249 by Glendia Leventhal, RN Outcome: Progressing Towards Goal 
Goal: *STG: Attends activities and groups 4/16/2019 3389 by Austen Pfeiffer RN Outcome: Progressing Towards Goal 
4/16/2019 0249 by Austen Pfeiffer RN Outcome: Progressing Towards Goal 
Goal: *STG: Will identify negative impact of chemical dependency including the use of tobacco, alcohol, and other substances 4/16/2019 0252 by Austen Pfeiffer RN Outcome: Progressing Towards Goal 
4/16/2019 0249 by Austen Pfeiffer RN Outcome: Progressing Towards Goal 
Goal: *STG: Verbalizes abstinence as an achievable goal 
4/16/2019 0252 by Austen Pfeiffer RN Outcome: Progressing Towards Goal 
4/16/2019 0249 by Austen Pfeiffer RN Outcome: Progressing Towards Goal 
Goal: *STG: Agrees to participate in outpatient after care program to support ongoing mental health 4/16/2019 0252 by Austen Pfeiffer RN Outcome: Progressing Towards Goal 
4/16/2019 0249 by Austen Pfeiffer RN Outcome: Progressing Towards Goal 
Goal: *STG: Able to indentify relapse triggers including interpersonal/social and familial factors 4/16/2019 0252 by Austen Pfeiffer RN Outcome: Progressing Towards Goal 
4/16/2019 0249 by Austen Pfeiffer RN Outcome: Progressing Towards Goal 
Goal: *STG: Identify lifestyle changes to support long term sobriety such as vocation, employment, education, and legal issues 4/16/2019 0252 by Austen Pfeiffer RN Outcome: Progressing Towards Goal 
4/16/2019 0249 by Austen Pfeiffer RN Outcome: Progressing Towards Goal 
Goal: *STG: Maintains appropriate nutrition and hydration 4/16/2019 0252 by Austen Pfeiffer RN Outcome: Progressing Towards Goal 
4/16/2019 0249 by Austen Pfeiffer RN Outcome: Progressing Towards Goal 
Goal: *STG: Vital signs within defined limits 4/16/2019 0252 by Austen Pfeiffer RN Outcome: Progressing Towards Goal 
4/16/2019 0249 by Austen Pfeiffer RN Outcome: Progressing Towards Goal 
Goal: *STG/LTG: Relapse prevention plan in place to include housing/aftercare, leisure activities, and spirituality 4/16/2019 0252 by Austen Pfeiffer RN Outcome: Progressing Towards Goal 
4/16/2019 0249 by Kayla Gonsalves RN Outcome: Progressing Towards Goal 
Goal: Interventions 4/16/2019 0252 by Kayla Gonsalves RN Outcome: Progressing Towards Goal 
4/16/2019 0249 by Kayla Gonsalves RN Outcome: Progressing Towards Goal 
  
Problem: Pain Goal: *Control of Pain 4/16/2019 0252 by Kayla Gonsalves RN Outcome: Progressing Towards Goal 
4/16/2019 0249 by Kayla Gonsalves RN Outcome: Progressing Towards Goal

## 2019-04-16 NOTE — PROGRESS NOTES
This pt is being discharged to the [facility] today. This morning his CIWA score was 4. CM spoke with Albaro Guthrie in admissions at the [facility] and informed her of this and that pt is ready for d/c. She confirmed that she can accept this pt there today. CHARANJIT spoke with pt and his father to inform them of d/c and they are in agreement. CM set up ambulance transport for 4pm today. An envelope containing pt's kardex, MAR and AVS will be sent with pt to the facility. Also, pt's nurse will call report. DEAN Garzon,ACM-SW

## 2019-04-16 NOTE — PROGRESS NOTES
Hospitalist Progress Note Stefania Gonzalez MD 
Answering service: [phone] from in house phone Date of Service:  2019 NAME:  Cortney Lopez :  1960 MRN:  [id number] Admission Summary:  
67 Y/O gentleman with a PMH significant for chronic severe alcoholism,liver cirrhosis,chronic anemia,thrombocytopenia,gastric ulcers,variceal bleeding was sent to the hospital from Rehab due to  alcohol withdrawal symptoms. Interval history / Subjective:  
 
CC: Alcohol Withdrawal. 
 
No new issues No pain, nausea or vomiting Assessment & Plan: Alcohol Use Disorder with active withdrawals present on admission. -ETOH level at admission 143. -Off Precedex. 
-continue CIWA protocol with prn Ativan  
-seizure precautions 
-Daily Multivitamin/mineral, Thiamine and Folic Acid. 
-Cessation counselling done. 
-will need to go to alcohol Rehab program at discharge Resolved Hypokalemia and hypomagnesemia. Alcoholic liver cirrhosis with ascites 
-heavy alcohol use -drinks 1/2 gallon of whiskey every 2 days. -CT abdomen shows liver cirrhosis 
-MELD score 18 child class C 
-s/p temporary  peritoneal cath placement for ascites which was removed by the patient 4/10/19. 
- continue 40 mg lasix and  spironolactone  
-Ammonia levels improved on current lactulose dose 
-Outpatient follow up with Dr [facility] Debility and ambulatory dysfunction PT/OT as tolerated. Umbilical hernia with superficial skin wound present on admission. 
-Reducible. No surgical intervention required per gen surgey 
-Daily wound care. Chronic Anemia and thrombocytopenia due to cirrhosis. Probable multifactorial Hyponatremia. Prophylaxis: DVT (SCDs). Directives: Full Code with a guarded prognosis. D/W patient. Plan: discharge to rehab today Hospital Problems  Date Reviewed: 4/15/2019 Codes Class Noted POA  * (Principal) ETOH abuse ICD-10-CM: F10.10 ICD-9-CM: 305.00  4/5/2019 Yes Umbilical hernia without obstruction and without gangrene ICD-10-CM: K42.9 ICD-9-CM: 553.1  1/24/2018 Unknown Overview Signed 1/24/2018  3:04 PM by Christy Cuenca MD  
  Without gangrene or obstruction. Review of Systems: As per HPI Vital Signs:  
 Last 24hrs VS reviewed since prior progress note. Most recent are: 
Visit Vitals /66 (BP 1 Location: Right arm, BP Patient Position: At rest) Pulse 98 Temp 98.5 °F (36.9 °C) Resp 18 Ht 6' (1.829 m) Wt 100.5 kg (221 lb 9 oz) SpO2 95% BMI 30.05 kg/m² Intake/Output Summary (Last 24 hours) at 4/16/2019 1131 Last data filed at 4/16/2019 6106 Gross per 24 hour Intake 600 ml Output  Net 600 ml Physical Examination:  
 
 
     
Constitutional:  Chronically ill-appearing. ENT:  Oral mucous moist, Resp:  Decreased air entry Bibasilarly. CV:  Regular rhythm,normal S1 S2  
 GI:  Soft, reducible umbilical hernia with excoriated skin ,distended abdomen. Musculoskeletal:  Bipedal edema. Neurologic:  Sleepy but arousable. Data Review:  
 
 
Us Paracentesis Abd W Imaging Result Date: 4/5/2019 IMPRESSION: 1. Successful ultrasound guided placement of an abdominal  catheter for diagnostic and large volume paracentesis. 2. Specimen sent for laboratory as requested. 3. Catheter is being held in by adhesive device and cannot easily removed by anybody when finished draining to gravity. Labs:  
 
Recent Labs 04/14/19 
0130 WBC 5.0 HGB 11.4* HCT 34.9*  
PLT 86* Recent Labs 04/15/19 
0300 04/14/19 
0130 * 132* K 3.7 3.8  101 CO2 22 24 BUN 5* 6  
CREA 0.74 0.81 * 111* CA 8.7 8.2* Recent Labs 04/15/19 
0300 04/14/19 
0130 SGOT 35 32 ALT 20 21  113 TBILI 2.1* 2.6*  2.5* TP 7.0 7.2 ALB 2.5* 2.5*  
GLOB 4.5* 4.7* Recent Labs   04/14/19 
0130 INR 1.5* PTP 15.1* No results for input(s): FE, TIBC, PSAT, FERR in the last 72 hours. Lab Results Component Value Date/Time Folate 13.7 03/22/2019 01:54 AM  
  
No results for input(s): PH, PCO2, PO2 in the last 72 hours. No results for input(s): CPK, CKNDX, TROIQ in the last 72 hours. No lab exists for component: CPKMB Lab Results Component Value Date/Time Cholesterol, total 116 09/08/2017 09:48 AM  
 HDL Cholesterol 40 09/08/2017 09:48 AM  
 LDL, calculated 64 09/08/2017 09:48 AM  
 Triglyceride 61 09/08/2017 09:48 AM  
 CHOL/HDL Ratio 7.6 (H) 07/25/2013 04:15 AM  
 
Lab Results Component Value Date/Time Glucose (POC) 107 (H) 04/12/2019 09:25 PM  
 Glucose (POC) 140 (H) 04/11/2019 04:40 PM  
 Glucose (POC) 118 (H) 07/07/2016 07:11 AM  
 Glucose (POC) 189 (H) 03/13/2014 08:16 PM  
 Glucose (POC) 118 (H) 08/10/2013 05:45 PM  
 Glucose (POC) 230 (H) 08/09/2013 08:18 PM  
 Glucose (POC) 143 (H) 07/24/2013 01:56 PM  
 Glucose  10/14/2014 10:50 AM  
 Glucose  08/14/2014 07:32 AM  
 
Lab Results Component Value Date/Time Color DARK YELLOW 04/05/2019 08:03 AM  
 Appearance CLEAR 04/05/2019 08:03 AM  
 Specific gravity 1.012 04/05/2019 08:03 AM  
 Specific gravity 1.025 03/21/2019 06:09 PM  
 pH (UA) 6.5 04/05/2019 08:03 AM  
 Protein NEGATIVE  04/05/2019 08:03 AM  
 Glucose NEGATIVE  04/05/2019 08:03 AM  
 Ketone TRACE (A) 04/05/2019 08:03 AM  
 Bilirubin NEGATIVE  05/16/2014 09:00 PM  
 Urobilinogen 4.0 (H) 04/05/2019 08:03 AM  
 Nitrites NEGATIVE  04/05/2019 08:03 AM  
 Leukocyte Esterase NEGATIVE  04/05/2019 08:03 AM  
 Epithelial cells FEW 04/05/2019 08:03 AM  
 Bacteria NEGATIVE  04/05/2019 08:03 AM  
 WBC 0-4 04/05/2019 08:03 AM  
 RBC 5-10 04/05/2019 08:03 AM  
 
 
 
Medications Reviewed:  
 
Current Facility-Administered Medications Medication Dose Route Frequency  nicotine (NICODERM CQ) 21 mg/24 hr patch 1 Patch  1 Patch TransDERmal DAILY  lactulose (CHRONULAC) 10 gram/15 mL solution 30 mL  20 g Oral TID  thiamine HCL (B-1) tablet 100 mg  100 mg Oral DAILY  folic acid (FOLVITE) tablet 1 mg  1 mg Oral DAILY  furosemide (LASIX) tablet 40 mg  40 mg Oral DAILY  spironolactone (ALDACTONE) tablet 100 mg  100 mg Oral DAILY  LORazepam (ATIVAN) injection 2 mg  2 mg IntraVENous Q1H PRN  
 LORazepam (ATIVAN) injection 4 mg  4 mg IntraVENous Q1H PRN  
 ondansetron (ZOFRAN) injection 4 mg  4 mg IntraVENous Q4H PRN  
 allopurinol (ZYLOPRIM) tablet 300 mg  300 mg Oral Q12H  cyclobenzaprine (FLEXERIL) tablet 10 mg  10 mg Oral TID PRN  pantoprazole (PROTONIX) tablet 40 mg  40 mg Oral ACB  venlafaxine-SR (EFFEXOR-XR) capsule 150 mg  150 mg Oral DAILY  hydrOXYzine HCl (ATARAX) tablet 25 mg  25 mg Oral TID  hydrALAZINE (APRESOLINE) 20 mg/mL injection 20 mg  20 mg IntraVENous Q6H PRN  
 
______________________________________________________________________ EXPECTED LENGTH OF STAY: 3d 7h 
ACTUAL LENGTH OF STAY:          11 Nic Zapata MD

## 2019-04-16 NOTE — PROGRESS NOTES
Problem: Mobility Impaired (Adult and Pediatric) Goal: *Acute Goals and Plan of Care (Insert Text) Description Physical Therapy Goals Initiated 4/9/2019 1. Patient will move from supine to sit and sit to supine  and scoot up and down in bed with modified independence within 7 day(s). 2.  Patient will transfer from bed to chair and chair to bed with modified independence using the least restrictive device within 7 day(s). 3.  Patient will perform sit to stand with modified independence within 7 day(s). 4.  Patient will ambulate with modified independence for 300 feet with the least restrictive device within 7 day(s). 5.  Patient will ascend/descend 2 stairs with handrail(s) with modified independence within 7 day(s). Physical Therapy Goals Initiated 4/16/2019 2. Patient will transfer from bed to chair and chair to bed with modified independence using the least restrictive device within 7 day(s). 3.  Patient will perform sit to stand with modified independence within 7 day(s). 4.  Patient will ambulate with supervision/set-up for 300 feet with the least restrictive device within 7 day(s). 5.  Patient will ascend/descend 4 stairs with one handrail(s) with supervision/set-up within 7 day(s). 4/16/2019 1235 by Kavitha Laird PT Outcome: Progressing Towards Goal 
PHYSICAL THERAPY TREATMENT: WEEKLY REASSESSMENT Patient: Cortney Lopez (62 y.o. male) Date: 4/16/2019 Diagnosis: ETOH abuse [F10.10] ETOH abuse Precautions: Fall, Bed Alarm, Seizure Chart, physical therapy assessment, plan of care and goals were reviewed. ASSESSMENT: 
Based on the objective data described below, the patient presents with Supervision/standby assist level overall for transfers. Gait training completed at Contact guard assistance to Minimum assistance, 300 feet and using a gait belt.   
Progress to date: patient has reached modified independence for bed mobility, now ambulating with assist of one and Tinetti has increased from 9 to 17/28. The following are barriers to independence while in acute care:  
-Cognitive and/or behavioral: safety awareness and insight into deficits 
-Medical condition: functional endurance and standing balance   
-Other:    
 
Prior level of function: Independent without assistive device PLAN: 
Patient continues to benefit from skilled intervention to address the above impairments. Continue treatment per established plan of care. Recommendation for Nursing: OOB to chair 3x/day, preferably for meals, and hallway ambulation BID with hand held assist to contact guard, gait belt. Thank you for your assistance. Discharge recommendations: Rehab at skilled nursing facility (SNF) (to regain functional baseline patient requires rehab) Patient's barriers to discharging home, in addition to above impairments: lives alone. Equipment recommendations for successful discharge (if) home: none needed. SUBJECTIVE:  
Patient stated ? I want to get back in the bed. .? OBJECTIVE DATA SUMMARY:  
Critical Behavior: 
Neurologic State: Alert, Eyes open to stimulus, Eyes open spontaneously Orientation Level: Oriented X4 Cognition: Follows commands, Poor safety awareness Safety/Judgement: Decreased awareness of need for safety, Decreased awareness of need for assistance, Decreased insight into deficits Functional Mobility Training: 
Bed Mobility: 
  
Supine to Sit: Modified independent Sit to Supine: Modified independent Transfers: 
Sit to Stand: Stand-by assistance Stand to Sit: Stand-by assistance Balance: 
Sitting: Intact; Without support Sitting - Static: Good (unsupported) Sitting - Dynamic: Good (unsupported) Standing: Impaired; Without support Standing - Static: Fair Standing - Dynamic : Fair Ambulation/Gait Training: 
Distance (ft): 300 Feet (ft) Assistive Device: Gait belt(occ hand held min assist) Ambulation - Level of Assistance: Contact guard assistance;Minimal assistance Gait Abnormalities: Decreased step clearance(decreased heel strike) Base of Support: Narrowed Speed/Gabbie: Fluctuations Step Length: Right shortened;Left shortened Pain: 
Pt denied pain during PT session. Activity Tolerance:  
Fair - pt requires encouragement to increase activity - prefers to stay in bed with lights off. Please refer to the flowsheet for vital signs taken during this treatment. After treatment patient left:  
Supine in bed Bed alarm/tab alert on Bed in low position Call light within reach RN notified COMMUNICATION/COLLABORATION:  
The patient?s plan of care was discussed with: Registered Nurse Kadeem Ulloa, PT Time Calculation: 20 mins

## 2019-04-16 NOTE — DISCHARGE SUMMARY
Discharge Summary PATIENT ID: Mira Helms MRN: [id number] YOB: 1960 DATE OF ADMISSION: 4/5/2019  6:15 AM   
DATE OF DISCHARGE: 4/16/2019 PRIMARY CARE PROVIDER: Ian Hall DO  
 
ATTENDING PHYSICIAN: Dr Nic Zapata DISCHARGING PROVIDER: Nic Zapata MD   
To contact this individual call [phone] and ask the  to page. If unavailable ask to be transferred the Adult Hospitalist Department. CONSULTATIONS: IP CONSULT TO GENERAL SURGERY 
IP CONSULT TO GASTROENTEROLOGY 
IP CONSULT TO HEPATOLOGY 
IP CONSULT TO PSYCHIATRY PROCEDURES/SURGERIES: * No surgery found * ADMITTING DIAGNOSES & HOSPITAL COURSE:  
Alcohol Use Disorder with active withdrawals present on admission. -ETOH level at admission 143. -Off Precedex. 
-continue CIWA protocol with prn Ativan  
-seizure precautions 
-Daily Multivitamin/mineral, Thiamine and Folic Acid. 
-Cessation counselling done. 
-will need to go to alcohol Rehab program at discharge Resolved Hypokalemia and hypomagnesemia. Alcoholic liver cirrhosis with ascites 
-heavy alcohol use -drinks 1/2 gallon of whiskey every 2 days. -CT abdomen shows liver cirrhosis 
-MELD score 18 child class C 
-s/p temporary  peritoneal cath placement for ascites which was removed by the patient 4/10/19. 
- continue 40 mg lasix and  spironolactone  
-Ammonia levels improved on current lactulose dose 
-Outpatient follow up with Dr Michel Castillo Debility and ambulatory dysfunction PT/OT as tolerated. Umbilical hernia with superficial skin wound present on admission. 
-Reducible. No surgical intervention required per gen surgey 
-Daily wound care. Chronic Anemia and thrombocytopenia due to cirrhosis. Probable multifactorial Hyponatremia. DISCHARGE DIAGNOSES / PLAN:   
 
1. Alcohol withdrawal 
2. Alcoholic liver cirrhosis PENDING TEST RESULTS:  
At the time of discharge the following test results are still pending: none FOLLOW UP APPOINTMENTS:   
Follow-up Information Follow up With Specialties Details Why Contact Info [facility] [place] [address] [phone] 
[phone] Manuel Wilson,  Family Practice In 1 week  [address]  [address] [address] [address] [id number] [phone] ADDITIONAL CARE RECOMMENDATIONS:  
Follow up with PMD 
Follow up with Dr Latrell Young DIET: Cardiac Diet ACTIVITY: Activity as tolerated DISCHARGE MEDICATIONS: 
 See Medication Reconciliation Form NOTIFY YOUR PHYSICIAN FOR ANY OF THE FOLLOWING:  
Fever over 101 degrees for 24 hours. Chest pain, shortness of breath, fever, chills, nausea, vomiting, diarrhea, change in mentation, falling, weakness, bleeding. Severe pain or pain not relieved by medications. Or, any other signs or symptoms that you may have questions about. DISPOSITION: 
  Home With: 
 OT  PT  HH  RN  
  
x SNF/Inpatient Rehab/LTAC Independent/assisted living Hospice Other:  
 
 
PATIENT CONDITION AT DISCHARGE:  
 
Functional status Poor   
x Deconditioned Independent Cognition  
 x Lucid Forgetful Dementia Catheters/lines (plus indication) Cervantes PICC   
 PEG   
x None Code status  
 x Full code DNR   
 
PHYSICAL EXAMINATION AT DISCHARGE: 
Please see progress note CHRONIC MEDICAL DIAGNOSES: 
Problem List as of 4/16/2019 Date Reviewed: 4/15/2019 Codes Class Noted - Resolved * (Principal) ETOH abuse ICD-10-CM: F10.10 ICD-9-CM: 305.00  4/5/2019 - Present Ascites ICD-10-CM: R18.8 ICD-9-CM: 789.59  3/21/2019 - Present History of GI bleed ICD-10-CM: Z87.19 ICD-9-CM: V12.79  11/30/2018 - Present Withdrawal symptoms, alcohol (HCC) ICD-10-CM: V71.891 ICD-9-CM: 291.81  11/29/2018 - Present  Severe obesity with body mass index (BMI) of 35.0 to 39.9 with serious comorbidity ([facility] ICD-10-CM: E66.01 ICD-9-CM: 278.01  7/27/2018 - Present Esophageal varices in alcoholic cirrhosis (HCC) PXV-28-GA: K70.30, I85.10 ICD-9-CM: 571.2, 456.21  7/2/2018 - Present Duodenal mass ICD-10-CM: K31.89 ICD-9-CM: 537.89  7/2/2018 - Present Alcoholic cirrhosis ([facility] LTP-92-FW: K70.30 ICD-9-CM: 571.2  4/6/2018 - Present Umbilical hernia without obstruction and without gangrene ICD-10-CM: K42.9 ICD-9-CM: 553.1  1/24/2018 - Present Overview Signed 1/24/2018  3:04 PM by Shandra Mao MD  
  Without gangrene or obstruction. Gallbladder calculus without cholecystitis ICD-10-CM: K80.20 ICD-9-CM: 574.20  7/21/2016 - Present Overview Signed 7/21/2016  1:18 PM by Shon Frank NP Seen on  7/1/16. Iron deficiency anemia ICD-10-CM: D50.9 ICD-9-CM: 280.9  3/3/2016 - Present Esophageal varices without bleeding (HCC) ICD-10-CM: I85.00 ICD-9-CM: 456.1  3/1/2016 - Present Prediabetes ICD-10-CM: R73.03 
ICD-9-CM: 790.29  1/11/2016 - Present Thrombocytopenia ([facility] ICD-10-CM: D69.6 ICD-9-CM: 287.5  10/17/2013 - Present Splenomegaly ICD-10-CM: R16.1 ICD-9-CM: 789.2  10/17/2013 - Present History of alcohol abuse ICD-10-CM: Z87.898 ICD-9-CM: 305.03  10/17/2013 - Present Hypogonadism, male ICD-10-CM: E29.1 ICD-9-CM: 257.2  10/10/2013 - Present Recurrent major depressive disorder ([facility] ICD-10-CM: F33.9 ICD-9-CM: 296.30  8/11/2013 - Present Alcohol dependence with withdrawal ([facility] [facility] ICD-10-CM: Y75.900 ICD-9-CM: 303.90, 291.81  7/25/2013 - Present Anxiety ICD-10-CM: F41.9 ICD-9-CM: 300.00  7/25/2013 - Present Gout ICD-10-CM: M10.9 ICD-9-CM: 274.9  6/27/2011 - Present HTN (hypertension) (Chronic) ICD-10-CM: I10 
ICD-9-CM: 401.9  6/1/2011 - Present RESOLVED: Chest pain ICD-10-CM: R07.9 ICD-9-CM: 786.50  3/30/2018 - 11/30/2018 RESOLVED: Hyponatremia ICD-10-CM: E87.1 ICD-9-CM: 276.1  7/5/2016 - 11/30/2018 RESOLVED: Type 2 diabetes mellitus ([facility] ICD-10-CM: E11.9 ICD-9-CM: 250.00  10/10/2013 - 11/5/2015 RESOLVED: Depression ICD-10-CM: F32.9 ICD-9-CM: 641  7/25/2013 - 11/30/2018 RESOLVED: GI bleed ICD-10-CM: K92.2 ICD-9-CM: 578.9  6/28/2011 - 11/30/2018 RESOLVED: Chest pain, unspecified ICD-10-CM: R07.9 ICD-9-CM: 786.50  6/28/2011 - 11/30/2018 RESOLVED: Hypokalemia ICD-10-CM: E87.6 ICD-9-CM: 276.8  6/1/2011 - 6/3/2011 RESOLVED: Alcohol abuse (Chronic) ICD-10-CM: F10.10 ICD-9-CM: 305.00  6/1/2011 - 7/25/2013 RESOLVED: Thrombocytopenia, unspecified ([facility] ICD-10-CM: D69.6 ICD-9-CM: 287.5  6/1/2011 - 6/28/2011 RESOLVED: Nausea & vomiting ICD-10-CM: R11.2 ICD-9-CM: 787.01  6/1/2011 - 6/3/2011 RESOLVED: Diarrhea ICD-10-CM: R19.7 ICD-9-CM: 787.91  6/1/2011 - 6/3/2011 Greater than 36 minutes were spent with the patient on counseling and coordination of care Signed:  
Shandra Fitzpatrick MD 
4/16/2019 
11:38 AM

## 2019-04-16 NOTE — PROGRESS NOTES
I have reviewed discharge instructions with the patient. The patient verbalized understanding. Patient discharged via ambulance to the [address] [place] [address] with clothes and luggage. PIV and telemetry discontinued. Report called and given to Shoshana Bhatia RN at the [address] [place] [address].

## 2019-04-18 NOTE — PROGRESS NOTES
Transition of Care Coordination/Hospital to Post Acute Facility: 
  
Date/Time:  2019 2:22 PM 
 
Patient was [place] [facility] [place] for treatment of ETOH abuse. Patient was discharged 19 to [facility] for continuation of care. Inpatient RRAT score: 18 Top Challenges reviewed Plans for alcohol rehab program at discharge? 
 - Patient's nurse unsure of plan of care post discharge. Will call back to speak with . Method of communication with care team :phone Nurse Navigator(NN) spoke with patient's bedside nurse to provide introduction to self and explanation of the Nurse Navigator Role. Verified name and  as patient identifiers. Discussed and reviewed  anticipated needs at time of discharge, discharge summary, medication reconciliation ACP:  
Does the patient have a current ACP (including DDNR):  no 
Does the post acute facility have a copy of the patients ACP:  N/A Medication(s):  
New Medications at Discharge: folic acid, nicotine patch, thiamine Changed Medications at Discharge: lasix, lactulose, aldactone, effexor Discontinued Medications at Discharge: coreg, cipro, bactroban, potassium, compazine, trazodone PCP/Specialist follow up:  
Future Appointments Date Time Provider Arti Norris 2019 10:00 AM Gisela Ray MD [address] [place] [address], [place] [address] Opportunity to ask questions was provided. Contact information was provided for future reference or further questions. Will continue to monitor.

## 2019-04-22 NOTE — TELEPHONE ENCOUNTER
----- Message from Cristine Ybarra MD sent at 4/20/2019  5:48 AM EDT -----  DC from [facility]. Needs FU appt in May.

## 2019-04-29 NOTE — TELEPHONE ENCOUNTER
Attempted to contact patient twice morning of 4/29/19. Both times phone made a fast busy sound. Will attempt to contact patient later today.

## 2019-04-29 NOTE — TELEPHONE ENCOUNTER
----- Message from Deidre Maradiaga sent at 4/23/2019  9:51 AM EDT -----  Please discuss with Mare and determine the priority of this follow up and schedule  ----- Message -----  From: Erika Boyle MD  Sent: 4/20/2019   5:48 AM  To: Yenni from [facility]. Needs FU appt in May.
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
## 2019-05-03 NOTE — PATIENT INSTRUCTIONS
A Healthy Lifestyle: Care Instructions  Your Care Instructions    A healthy lifestyle can help you feel good, stay at a healthy weight, and have plenty of energy for both work and play. A healthy lifestyle is something you can share with your whole family. A healthy lifestyle also can lower your risk for serious health problems, such as high blood pressure, heart disease, and diabetes. You can follow a few steps listed below to improve your health and the health of your family. Follow-up care is a key part of your treatment and safety. Be sure to make and go to all appointments, and call your doctor if you are having problems. It's also a good idea to know your test results and keep a list of the medicines you take. How can you care for yourself at home? · Do not eat too much sugar, fat, or fast foods. You can still have dessert and treats now and then. The goal is moderation. · Start small to improve your eating habits. Pay attention to portion sizes, drink less juice and soda pop, and eat more fruits and vegetables. ? Eat a healthy amount of food. A 3-ounce serving of meat, for example, is about the size of a deck of cards. Fill the rest of your plate with vegetables and whole grains. ? Limit the amount of soda and sports drinks you have every day. Drink more water when you are thirsty. ? Eat at least 5 servings of fruits and vegetables every day. It may seem like a lot, but it is not hard to reach this goal. A serving or helping is 1 piece of fruit, 1 cup of vegetables, or 2 cups of leafy, raw vegetables. Have an apple or some carrot sticks as an afternoon snack instead of a candy bar. Try to have fruits and/or vegetables at every meal.  · Make exercise part of your daily routine. You may want to start with simple activities, such as walking, bicycling, or slow swimming. Try to be active 30 to 60 minutes every day. You do not need to do all 30 to 60 minutes all at once.  For example, you can exercise 3 times a day for 10 or 20 minutes. Moderate exercise is safe for most people, but it is always a good idea to talk to your doctor before starting an exercise program.  · Keep moving. Bertrand Leap the lawn, work in the garden, or [employer]. Take the stairs instead of the elevator at work. · If you smoke, quit. People who smoke have an increased risk for heart attack, stroke, cancer, and other lung illnesses. Quitting is hard, but there are ways to boost your chance of quitting tobacco for good. ? Use nicotine gum, patches, or lozenges. ? Ask your doctor about stop-smoking programs and medicines. ? Keep trying. In addition to reducing your risk of diseases in the future, you will notice some benefits soon after you stop using tobacco. If you have shortness of breath or asthma symptoms, they will likely get better within a few weeks after you quit. · Limit how much alcohol you drink. Moderate amounts of alcohol (up to 2 drinks a day for men, 1 drink a day for women) are okay. But drinking too much can lead to liver problems, high blood pressure, and other health problems. Family health  If you have a family, there are many things you can do together to improve your health. · Eat meals together as a family as often as possible. · Eat healthy foods. This includes fruits, vegetables, lean meats and dairy, and whole grains. · Include your family in your fitness plan. Most people think of activities such as jogging or tennis as the way to fitness, but there are many ways you and your family can be more active. Anything that makes you breathe hard and gets your heart pumping is exercise. Here are some tips:  ? Walk to do errands or to take your child to school or the bus.  ? Go for a family bike ride after dinner instead of watching TV. Where can you learn more? Go to http://bhavana-rosio.info/. Enter M561 in the search box to learn more about \"A Healthy Lifestyle: Care Instructions. \"  Current as of: September 11, 2018  Content Version: 11.9  © 6054-4355 [employer]. Care instructions adapted under license by [employer] (which disclaims liability or warranty for this information). If you have questions about a medical condition or this instruction, always ask your healthcare professional. [address] any warranty or liability for your use of this information.
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
## 2019-05-07 NOTE — PROGRESS NOTES
Is he taking the lactulose tid? If not start his ammonia is very high. Renal function has declined as well.   Pt needs f/u end of this week

## 2019-05-09 NOTE — LETTER
5/9/2019 8:52 AM 
 
Mr. Sofia Quarles. 
[address] [facility] [address] To Whom It May Concern, 
 
 currently has recurrent ascites secondary to liver cirrhosis which has also caused esophageal varices putting him at increased risk of a sudden gastrointestinal bleed. He has required recurrent paracentesis to remove the excess fluid from his abdominal cavity. Pt also with a large umbilical hernia which is not acute but awaiting stabilization of his chronic medical conditions prior to consideration of repair. He also suffers from chronic anxiety and depression. If you have any further questions please have the patient contact my office. Sincerely, [address] [facility] [address], DO

## 2019-05-09 NOTE — PATIENT INSTRUCTIONS
Alcohol and Drug Problems: Care Instructions  Your Care Instructions    You can improve your life and health by stopping your use of alcohol or drugs. Ending dependency on alcohol or drugs may help you feel and sleep better. You may get along better with your family, friends, and coworkers. There are medicines and programs that can help. Follow-up care is a key part of your treatment and safety. Be sure to make and go to all appointments, and call your doctor if you are having problems. It's also a good idea to know your test results and keep a list of the medicines you take. How can you care for yourself at home? · If you have been given medicine to help keep you sober or reduce your cravings, be sure to take it as prescribed. · Talk to your doctor about programs that can help you stop using drugs or drinking alcohol. · If your doctor prescribes disulfiram (Antabuse), do not drink any alcohol while you are taking this medicine. You may have severe, even life-threatening, side effects from even small amounts of alcohol. · Do not tempt yourself by keeping alcohol or drugs in your home. · Learn how to say no when other people drink or use drugs. Or don't spend time with people who drink or use drugs. · Use the time and money spent on drinking or drugs to do something fun with your family or friends. Preventing a relapse  · Do not drink alcohol or use drugs at all. Using any amount of alcohol or drugs greatly increases your risk for relapse. · Seek help from organizations such as Alcoholics Anonymous, Narcotics Anonymous, or treatment facilities if you feel the need to drink alcohol or use drugs again. · Remember that recovery is a lifelong process. · Stay away from situations, friends, or places that may lead you to drink or use drugs. · Have a plan to spot and deal with relapse. Learn to recognize changes in your thinking that lead you to drink or use drugs. These are warning signs.  Get help before you start to drink or use drugs again. · Get help as soon as you can if you relapse. Some people make a plan with another person that outlines what they want that person to do for them if they relapse. The plan usually includes how to handle the relapse and who to notify in case of relapse. · Don't give up. Remember that a relapse does not mean that you have failed. Use the experience to learn the triggers that lead you to drink or use drugs. Then quit again. Many people have several relapses before they are able to quit for good. When should you call for help? Call 911 anytime you think you may need emergency care. For example, call if:    · You feel you cannot stop from hurting yourself or someone else.   [facility] your doctor now or seek immediate medical care if:    · You have serious withdrawal symptoms such as confusion, hallucinations, or severe trembling.    Watch closely for changes in your health, and be sure to contact your doctor if:    · You have a relapse.     · You need more help or support to stop. Where can you learn more? Go to http://bhavana-rosio.info/. Enter 117-0142711 in the search box to learn more about \"Alcohol and Drug Problems: Care Instructions. \"  Current as of: May 7, 2018  Content Version: 11.9  © 1393-8149 [employer]. Care instructions adapted under license by [employer] (which disclaims liability or warranty for this information). If you have questions about a medical condition or this instruction, always ask your healthcare professional. [place] [address] any warranty or liability for your use of this information.
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
## 2019-05-09 NOTE — PROGRESS NOTES
Chief Complaint   Patient presents with   [address]     Patient presents in office today for f/u and labs. Dad states he has concerns of patients BP being lower as last time it was 97/69. No other concerns. 1. Have you been to the ER, urgent care clinic since your last visit? Hospitalized since your last visit? No    2. Have you seen or consulted any other health care providers outside of the [address] [place] [address] since your last visit? Include any pap smears or colon screening.  No    Learning Assessment 4/6/2017   PRIMARY LEARNER Patient   HIGHEST LEVEL OF EDUCATION - PRIMARY LEARNER  SOME COLLEGE   BARRIERS PRIMARY LEARNER NONE   CO-LEARNER CAREGIVER No   PRIMARY LANGUAGE ENGLISH   LEARNER PREFERENCE PRIMARY DEMONSTRATION   ANSWERED BY patient   RELATIONSHIP SELF
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
## 2019-05-10 NOTE — PROGRESS NOTES
Increase your potassium intake with dairy or legumes or mushrooms. Your sodium is a little low too so increase your salt intake for a couple days. Labs should be rechecked in a week.   I am still waiting for the ammonia level
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
## 2019-05-27 NOTE — PROGRESS NOTES
Pt states that she drove here herself (pt is visible intoxicated).  Pt currently attempting to call family members to pick her up Wife Michael Jones on hippa notified and verbalized understanding.     Would like recommendation for specialist for possible hernia repair

## 2019-05-29 NOTE — ED PROVIDER NOTES
66 y.o. male with past medical history significant for HTN, h/o EtOH abuse, GERD, gastric ulcer, PUD, depression, anxiety, h/o seizures, h/o stroke and liver cirrhosis who presents via private vehicle with chief complaint of abdominal distension. Pt reports he has had abdominal distension for 2 weeks and notes he had his abdomen drained a while ago. Pt notes he has had SOB since yesterday, stating he could not sleep because of it. There are no other acute medical concerns at this time. Social hx: Former Smoker; Occasional use of EtOH 
PCP: Rosa Elena Sy DO Note written by Sarah Zaidi, as dictated by Glenn Rousseau MD 5:41 PM 
 
 
The history is provided by the patient. No  was used. Past Medical History:  
Diagnosis Date  Adverse effect of anesthesia   
 pt reports waking up during colonoscopy/endoscopy  Alcohol abuse 6/1/2011  Anemia NEC  Anxiety  Ascites   
 has to have fluid drained occasionally  Cirrhosis of liver ([facility] [address]  Depression  Gastric ulcer, unspecified as acute or chronic, without mention of hemorrhage, perforation, or obstruction  GERD (gastroesophageal reflux disease)  Gout  Hypertension  Liver disease   
 cirrhosis  Obesity, Class II, BMI 35-39.9  Pneumonia  PUD (peptic ulcer disease)  Seizures ([address] 2013  
 from alcohol withdrawal  
 Stroke [facility]) 2013  
 per pt, possible mini stroke from alcohol withdrawal (same time as seizure) Past Surgical History:  
Procedure Laterality Date  COLONOSCOPY N/A 7/2/2018 COLONOSCOPY performed by Miri Ledbetter MD at [address] HX COLONOSCOPY  07/2018  HX ENDOSCOPY    
 also colonoscopy  HX GI  1998  
 rectal abscess surgery  HX GI  1998  
 rectal abscess surgery  HX HEENT  1980  
 wisdom teeth  IR PARACENTESIS ABD INIT Pt does not remember last time but stated 5-6 times. Family History:  
Problem Relation Age of Onset  Asthma Mother  Other Father   
     history of fall multiple injuries  Hypertension Brother  Cancer Paternal Grandmother   
     uncertain if colon or stomach  Cancer Paternal Grandfather   
     stomach cancer- dx mid [de-identified] Social History Socioeconomic History  Marital status:  Spouse name: Not on file  Number of children: Not on file  Years of education: Not on file  Highest education level: Not on file Occupational History  Occupation: Landscaping Social Needs  Financial resource strain: Not on file  Food insecurity:  
  Worry: Not on file Inability: Not on file  Transportation needs:  
  Medical: Not on file Non-medical: Not on file Tobacco Use  Smoking status: Former Smoker  Smokeless tobacco: Current User Types: Chew  Tobacco comment: currently uses snuff Substance and Sexual Activity  Alcohol use: Yes Alcohol/week: 1.2 oz Types: 2 Shots of liquor per week Comment: Today  Drug use: No  
 Sexual activity: Yes  
  Partners: Female Lifestyle  Physical activity:  
  Days per week: Not on file Minutes per session: Not on file  Stress: Not on file Relationships  Social connections:  
  Talks on phone: Not on file Gets together: Not on file Attends Scientologist service: Not on file Active member of club or organization: Not on file Attends meetings of clubs or organizations: Not on file Relationship status: Not on file  Intimate partner violence:  
  Fear of current or ex partner: Not on file Emotionally abused: Not on file Physically abused: Not on file Forced sexual activity: Not on file Other Topics Concern  Not on file Social History Narrative  Not on file ALLERGIES: Onion and Statins-hmg-coa reductase inhibitors Review of Systems Constitutional: Negative for chills and fever. Respiratory: Positive for shortness of breath. Cardiovascular: Negative for chest pain. Gastrointestinal: Positive for abdominal distention. Negative for abdominal pain, constipation, diarrhea and vomiting. Neurological: Negative for dizziness and light-headedness. All other systems reviewed and are negative. Vitals:  
 05/29/19 1724 BP: 134/78 Pulse: 98 Resp: 18 Temp: 98.6 °F (37 °C) SpO2: 98% Weight: 124.7 kg (275 lb) Height: 6' (1.829 m) Physical Exam  
Constitutional: He is oriented to person, place, and time. He appears well-developed and well-nourished. HENT:  
Head: Normocephalic and atraumatic. Eyes: No scleral icterus. Neck: Normal range of motion. Cardiovascular: Normal rate, regular rhythm, normal heart sounds and normal pulses. Pulmonary/Chest: Effort normal. No respiratory distress. Abdominal: Soft. He exhibits distension. There is no tenderness. Neurological: He is alert and oriented to person, place, and time. Skin: No rash noted. No erythema. Nursing note and vitals reviewed. Note written by Sarah Galaviz, as dictated by Gus Monzon MD 5:41 PM 
 
MDM Number of Diagnoses or Management Options Ascites due to alcoholic cirrhosis ([facility] [address]): established and worsening Hypokalemia: established and worsening Diagnosis management comments: Pt presents with chronic ascites that has been gradually worsening. Hemodynamically stable without respiratory distress, hypoxia, abdominal tenderness, encephalopathy Procedures CONSULT NOTE: 
5:57 PM Gus Monzon MD spoke with Dr. Britta Llanos, Consult for Gastroenterology. Discussed available diagnostic tests and clinical findings. Dr. Janae Eisenmenger states he will contact Dr. Beryle Levee in the morning to set up an outpatient paracentesis. 8:07 PM 
Familt reports pt has nausea and haven't been taking lactulose. Will give pt a prescription for Zofran. Pt is laying down and resting comfortably with O2 saturation of 97% on room air.

## 2019-05-29 NOTE — ED NOTES
Bedside and Verbal shift change report given to Heather Gonzalez RN (oncoming nurse) by Xiomara RN (offgoing nurse). Report included the following information SBAR, Kardex, ED Summary, Intake/Output and Recent Results.

## 2019-05-30 NOTE — PROGRESS NOTES
Pt received to US, confirmed allergies. IV started in L AC, belly severely distended. Pt signed consent with Dr Gayla Thompson. Timeout at 1240 for paracentesis, start time 1241. Pt draining clear yellow fluid from R side abdomen. 1415--Pt drained 00727 mL clear fluid from abdomen. Dressing applied, stop time 1417. Orthostatics taken, stable, pt discharge directions reviewed, taken out to waiting area for discharge.

## 2019-05-30 NOTE — DISCHARGE INSTRUCTIONS
Patient Education        Learning About Paracentesis  What is paracentesis? Paracentesis (say \"rqjm-bl-hym-MARIBELL-joyce\") is a procedure that removes fluid from the belly. The buildup of fluid may be caused by infection, inflammation, an injury, or other problems. Swelling from too much fluid may cause pain or trouble breathing. The doctor will remove the extra fluid with a needle attached to a tube. Your doctor may remove the fluid to:  · Diagnose infection, injury, or other conditions. · Relieve pressure in your belly. How is the procedure done? Your doctor or nurse will clean the area of your belly where the needle will go in. Then he or she will put sterile towels around the area. You may get a shot of numbing medicine in your belly. Then your doctor will gently insert a needle where the fluid is. He or she may attach a tube (catheter) to the site to help collect the fluid. The procedure may take from a few minutes to 30 minutes or more. After the fluid has drained, your doctor will take out the needle or catheter and put a bandage on the site. What can you expect after the procedure? Your doctor will watch your pulse, blood pressure, and temperature for about an hour. If your doctor thinks that testing the fluid can help find the cause of a problem, he or she will send it to a lab. For up to 2 days after the procedure, you may have a small amount of clear fluid coming out of the site where the needle was inserted, especially if you had a lot of fluid removed. You may need to change the bandage on the site. You can do your normal activities after the procedure, unless your doctor tells you not to. If fluid builds up in your belly again, your doctor may repeat this procedure. When should you call for help? Call 911 anytime you think you may need emergency care. For example, call if:  · You passed out (lost consciousness).   Call your doctor now or seek immediate medical care if:  · You have symptoms of infection, such as:  ? Increased pain, swelling, warmth, or redness. ? Red streaks or pus. ? A fever. · You are dizzy or lightheaded, or you feel like you may faint. · You have new or worse belly pain. Watch closely for changes in your health, and be sure to contact your doctor if:  · Fluid builds up in your belly again. · You do not get better as expected. Where can you learn more? Go to http://bhavana-rosio.info/. Enter X447 in the search box to learn more about \"Learning About Paracentesis. \"  Current as of: March 27, 2018  Content Version: 11.9  © 3875-3704 [employer]. Care instructions adapted under license by [employer] (which disclaims liability or warranty for this information). If you have questions about a medical condition or this instruction, always ask your healthcare professional. [address] any warranty or liability for your use of this information.

## 2019-05-31 NOTE — PATIENT INSTRUCTIONS
A Healthy Lifestyle: Care Instructions  Your Care Instructions    A healthy lifestyle can help you feel good, stay at a healthy weight, and have plenty of energy for both work and play. A healthy lifestyle is something you can share with your whole family. A healthy lifestyle also can lower your risk for serious health problems, such as high blood pressure, heart disease, and diabetes. You can follow a few steps listed below to improve your health and the health of your family. Follow-up care is a key part of your treatment and safety. Be sure to make and go to all appointments, and call your doctor if you are having problems. It's also a good idea to know your test results and keep a list of the medicines you take. How can you care for yourself at home? · Do not eat too much sugar, fat, or fast foods. You can still have dessert and treats now and then. The goal is moderation. · Start small to improve your eating habits. Pay attention to portion sizes, drink less juice and soda pop, and eat more fruits and vegetables. ? Eat a healthy amount of food. A 3-ounce serving of meat, for example, is about the size of a deck of cards. Fill the rest of your plate with vegetables and whole grains. ? Limit the amount of soda and sports drinks you have every day. Drink more water when you are thirsty. ? Eat at least 5 servings of fruits and vegetables every day. It may seem like a lot, but it is not hard to reach this goal. A serving or helping is 1 piece of fruit, 1 cup of vegetables, or 2 cups of leafy, raw vegetables. Have an apple or some carrot sticks as an afternoon snack instead of a candy bar. Try to have fruits and/or vegetables at every meal.  · Make exercise part of your daily routine. You may want to start with simple activities, such as walking, bicycling, or slow swimming. Try to be active 30 to 60 minutes every day. You do not need to do all 30 to 60 minutes all at once.  For example, you can exercise 3 times a day for 10 or 20 minutes. Moderate exercise is safe for most people, but it is always a good idea to talk to your doctor before starting an exercise program.  · Keep moving. [employer] the lawn, work in the garden, or [employer]. Take the stairs instead of the elevator at work. · If you smoke, quit. People who smoke have an increased risk for heart attack, stroke, cancer, and other lung illnesses. Quitting is hard, but there are ways to boost your chance of quitting tobacco for good. ? Use nicotine gum, patches, or lozenges. ? Ask your doctor about stop-smoking programs and medicines. ? Keep trying. In addition to reducing your risk of diseases in the future, you will notice some benefits soon after you stop using tobacco. If you have shortness of breath or asthma symptoms, they will likely get better within a few weeks after you quit. · Limit how much alcohol you drink. Moderate amounts of alcohol (up to 2 drinks a day for men, 1 drink a day for women) are okay. But drinking too much can lead to liver problems, high blood pressure, and other health problems. Family health  If you have a family, there are many things you can do together to improve your health. · Eat meals together as a family as often as possible. · Eat healthy foods. This includes fruits, vegetables, lean meats and dairy, and whole grains. · Include your family in your fitness plan. Most people think of activities such as jogging or tennis as the way to fitness, but there are many ways you and your family can be more active. Anything that makes you breathe hard and gets your heart pumping is exercise. Here are some tips:  ? Walk to do errands or to take your child to school or the bus.  ? Go for a family bike ride after dinner instead of watching TV. Where can you learn more? Go to http://bhavana-rosio.info/. Enter P305 in the search box to learn more about \"A Healthy Lifestyle: Care Instructions. \"  Current as of: September 11, 2018  Content Version: 11.9  © 9558-9633 [employer]. Care instructions adapted under license by [employer] (which disclaims liability or warranty for this information). If you have questions about a medical condition or this instruction, always ask your healthcare professional. [address] any warranty or liability for your use of this information.

## 2019-05-31 NOTE — PROGRESS NOTES
Otelia Pallas. is a 66 y.o. male   Chief Complaint   Patient presents with    Follow-up    Immunization/Injection     testosterone inj    pt here for f/u and was recently at hosp and had approx 4 gallons of ascites removed. Pt is feeling much better. Pt states he is no longer drinking and is living with his dad who is currently with him. Pt also with hx of prediabetes and is due for a recheck. Recent glucose was 90.      he is a 66y.o. year old male who presents for evalution. Reviewed PmHx, RxHx, FmHx, SocHx, AllgHx and updated and dated in the chart. Review of Systems - negative except as listed above in the HPI    Objective:     Vitals:    05/31/19 1500   BP: 118/76   Pulse: (!) 106   Resp: 16   Temp: 98.4 °F (36.9 °C)   TempSrc: Oral   SpO2: 99%   Weight: 232 lb (105.2 kg)   Height: 6' (1.829 m)       Current Outpatient Medications   Medication Sig    testosterone cypionate (DEPOTESTOTERONE CYPIONATE) 200 mg/mL injection 2 mL by IntraMUSCular route once for 1 dose. Max Daily Amount: 400 mg.    ondansetron (ZOFRAN ODT) 8 mg disintegrating tablet Take 1 Tab by mouth every eight (8) hours as needed for Nausea.  mupirocin (BACTROBAN) 2 % ointment Apply  to affected area three (3) times daily.  furosemide (LASIX) 40 mg tablet Take 1 Tab by mouth daily.  lactulose (CHRONULAC) 10 gram/15 mL solution Take 30 mL by mouth three (3) times daily.  spironolactone (ALDACTONE) 100 mg tablet Take 1 Tab by mouth daily.  venlafaxine-SR (EFFEXOR-XR) 150 mg capsule Take 1 Cap by mouth daily.  folic acid (FOLVITE) 1 mg tablet Take 1 Tab by mouth daily.  thiamine HCL (B-1) 100 mg tablet Take 1 Tab by mouth daily.  LORazepam (ATIVAN) 2 mg tablet Take 0.5-1 Tabs by mouth every six (6) hours as needed for Anxiety. Max Daily Amount: 8 mg.     cyclobenzaprine (FLEXERIL) 10 mg tablet TAKE 1 TABLET THREE TIMES DAILY AS NEEDED    hydrOXYzine HCl (ATARAX) 25 mg tablet Take 25 mg by mouth three (3) times daily.  omeprazole (PRILOSEC) 40 mg capsule TAKE 1 CAPSULE TWICE DAILY    allopurinol (ZYLOPRIM) 300 mg tablet TAKE 1 TABLET TWICE DAILY     No current facility-administered medications for this visit. Facility-Administered Medications Ordered in Other Visits   Medication Dose Route Frequency    albumin human 25% (BUMINATE) solution 25 g  25 g IntraVENous RAD PRN       Physical Examination: General appearance - alert, well appearing, and in no distress  Chest - clear to auscultation, no wheezes, rales or rhonchi, symmetric air entry  Heart - normal rate, regular rhythm, normal S1, S2, no murmurs, rubs, clicks or gallops      Assessment/ Plan:   Diagnoses and all orders for this visit:    1. Prediabetes  -     HEMOGLOBIN A1C WITH EAG    2. Hypogonadism, male  -     testosterone cypionate (DEPOTESTOTERONE CYPIONATE) 200 mg/mL injection; 2 mL by IntraMUSCular route once for 1 dose. Max Daily Amount: 400 mg.  -     IN INJ TESTOSTERONE CYPIONATE  -     IN THER/PROPH/DIAG INJECTION, SUBCUT/IM       Follow-up and Dispositions    · Return in about 1 month (around 6/28/2019), or if symptoms worsen or fail to improve. I have discussed the diagnosis with the patient and the intended plan as seen in the above orders. The patient has received an after-visit summary and questions were answered concerning future plans. Pt conveyed understanding of plan.     Medication Side Effects and Warnings were discussed with patient      Mag Crawford DO

## 2019-05-31 NOTE — PROGRESS NOTES
Chief Complaint   Patient presents with    Follow-up    Immunization/Injection     testosterone inj     Patient presents in office today for 1 month f/u. Was seen in the ED at [facility] on 5/29/19 for fluid build up in abdomen. Also here for T injection. No concerns. 1. Have you been to the ER, urgent care clinic since your last visit? Hospitalized since your last visit? Yes When: 5/29/19 [facility] ED    2. Have you seen or consulted any other health care providers outside of the [address] [place] [address] since your last visit? Include any pap smears or colon screening.  No    Learning Assessment 4/6/2017   PRIMARY LEARNER Patient   HIGHEST LEVEL OF EDUCATION - PRIMARY LEARNER  SOME COLLEGE   BARRIERS PRIMARY LEARNER NONE   CO-LEARNER CAREGIVER No   PRIMARY LANGUAGE ENGLISH   LEARNER PREFERENCE PRIMARY DEMONSTRATION   ANSWERED BY patient   RELATIONSHIP SELF

## 2019-06-06 NOTE — TELEPHONE ENCOUNTER
Patients mother called and stated that there was an emergency and patient will not be able to come in today.

## 2019-06-18 NOTE — PATIENT INSTRUCTIONS
A Healthy Lifestyle: Care Instructions  Your Care Instructions    A healthy lifestyle can help you feel good, stay at a healthy weight, and have plenty of energy for both work and play. A healthy lifestyle is something you can share with your whole family. A healthy lifestyle also can lower your risk for serious health problems, such as high blood pressure, heart disease, and diabetes. You can follow a few steps listed below to improve your health and the health of your family. Follow-up care is a key part of your treatment and safety. Be sure to make and go to all appointments, and call your doctor if you are having problems. It's also a good idea to know your test results and keep a list of the medicines you take. How can you care for yourself at home? · Do not eat too much sugar, fat, or fast foods. You can still have dessert and treats now and then. The goal is moderation. · Start small to improve your eating habits. Pay attention to portion sizes, drink less juice and soda pop, and eat more fruits and vegetables. ? Eat a healthy amount of food. A 3-ounce serving of meat, for example, is about the size of a deck of cards. Fill the rest of your plate with vegetables and whole grains. ? Limit the amount of soda and sports drinks you have every day. Drink more water when you are thirsty. ? Eat at least 5 servings of fruits and vegetables every day. It may seem like a lot, but it is not hard to reach this goal. A serving or helping is 1 piece of fruit, 1 cup of vegetables, or 2 cups of leafy, raw vegetables. Have an apple or some carrot sticks as an afternoon snack instead of a candy bar. Try to have fruits and/or vegetables at every meal.  · Make exercise part of your daily routine. You may want to start with simple activities, such as walking, bicycling, or slow swimming. Try to be active 30 to 60 minutes every day. You do not need to do all 30 to 60 minutes all at once.  For example, you can exercise 3 times a day for 10 or 20 minutes. Moderate exercise is safe for most people, but it is always a good idea to talk to your doctor before starting an exercise program.  · Keep moving. [employer] the lawn, work in the garden, or [employer]. Take the stairs instead of the elevator at work. · If you smoke, quit. People who smoke have an increased risk for heart attack, stroke, cancer, and other lung illnesses. Quitting is hard, but there are ways to boost your chance of quitting tobacco for good. ? Use nicotine gum, patches, or lozenges. ? Ask your doctor about stop-smoking programs and medicines. ? Keep trying. In addition to reducing your risk of diseases in the future, you will notice some benefits soon after you stop using tobacco. If you have shortness of breath or asthma symptoms, they will likely get better within a few weeks after you quit. · Limit how much alcohol you drink. Moderate amounts of alcohol (up to 2 drinks a day for men, 1 drink a day for women) are okay. But drinking too much can lead to liver problems, high blood pressure, and other health problems. Family health  If you have a family, there are many things you can do together to improve your health. · Eat meals together as a family as often as possible. · Eat healthy foods. This includes fruits, vegetables, lean meats and dairy, and whole grains. · Include your family in your fitness plan. Most people think of activities such as jogging or tennis as the way to fitness, but there are many ways you and your family can be more active. Anything that makes you breathe hard and gets your heart pumping is exercise. Here are some tips:  ? Walk to do errands or to take your child to school or the bus.  ? Go for a family bike ride after dinner instead of watching TV. Where can you learn more? Go to http://bhavana-rosio.info/. Enter V498 in the search box to learn more about \"A Healthy Lifestyle: Care Instructions. \"  Current as of: September 11, 2018  Content Version: 11.9  © 1067-4744 [employer]. Care instructions adapted under license by [employer] (which disclaims liability or warranty for this information). If you have questions about a medical condition or this instruction, always ask your healthcare professional. [address] any warranty or liability for your use of this information.

## 2019-06-18 NOTE — PROGRESS NOTES
Chief Complaint   Patient presents with   [address] Immunization/Injection     Patient presents in office today for labs. States that he has a paracentesis scheduled for Friday and they want him to get PTT drawn. Also here for T injection. States that he needs a letter for senior care. He has been sentenced to 16 days. States that the last time he was in senior care for 4 days [facility] did not give him any of his medication. Mom said that she needs Dr. Michael Reid to call [facility] and verify they they are unable to care for him. States that the letter needs to states that [facility] is unable to care for him and it would be more beneficial for him to be on house arrest.  Also would like to have his umbilical hernia looked at. It has grown in size and is bleeding. No other concerns. 1. Have you been to the ER, urgent care clinic since your last visit? Hospitalized since your last visit? No    2. Have you seen or consulted any other health care providers outside of the [address] [place] [address] since your last visit? Include any pap smears or colon screening.  No    Learning Assessment 4/6/2017   PRIMARY LEARNER Patient   HIGHEST LEVEL OF EDUCATION - PRIMARY LEARNER  SOME COLLEGE   BARRIERS PRIMARY LEARNER NONE   CO-LEARNER CAREGIVER No   PRIMARY LANGUAGE ENGLISH   LEARNER PREFERENCE PRIMARY DEMONSTRATION   ANSWERED BY patient   RELATIONSHIP SELF

## 2019-06-18 NOTE — PROGRESS NOTES
Rodrigo Greene. is a 64 y.o. male   Chief Complaint   Patient presents with   [address] Immunization/Injection    pt here with mother and pt was sentenced to 16 days of detention time at [facility] and was there for a prior 4 day sentence and did not receive his meds. Mother would like for him to get a letter recommending house arrest over this to ensure he can get his meds for his ascites and liver failure. Concern about hernia enlarging, pt had seen surgeon and was advised against surgery due to non emergent. Will give letter    Pt needs a ptt inr pt for drainage of his ascites. Pt insists needs ptt so will order this along with pt/inr  Pt continues to pick his umbilical hernia and the skin is raw overlying it without pustulent drainage. he is a 64y.o. year old male who presents for evalution. Reviewed PmHx, RxHx, FmHx, SocHx, AllgHx and updated and dated in the chart. Review of Systems - negative except as listed above in the HPI    Objective:     Vitals:    06/18/19 1355   BP: 119/77   Pulse: (!) 107   Resp: 18   Temp: 98.3 °F (36.8 °C)   TempSrc: Oral   SpO2: 99%   Weight: 268 lb (121.6 kg)   Height: 6' (1.829 m)       Current Outpatient Medications   Medication Sig    testosterone cypionate (DEPOTESTOTERONE CYPIONATE) 200 mg/mL injection 2 mL by IntraMUSCular route once for 1 dose. Max Daily Amount: 400 mg.    ondansetron (ZOFRAN ODT) 8 mg disintegrating tablet Take 1 Tab by mouth every eight (8) hours as needed for Nausea.  mupirocin (BACTROBAN) 2 % ointment Apply  to affected area three (3) times daily.  furosemide (LASIX) 40 mg tablet Take 1 Tab by mouth daily.  lactulose (CHRONULAC) 10 gram/15 mL solution Take 30 mL by mouth three (3) times daily.  spironolactone (ALDACTONE) 100 mg tablet Take 1 Tab by mouth daily.  venlafaxine-SR (EFFEXOR-XR) 150 mg capsule Take 1 Cap by mouth daily.     LORazepam (ATIVAN) 2 mg tablet Take 0.5-1 Tabs by mouth every six (6) hours as needed for Anxiety. Max Daily Amount: 8 mg.  hydrOXYzine HCl (ATARAX) 25 mg tablet Take 25 mg by mouth three (3) times daily.  omeprazole (PRILOSEC) 40 mg capsule TAKE 1 CAPSULE TWICE DAILY    allopurinol (ZYLOPRIM) 300 mg tablet TAKE 1 TABLET TWICE DAILY    folic acid (FOLVITE) 1 mg tablet Take 1 Tab by mouth daily.  thiamine HCL (B-1) 100 mg tablet Take 1 Tab by mouth daily.  cyclobenzaprine (FLEXERIL) 10 mg tablet TAKE 1 TABLET THREE TIMES DAILY AS NEEDED     No current facility-administered medications for this visit. Physical Examination: General appearance - alert, well appearing, and in no distress  Mental status - alert, oriented to person, place, and time  Chest - clear to auscultation, no wheezes, rales or rhonchi, symmetric air entry  Heart - normal rate, regular rhythm, normal S1, S2, no murmurs, rubs, clicks or gallops  Abdomen - large umbilical hernia, overlying skin is raw from constant picking but without sign of infection and the hernia is reducible      Assessment/ Plan:   Diagnoses and all orders for this visit:    1. Chronic liver failure without hepatic coma (HCC)  -     PROTHROMBIN TIME + INR  -     PTT  -     CBC WITH AUTOMATED DIFF    2. Hypogonadism, male  -     testosterone cypionate (DEPOTESTOTERONE CYPIONATE) 200 mg/mL injection; 2 mL by IntraMUSCular route once for 1 dose. Max Daily Amount: 400 mg.  -     NE INJ TESTOSTERONE CYPIONATE  -     NE THER/PROPH/DIAG INJECTION, SUBCUT/IM    3. Umbilical hernia without obstruction and without gangrene  High risk for surgery, not emergent. Stop picking at wound use abx ointment and keep covered to prevent picking  4. Secondary esophageal varices without bleeding ([facility] [address]  Letter written, however advised I would not be contacting the prison on his behalf and I do not have any control over his legal issues. No more etoh     Follow-up and Dispositions    · Return if symptoms worsen or fail to improve.            I have discussed the diagnosis with the patient and the intended plan as seen in the above orders. The patient has received an after-visit summary and questions were answered concerning future plans. Pt conveyed understanding of plan.     Medication Side Effects and Warnings were discussed with patient      Angy Riley DO

## 2019-06-18 NOTE — LETTER
6/18/2019 2:17 PM 
 
Mr. Flavio Pool. 
[address] [facility] [address] To Whom It May Concern, 
 
Mr Oswaldo Ram has multiple medical conditions requiring on going chronic care. These include recurrent ascites secondary to liver failure requiring frequent paracentesis. As well as esophageal varices at risk of rupture and severe bleeding, also secondary to his liver failure. It is extremely important that he continue his current medications uninterrupted. If house arrest is an option in lieu of his long-term time, this would be more beneficial to Mr Ciera Syed current health situation. If you have any further questions please have the patient contact my office. Sincerely, [address] [facility] [address], DO

## 2019-06-20 NOTE — PROGRESS NOTES
Here are the labs for Mr Natividad Lo, his INR has worsened some along with his platelets tho they remain at [id number].

## 2019-06-21 NOTE — DISCHARGE INSTRUCTIONS
Patient Education        Learning About Paracentesis  What is paracentesis? Paracentesis (say \"qrfz-yl-wzj-MARIBELL-joyce\") is a procedure that removes fluid from the belly. The buildup of fluid may be caused by infection, inflammation, an injury, or other problems. Swelling from too much fluid may cause pain or trouble breathing. The doctor will remove the extra fluid with a needle attached to a tube. Your doctor may remove the fluid to:  · Diagnose infection, injury, or other conditions. · Relieve pressure in your belly. How is the procedure done? Your doctor or nurse will clean the area of your belly where the needle will go in. Then he or she will put sterile towels around the area. You may get a shot of numbing medicine in your belly. Then your doctor will gently insert a needle where the fluid is. He or she may attach a tube (catheter) to the site to help collect the fluid. The procedure may take from a few minutes to 30 minutes or more. After the fluid has drained, your doctor will take out the needle or catheter and put a bandage on the site. What can you expect after the procedure? Your doctor will watch your pulse, blood pressure, and temperature for about an hour. If your doctor thinks that testing the fluid can help find the cause of a problem, he or she will send it to a lab. For up to 2 days after the procedure, you may have a small amount of clear fluid coming out of the site where the needle was inserted, especially if you had a lot of fluid removed. You may need to change the bandage on the site. You can do your normal activities after the procedure, unless your doctor tells you not to. If fluid builds up in your belly again, your doctor may repeat this procedure. When should you call for help? Call 911 anytime you think you may need emergency care. For example, call if:  · You passed out (lost consciousness).   Call your doctor now or seek immediate medical care if:  · You have symptoms of infection, such as:  ? Increased pain, swelling, warmth, or redness. ? Red streaks or pus. ? A fever. · You are dizzy or lightheaded, or you feel like you may faint. · You have new or worse belly pain. Watch closely for changes in your health, and be sure to contact your doctor if:  · Fluid builds up in your belly again. · You do not get better as expected. Where can you learn more? Go to http://bhavana-rosio.info/. Enter X447 in the search box to learn more about \"Learning About Paracentesis. \"  Current as of: March 27, 2018  Content Version: 11.9  © 3529-4900 [employer], [employer]. Care instructions adapted under license by [employer] (which disclaims liability or warranty for this information). If you have questions about a medical condition or this instruction, always ask your healthcare professional. [address] any warranty or liability for your use of this information.

## 2019-06-21 NOTE — PROGRESS NOTES
0820 Patient found on table in ultrasound. Placed on dynamap and scanned for fluid per ultrasound student [place]  and [employer]. Patient's abdomen protuberant and umbillical hernia apparent with 2X2 ulceration noted to posterior aspect. Per patient he picks at it and has now stopped after doctor condemnation and applied antibiotic ointment. Transporation verified per patient as father and mother, present. 2082 Dr. Erin Calvillo at bedside for consent, Aware of altered labs. Per Dr. Erin Calvillo no Albumin to be given. Time out 0837 Start time End Time 1043. Total [id number],250 ml of clear yellow fluid removed.

## 2019-07-02 NOTE — DISCHARGE INSTRUCTIONS
Patient Education        Learning About Paracentesis  What is paracentesis? Paracentesis (say \"bzjw-up-owj-MARIBELL-joyce\") is a procedure that removes fluid from the belly. The buildup of fluid may be caused by infection, inflammation, an injury, or other problems. Swelling from too much fluid may cause pain or trouble breathing. The doctor will remove the extra fluid with a needle attached to a tube. Your doctor may remove the fluid to:  · Diagnose infection, injury, or other conditions. · Relieve pressure in your belly. How is the procedure done? Your doctor or nurse will clean the area of your belly where the needle will go in. Then he or she will put sterile towels around the area. You may get a shot of numbing medicine in your belly. Then your doctor will gently insert a needle where the fluid is. He or she may attach a tube (catheter) to the site to help collect the fluid. The procedure may take from a few minutes to 30 minutes or more. After the fluid has drained, your doctor will take out the needle or catheter and put a bandage on the site. What can you expect after the procedure? Your doctor will watch your pulse, blood pressure, and temperature for about an hour. If your doctor thinks that testing the fluid can help find the cause of a problem, he or she will send it to a lab. For up to 2 days after the procedure, you may have a small amount of clear fluid coming out of the site where the needle was inserted, especially if you had a lot of fluid removed. You may need to change the bandage on the site. You can do your normal activities after the procedure, unless your doctor tells you not to. If fluid builds up in your belly again, your doctor may repeat this procedure. When should you call for help? Call 911 anytime you think you may need emergency care. For example, call if:  · You passed out (lost consciousness).   Call your doctor now or seek immediate medical care if:  · You have symptoms of infection, such as:  ? Increased pain, swelling, warmth, or redness. ? Red streaks or pus. ? A fever. · You are dizzy or lightheaded, or you feel like you may faint. · You have new or worse belly pain. Watch closely for changes in your health, and be sure to contact your doctor if:  · Fluid builds up in your belly again. · You do not get better as expected. Where can you learn more? Go to http://bhavana-rosio.info/. Enter X447 in the search box to learn more about \"Learning About Paracentesis. \"  Current as of: March 27, 2018  Content Version: 11.9  © 8193-9092 [employer], [employer]. Care instructions adapted under license by [employer] (which disclaims liability or warranty for this information). If you have questions about a medical condition or this instruction, always ask your healthcare professional. [address] any warranty or liability for your use of this information.

## 2019-07-02 NOTE — PROGRESS NOTES
1025 Patient found supine on stretcher in ultrasound for ultrasound paracentesis. Connected to dynamap and scanned and prepped per MIKEY Bonilla. 200 Dr. Miller Grullon at bedside for consent with review of risks and benefits. Time out 1046 Start time 1046 End time 1200  . No albumin needed with paracentesis per Dr. Miller Grullon and notify if bp drops. Total volume 97988 ml of clear yellow fluid removed without difficulty from right abdomen with CDI dressing placed. Patient with drop in BP with standing BP but denies dizziness and no corresponding raise in pulse. Patient signed as received discharge instructions with verbalization of understanding. Patient taken out via wheelchair to [facility] for discharge to home with parents. Patient given paper to fill out for application for care care renewal. Asked patient to call Dr. Maci Herring office and clarify appointment for TIPS.

## 2019-07-09 NOTE — PROGRESS NOTES
Pt received to US, changed to gown for paracentesis. Pt noticeably confused, unable to verbalize clear statements. Able to state his name and , who is parents are. Parents state pt has prescribed lactulose that he is not taking for elevated ammonia levels, but that it makes him throw up so he has not been taking. Confirmed allergies with patient. Vitals stable. Dr Lyles Argue here for consent. Timeout at 0930, start time 0931. Pt draining clear yellow fluid from abdomen. Pt's parents in to discuss options with Dr Lyles Argue regarding treatment plan. Stop time 1026, total of 8810 mL fluid drained from abdomen, uanble to sit still for orthostatic BP. Discharge directions provided and taken out to [facility] in [place].

## 2019-07-09 NOTE — DISCHARGE INSTRUCTIONS
Patient Education        Learning About Paracentesis  What is paracentesis? Paracentesis (say \"srqw-zj-stj-MARIBELL-joyce\") is a procedure that removes fluid from the belly. The buildup of fluid may be caused by infection, inflammation, an injury, or other problems. Swelling from too much fluid may cause pain or trouble breathing. The doctor will remove the extra fluid with a needle attached to a tube. Your doctor may remove the fluid to:  · Diagnose infection, injury, or other conditions. · Relieve pressure in your belly. How is the procedure done? Your doctor or nurse will clean the area of your belly where the needle will go in. Then he or she will put sterile towels around the area. You may get a shot of numbing medicine in your belly. Then your doctor will gently insert a needle where the fluid is. He or she may attach a tube (catheter) to the site to help collect the fluid. The procedure may take from a few minutes to 30 minutes or more. After the fluid has drained, your doctor will take out the needle or catheter and put a bandage on the site. What can you expect after the procedure? Your doctor will watch your pulse, blood pressure, and temperature for about an hour. If your doctor thinks that testing the fluid can help find the cause of a problem, he or she will send it to a lab. For up to 2 days after the procedure, you may have a small amount of clear fluid coming out of the site where the needle was inserted, especially if you had a lot of fluid removed. You may need to change the bandage on the site. You can do your normal activities after the procedure, unless your doctor tells you not to. If fluid builds up in your belly again, your doctor may repeat this procedure. When should you call for help? Call 911 anytime you think you may need emergency care. For example, call if:  · You passed out (lost consciousness).   Call your doctor now or seek immediate medical care if:  · You have symptoms of infection, such as:  ? Increased pain, swelling, warmth, or redness. ? Red streaks or pus. ? A fever. · You are dizzy or lightheaded, or you feel like you may faint. · You have new or worse belly pain. Watch closely for changes in your health, and be sure to contact your doctor if:  · Fluid builds up in your belly again. · You do not get better as expected. Where can you learn more? Go to http://bhavana-rosio.info/. Enter X447 in the search box to learn more about \"Learning About Paracentesis. \"  Current as of: March 27, 2018  Content Version: 11.9  © 9466-0572 [employer], [employer]. Care instructions adapted under license by [employer] (which disclaims liability or warranty for this information). If you have questions about a medical condition or this instruction, always ask your healthcare professional. [address] any warranty or liability for your use of this information.

## 2019-07-10 PROBLEM — K76.82 HEPATIC ENCEPHALOPATHY: Status: ACTIVE | Noted: 2019-01-01

## 2019-07-10 PROBLEM — K72.10 END STAGE LIVER DISEASE (HCC): Status: ACTIVE | Noted: 2019-01-01

## 2019-07-10 NOTE — PROGRESS NOTES
[facility]: MED RECONCILIATION Comments/Recommendations:  
Interview with the patient's parents. Provide a medication list found not to be up to date during interview. The patient had not taken lactulose for four to five days due to nausea. He was able to take all three doses yesterday in cranberry juice. The patient has not been taking thiamine and folic acid as his mother had trouble locating these vitamins OTC. Pharmacist counseled the patient's mother on where these vitamins would be located and recommend she speak with the pharmacist if needed. It may be easier to provide the patient with prescriptions for thiamine and folic acid. The patient has not take his spironolactone in five days due to low blood pressure (80/55) Medications added:  
 
Tylenol PM 
 
Medications removed: 
 
Cyclobenzaprine Folic acid Lorazepam 
Mupirocin Thiamine Medications adjusted: 
 
None Allergies: Onion and Statins-hmg-coa reductase inhibitors Prior to Admission Medications:  
Prior to Admission Medications Prescriptions Last Dose Informant Patient Reported? Taking?  
allopurinol (ZYLOPRIM) 300 mg tablet 7/9/2019 at 2000 Parent No Yes Sig: TAKE 1 TABLET TWICE DAILY  
diphenhydrAMINE-acetaminophen (TYLENOL PM EXTRA STRENGTH)  mg tab 7/10/2019 at Unknown time Parent Yes Yes Sig: Take 1 Tab by mouth nightly as needed (sleep). furosemide (LASIX) 40 mg tablet 7/9/2019 at 2000 Parent No Yes Sig: Take 1 Tab by mouth daily. hydrOXYzine HCl (ATARAX) 25 mg tablet 7/9/2019 at 2000 Parent Yes Yes Sig: Take 25 mg by mouth two (2) times a day. lactulose (CHRONULAC) 10 gram/15 mL solution 7/9/2019 at Unknown time Parent Yes Yes Sig: Take 20 g by mouth three (3) times daily. Oral solution may be mixed with fruit juice, water, or milk  
omeprazole (PRILOSEC) 40 mg capsule 7/9/2019 at 2000 Parent No Yes Sig: TAKE 1 CAPSULE TWICE DAILY  
ondansetron (ZOFRAN ODT) 8 mg disintegrating tablet  Parent No Yes Sig: Take 1 Tab by mouth every eight (8) hours as needed for Nausea. potassium chloride SR (KLOR-CON 10) 10 mEq tablet 7/9/2019 at 2000 Parent Yes Yes Sig: Take 10 mEq by mouth two (2) times a day. spironolactone (ALDACTONE) 100 mg tablet 7/7/2019 at 0500 Parent No Yes Sig: Take 1 Tab by mouth daily. venlafaxine-SR (EFFEXOR-XR) 150 mg capsule 7/9/2019 at 0800 Parent No Yes Sig: Take 1 Cap by mouth daily. Facility-Administered Medications: None Thank you, Ronni Shin, PharmD, BCPS

## 2019-07-10 NOTE — H&P
[address] [address] Office [phone] Fax [phone] Admission H&P Name: Mason Costa MRN: [id number]  Sex: Male YOB: 1960  Age: 64 y.o. PCP: Emani Hutchinson DO Source of Information: parents, medical records Chief complaint: confusion History of Present Illness Mason Costa is a 64 y.o. male with known EtOH abuse cirrhosis, CVA, HTN presents to the ER by his parents with the  complaint of worsening altered mental status. Patient is sedated at this time following [place] administration so history is obtained from Mom and Dad at bedside. Patient has been living with his parents for the past 6 weeks following his separation from his wife. He has been unstable on his feet but in normal mentation up until 3 days ago when parents state he became confused and was not able to comprehend. He has been speaking in short sentences, not responding when questioned, acting increasingly agitated and shaking his head. He has had hiccups and has been throwing up his lactulose for the past few days, although he did get a dose yesterday when mixed into juice. Parents also report the patient has had about 5 falls in the last 2 weeks when transferring from bed or bath. They deny any head trauma. Mom notes the patient has had good appetite however he has been gagging on his food lately. He has been constipated and developed urinary incontinence yesterday. They have no support at home to continue to care for the patient. Of note, patient underwent paracentesis on 07/02 (13.6L removed) and 07/09 (8L removed). He follows with Dr. Candida Santiago. In the ED: 
Vitals: Temp 97.2F /86 HR 96 RR 26 SatO2 100 % on room air Labs: remarkable for Na 130, K 3.3, Cr 1.56, INR 1.5, Bilirubin 4, Albumin 2.2, Ammonia 241, Trop (-)x1 Imaging: · CXR: No acute process · CT Abdomen/Pelvis: Cirrhotic morphology of the liver with large volume ascites.  Wall thickening ascending and transverse colon segments may be due to an infectious or inflammatory colitis, nonspecific. No bowel obstruction. · CT Head: No acute traumatic injury Treatment: Geodon 20mg x1, Lactulose 1L x1, 1L NS bolus, IV Zosyn x1, Haldol 5mgx1 for violent behavior and agitation EKG: pending Patient Vitals for the past 12 hrs: 
 Temp Pulse Resp BP SpO2  
07/10/19 0631  99 14 114/73 96 % 07/10/19 0530  97 26 92/65 100 % 07/10/19 0526 97.2 °F (36.2 °C) 96 26 138/86 100 % Review of Systems Unable to perform ROS: Mental status change Home Medications Prior to Admission medications Medication Sig Start Date End Date Taking? Authorizing Provider  
potassium chloride SR (KLOR-CON 10) 10 mEq tablet Take  by mouth two (2) times a day. Yes Other, MD Dillon  
furosemide (LASIX) 40 mg tablet Take 1 Tab by mouth daily. 4/16/19  Yes Emma Chaidez MD  
lactulose (CHRONULAC) 10 gram/15 mL solution Take 30 mL by mouth three (3) times daily. 4/16/19  Yes Emma Chaidez MD  
spironolactone (ALDACTONE) 100 mg tablet Take 1 Tab by mouth daily. 4/17/19  Yes Emma Chaidez MD  
venlafaxine-SR [facility]) 150 mg capsule Take 1 Cap by mouth daily. 4/17/19  Yes Emma Chaidez MD  
LORazepam (ATIVAN) 2 mg tablet Take 0.5-1 Tabs by mouth every six (6) hours as needed for Anxiety. Max Daily Amount: 8 mg. 4/16/19  Yes Emma Chaidez MD  
hydrOXYzine HCl (ATARAX) 25 mg tablet Take 25 mg by mouth three (3) times daily. Yes Mary, MD Dillon  
omeprazole (PRILOSEC) 40 mg capsule TAKE 1 CAPSULE TWICE DAILY 8/6/18  Yes Ples Klinefelter, NP  
allopurinol (ZYLOPRIM) 300 mg tablet TAKE 1 TABLET TWICE DAILY 4/6/18  Yes Trinity Keith DO  
ondansetron (ZOFRAN ODT) 8 mg disintegrating tablet Take 1 Tab by mouth every eight (8) hours as needed for Nausea. 5/29/19   Billy Ch MD  
mupirocin (BACTROBAN) 2 % ointment Apply  to affected area three (3) times daily.  5/3/19   Charles Diamond DO  
folic acid (FOLVITE) 1 mg tablet Take 1 Tab by mouth daily. 4/17/19   Jayme Winters MD  
thiamine HCL (B-1) 100 mg tablet Take 1 Tab by mouth daily. 4/17/19   Jayme Winters MD  
cyclobenzaprine (FLEXERIL) 10 mg tablet TAKE 1 TABLET THREE TIMES DAILY AS NEEDED 2/20/19   George Keith,  Allergies Allergies Allergen Reactions  Onion Nausea and Vomiting  Statins-Hmg-Coa Reductase Inhibitors Rash  
  rash Past Medical History Past Medical History:  
Diagnosis Date  Adverse effect of anesthesia   
 pt reports waking up during colonoscopy/endoscopy  Alcohol abuse 6/1/2011  Anemia NEC  Anxiety  Ascites   
 has to have fluid drained occasionally  Cirrhosis of liver ([facility] [address]  Depression  Gastric ulcer, unspecified as acute or chronic, without mention of hemorrhage, perforation, or obstruction  GERD (gastroesophageal reflux disease)  Gout  Hypertension  Liver disease   
 cirrhosis  Obesity, Class II, BMI 35-39.9  Pneumonia  PUD (peptic ulcer disease)  Seizures ([facility] [address] 2013  
 from alcohol withdrawal  
 Stroke [facility]) 2013  
 per pt, possible mini stroke from alcohol withdrawal (same time as seizure) Previous Hospitalization(s) Past Surgical History:  
Procedure Laterality Date  COLONOSCOPY N/A 7/2/2018 COLONOSCOPY performed by Donnita Mortimer, MD at [address] [facility] HX COLONOSCOPY  07/2018  HX ENDOSCOPY    
 also colonoscopy  HX GI  1998  
 rectal abscess surgery  HX GI  1998  
 rectal abscess surgery  HX HEENT  1980  
 wisdom teeth  IR PARACENTESIS ABD INIT Pt does not remember last time but stated 5-6 times. Family History Family History Problem Relation Age of Onset  Asthma Mother  Other Father   
     history of fall multiple injuries  Hypertension Brother  Cancer Paternal Grandmother   
     uncertain if colon or stomach  Cancer Paternal Grandfather   
     stomach cancer- dx mid [de-identified] Social History Social History Socioeconomic History  Marital status: LEGALLY  Spouse name: Not on file  Number of children: Not on file  Years of education: Not on file  Highest education level: Not on file Occupational History  Occupation: Landscaping Social Needs  Financial resource strain: Not on file  Food insecurity:  
  Worry: Not on file Inability: Not on file  Transportation needs:  
  Medical: Not on file Non-medical: Not on file Tobacco Use  Smoking status: Former Smoker  Smokeless tobacco: Current User Types: Chew  Tobacco comment: currently uses snuff Substance and Sexual Activity  Alcohol use: Yes Alcohol/week: 1.2 oz Types: 2 Shots of liquor per week Comment: Today  Drug use: No  
 Sexual activity: Yes  
  Partners: Female Lifestyle  Physical activity:  
  Days per week: Not on file Minutes per session: Not on file  Stress: Not on file Relationships  Social connections:  
  Talks on phone: Not on file Gets together: Not on file Attends Rastafarian service: Not on file Active member of club or organization: Not on file Attends meetings of clubs or organizations: Not on file Relationship status: Not on file  Intimate partner violence:  
  Fear of current or ex partner: Not on file Emotionally abused: Not on file Physically abused: Not on file Forced sexual activity: Not on file Other Topics Concern  Not on file Social History Narrative  Not on file Alcohol history: significant but parents unable to quantify. Last drink 6 weeks ago per Mother Smoking history: Non-smoker Illicit drug history: Not at all Living arrangement: patient lives with their family. Ambulates: Assisted walker Visit Vitals /73 Pulse 99 Temp 97.2 °F (36.2 °C) Resp 14 Ht 6' (1.829 m) Wt 230 lb (104.3 kg) SpO2 96% BMI 31.19 kg/m² Physical Exam 
General: In no acute distress.  Patient is in a sedated state, not alert and not responsive to sternal rub. Head: Normocephalic. Atraumatic. Eyes:              Conjunctiva pink. Scleral icterus present. Pupils slow to react. Ears:              Ear canals patent. Nose:             Septum midline. Mucosa pink. No drainage. Throat: Mucosa pink. Moist mucous membranes. Neck: Supple. Normal ROM. No stiffness. Respiratory: CTAB on the front chest. No w/r/r/c.  
Cardiovascular: RRR. Normal S1,S2. No m/r/g. Pulses 2+ throughout. GI: + bowel sounds. Significantly distended abdomen with large umbilical hernia present. No evidence of tenderness as patient is not alert and cooperative Extremities: No LE edema. Distal pulses intact. Musculoskeletal: Unable to assess range of motion Skin: Warm, dry. Punctate-like erythematous rash over abdomen Neuro: Patient not alert Laboratory Data Recent Results (from the past 8 hour(s)) TROPONIN I Collection Time: 07/10/19  5:23 AM  
Result Value Ref Range Troponin-I, Qt. <0.05 <0.05 ng/mL METABOLIC PANEL, COMPREHENSIVE Collection Time: 07/10/19  5:23 AM  
Result Value Ref Range Sodium 130 (L) 136 - 145 mmol/L Potassium 3.3 (L) 3.5 - 5.1 mmol/L Chloride 94 (L) 97 - 108 mmol/L  
 CO2 26 21 - 32 mmol/L Anion gap 10 5 - 15 mmol/L Glucose 80 65 - 100 mg/dL BUN 16 6 - 20 MG/DL Creatinine 1.56 (H) 0.70 - 1.30 MG/DL  
 BUN/Creatinine ratio 10 (L) 12 - 20 GFR est AA 56 (L) >60 ml/min/1.73m2 GFR est non-AA 46 (L) >60 ml/min/1.73m2 Calcium 8.5 8.5 - 10.1 MG/DL Bilirubin, total 4.0 (H) 0.2 - 1.0 MG/DL  
 ALT (SGPT) 25 12 - 78 U/L  
 AST (SGOT) 35 15 - 37 U/L Alk. phosphatase 118 (H) 45 - 117 U/L Protein, total 6.7 6.4 - 8.2 g/dL Albumin 2.2 (L) 3.5 - 5.0 g/dL Globulin 4.5 (H) 2.0 - 4.0 g/dL A-G Ratio 0.5 (L) 1.1 - 2.2    
CBC WITH AUTOMATED DIFF Collection Time: 07/10/19  5:23 AM  
Result Value Ref Range  WBC 7.6 4.1 - 11.1 K/uL  
 RBC 4.87 4.10 - 5.70 M/uL HGB 12.6 12.1 - 17.0 g/dL HCT 37.6 36.6 - 50.3 % MCV 77.2 (L) 80.0 - 99.0 FL  
 MCH 25.9 (L) 26.0 - 34.0 PG  
 MCHC 33.5 30.0 - 36.5 g/dL RDW 20.9 (H) 11.5 - 14.5 % PLATELET 079 (L) 659 - 400 K/uL NRBC 0.0 0  WBC ABSOLUTE NRBC 0.00 0.00 - 0.01 K/uL NEUTROPHILS 63 32 - 75 % LYMPHOCYTES 17 12 - 49 % MONOCYTES 15 (H) 5 - 13 % EOSINOPHILS 2 0 - 7 % BASOPHILS 1 0 - 1 % IMMATURE GRANULOCYTES 2 (H) 0.0 - 0.5 % ABS. NEUTROPHILS 4.7 1.8 - 8.0 K/UL  
 ABS. LYMPHOCYTES 1.3 0.8 - 3.5 K/UL  
 ABS. MONOCYTES 1.1 (H) 0.0 - 1.0 K/UL  
 ABS. EOSINOPHILS 0.2 0.0 - 0.4 K/UL  
 ABS. BASOPHILS 0.1 0.0 - 0.1 K/UL  
 ABS. IMM. GRANS. 0.2 (H) 0.00 - 0.04 K/UL  
 DF SMEAR SCANNED    
 RBC COMMENTS ANISOCYTOSIS 
PRESENT 
    
PROTHROMBIN TIME + INR Collection Time: 07/10/19  5:23 AM  
Result Value Ref Range INR 1.5 (H) 0.9 - 1.1 Prothrombin time 15.1 (H) 9.0 - 11.1 sec AMMONIA Collection Time: 07/10/19  5:23 AM  
Result Value Ref Range Ammonia 241 (H) <32 UMOL/L Imaging CXR Results  (Last 48 hours) 07/10/19 0538  XR CHEST PORT Final result Impression:  IMPRESSION:  
No acute process. Narrative:  INDICATION: altered/ elevated ammonia EXAM:  AP CHEST RADIOGRAPH  
   
COMPARISON: October 18, 2016 FINDINGS:  
   
AP portable view of the chest demonstrates a normal cardiomediastinal  
silhouette. The lungs are adequately expanded. There is no edema, effusion,  
consolidation, or pneumothorax. The osseous structures are unremarkable. CT Results  (Last 48 hours) 07/10/19 0630  CT HEAD WO CONT Final result Impression:  IMPRESSION:  
   
No acute traumatic injury. Narrative:  INDICATION:   Falling, altered mental status EXAMINATION:  CT HEAD WO CONTRAST  
   
COMPARISON:  September 5, 2012 TECHNIQUE:  Routine noncontrast axial head CT was performed.   Sagittal and  
coronal reconstructions were generated. CT dose reduction was achieved through  
use of a standardized protocol tailored for this examination and automatic  
exposure control for dose modulation. Joaquina Romero FINDINGS:  
   
Ventricles:  Midline, no hydrocephalus. Intracranial Hemorrhage:  None. Brain Parenchyma/Brainstem:  Normal for age. Basal Cisterns:  Normal.   
Paranasal Sinuses:  Visualized sinuses are clear. Soft Tissues:  No significant soft tissue swelling. Osseous Structures:  No acute fracture. Additional Comments:  N/A.   
   
  
 07/10/19 0630  CT ABD PELV W CONT Final result Impression:  IMPRESSION:  
   
1. Cirrhotic morphology of the liver with large volume ascites. 2. Wall thickening ascending and transverse colon segments may be due to an  
infectious or inflammatory colitis, nonspecific. No bowel obstruction. Oral  
contrast was not administered. Narrative:  INDICATION: ascites/ recent paracentesis COMPARISON: March 21, 2019 TECHNIQUE:  
Following the uneventful intravenous administration of IV contrast, thin axial  
images were obtained through the abdomen and pelvis. Coronal and sagittal  
reconstructions were generated. Oral contrast was not administered. CT dose  
reduction was achieved through use of a standardized protocol tailored for this  
examination and automatic exposure control for dose modulation. FINDINGS:  
LUNG BASES: No abnormality. LIVER: Small, nodular without focal lesion. Heterogeneous enhancement. Portal  
vein is patent. GALLBLADDER: Contains several gallstones. SPLEEN: No enlargement or lesion. PANCREAS: No mass or ductal dilatation. ADRENALS: No mass. KIDNEYS: No mass, calculus, or hydronephrosis. GI TRACT: No evidence of bowel obstruction. Wall thickening of the ascending and  
transverse colonic segments. PERITONEUM: Large ascites. Ascites extends into an umbilical hernia. No  
pneumoperitoneum. APPENDIX: Unremarkable. RETROPERITONEUM: No aortic aneurysm. LYMPH NODES: None enlarged. ADDITIONAL COMMENTS: N/A. URINARY BLADDER: Unremarkable. REPRODUCTIVE ORGANS: Unremarkable. LYMPH NODES: None enlarged. FREE FLUID: None. BONES: No destructive bone lesion. ADDITIONAL COMMENTS: N/A. Assessment and Plan Xiomara Mtz is a 64 y.o. male with a past medical history of EtOH-related cirrhosis who is admitted for hepatic encephalopathy. Grade 3 hepatic encephalopathy 2/2 hyperammonemia in the setting of cirrhosis due to history of EtOH abuse: Patient with significant EtOH abuse history and cirrhosis requiring paracentesis about every 3 weeks, here with markedly elevated ammonia level likely cause of hepatic encephalopathy. MELD score 25 points (14-15% 90 day mortality), Child Phillips 13 points (1-3yr life expectancy). Expect mentation to improve with treatment to lower ammonia. - Admit to ICU 
- Vital signs per unit protocol - Lactulose q4hrs by NG, monitor stool output - Rifaximin 550mg BID  
- CIWA protocol with seizure precautions 
- EtOH level wnl 
- UDS clear - Goody bag daily - Replete electrolytes PRN 
- IV hydration  
- Daily CBC/CMP  
- Fall precautions, ambulate with assistance 
- Haldol prn, consider Precedex as needed - GI consulted, appreciate recs Colitis: seen on CT Abd/pelv on transcending and ascending colon, no signs of infection. Will treat empirically. - IV Zosyn TID At risk JENNIFER: POA Cr 1.56 (BL 0.7-0.8) - MIVF 
- Avoid nephrotoxic agents Depression (stable) 
- Holding Effexor while NPO Obesity 
- PT with BMI of Body mass index is 31.19 kg/m². - Encouraging lifestyle modifications and further follow up outpatient. FEN/GI - NPO. NS with KCL at 140 mL/hr. Activity - Ambulate with assistance DVT prophylaxis - Lovenox GI prophylaxis - Protonix Fall prophylaxis - Fall precautions ordered. Disposition - Admit to Stepdown. Plan to d/c to TBD. Consulting PT/OT Code Status - Full, discussed with caregivers. Next of Carlie 71 Name and Contact Mom [phone] Dad [phone] Home [address] [facility] Patient Juan Daniel Head. will be discussed with Dr. Cony Mosqueda. 6:44 AM, 07/10/19 Vishal Albright MD 
Family Medicine Resident For Billing Chief Complaint Patient presents with  Vomiting Hospital Problems  Date Reviewed: 6/18/2019 None

## 2019-07-10 NOTE — ED NOTES
Pt Throughput: Charge Nurse on ICU  made aware of patient's bed assignment. Latrice Tinoco RN Emergency Dept Charge RN.

## 2019-07-10 NOTE — ACP (ADVANCE CARE PLANNING)
Primary Decision MakerRthlema Crespo - Mother - [phone] Primary Decision Maker: Aayush Campos - Father - [phone] Advance Care Planning 7/10/2019 Patient's Healthcare Decision Maker is: Verbal statement (Legal Next of Kin remains as decision maker) Primary Decision Maker Name Purnima Donis Primary Decision Maker Phone Number V:  [phone], D:   [phone] Primary Decision Maker Relationship to Patient Parents Confirm Advance Directive Yes, not on file Patient Would Like to Complete Advance Directive Already in place, per mother Does the patient have other document types Durable Do Not Resuscitate Mother reports pt has AMD in place which appoints parents as Medical POA's; copy was requested for chart. Pt and wife Stephan Mcginnis have been  for some time but remain legally . Both parents and wife were present for goals of care discussion on this date, as well as pt's son Yakelin Wise, [address], and brother Karthikeyan Zamudio. Family verbalized understanding of poor prognosis; all were in agreement that pt would not want life prolonged in current state. Family opted to transition focus of care to comfort and elect hospice benefit. Code status was changed to DNR to reflect family's wishes (and what family reports would be pt's wishes).

## 2019-07-10 NOTE — ED NOTES
At [address] Gina CASTRO checked in to see if we had initiated restraints. We had not as the Haldol IM injection seemed to be working. She stated that the order could be discontinued and that ICU could assess at a later time if it should be reordered.

## 2019-07-10 NOTE — ED TRIAGE NOTES
Patient presents to ED complaining of sickness. Per family, pt was here yesterday for paracentesis. Pt medical hx significant for end stage liver disease. Pt had elevated ammonia levels and was prescribed lactulose. Per family, lactulose makes him sick/throw up. Patient has also had 5 falls since yesterday.  
Rash noted to pt's belly on arrival.

## 2019-07-10 NOTE — PROGRESS NOTES
0813: Attempted to get report from ED RN. ED RN to call ICU back per ED RN. 
 
 7056: TRANSFER - IN REPORT: 
 
Verbal report received from Elpidio Leong RN(name) on Jessica Urbano.  being received from ED(unit) for routine progression of care Report consisted of patients Situation, Background, Assessment and  
Recommendations(SBAR). Information from the following report(s) SBAR was reviewed with the receiving nurse. Opportunity for questions and clarification was provided. Assessment completed upon patients arrival to unit and care assumed. 0900: Patient arrives to unit from ED via stretcher with ED RN Elpidio Leong. Patient with AMS, impulsive, lethargic. Primary Nurse Gian Love and Adamaris Ly RN performed a dual skin assessment on this patient Impairment noted- see wound doc flow sheet. Large bruise to right hip excerning toward back right buttock. Scattered bruising noted throughout body. Scattered red scabs noted all over body. Large hernia noted to right/mid lower quadrant with half dollar size open wound on underside of hernia. Hernia appears red/purple in color when compared to rest of skin color. Patient is jaundiced. No command following at this time. Per Dr. Joe Lezama instruction, NGT inserted in R nare for patient to receive lactulose. Patient tolerated NGT insertion without difficulty. STAT KUB ordered. Sp02 98% on RA. NSR in 90's on monitor. Garbled/slurred/incomprehensible words noted from patient at the moment. No acute distress at this time. Abdomen appears distended/rotound. Ascites noted on CT scan. 1201: Re assessment complete see flowsheet. 2363: Dr Andrea Spencer meeting with patient's mother, father/family to discuss goals of care. 1330: Family practice at bedside rounding on patient.

## 2019-07-10 NOTE — DISCHARGE SUMMARY
[facility] [address]  Office [phone] Fax [phone] Off-Service Note Name: Rosa Gonzalez MRN: [id number]  Sex: Male YOB: 1960  Age: 64 y.o. PCP: Lacy Branch DO Date of admission: 7/10/2019 Date of discharge/transfer: 7/10/2019 Attending physician at admission: Dr. Gerardo Corley Attending physician at discharge/transfer: Dr. Gerardo Corley Resident physician at discharge/transfer: Anne Perkins MD, PGY2 Consultants during hospitalization Dr. Jeni Pang (Gastroenterologist) Dr. Anette Buckley ([address] [place] [address]) Dr. Gracie Cervantes (Palliative Medicine) Admission diagnoses [facility] [K72.90] History of Present Illness Per admitting provider, Rosa Gonzalez is a 64 y.o. male with known EtOH abuse cirrhosis, CVA, HTN presents to the ER by his parents with the  complaint of worsening altered mental status. Patient is sedated at this time following Erenest Corona administration so history is obtained from Mom and Dad at bedside. Patient has been living with his parents for the past 6 weeks following his separation from his wife. He has been unstable on his feet but in normal mentation up until 3 days ago when parents state he became confused and was not able to comprehend. He has been speaking in short sentences, not responding when questioned, acting increasingly agitated and shaking his head. He has had hiccups and has been throwing up his lactulose for the past few days, although he did get a dose yesterday when mixed into juice. Parents also report the patient has had about 5 falls in the last 2 weeks when transferring from bed or bath. They deny any head trauma. Mom notes the patient has had good appetite however he has been gagging on his food lately. He has been constipated and developed urinary incontinence yesterday. They have no support at home to continue to care for the patient.  Of note, patient underwent paracenteses on 07/02 (13.6L removed) and 07/09 (8L removed). He follows with Dr. Jean Koenig. HOSPITAL COURSE Grade 3 hepatic encephalopathy 2/2 hyperammonemia in the setting of cirrhosis due to history of EtOH abuse: Patient with a significant EtOH abuse history and end-stage cirrhosis, requiring paracentesis about every 3 weeks, presented with altered mental status and was found to have a markedly elevated ammonia level likely causing his encephalopathy. Patient was started on Lactulose and Rifaximin by . His alcohol level and UDS were negative. However due to patient's end stage disease his prognosis was poor and treatment limited. GI recommended a palliative consult to discuss goals of care. Palliative met with patient's family who decided to transition the patient to comfort care. Patient was transferred to in patient hospice on 07/10/19.  
  
Colitis: Infectious or inflammatory colitis of the transverse and ascending colon seen on CT Abd/Pelv on admission. Patient did not have any signs of infection but was treated with IV Zosyn empirically.  
  
At risk JENNIFER: POA Cr 1.56 (BL 0.7-0.8). Patient received MIVF.  
  
Depression (stable): Effexor was held as patient was NPO. Physical exam at discharge: 
 
Vitals Reviewed. General: In no acute distress. Patient is in a sedated state, not alert and not responsive to sternal rub. Head: Normocephalic. Atraumatic. Eyes:              Conjunctiva pink. Scleral icterus present. Pupils slow to react. Ears:              Ear canals patent. Nose:             Septum midline. Mucosa pink. No drainage. Throat: Mucosa pink. Moist mucous membranes. Neck: Supple. Normal ROM. No stiffness. Respiratory: CTAB on the front chest. No w/r/r/c.  
Cardiovascular: RRR. Normal S1,S2. No m/r/g. Pulses 2+ throughout. GI: + bowel sounds. Significantly distended abdomen with large umbilical hernia present.  No evidence of tenderness as patient is not alert and cooperative Extremities: No LE edema. Distal pulses intact. Musculoskeletal: Unable to assess range of motion Skin: Warm, dry. Punctate-like erythematous rash over abdomen Neuro: Patient not alert Condition at discharge: Stable. Discharged to comfort care Labs Recent Labs  
  07/10/19 
7549 WBC 7.6 HGB 12.6 HCT 37.6 * Recent Labs  
  07/10/19 
1167 *  
K 3.3*  
CL 94* CO2 26 BUN 16  
CREA 1.56* GLU 80  
CA 8.5 Recent Labs  
  07/10/19 
6313 SGOT 35 ALT 25  
* TBILI 4.0*  
TP 6.7 ALB 2.2*  
GLOB 4.5* Recent Labs  
  07/10/19 
0883 TROIQ <0.05 INR 1.5* PTP 15.1* Cultures · None Procedures / Diagnostic Studies · None Imaging Results from Hospital Encounter encounter on 07/10/19 XR ABD PORT  1 V Narrative INDICATION: NG tube placement FINDINGS: Single supine view of the abdomen demonstrates a nonspecific 
intestinal gas pattern. Nasogastric tube tip overlies the gastric body/fundus. No soft tissue mass or pathological soft tissue calcification is seen. The 
osseous structures are unremarkable. Impression IMPRESSION: Nonspecific intestinal gas pattern. Nasogastric tube appears to be 
in satisfactory position. Results from Hospital Encounter encounter on 07/09/19 [address] Narrative INDICATION:  Ascites EXAM:  ULTRASOUND GUIDED PARACENTESIS  
 
TECHNIQUE: Informed consent was obtained. Preliminary ultrasound imaging of the 
abdomen demonstrated a large amount of ascites. An appropriate site for 
paracentesis was marked on the skin of the right lower quadrant. The patient was 
prepped and draped in sterile fashion. 1% lidocaine was injected locally. A 
dermatotomy was made. A paracentesis catheter was then inserted into the 
peritoneal space using trocar technique. Approximately 8810 ml of fluid was 
obtained. The catheter was then removed.  The patient tolerated the procedure well. There were no immediate complications. Impression IMPRESSION:  
Successful ultrasound guided paracentesis yielding approximately 8810 ml of 
fluid. Results from Hospital Encounter encounter on 07/10/19 CT ABD PELV W CONT Narrative INDICATION: ascites/ recent paracentesis COMPARISON: March 21, 2019 TECHNIQUE: 
Following the uneventful intravenous administration of IV contrast, thin axial 
images were obtained through the abdomen and pelvis. Coronal and sagittal 
reconstructions were generated. Oral contrast was not administered. CT dose 
reduction was achieved through use of a standardized protocol tailored for this 
examination and automatic exposure control for dose modulation. FINDINGS: 
LUNG BASES: No abnormality. LIVER: Small, nodular without focal lesion. Heterogeneous enhancement. Portal 
vein is patent. GALLBLADDER: Contains several gallstones. SPLEEN: No enlargement or lesion. PANCREAS: No mass or ductal dilatation. ADRENALS: No mass. KIDNEYS: No mass, calculus, or hydronephrosis. GI TRACT: No evidence of bowel obstruction. Wall thickening of the ascending and 
transverse colonic segments. PERITONEUM: Large ascites. Ascites extends into an umbilical hernia. No 
pneumoperitoneum. APPENDIX: Unremarkable. RETROPERITONEUM: No aortic aneurysm. LYMPH NODES: None enlarged. ADDITIONAL COMMENTS: N/A. URINARY BLADDER: Unremarkable. REPRODUCTIVE ORGANS: Unremarkable. LYMPH NODES: None enlarged. FREE FLUID: None. BONES: No destructive bone lesion. ADDITIONAL COMMENTS: N/A. Impression IMPRESSION: 
 
1. Cirrhotic morphology of the liver with large volume ascites. 2. Wall thickening ascending and transverse colon segments may be due to an 
infectious or inflammatory colitis, nonspecific. No bowel obstruction. Oral 
contrast was not administered. Chronic diagnoses Problem List as of 7/10/2019 Date Reviewed: 6/18/2019        Codes Class Noted - Resolved * (Principal) Hepatic encephalopathy ([facility] ICD-10-CM: K72.90 ICD-9-CM: 572.2  7/10/2019 - Present End stage liver disease (HCC) ICD-10-CM: K72.90 ICD-9-CM: 572.8  7/10/2019 - Present ETOH abuse ICD-10-CM: F10.10 ICD-9-CM: 305.00  4/5/2019 - Present Ascites ICD-10-CM: R18.8 ICD-9-CM: 789.59  3/21/2019 - Present History of GI bleed ICD-10-CM: Z87.19 ICD-9-CM: V12.79  11/30/2018 - Present Withdrawal symptoms, alcohol (HCC) ICD-10-CM: C95.054 ICD-9-CM: 291.81  11/29/2018 - Present Severe obesity with body mass index (BMI) of 35.0 to 39.9 with serious comorbidity ([facility] ICD-10-CM: E66.01 
ICD-9-CM: 278.01  7/27/2018 - Present Esophageal varices in alcoholic cirrhosis (HCC) EXU-04-SV: K70.30, I85.10 ICD-9-CM: 571.2, 456.21  7/2/2018 - Present Duodenal mass ICD-10-CM: K31.89 ICD-9-CM: 537.89  7/2/2018 - Present Alcoholic cirrhosis ([facility] YKY-73-MM: K70.30 ICD-9-CM: 571.2  4/6/2018 - Present Umbilical hernia without obstruction and without gangrene ICD-10-CM: K42.9 ICD-9-CM: 553.1  1/24/2018 - Present Overview Signed 1/24/2018  3:04 PM by Abbie Archer MD  
  Without gangrene or obstruction. Gallbladder calculus without cholecystitis ICD-10-CM: K80.20 ICD-9-CM: 574.20  7/21/2016 - Present Overview Signed 7/21/2016  1:18 PM by Mina Salazar NP Seen on US 7/1/16. Iron deficiency anemia ICD-10-CM: D50.9 ICD-9-CM: 280.9  3/3/2016 - Present Esophageal varices without bleeding (HCC) ICD-10-CM: I85.00 ICD-9-CM: 456.1  3/1/2016 - Present Prediabetes ICD-10-CM: R73.03 
ICD-9-CM: 790.29  1/11/2016 - Present Thrombocytopenia ([facility] ICD-10-CM: D69.6 ICD-9-CM: 287.5  10/17/2013 - Present Splenomegaly ICD-10-CM: R16.1 ICD-9-CM: 789.2  10/17/2013 - Present History of alcohol abuse ICD-10-CM: Z87.898 ICD-9-CM: 305.03  10/17/2013 - Present  Hypogonadism, male ICD-10-CM: E29.1 ICD-9-CM: 257.2  10/10/2013 - Present Recurrent major depressive disorder ([place] [address] ICD-10-CM: F33.9 ICD-9-CM: 296.30  8/11/2013 - Present Alcohol dependence with withdrawal ([place] [address] ICD-10-CM: R76.747 ICD-9-CM: 303.90, 291.81  7/25/2013 - Present Anxiety ICD-10-CM: F41.9 ICD-9-CM: 300.00  7/25/2013 - Present Gout ICD-10-CM: M10.9 ICD-9-CM: 274.9  6/27/2011 - Present HTN (hypertension) (Chronic) ICD-10-CM: I10 
ICD-9-CM: 401.9  6/1/2011 - Present RESOLVED: Chest pain ICD-10-CM: R07.9 ICD-9-CM: 786.50  3/30/2018 - 11/30/2018 RESOLVED: Hyponatremia ICD-10-CM: E87.1 ICD-9-CM: 276.1  7/5/2016 - 11/30/2018 RESOLVED: Type 2 diabetes mellitus ([place] [address] ICD-10-CM: E11.9 ICD-9-CM: 250.00  10/10/2013 - 11/5/2015 RESOLVED: Depression ICD-10-CM: F32.9 ICD-9-CM: 099  7/25/2013 - 11/30/2018 RESOLVED: GI bleed ICD-10-CM: K92.2 ICD-9-CM: 578.9  6/28/2011 - 11/30/2018 RESOLVED: Chest pain, unspecified ICD-10-CM: R07.9 ICD-9-CM: 786.50  6/28/2011 - 11/30/2018 RESOLVED: Hypokalemia ICD-10-CM: E87.6 ICD-9-CM: 276.8  6/1/2011 - 6/3/2011 RESOLVED: Alcohol abuse (Chronic) ICD-10-CM: F10.10 ICD-9-CM: 305.00  6/1/2011 - 7/25/2013 RESOLVED: Thrombocytopenia, unspecified ([place] [address] ICD-10-CM: D69.6 ICD-9-CM: 287.5  6/1/2011 - 6/28/2011 RESOLVED: Nausea & vomiting ICD-10-CM: R11.2 ICD-9-CM: 787.01  6/1/2011 - 6/3/2011 RESOLVED: Diarrhea ICD-10-CM: R19.7 ICD-9-CM: 787.91  6/1/2011 - 6/3/2011 Discharge Medications: None, treatment per Hospice team.  
 
Diet:  NPO Activity:  Bedrest 
Disposition: Comfort care Bonilla Fermin MD 
Family Medicine Resident For Billing Chief Complaint Patient presents with  Vomiting Hospital Problems  Date Reviewed: 6/18/2019 Codes Class Noted POA * (Principal) Hepatic encephalopathy ([facility] ICD-10-CM: K72.90 ICD-9-CM: 572.2  7/10/2019 Yes ETOH abuse ICD-10-CM: F10.10 ICD-9-CM: 305.00  4/5/2019 Yes Severe obesity with body mass index (BMI) of 35.0 to 39.9 with serious comorbidity ([place] [address] ICD-10-CM: E66.01 
ICD-9-CM: 278.01  7/27/2018 Yes Alcoholic cirrhosis ([place] [address] INO-45-SX: K70.30 ICD-9-CM: 571.2  4/6/2018 Yes Esophageal varices without bleeding (HCC) ICD-10-CM: I85.00 ICD-9-CM: 456.1  3/1/2016 Yes Thrombocytopenia ([place] [address] ICD-10-CM: D69.6 ICD-9-CM: 287.5  10/17/2013 Yes Recurrent major depressive disorder ([facility] ICD-10-CM: F33.9 ICD-9-CM: 296.30  8/11/2013 Yes Alcohol dependence with withdrawal ([place] [address] ICD-10-CM: H71.883 ICD-9-CM: 303.90, 291.81  7/25/2013 Yes Anxiety ICD-10-CM: F41.9 ICD-9-CM: 300.00  7/25/2013 Yes Gout ICD-10-CM: M10.9 ICD-9-CM: 274.9  6/27/2011 Yes

## 2019-07-10 NOTE — ED NOTES
Patient agitated and thrashing in bed. Seizure pads placed on side rails. Pt not following commands, but does respond to name.

## 2019-07-10 NOTE — ED NOTES
Attendings at bedside. Pt continues to show agitation. Nursing staffing keeping patient safe from himself. Security at bedside for assistance. No command following. No comprehensible speech. O2 sats remain 100%.

## 2019-07-10 NOTE — CONSULTS
Gastroenterology Consultation Note NAME: Kyrie Carmona. : 1960 MRN: [id number] ATTG: James Macias MD PCP: Cherry Jacobo DO Date/Time:  7/10/2019 9:28 AM 
Subjective:  
REASON FOR CONSULT:     
Iron Martin is a 64 y.o.  male who I was asked to see for hepatic encephalopathy. Patient with known history of decompensated alcoholic cirrhosis. Due to his confusion he is not able to provide history. Per review of chart his parents brought him in because of change in mental status. Reported that family noticed patient to be unsteady on his feet and having frequent falls. Three days ago is when his parents started to notice change in mental status - not responding to questions appropriately, not able to comprehend, agitated. No reports of whether or not patient continues to drink alcohol. He follows with Dr. Ira Benjamin as outpatient. He's required frequent paracentesis and plan was to consider TIPS procedure. However, due to high MELD score this was not performed until hepatology evaluation. Prior paracentesis 19 patient had 13.6ml removed. Paracentesis performed yesterday 8810ml removed. His liver parameters are similar to prior labs drawn 19. Ammonia as outpatient 19 was 210. Patient was on xifaxan and lactulose regimen, but per chart notes patient had difficulty taking lactulose. Labs in ED: Tbili 4.0, AST/ALT normal, , INR 1.5, ammonia 241 CT scan: Cirrhotic morphology of the liver with large volume ascites. Wall thickening ascending and transverse colon segments may be due to an infectious or inflammatory colitis, nonspecific. No bowel obstruction. Oral contrast was not administered. Past Medical History:  
Diagnosis Date  Adverse effect of anesthesia   
 pt reports waking up during colonoscopy/endoscopy  Alcohol abuse 2011  Anemia NEC  Anxiety  Ascites   
 has to have fluid drained occasionally  Cirrhosis of liver ([address]  Depression  Gastric ulcer, unspecified as acute or chronic, without mention of hemorrhage, perforation, or obstruction  GERD (gastroesophageal reflux disease)  Gout  Hypertension  Liver disease   
 cirrhosis  Obesity, Class II, BMI 35-39.9  Pneumonia  PUD (peptic ulcer disease)  Seizures ([address] 2013  
 from alcohol withdrawal  
 Stroke [facility]) 2013  
 per pt, possible mini stroke from alcohol withdrawal (same time as seizure) Past Surgical History:  
Procedure Laterality Date  COLONOSCOPY N/A 7/2/2018 COLONOSCOPY performed by Megan Darby MD at [facility] 112 HX COLONOSCOPY  07/2018  HX ENDOSCOPY    
 also colonoscopy  HX GI  1998  
 rectal abscess surgery  HX GI  1998  
 rectal abscess surgery  HX HEENT  1980  
 wisdom teeth  IR PARACENTESIS ABD INIT Pt does not remember last time but stated 5-6 times. Social History Tobacco Use  Smoking status: Former Smoker  Smokeless tobacco: Current User Types: Chew  Tobacco comment: currently uses snuff Substance Use Topics  Alcohol use: Yes Alcohol/week: 1.2 oz Types: 2 Shots of liquor per week Comment: Today Family History Problem Relation Age of Onset  Asthma Mother  Other Father   
     history of fall multiple injuries  Hypertension Brother  Cancer Paternal Grandmother   
     uncertain if colon or stomach  Cancer Paternal Grandfather   
     stomach cancer- dx mid [de-identified] Allergies Allergen Reactions  Onion Nausea and Vomiting  Statins-Hmg-Coa Reductase Inhibitors Rash  
  rash Home Medications: 
Prior to Admission Medications Prescriptions Last Dose Informant Patient Reported? Taking? LORazepam (ATIVAN) 2 mg tablet 7/9/2019 at 2000  No Yes Sig: Take 0.5-1 Tabs by mouth every six (6) hours as needed for Anxiety. Max Daily Amount: 8 mg.  
allopurinol (ZYLOPRIM) 300 mg tablet 7/9/2019 at 2000 Parent No Yes Sig: TAKE 1 TABLET TWICE DAILY  
furosemide (LASIX) 40 mg tablet 7/9/2019 at 2000  No Yes Sig: Take 1 Tab by mouth daily. hydrOXYzine HCl (ATARAX) 25 mg tablet 7/9/2019 at 2000 Parent Yes Yes Sig: Take 25 mg by mouth three (3) times daily. mupirocin (BACTROBAN) 2 % ointment   No No  
Sig: Apply  to affected area three (3) times daily. omeprazole (PRILOSEC) 40 mg capsule 7/9/2019 at 2000 Parent No Yes Sig: TAKE 1 CAPSULE TWICE DAILY  
ondansetron (ZOFRAN ODT) 8 mg disintegrating tablet Unknown at Unknown time  No No  
Sig: Take 1 Tab by mouth every eight (8) hours as needed for Nausea. potassium chloride SR (KLOR-CON 10) 10 mEq tablet 7/9/2019 at 2000  Yes Yes Sig: Take  by mouth two (2) times a day. spironolactone (ALDACTONE) 100 mg tablet 7/7/2019 at 0500  No Yes Sig: Take 1 Tab by mouth daily. thiamine HCL (B-1) 100 mg tablet Unknown at Unknown time  No No  
Sig: Take 1 Tab by mouth daily. venlafaxine-SR (EFFEXOR-XR) 150 mg capsule 7/9/2019 at 0800  No Yes Sig: Take 1 Cap by mouth daily. Facility-Administered Medications: None Hospital medications: 
Current Facility-Administered Medications Medication Dose Route Frequency  sodium chloride (NS) flush 5-40 mL  5-40 mL IntraVENous Q8H  
 sodium chloride (NS) flush 5-40 mL  5-40 mL IntraVENous PRN  
 0.9% sodium chloride 1,000 mL with mvi, adult no. 4 with vit K 10 mL, thiamine 815 mg, folic acid 1 mg infusion   IntraVENous Q24H  
 haloperidol lactate (HALDOL) injection 5 mg  5 mg IntraMUSCular Q6H PRN  pantoprazole (PROTONIX) 40 mg in sodium chloride 0.9% 10 mL injection  40 mg IntraVENous DAILY  piperacillin-tazobactam (ZOSYN) 3.375 g in 0.9% sodium chloride (MBP/ADV) 100 mL  3.375 g IntraVENous Q8H  
 0.9% sodium chloride with KCl 20 mEq/L infusion   IntraVENous CONTINUOUS  
 potassium chloride 10 mEq in 100 ml IVPB  10 mEq IntraVENous Q1H  
 heparin (porcine) injection 5,000 Units  5,000 Units SubCUTAneous Q8H  
 lactulose (CHRONULAC) 10 gram/15 mL solution 45 mL  45 mL Per NG tube Q4H REVIEW OF SYSTEMS:   
 []     Unable to obtain  ROS due to  [x]    mental status change  []    sedated   []    intubated Objective: VITALS:   
Visit Vitals /89 Pulse 97 Temp 97.2 °F (36.2 °C) Resp 21 Ht 6' (1.829 m) Wt 104.3 kg (230 lb) SpO2 100% BMI 31.19 kg/m² Temp (24hrs), Av.2 °F (36.2 °C), Min:97.2 °F (36.2 °C), Max:97.2 °F (36.2 °C) PHYSICAL EXAM:  
General:    No acute distress, confused Head:   Normocephalic, without obvious abnormality, atraumatic. Eyes:   Conjunctivae clear, Pupils are equal 
Nose:  NG tube in place Throat:    Lips, mucosa, and tongue normal 
Back:    Symmetric,  No CVA tenderness. Lungs:   CTA bilaterally. No wheezing/rhonchi/rales. Heart:   Regular rate and rhythm,  no murmur, rub or gallop. Abdomen:   Significant distention, does not appear tender, large umbilical hernia with overlying ulcerated area Extremities: No cyanosis. No edema. No clubbing Skin:     Jaundice Psych:  Agitated Neurologic: EOMs intact. No facial asymmetry. Does not follow commands. Not able to answer questions. LAB DATA REVIEWED:   
Lab Results Component Value Date/Time WBC 7.6 07/10/2019 05:23 AM  
 Hemoglobin (POC) 14.3 2014 08:16 PM  
 HGB 12.6 07/10/2019 05:23 AM  
 Hematocrit (POC) 42 2014 08:16 PM  
 HCT 37.6 07/10/2019 05:23 AM  
 PLATELET 379 (L)  05:23 AM  
 MCV 77.2 (L) 07/10/2019 05:23 AM  
 
Lab Results Component Value Date/Time ALT (SGPT) 25 07/10/2019 05:23 AM  
 AST (SGOT) 35 07/10/2019 05:23 AM  
 Alk. phosphatase 118 (H) 07/10/2019 05:23 AM  
 Bilirubin, direct 0.3 (H) 2014 08:15 PM  
 Bilirubin, total 4.0 (H) 07/10/2019 05:23 AM  
 
Lab Results Component Value Date/Time  Sodium 130 (L) 07/10/2019 05:23 AM  
 Potassium 3.3 (L) 07/10/2019 05:23 AM  
 Chloride 94 (L) 07/10/2019 05:23 AM  
 CO2 26 07/10/2019 05:23 AM  
 Anion gap 10 07/10/2019 05:23 AM  
 Glucose 80 07/10/2019 05:23 AM  
 BUN 16 07/10/2019 05:23 AM  
 Creatinine 1.56 (H) 07/10/2019 05:23 AM  
 BUN/Creatinine ratio 10 (L) 07/10/2019 05:23 AM  
 GFR est AA 56 (L) 07/10/2019 05:23 AM  
 GFR est non-AA 46 (L) 07/10/2019 05:23 AM  
 Calcium 8.5 07/10/2019 05:23 AM  
 
Lab Results Component Value Date/Time Lipase 102 05/29/2019 05:34 PM  
 
Lab Results Component Value Date/Time INR 1.5 (H) 07/10/2019 05:23 AM  
 INR 1.8 (H) 06/18/2019 02:28 PM  
 INR 1.6 (H) 05/29/2019 05:34 PM  
 Prothrombin time 15.1 (H) 07/10/2019 05:23 AM  
 Prothrombin time 18.5 (H) 06/18/2019 02:28 PM  
 Prothrombin time 15.5 (H) 05/29/2019 05:34 PM  
 
 
IMAGING RESULTS: 
  
CT Results (most recent): 
Results from Hospital Encounter encounter on 07/10/19 CT ABD PELV W CONT Narrative INDICATION: ascites/ recent paracentesis COMPARISON: March 21, 2019 TECHNIQUE: 
Following the uneventful intravenous administration of IV contrast, thin axial 
images were obtained through the abdomen and pelvis. Coronal and sagittal 
reconstructions were generated. Oral contrast was not administered. CT dose 
reduction was achieved through use of a standardized protocol tailored for this 
examination and automatic exposure control for dose modulation. FINDINGS: 
LUNG BASES: No abnormality. LIVER: Small, nodular without focal lesion. Heterogeneous enhancement. Portal 
vein is patent. GALLBLADDER: Contains several gallstones. SPLEEN: No enlargement or lesion. PANCREAS: No mass or ductal dilatation. ADRENALS: No mass. KIDNEYS: No mass, calculus, or hydronephrosis. GI TRACT: No evidence of bowel obstruction. Wall thickening of the ascending and 
transverse colonic segments. PERITONEUM: Large ascites. Ascites extends into an umbilical hernia. No 
pneumoperitoneum. APPENDIX: Unremarkable. RETROPERITONEUM: No aortic aneurysm. LYMPH NODES: None enlarged. ADDITIONAL COMMENTS: N/A. URINARY BLADDER: Unremarkable. REPRODUCTIVE ORGANS: Unremarkable. LYMPH NODES: None enlarged. FREE FLUID: None. BONES: No destructive bone lesion. ADDITIONAL COMMENTS: N/A. Impression IMPRESSION: 
 
1. Cirrhotic morphology of the liver with large volume ascites. 2. Wall thickening ascending and transverse colon segments may be due to an 
infectious or inflammatory colitis, nonspecific. No bowel obstruction. Oral 
contrast was not administered. Impression: 
Principal Problem: 
  Hepatic encephalopathy ([address] (7/10/2019) Active Problems: 
  Gout (6/27/2011) Alcohol dependence with withdrawal ([address] (7/25/2013) Anxiety (7/25/2013) Recurrent major depressive disorder ([address] (8/11/2013) Thrombocytopenia ([address] (10/17/2013) Esophageal varices without bleeding ([address] (3/1/2016) Alcoholic cirrhosis ([address] (4/6/2018) Severe obesity with body mass index (BMI) of 35.0 to 39.9 with serious comorbidity ([address] (7/27/2018) ETOH abuse (4/5/2019) Decompensated alcoholic cirrhosis. MELD score 25 (worse from prior admissions). Plan: reviewed with Dr. Caleb Jimenez  
- NG tube placement for medication administration 
- add scheduled lactulose 
- xifaxan 550mg BID 
- when patient more cooperative and alert will arrange repeat paracentesis - per nursing staff and family discussion with Dr. Caleb Jimenez hospice evaluation requested, will consult palliative care to discuss goals of care then determine next steps 
- following Attending attestation to follow 
  
   
___________________________________________________ Care Plan discussed with: 
  []    Patient   []    Family   [x]    Nursing   []    Attending 
 ___________________________________________________ GI: Kaylan Hurt PA-C I have interviewed and examined patient with addendum to note above and formulation care plan to reflect my evaluation Norberto Vilchis M.D.

## 2019-07-10 NOTE — CONSULTS
Palliative Medicine Consult Jefe: [phone] ([id number]) Patient Name: Emily Lane. YOB: 1960 Date of Initial Consult: 7/10/2019 Reason for Consult: Care decisions Requesting Provider: Dr. Saundra Barton Primary Care Physician: Dallas Solis DO 
 
 SUMMARY:  
Emily Lane. is a 64 y.o. with a past history of alcohol abuse, hepatic encephalopathy, end-stage liver disease with cirrhosis, GERD, depression, anxiety, recurrent ascites, who was admitted on 7/10/2019 from home with a diagnosis of altered mental status/hepatic encephalopathy. Current medical issues leading to Palliative Medicine involvement include: Care decisions. Chart reviewed/history of present illness patient is a 53-year-old gentleman with a history of cirrhosis secondary to alcoholism. He unfortunately has advanced liver disease with associated esophageal varices and recurrent ascites. He has had multiple paracentesis over the last several months to the point that he has required weekly paracentesis that is becoming less effective. Less than a week ago he had almost 15 L removed and then on 7/9 he had an additional 9 L removed. Per family, he has had further decline with confusion, not tolerating his lactulose and multiple falls. In the emergency room, he was found to have an ammonia of over 200, INR of 1.5, bilirubin of 4-1/2. He was evaluated by GI and really limited options at this point. They consider doing a TIPS procedure but given his overall decline, did not think he was a candidate. GI actually asked for hospice evaluation and so our team has met with the family to discuss the options moving forward. Social history patient remains  and his wife is present but they have been  for an extended time. His 2 children are also present during the family meeting. He has been living with his parents for the last month because he was unable to care for himself in the home setting. PALLIATIVE DIAGNOSES:  
1. Goals of care discussion 2. DNR discussion 3. Advanced care planning 4. End-stage liver disease 5. Hepatic encephalopathy with significant agitation to the point that he was requiring four-point restraints 6. Recurrent ascites 7. Debility PLAN:  
1. Purnima Olea, licensed clinical ,Ania(Hospice nurse) and I met with patient briefly-he was lethargic but very restless. We then had a family meeting with his wife, 2 children, parents, and brother outside the room. We discussed the role of palliative medicine in his care and then reviewed all the medical issues. Family was very appreciative of Dr. Onelia Rodriguez being very straight and honest with them this morning and telling him that he had very limited options for further treatment. I explained to them that Dr. Onelia Rodriguez was the provider that actually recommended evaluation by our hospice team.  They are clear that patient has had significant decline over the last year but worse over the last month. Family has been struggling caring for him. 2. Goals of care everybody is very clear that they do not want to see him suffer. We discussed options moving forward to include continuing current therapy to see if his sensorium were to clear. If he were to improve enough to get home, did explain that does not change the underlying advanced nature of his liver disease. We all agree that he is not stable enough to go home at this point even with hospice support. We discussed transition to inpatient hospice care given his significant decline and need for management of his symptoms. Everybody in the meeting had input and all agreed and transition to hospice care. We did explain what hospice would and would not provide. We explained that the NG tube would be removed, IV fluids and further blood work would be stopped and we would completely focus on his symptoms.   If he is awake enough to eat, certainly he is allowed to eat and drink what he wants but we are not going to force the issue. Family still agrees on proceeding. 3. Advanced care planning per his parents, he has completed an advanced medical directive that assigns his mother is his primary medical power of . His wife who is present does not dispute this but we also explained that we do not have a copy for the chart and it would be ideal if she could bring a copy of this to the hospital.  At this point, nobody is in disagreement on moving forward with hospice. Technically, until we see the actual advanced medical directive, his wife would be his legal next of kin and medical power of . 4. CODE STATUS patient will be transition to do not attempt resuscitation. 5. Symptom management patient very restless and agitated and will be started on scheduled Ativan. Comfort order set has been placed until patient can be transition to inpatient hospice. 10. Psychosocial family seems to have come together to support patient at the end of life despite issues with his alcoholism and clearly the stressors that this cause. His mom states she has been talking with him about his deanna over the last several weeks. 7. Discussed with bedside nurse and family medicine team 
8. Initial consult note routed to primary continuity provider and/or primary health care team members 9. Communicated plan of care with: Palliative Jacinda PEREZ 192 Team 
 
 GOALS OF CARE / TREATMENT PREFERENCES:  
 
GOALS OF CARE: 
Patient/Health Care Proxy Stated Goals: Comfort TREATMENT PREFERENCES:  
Code Status: DNR Advance Care Planning: 
[] [facility] Interdisciplinary Team has updated the ACP Navigator with Ki and Patient Capacity Advance Care Planning 7/10/2019 Patient's Healthcare Decision Maker is: Verbal statement (Legal Next of Kin remains as decision maker) Primary Decision Maker Name -  
Primary Decision Maker Phone Number -  
Primary Decision Maker Relationship to Patient -  
Confirm Advance Directive Yes, not on file Patient Would Like to Complete Advance Directive - Does the patient have other document types Do Not Resuscitate Medical Interventions: Comfort measures Other Instructions: 
Artificially Administered Nutrition: No feeding tube Other: As far as possible, the palliative care team has discussed with patient / health care proxy about goals of care / treatment preferences for patient. HISTORY:  
 
History obtained from: Chart, family, bedside nurse CHIEF COMPLAINT: Confusion HPI/SUBJECTIVE: The patient is:  
[] Verbal and participatory [x] Non-participatory due to:  
Hepatic encephalopathy Clinical Pain Assessment (nonverbal scale for severity on nonverbal patients):  
Clinical Pain Assessment Severity: 0 Activity (Movement): Lying quietly, normal position Duration: for how long has pt been experiencing pain (e.g., 2 days, 1 month, years) Frequency: how often pain is an issue (e.g., several times per day, once every few days, constant) FUNCTIONAL ASSESSMENT:  
 
Palliative Performance Scale (PPS): PPS: 30 
 
 
 PSYCHOSOCIAL/SPIRITUAL SCREENING:  
 
Palliative IDT has assessed this patient for cultural preferences / practices and a referral made as appropriate to needs (Cultural Services, Patient Advocacy, Ethics, etc.) Any spiritual / Hoahaoism concerns: 
[] Yes /  [x] No 
 
Caregiver Burnout: 
[] Yes /  [x] No /  [] No Caregiver Present Anticipatory grief assessment:  
[x] Normal  / [] Maladaptive ESAS Anxiety: Anxiety: 5 ESAS Depression: Depression: 0 REVIEW OF SYSTEMS:  
 
Positive and pertinent negative findings in ROS are noted above in HPI. The following systems were [x] reviewed / [] unable to be reviewed as noted in HPI Other findings are noted below.  
Systems: constitutional, ears/nose/mouth/throat, respiratory, gastrointestinal, genitourinary, musculoskeletal, integumentary, neurologic, psychiatric, endocrine. Positive findings noted below. Modified ESAS Completed by: provider Fatigue: 3 Drowsiness: 5 Depression: 0 Pain: 0 Anxiety: 5 Nausea: 3 Anorexia: 2 Dyspnea: 1 Constipation: No  
  Stool Occurrence(s): 1 PHYSICAL EXAM:  
 
From RN flowsheet: 
Wt Readings from Last 3 Encounters:  
07/10/19 230 lb (104.3 kg) 06/18/19 268 lb (121.6 kg) 05/31/19 232 lb (105.2 kg) Blood pressure 122/90, pulse (!) 104, temperature 96.8 °F (36 °C), resp. rate 16, height 6' (1.829 m), weight 230 lb (104.3 kg), SpO2 99 %. Pain Scale 1: Adult Nonverbal Pain Scale Last bowel movement, if known:  
 
Constitutional: Appears much older than stated age, restless, agitated Eyes: pupils equal, icteric ENMT: no nasal discharge, moist mucous membranes Cardiovascular: regular rhythm, distal pulses intact Respiratory: breathing slightly labored, symmetric Gastrointestinal: soft non-tender, +bowel sounds, mild to moderate ascites Musculoskeletal: no deformity, no tenderness to palpation Skin: warm, dry Neurologic:not following commands, moving all extremities Psychiatric: Restless other: 
 
 
 HISTORY:  
 
Principal Problem: 
  Hepatic encephalopathy ([address] (7/10/2019) Active Problems: 
  Gout (6/27/2011) Alcohol dependence with withdrawal ([address] (7/25/2013) Anxiety (7/25/2013) Recurrent major depressive disorder ([address] (8/11/2013) Thrombocytopenia ([address] (10/17/2013) Esophageal varices without bleeding ([address] (3/1/2016) Alcoholic cirrhosis ([address] (4/6/2018) Severe obesity with body mass index (BMI) of 35.0 to 39.9 with serious comorbidity ([address] (7/27/2018) ETOH abuse (4/5/2019) Past Medical History:  
Diagnosis Date  Adverse effect of anesthesia   
 pt reports waking up during colonoscopy/endoscopy  Alcohol abuse 6/1/2011  Anemia NEC  Anxiety  Ascites   
 has to have fluid drained occasionally  Cirrhosis of liver ([facility]  Depression  Gastric ulcer, unspecified as acute or chronic, without mention of hemorrhage, perforation, or obstruction  GERD (gastroesophageal reflux disease)  Gout  Hypertension  Liver disease   
 cirrhosis  Obesity, Class II, BMI 35-39.9  Pneumonia  PUD (peptic ulcer disease)  Seizures ([facility] 2013  
 from alcohol withdrawal  
 Stroke [facility]) 2013  
 per pt, possible mini stroke from alcohol withdrawal (same time as seizure) Past Surgical History:  
Procedure Laterality Date  COLONOSCOPY N/A 7/2/2018 COLONOSCOPY performed by Kj Cochran MD at [facility] HX COLONOSCOPY  07/2018  HX ENDOSCOPY    
 also colonoscopy  HX GI  1998  
 rectal abscess surgery  HX GI  1998  
 rectal abscess surgery  HX HEENT  1980  
 wisdom teeth  IR PARACENTESIS ABD INIT Pt does not remember last time but stated 5-6 times. Family History Problem Relation Age of Onset  Asthma Mother  Other Father   
     history of fall multiple injuries  Hypertension Brother  Cancer Paternal Grandmother   
     uncertain if colon or stomach  Cancer Paternal Grandfather   
     stomach cancer- dx mid [de-identified] History reviewed, no pertinent family history. Social History Tobacco Use  Smoking status: Former Smoker  Smokeless tobacco: Current User Types: Chew  Tobacco comment: currently uses snuff Substance Use Topics  Alcohol use: Yes Alcohol/week: 1.2 oz Types: 2 Shots of liquor per week Comment: Today Allergies Allergen Reactions  Onion Nausea and Vomiting  Statins-Hmg-Coa Reductase Inhibitors Rash  
  rash No current facility-administered medications for this encounter. No current outpatient medications on file. Facility-Administered Medications Ordered in Other Encounters Medication Dose Route Frequency  haloperidol lactate (HALDOL) injection 2 mg  2 mg IntraVENous Q6H PRN  
 LORazepam (ATIVAN) injection 1 mg  1 mg IntraVENous Q4H  
 glycopyrrolate (ROBINUL) injection 0.2 mg  0.2 mg IntraVENous Q4H PRN  
 lactulose (CHRONULAC) 10 gram/15 mL solution 45 mL  45 mL Oral Q8H  
 bisacodyl (DULCOLAX) suppository 10 mg  10 mg Rectal DAILY PRN  
 HYDROmorphone (PF) (DILAUDID) injection 0.5 mg  0.5 mg IntraVENous Q15MIN PRN  
 diphenhydrAMINE (BENADRYL) injection 50 mg  50 mg IntraVENous Q6H PRN  
 LORazepam (ATIVAN) injection 1 mg  1 mg IntraVENous Q15MIN PRN  
 ondansetron (ZOFRAN) injection 4 mg  4 mg IntraVENous Q4H PRN  
 ketorolac (TORADOL) injection 30 mg  30 mg IntraVENous Q6H PRN  
 
 
 
 LAB AND IMAGING FINDINGS:  
 
Lab Results Component Value Date/Time WBC 7.6 07/10/2019 05:23 AM  
 HGB 12.6 07/10/2019 05:23 AM  
 PLATELET 064 (L) 43/38/9741 05:23 AM  
 
Lab Results Component Value Date/Time Sodium 130 (L) 07/10/2019 05:23 AM  
 Potassium 3.3 (L) 07/10/2019 05:23 AM  
 Chloride 94 (L) 07/10/2019 05:23 AM  
 CO2 26 07/10/2019 05:23 AM  
 BUN 16 07/10/2019 05:23 AM  
 Creatinine 1.56 (H) 07/10/2019 05:23 AM  
 Calcium 8.5 07/10/2019 05:23 AM  
 Magnesium 1.6 04/13/2019 01:43 AM  
 Phosphorus 2.7 04/08/2019 03:50 AM  
  
Lab Results Component Value Date/Time AST (SGOT) 35 07/10/2019 05:23 AM  
 Alk. phosphatase 118 (H) 07/10/2019 05:23 AM  
 Protein, total 6.7 07/10/2019 05:23 AM  
 Albumin 2.2 (L) 07/10/2019 05:23 AM  
 Globulin 4.5 (H) 07/10/2019 05:23 AM  
 
Lab Results Component Value Date/Time INR 1.5 (H) 07/10/2019 05:23 AM  
 Prothrombin time 15.1 (H) 07/10/2019 05:23 AM  
 aPTT 39 (H) 06/18/2019 02:28 PM  
  
Lab Results Component Value Date/Time Iron 61 01/10/2019 08:20 AM  
 TIBC 212 (L) 01/10/2019 08:20 AM  
 Iron % saturation 29 01/10/2019 08:20 AM  
 Ferritin 66 01/10/2019 08:20 AM  
  
Lab Results Component Value Date/Time  pH 7.57 (HH) 11/29/2018 05:15 PM  
 PCO2 27 (L) 11/29/2018 05:15 PM  
 PO2 77 (L) 11/29/2018 05:15 PM  
 
No components found for: [place] Lab Results Component Value Date/Time  03/21/2019 06:02 PM  
 CK - MB 3.3 03/21/2019 06:02 PM  
  
 
 
   
 
Total time: 70 
Counseling / coordination time, spent as noted above: 65 
> 50% counseling / coordination?: yes Prolonged service was provided for  []30 min   []75 min in face to face time in the presence of the patient, spent as noted above. Time Start:  
Time End:  
Note: this can only be billed with 00211 (initial) or 23120 (follow up). If multiple start / stop times, list each separately.

## 2019-07-10 NOTE — PROGRESS NOTES
Patient re-assessed. Violent restraints not required at this time. Will continue with 2 point restraints. Restraint type: Soft restraint:  right ankle and right wrist 
Reason for restraints: Interference with medical treatment Duration: 24 hours Restraints must be removed when an alternative is available and effective and/or patient no longer meets criteria. Orders must be renewed every calendar day or when discontinued.  
 
The MD must conduct a face to face assessment within 1 calendar day of initiation when initial restraint order is verbal.

## 2019-07-10 NOTE — WOUND CARE
Wound care- initial visit for abdominal wound. Seen with staff nurse. Assessment Mid abdominal wound- 3x3 cm full thickness wound, dry wound base on the protruding umbilical hernia- chronic wound Treatment Wound bed cleansed, medihoney to wound, mepilex applied. Plan of care and assessment called to [facility] practice - orders obtained.  
 
Alex Torres RN [facility], [facility].

## 2019-07-10 NOTE — HOSPICE
[facility]  [phone]    Inpatient Nursing Admission   Patient Name: Emily Lane. YOB: 1960  Age: 64 y.o. Date of Hospice Admission: 7/10/2019  Hospice Attending Elected by Patient: Boris Estrada MD  Primary Care Physician: Dallas Solis DO  Admitting RN: Senia Preston RN  : ELLIOT     Level of Care (GIP/Routine/Respite): GIP  Facility of Care: [facility]  Patient Room: 12/0     HOSPICE SUMMARY   ER Visits/ Hospitalizations in past year: 4  Hospice Diagnosis: End stage liver disease ([facility] [K72.90]  Onset Date of Hospice Diagnosis: 07/10/2019  Summary of Disease Progression Leading to Hospice Diagnosis: Patient with long history of ETOH abuse leading to ETOH cirrhosis, hepatic encephalopathy, ascites requiring frequent paracenteses. Had been living with parents for appx 2 months due to increase in confusion, falls, non-compliant with lactulose. Also with esophageal varices, elevated ammonia levels. Patient brought into ED confused and agitated, incontinent, having N/V at home.  per  ED attending note: MELD score 25    Co-Morbidities:   Patient Active Problem List   Diagnosis Code    HTN (hypertension) I10    Gout M10.9    Alcohol dependence with withdrawal ([facility] F10.239    Anxiety F41.9    Recurrent major depressive disorder ([facility] F33.9    Hypogonadism, male E29.1    Thrombocytopenia ([facility] D69.6    Splenomegaly R16.1    History of alcohol abuse Z87.898    Prediabetes R73.03    Esophageal varices without bleeding (HCC) I85.00    Iron deficiency anemia D50.9    Gallbladder calculus without cholecystitis P08.91    Umbilical hernia without obstruction and without gangrene F57.4    Alcoholic cirrhosis (HCC) P84.62    Esophageal varices in alcoholic cirrhosis (HCC) U06.14, I85.10    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    Severe obesity with body mass index (BMI) of 35.0 to 39.9 with serious comorbidity (HCC) E66.01    Withdrawal symptoms, alcohol (HCC) F10.239    History of GI bleed Z87.19    Ascites R18.8    ETOH abuse F10.10    Hepatic encephalopathy (HCC) K72.90    End stage liver disease (HCC) K72.90     Diagnoses RELATED to the terminal prognosis: alcohol abuse, hepatic encephalopathy, elevated ammonia levels  Other Diagnoses: see above    Rationale for a prognosis of life expectancy of 6 months or less if the disease follows its normal course (Disease Specific History):     Balwinder Ansari is a 64 y.o. who was admitted to [facility]. The patient's principle diagnosis of end stage liver disease has resulted in pursuit of hospice care. Functionally, the patient's Palliative Performance Scale has declined over a period of weeks and is estimated at 10. Objective information that support this patients limited prognosis includes:  confusion, agitation, unresponsiveness . The patient/family chose comfort measures with the support of Hospice. Patient meets for GIP LOC as evidenced by agitation, unresponsive, unable to take PO, requiring scheduled IV ativan    Prognosis estimated based on 07/10/19 clinical assessment is:   [] Few to Many Hours  [x] Hours to Days   [] Few to Many Days   [] Days to Weeks   [] Few to Many Weeks   [] Weeks to Months   [] Few to Many Months    ASSESSMENT    Patient self-reports:  []  Yes    [x] No    SYMPTOMS: agitation, inability to tolerate PO     SIGNS/PHYSICAL FINDINGS: ascites, decreased mental status    KARNOFSKY: 10      Learning Assessment:  Patient is unable to learn    Caregiver willing and able to learn. Discussed hospice philosophy and goals.  Family verbalized understanding    CLINICAL INFORMATION     Wt Readings from Last 3 Encounters:   07/10/19 104.3 kg (230 lb)   06/18/19 121.6 kg (268 lb)   05/31/19 105.2 kg (232 lb)     Ht Readings from Last 3 Encounters:   07/10/19 6' (1.829 m)   06/18/19 6' (1.829 m)   05/31/19 6' (1.829 m)     There is no height or weight on file to calculate BMI. There were no vitals taken for this visit. LAB VALUES  No results found for this visit on 07/10/19 (from the past 12 hour(s)). No results found for this visit on 07/10/19 (from the past 6 hour(s)). Lab Results   Component Value Date/Time    Protein, total 6.7 07/10/2019 05:23 AM    Albumin 2.2 (L) 07/10/2019 05:23 AM       Currently this patient has:  [x] Supplemental O2 [x] Peripheral IV  [] PICC    [] PORT   [] Cervantes Catheter [] NG Tube   [] PEG Tube [] Ostomy    [] AICD: Has ICD been deactivated? [] Yes [] No:______    PLAN     1. Administer scheduled Ativan as ordered  2. Assess and monitor for symptoms such has pain, N/V, agitation  3. Administer PO lactulose if tolerated for AMS  4. Emotional and spiritual support by hospice team, hospice chaplain notified of admission a  5. Dispo to home or [facility] should patient stablize      Hospice Team Frequency Orders:  Skilled Nurse -   Daily x 7 days /every other day x 7 days  with 5 PRN visits for symptom control. MSW - 1 visit for initial assessment/evaluation for family support and need for volunteer services. [employer] - 1 visit for initial assessment/evaluation for spiritual support.      ADVANCE CARE PLANNING (Complete in ACP Flow Sheet)   Code Status: DNR  Durable DNR: [x]  Yes  []  No  Code Status Discussed/Confirmed:  yes  Preference for Other Life Sustaining Treatment Discussed/Confirmed:yes  Hospitalization Preference:  Hospital vs home   Advance Care Planning 7/10/2019   Patient's [place] is: Verbal statement (Legal Next of Kin remains as decision maker)   Primary Decision Maker Name -   Primary Decision Maker Phone Number -   Primary Decision Maker Relationship to Patient -   Confirm Advance Directive Yes, not on file   Patient Would Like to Complete Advance Directive -   Does the patient have other document types Do Not Resuscitate        Service: [] Yes  [x]  No      [] Unknown  Appropriate for Pinning Ceremony:  [] Yes     [] No  Congregation: [address]: Tyler Bishop [phone]    DISCHARGE PLANNING     1. Discharge Plan: can transition to home should patient improve  2. Patient/Family teaching: hospice goals of care, levels of care  3. Response to patient/family teaching: verbalized understanding    SOCIAL/EMOTIONAL/SPIRITUAL NEEDS     Spiritual Issues Identified: none    Psych/ Social/ Emotional Issues Identified: has 2 young adult children: daughter Rosanna Higuera, son Xiomy Leavitt [phone]. Patient is  from wife, Jacinto Lowe [phone]  however she is at bedside, still supportive     Caregiver Support:  [x] Provided information on End of Life Care   [] Material Provided: [facility] or [employer]   Dr. Dary Arteaga contacted, discharge to hospice order received  Dr. Hortensia Montes contacted, agrees to serve as attending provider for hospice and provided verbal certification of terminal illness with life expectancy of 6 months or less. Orders for hospice admission, medications and plan of treatment received. Medication reconciliation completed. MEDS: See medication list below  DME: Per hospital  Supplies: Per hospital  IDT communication to include MD, SN, SW, CH and support team    ALLERGIES AND MEDICATIONS     Allergies:    Allergies   Allergen Reactions    Onion Nausea and Vomiting    Statins-Hmg-Coa Reductase Inhibitors Rash     rash     Current Facility-Administered Medications   Medication Dose Route Frequency    haloperidol lactate (HALDOL) injection 2 mg  2 mg IntraVENous Q6H PRN    LORazepam (ATIVAN) injection 1 mg  1 mg IntraVENous Q4H    glycopyrrolate (ROBINUL) injection 0.2 mg  0.2 mg IntraVENous Q4H PRN    lactulose (CHRONULAC) 10 gram/15 mL solution 45 mL  45 mL Oral Q8H    bisacodyl (DULCOLAX) suppository 10 mg  10 mg Rectal DAILY PRN    HYDROmorphone (PF) (DILAUDID) injection 0.5 mg  0.5 mg IntraVENous Q15MIN PRN    diphenhydrAMINE (BENADRYL) injection 50 mg  50 mg IntraVENous Q6H PRN    LORazepam (ATIVAN) injection 1 mg  1 mg IntraVENous Q15MIN PRN    ondansetron (ZOFRAN) injection 4 mg  4 mg IntraVENous Q4H PRN

## 2019-07-10 NOTE — PROGRESS NOTES
Restraint type: Soft restraint:  right ankle, right wrist, left ankle and left wrist 
Reason for restraints: interference with medical treatment, danger to self and others Duration: 4 hours Restraints must be removed when an alternative is available and effective and/or patient no longer meets criteria.

## 2019-07-10 NOTE — PROGRESS NOTES
1621: TRANSFER - OUT REPORT:    Verbal report given to Adeline Gallardo RN(name) on Balwinder Sarabia.  being transferred to 5th floor(unit) for routine progression of care       Report consisted of patients Situation, Background, Assessment and   Recommendations(SBAR). Information from the following report(s) SBAR was reviewed with the receiving nurse. Lines:   Peripheral IV 07/10/19 Left Antecubital (Active)   Site Assessment Clean, dry, & intact 7/10/2019 12:01 PM   Phlebitis Assessment 0 7/10/2019 12:01 PM   Infiltration Assessment 0 7/10/2019 12:01 PM   Dressing Status Clean, dry, & intact 7/10/2019 12:01 PM   Dressing Type Tape;Transparent 7/10/2019 12:01 PM   Hub Color/Line Status Pink; Infusing 7/10/2019 12:01 PM   Action Taken Open ports on tubing capped 7/10/2019 12:01 PM   Alcohol Cap Used Yes 7/10/2019 12:01 PM       Peripheral IV 07/10/19 Right Antecubital (Active)   Site Assessment Clean, dry, & intact 7/10/2019 12:01 PM   Phlebitis Assessment 0 7/10/2019 12:01 PM   Infiltration Assessment 0 7/10/2019 12:01 PM   Dressing Status Clean, dry, & intact 7/10/2019 12:01 PM   Dressing Type Tape;Transparent 7/10/2019 12:01 PM   Hub Color/Line Status Pink;Capped 7/10/2019 12:01 PM   Action Taken Open ports on tubing capped 7/10/2019 12:01 PM   Alcohol Cap Used Yes 7/10/2019 12:01 PM        Opportunity for questions and clarification was provided.       Patient transported with:   Front Row

## 2019-07-10 NOTE — ED PROVIDER NOTES
Pt brought in by parents - 'he has liver disease/ See's Dr cohen/ Had a paracentesis yesterday/ we were told he has an elevated ammonia level again/ He has been altered x 3-4 days/ unable to take his lactulose x 2d; They deny HA, vison changes, diff swallowing, CP, SOB, Abd pain, F/Ch, N/V, D/Cons or other current systemic complaints Do add 'he has fallen several times/ seems like he is getting worse'; Chart review - Pt had 8.8 L drained yesterday/ 13 L the week prior;  
 
 
  
 
Past Medical History:  
Diagnosis Date  Adverse effect of anesthesia   
 pt reports waking up during colonoscopy/endoscopy  Alcohol abuse 6/1/2011  Anemia NEC  Anxiety  Ascites   
 has to have fluid drained occasionally  Cirrhosis of liver ([facility] [address]  Depression  Gastric ulcer, unspecified as acute or chronic, without mention of hemorrhage, perforation, or obstruction  GERD (gastroesophageal reflux disease)  Gout  Hypertension  Liver disease   
 cirrhosis  Obesity, Class II, BMI 35-39.9  Pneumonia  PUD (peptic ulcer disease)  Seizures ([facility] [address] 2013  
 from alcohol withdrawal  
 Stroke [facility]) 2013  
 per pt, possible mini stroke from alcohol withdrawal (same time as seizure) Past Surgical History:  
Procedure Laterality Date  COLONOSCOPY N/A 7/2/2018 COLONOSCOPY performed by Janice Lynne MD at [address] HX COLONOSCOPY  07/2018  HX ENDOSCOPY    
 also colonoscopy  HX GI  1998  
 rectal abscess surgery  HX GI  1998  
 rectal abscess surgery  HX HEENT  1980  
 wisdom teeth  IR PARACENTESIS ABD INIT Pt does not remember last time but stated 5-6 times. Family History:  
Problem Relation Age of Onset  Asthma Mother  Other Father   
     history of fall multiple injuries  Hypertension Brother  Cancer Paternal Grandmother   
     uncertain if colon or stomach  Cancer Paternal Grandfather   
     stomach cancer- dx mid [de-identified] Social History Socioeconomic History  Marital status: LEGALLY  Spouse name: Not on file  Number of children: Not on file  Years of education: Not on file  Highest education level: Not on file Occupational History  Occupation: Landscaping Social Needs  Financial resource strain: Not on file  Food insecurity:  
  Worry: Not on file Inability: Not on file  Transportation needs:  
  Medical: Not on file Non-medical: Not on file Tobacco Use  Smoking status: Former Smoker  Smokeless tobacco: Current User Types: Chew  Tobacco comment: currently uses snuff Substance and Sexual Activity  Alcohol use: Yes Alcohol/week: 1.2 oz Types: 2 Shots of liquor per week Comment: Today  Drug use: No  
 Sexual activity: Yes  
  Partners: Female Lifestyle  Physical activity:  
  Days per week: Not on file Minutes per session: Not on file  Stress: Not on file Relationships  Social connections:  
  Talks on phone: Not on file Gets together: Not on file Attends Jewish service: Not on file Active member of club or organization: Not on file Attends meetings of clubs or organizations: Not on file Relationship status: Not on file  Intimate partner violence:  
  Fear of current or ex partner: Not on file Emotionally abused: Not on file Physically abused: Not on file Forced sexual activity: Not on file Other Topics Concern  Not on file Social History Narrative  Not on file ALLERGIES: Onion and Statins-hmg-coa reductase inhibitors Review of Systems Unable to perform ROS: Mental status change Vitals:  
 07/10/19 9396 BP: 138/86 Pulse: 96  
Resp: 26 Temp: 97.2 °F (36.2 °C) SpO2: 100% Weight: 104.3 kg (230 lb) Height: 6' (1.829 m) Physical Exam  
Constitutional: He appears well-developed and well-nourished. NAD, AxOx1, not speaking, follows some commands He appears cirrhotic HENT:  
Head: Normocephalic and atraumatic. Mouth/Throat: Oropharynx is clear and moist. No oropharyngeal exudate. Eyes: Pupils are equal, round, and reactive to light. Conjunctivae and EOM are normal. Right eye exhibits no discharge. Left eye exhibits no discharge. Scleral icterus is present. Neck: Normal range of motion. Neck supple. Cardiovascular: Regular rhythm, normal heart sounds and intact distal pulses. Exam reveals no gallop and no friction rub. No murmur heard. Pulmonary/Chest: Effort normal and breath sounds normal. No respiratory distress. He has no wheezes. He has no rales. He exhibits no tenderness. Abdominal: Soft. Bowel sounds are normal. He exhibits distension. He exhibits no mass. There is no tenderness. There is no rebound and no guarding. Belly appears swollen/ full of ascites; He does not exhibit discomfort with palpation Genitourinary:  
Genitourinary Comments: Pt denies urinary/ Testicular/ scrotal or penile  complaints Musculoskeletal: Normal range of motion. He exhibits no edema, tenderness or deformity. Lymphadenopathy:  
  He has no cervical adenopathy. Neurological: He is alert. He displays abnormal reflex. No cranial nerve deficit or sensory deficit. Coordination normal.  
pt has motor/ CV/ Sensation grossly intact to all extremities, R = L in strength;  
Skin: Skin is warm and dry. Capillary refill takes less than 2 seconds. No rash noted. No erythema. Psychiatric: He has a normal mood and affect. Nursing note and vitals reviewed. MDM Number of Diagnoses or Management Options Cirrhosis of liver with ascites, unspecified hepatic cirrhosis type [facility]):  
Increased ammonia level:  
Stupor:  
Risk of Complications, Morbidity, and/or Mortality Presenting problems: high Diagnostic procedures: moderate Management options: high Critical Care Total time providing critical care: 30-74 minutes (45 min) Patient Progress Patient progress: other (comment) Procedures Chief Complaint Patient presents with  Vomiting 6:15 AM 
The patients presenting problems have been discussed, and they are in agreement with the care plan formulated and outlined with them. I have encouraged them to ask questions as they arise throughout their visit. MEDICATIONS GIVEN: 
Medications  
iopamidol (ISOVUE-370) 76 % injection 100 mL (has no administration in time range)  
sodium chloride 0.9 % bolus infusion 1,000 mL (has no administration in time range)  
lactulose (CHRONULAC) 10 gram/15 mL 300 mL in sterile water irrigation 700 mL rectal enema (has no administration in time range)  
ziprasidone (GEODON) injection 20 mg (20 mg IntraMUSCular Given 7/10/19 0547) sterile water (preservative free) injection (10 mL  Given 7/10/19 0553) LABS REVIEWED: 
Labs Reviewed METABOLIC PANEL, COMPREHENSIVE - Abnormal; Notable for the following components:  
    Result Value Sodium 130 (*) Potassium 3.3 (*) Chloride 94 (*) Creatinine 1.56 (*)   
 BUN/Creatinine ratio 10 (*)   
 GFR est AA 56 (*)   
 GFR est non-AA 46 (*) Bilirubin, total 4.0 (*) Alk. phosphatase 118 (*) Albumin 2.2 (*) Globulin 4.5 (*) A-G Ratio 0.5 (*) All other components within normal limits CBC WITH AUTOMATED DIFF - Abnormal; Notable for the following components: MCV 77.2 (*) MCH 25.9 (*) RDW 20.9 (*) PLATELET 191 (*) MONOCYTES 15 (*) IMMATURE GRANULOCYTES 2 (*)   
 ABS. MONOCYTES 1.1 (*)   
 ABS. IMM. GRANS. 0.2 (*) All other components within normal limits PROTHROMBIN TIME + INR - Abnormal; Notable for the following components: INR 1.5 (*) Prothrombin time 15.1 (*) All other components within normal limits AMMONIA - Abnormal; Notable for the following components:  
 Ammonia 241 (*) All other components within normal limits TROPONIN I  
URINALYSIS W/MICROSCOPIC  
SAMPLES BEING HELD  
ETHYL ALCOHOL  
DRUG SCREEN, URINE  
SAMPLE TO BLOOD BANK  
 
 
RADIOLOGY RESULTS: 
The following have been ordered and reviewed: 
_____________________________________________________________________ 
_____________________________________________________________________ EKG interpretation:  
Rhythm: normal sinus rhythm; and regular . Rate (approx.):; Axis: normal; P wave: normal; QRS interval: normal ; ST/T wave: normal; Negative acute significant segmental elevations PROCEDURES: 
 
 
 
CONSULTATIONS:  
 
 
PROGRESS NOTES: 
 
 
DIAGNOSIS: 
 
1. Stupor 2. Increased ammonia level 3. Cirrhosis of liver with ascites, unspecified hepatic cirrhosis type ([address] PLAN: 
1- 
 
 
ED COURSE: The patients hospital course has been uncomplicated. 6:16 AM 
Patient is being evaluated for admission to the hospital by the [facility]. The results of their tests and reasons for their admission have been discussed with them and/or available family. They convey agreement and understanding for the need to be admitted and for their admission diagnosis. Consultation has been made with the inpatient physician specialist for hospitalization.

## 2019-07-10 NOTE — HOSPICE
[facility] RN note:  Consult noted. Reviewing chart. Family meeting planned in conjunction with Palliative care team for this afternoon 1330: Met with multiple family members with palliative team (MD, MSW) regarding patient prognosis, hospice philosophy, and goals of care. Family is in agreement to elect hospice at [facility] level of care at this time. Attending MD notified, verbal CTI received, consents signed by mPOA, patient's father. Thank you for the opportunity to care for this pt and family.   Please contact hospice at [phone] with any questions or concerns.

## 2019-07-10 NOTE — PROGRESS NOTES
Reason for Admission: end stage alcohol abuse cirrhosis  ,grade 3 hepatic encephalopathy due to hyperammonemia in the setting of cirrhosis due to ETOH abuse,colitis RRAT Score:     15 Do you (patient/family) have any concerns for transition/discharge? Will be discussed with family,    
           
Plan for utilizing home health: The plans currently are for hospice to meet with family today and Palliative Medicine to meet with family today to discuss goals of care and code status Current Advanced Directive/Advance Care Plan:  Full code Transition of Care Plan: Pt was admitted with hepatic encephalopathy,elevated ammonia levels and cirrhosis relating to chronic alcohol abuse. PCP is Dr Carmen Salmeron. I notified Dr Meche Perkins. Prior to hospitalization,pt was residing with his mother. I received a hospice consult from GI physician. Hospice consult sent through the cc link to [facility]. I also called [facility] to insure receipt of the referral which they confirmed. They will contact the covering hospice nurse who will be in the ICU meeting with av different family @ 1300 pm. 
 
Please let me know if I can be of any assistance.  
 
[address] [facility]
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
## 2019-07-10 NOTE — ED NOTES
Pt increasingly agitated. No following commands from nursing staff. MD made aware. Pt's level of care increased to ICU. Medication orders received, see MAR. Multiple nursing staff members remain at bedside.

## 2019-07-10 NOTE — H&P
[facility] Help to Those in Need  [phone]    Patient Name: Bailee Babb. YOB: 1960    Date of Provider Hospice Visit: 07/10/19    Level of Care:   [x] General Inpatient (GIP)    [] Routine   [] Respite    Current Location of Care:  [] [facility] [x] [facility] [] [address] [] [facility] [] Hospice House [facility]    IF [facility], patient referred from:  [] [facility] [] [facility] [] [address] [] [facility] [] Home [] Other:     Date of Original Hospice Admission: 7/10/2019  Hospice Medical Director at time of admission: [place] Diagnosis: End-stage liver disease  Diagnoses RELATED to the terminal prognosis: Alcoholic cirrhosis, hepatic encephalopathy, malnutrition  Other Diagnoses:      Stephanie Garvin. is a 64y.o. year old who was admitted to [facility]. Patient is a 61-year-old gentleman with a history of cirrhosis secondary to alcoholism. He unfortunately has advanced liver disease with associated esophageal varices and recurrent ascites. He has had multiple paracentesis over the last several months to the point that he has required weekly paracentesis that is becoming less effective. Less than a week ago he had almost 15 L removed and then on 7/9 he had an additional 9 L removed. Per family, he has had further decline with confusion, not tolerating his lactulose and multiple falls. In the emergency room, he was found to have an ammonia of over 200, INR of 1.5, bilirubin of 4.5  He was evaluated by GI and really limited options at this point. They consider doing a TIPS procedure but given his overall decline, did not think he was a candidate. GI actually asked for hospice evaluation and so our team has met with the family to discuss the options moving forward. Patient requiring management of his agitation and restlessness with IV medication and was in four-point restraints in the ICU.   Family is ultimately decided to transition to inpatient hospice care for management of his end-stage liver disease and symptoms associated with it        The patient's principle diagnosis has resulted in hepatic encephalopathy  Refer to LCD     Functionally, the patient's Karnofsky and/or Palliative Performance Scale has declined over a period of weeks to months and is estimated at 10-20. The patient is dependent on the following ADLs: All    Objective information that support this patients limited prognosis includes:   Ammonia greater than 200    The patient/family chose comfort measures with the support of Hospice. HOSPICE DIAGNOSES   Active Symptoms:  1. Restlessness with agitation  2. Abdominal distention with large volume ascites  3. Shortness of breath  4. Alcoholic cirrhosis     PLAN   1. Patient will be admitted to [facility] care secondary to the need for aggressive symptom management of his restlessness and frequent nursing assessment. 2. Start Ativan 1 mg IV every 4 hours scheduled and every 15 minutes as needed  3. Comfort order set placed for other symptoms to include nausea, vomiting, fever, pain  4. Discussed with bedside nurse and hospice liaison's  5. Lengthy discussion with family about expectations and what to look for moving forward. Clearly are struggling with this transition and will need ongoing education about end-of-life care-especially his parents. We reviewed again nutrition at the end of life and reassured them that they are not \"starving him\". We will continue to offer him lactulose if he is awake enough to take it explained that we will not force this down him if he is not awake enough to tolerate    6.  and SW to support family needs  7. Disposition: Likely will pass in the hospital    Prognosis estimated based on 07/10/19 clinical assessment is:   [x] Hours to Days    [] Days to Weeks    [] Other:    Communicated plan of care with: Hospice Case Manager;  Hospice IDT; Care Team     GOALS OF CARE     Patient/Medical POA stated Goal of Care: Hospice/comfort care    [x] I have reviewed and/or updated ACP information in the Advance Care Planning Navigator. This information is available in the [address] link in the patient's chart header. Primary Decision Maker (Health Care Agent):   Primary Decision Maker: Brielle Angeles - Mother - [phone]    Primary Decision Maker: Fidel Scott - Father - [phone]    Resuscitation Status: DNR  If DNR is there a Durable DNR on file? : [x] Yes [] No (If no, complete Durable DNR)    HISTORY     History obtained from: Chart, family    CHIEF COMPLAINT: Confusion  The patient is:   [] Verbal  [x] Nonverbal  [] Unresponsive    HPI/SUBJECTIVE: Patient is not verbal as he remains encephalopathic related to his end-stage liver disease.        REVIEW OF SYSTEMS     The following systems were: [x] reviewed  [] unable to be reviewed    Positive ROS include:  Constitutional: fatigue, weakness,short of breath  Gastrointestinal:poor appetite, nausea, vomiting,  Neurologic:confusion, hallucinations, weakness  Psychiatric:anxiety,  Endocrine:     Adult Non-Verbal Pain Assessment Score: 5    Face  [] 0   No particular expression or smile  [x] 1   Occasional grimace, tearing, frowning, wrinkled forehead  [] 2   Frequent grimace, tearing, frowning, wrinkled forehead    Activity (movement)  [] 0   Lying quietly, normal position  [] 1   Seeking attention through movement or slow, cautious movement  [x] 2   Restless, excessive activity and/or withdrawal reflexes    Guarding  [] 0   Lying quietly, no positioning of hands over areas of body  [x] 1   Splinting areas of the body, tense  [] 2   Rigid, stiff    Physiology (vital signs)  [x] 0   Stable vital signs  [] 1   Change in any of the following: SBP > 20mm Hg; HR > 20/minute  [] 2   Change in any of the following: SBP > 30mm Hg; HR > 25/minute    Respiratory  [] 0   Baseline RR/SpO2, compliant with ventilator  [x] 1   RR > 10 above baseline, or 5% drop SpO2, mild asynchrony with ventilator  [] 2   RR > 20 above baseline, or 10% drop SpO2, asynchrony with ventilator     FUNCTIONAL ASSESSMENT     Palliative Performance Scale (PPS): 10-20       PSYCHOSOCIAL/SPIRITUAL ASSESSMENT     Active Problems:    End stage liver disease ([facility] (7/10/2019)      Past Medical History:   Diagnosis Date    Adverse effect of anesthesia     pt reports waking up during colonoscopy/endoscopy    Alcohol abuse 6/1/2011    Anemia NEC     Anxiety     Ascites     has to have fluid drained occasionally    Cirrhosis of liver (HCC)     Depression     Gastric ulcer, unspecified as acute or chronic, without mention of hemorrhage, perforation, or obstruction     GERD (gastroesophageal reflux disease)     Gout     Hypertension     Liver disease     cirrhosis    Obesity, Class II, BMI 35-39.9     Pneumonia     PUD (peptic ulcer disease)     Seizures ([facility] 2013    from alcohol withdrawal    [facility] 2013    per pt, possible mini stroke from alcohol withdrawal (same time as seizure)      Past Surgical History:   Procedure Laterality Date    COLONOSCOPY N/A 7/2/2018    COLONOSCOPY performed by Dalton Red MD at [address] [facility] HX COLONOSCOPY  07/2018    HX ENDOSCOPY      also colonoscopy    HX GI  1998    rectal abscess surgery    HX GI  1998    rectal abscess surgery    HX HEENT  1980    wisdom teeth    IR PARACENTESIS ABD INIT      Pt does not remember last time but stated 5-6 times. Social History     Tobacco Use    Smoking status: Former Smoker    Smokeless tobacco: Current User     Types: Chew    Tobacco comment: currently uses snuff   Substance Use Topics    Alcohol use:  Yes     Alcohol/week: 1.2 oz     Types: 2 Shots of liquor per week     Comment: Today     Family History   Problem Relation Age of Onset    Asthma Mother     Other Father         history of fall multiple injuries    Hypertension Brother     Cancer Paternal Grandmother         uncertain if colon or stomach    Cancer Paternal Grandfather         stomach cancer- dx mid [de-identified]      Allergies   Allergen Reactions    Onion Nausea and Vomiting    Statins-Hmg-Coa Reductase Inhibitors Rash     rash      Current Facility-Administered Medications   Medication Dose Route Frequency    haloperidol lactate (HALDOL) injection 2 mg  2 mg IntraVENous Q6H PRN    LORazepam (ATIVAN) injection 1 mg  1 mg IntraVENous Q4H    glycopyrrolate (ROBINUL) injection 0.2 mg  0.2 mg IntraVENous Q4H PRN    lactulose (CHRONULAC) 10 gram/15 mL solution 45 mL  45 mL Oral Q8H    bisacodyl (DULCOLAX) suppository 10 mg  10 mg Rectal DAILY PRN    HYDROmorphone (PF) (DILAUDID) injection 0.5 mg  0.5 mg IntraVENous Q15MIN PRN    diphenhydrAMINE (BENADRYL) injection 50 mg  50 mg IntraVENous Q6H PRN    LORazepam (ATIVAN) injection 1 mg  1 mg IntraVENous Q15MIN PRN    ondansetron (ZOFRAN) injection 4 mg  4 mg IntraVENous Q4H PRN    ketorolac (TORADOL) injection 30 mg  30 mg IntraVENous Q6H PRN        PHYSICAL EXAM     Wt Readings from Last 3 Encounters:   07/10/19 230 lb (104.3 kg)   06/18/19 268 lb (121.6 kg)   05/31/19 232 lb (105.2 kg)       Visit Vitals  /85 (BP 1 Location: Right arm, BP Patient Position: At rest)   Pulse (!) 105   Temp 97.7 °F (36.5 °C)   Resp 24   SpO2 95%       Supplemental O2  [] Yes  [x] NO  Last bowel movement:     Currently this patient has:  [x] Peripheral IV [] PICC  [] PORT [] ICD    [x] Cervantes Catheter [] NG Tube   [] PEG Tube    [] Rectal Tube [] Drain  [] Other:     Constitutional: Ill-appearing, appears much older than stated age, very restless  Eyes:  Icterus  ENMT: Dry  Cardiovascular: Regular rate  Respiratory: No distress  Gastrointestinal: Soft, moderate ascites  Musculoskeletal: Muscle wasting  Skin: Multiple areas of bruising  Neurologic: Restless and agitated at times         Pertinent Lab and or Imaging Tests:  Lab Results   Component Value Date/Time    Sodium 130 (L) 07/10/2019 05:23 AM    Potassium 3.3 (L) 07/10/2019 05:23 AM    Chloride 94 (L) 07/10/2019 05:23 AM    CO2 26 07/10/2019 05:23 AM    Anion gap 10 07/10/2019 05:23 AM    Glucose 80 07/10/2019 05:23 AM    BUN 16 07/10/2019 05:23 AM    Creatinine 1.56 (H) 07/10/2019 05:23 AM    BUN/Creatinine ratio 10 (L) 07/10/2019 05:23 AM    GFR est AA 56 (L) 07/10/2019 05:23 AM    GFR est non-AA 46 (L) 07/10/2019 05:23 AM    Calcium 8.5 07/10/2019 05:23 AM     Lab Results   Component Value Date/Time    Protein, total 6.7 07/10/2019 05:23 AM    Albumin 2.2 (L) 07/10/2019 05:23 AM           Total time: 70  Counseling / coordination time:   > 50% counseling / coordination?:

## 2019-07-10 NOTE — H&P
Pulmonology Intensive Care Unit Initial Assessment Subjective: 
 
 
 
Subjective:  
 
Critical Care Initial Evaluation Note: 7/10/2019 10:57 AM 
 
HPI: 65 y/o with h/o EtOH cirrhosis, presenting with several day h/o worsening confusion. Pt currently lives with mother. She states that he has had no EtOH for at least 2 months. She adds that he has not been able to tolerate lactulose for 2 weeks. Adds that he vomits every time he tries to take it. She has noticed worsening confusion over the past few days. Denies abd pain/diarrhea. Denies f/c or cough. Past Medical History:  
Diagnosis Date  Adverse effect of anesthesia   
 pt reports waking up during colonoscopy/endoscopy  Alcohol abuse 6/1/2011  Anemia NEC  Anxiety  Ascites   
 has to have fluid drained occasionally  Cirrhosis of liver ([address]  Depression  Gastric ulcer, unspecified as acute or chronic, without mention of hemorrhage, perforation, or obstruction  GERD (gastroesophageal reflux disease)  Gout  Hypertension  Liver disease   
 cirrhosis  Obesity, Class II, BMI 35-39.9  Pneumonia  PUD (peptic ulcer disease)  Seizures ([address] 2013  
 from alcohol withdrawal  
 Stroke [facility]) 2013  
 per pt, possible mini stroke from alcohol withdrawal (same time as seizure) Past Surgical History:  
Procedure Laterality Date  COLONOSCOPY N/A 7/2/2018 COLONOSCOPY performed by Janice Lynne MD at [address] [facility] HX COLONOSCOPY  07/2018  HX ENDOSCOPY    
 also colonoscopy  HX GI  1998  
 rectal abscess surgery  HX GI  1998  
 rectal abscess surgery  HX HEENT  1980  
 wisdom teeth  IR PARACENTESIS ABD INIT Pt does not remember last time but stated 5-6 times. Prior to Admission medications Medication Sig Start Date End Date Taking? Authorizing Provider  
potassium chloride SR (KLOR-CON 10) 10 mEq tablet Take 10 mEq by mouth two (2) times a day.    Yes Other, MD Dillon  
lactulose (CHRONULAC) 10 gram/15 mL solution Take 20 g by mouth three (3) times daily. Oral solution may be mixed with fruit juice, water, or milk   Yes Provider, Historical  
diphenhydrAMINE-acetaminophen (TYLENOL PM EXTRA STRENGTH)  mg tab Take 1 Tab by mouth nightly as needed (sleep). Yes Provider, Historical  
ondansetron (ZOFRAN ODT) 8 mg disintegrating tablet Take 1 Tab by mouth every eight (8) hours as needed for Nausea. 5/29/19  Yes Yenny Lewis MD  
furosemide (LASIX) 40 mg tablet Take 1 Tab by mouth daily. 4/16/19  Yes Shon Swift MD  
spironolactone (ALDACTONE) 100 mg tablet Take 1 Tab by mouth daily. 4/17/19  Yes Shon Swift MD  
venlafaxine-SR [facility] 150 mg capsule Take 1 Cap by mouth daily. 4/17/19  Yes Shon Swift MD  
hydrOXYzine HCl (ATARAX) 25 mg tablet Take 25 mg by mouth two (2) times a day. Yes Other, MD Dillon  
omeprazole (PRILOSEC) 40 mg capsule TAKE 1 CAPSULE TWICE DAILY 8/6/18  Yes Morales Koroma NP  
allopurinol (ZYLOPRIM) 300 mg tablet TAKE 1 TABLET TWICE DAILY 4/6/18  Yes Natalia Keith,  Allergies Allergen Reactions  Onion Nausea and Vomiting  Statins-Hmg-Coa Reductase Inhibitors Rash  
  rash Social History Tobacco Use  Smoking status: Former Smoker  Smokeless tobacco: Current User Types: Chew  Tobacco comment: currently uses snuff Substance Use Topics  Alcohol use: Yes Alcohol/week: 1.2 oz Types: 2 Shots of liquor per week Comment: Today Family History Problem Relation Age of Onset  Asthma Mother  Other Father   
     history of fall multiple injuries  Hypertension Brother  Cancer Paternal Grandmother   
     uncertain if colon or stomach  Cancer Paternal Grandfather   
     stomach cancer- dx mid [de-identified] Review of Systems Unable to perform ROS: Mental status change Objective:  
 
Vital signs reviewed. 07/10 0701 - 07/10 1900 In: 2897.1 [I.V.:2897.1] Out: 350 [Urine:350] No intake/output data recorded. Physical Exam  
Constitutional: He appears well-developed and well-nourished. HENT:  
Head: Normocephalic and atraumatic. Cardiovascular: Normal rate and regular rhythm. Pulmonary/Chest: Effort normal and breath sounds normal.  
Abdominal: He exhibits distension. Umbilical hernia Musculoskeletal: He exhibits no edema. Neurological:  
lethargic Skin: Skin is warm and dry. Data Review:  
 
Recent Results (from the past 24 hour(s)) TROPONIN I Collection Time: 07/10/19  5:23 AM  
Result Value Ref Range Troponin-I, Qt. <0.05 <0.05 ng/mL METABOLIC PANEL, COMPREHENSIVE Collection Time: 07/10/19  5:23 AM  
Result Value Ref Range Sodium 130 (L) 136 - 145 mmol/L Potassium 3.3 (L) 3.5 - 5.1 mmol/L Chloride 94 (L) 97 - 108 mmol/L  
 CO2 26 21 - 32 mmol/L Anion gap 10 5 - 15 mmol/L Glucose 80 65 - 100 mg/dL BUN 16 6 - 20 MG/DL Creatinine 1.56 (H) 0.70 - 1.30 MG/DL  
 BUN/Creatinine ratio 10 (L) 12 - 20 GFR est AA 56 (L) >60 ml/min/1.73m2 GFR est non-AA 46 (L) >60 ml/min/1.73m2 Calcium 8.5 8.5 - 10.1 MG/DL Bilirubin, total 4.0 (H) 0.2 - 1.0 MG/DL  
 ALT (SGPT) 25 12 - 78 U/L  
 AST (SGOT) 35 15 - 37 U/L Alk. phosphatase 118 (H) 45 - 117 U/L Protein, total 6.7 6.4 - 8.2 g/dL Albumin 2.2 (L) 3.5 - 5.0 g/dL Globulin 4.5 (H) 2.0 - 4.0 g/dL A-G Ratio 0.5 (L) 1.1 - 2.2    
CBC WITH AUTOMATED DIFF Collection Time: 07/10/19  5:23 AM  
Result Value Ref Range WBC 7.6 4.1 - 11.1 K/uL  
 RBC 4.87 4.10 - 5.70 M/uL  
 HGB 12.6 12.1 - 17.0 g/dL HCT 37.6 36.6 - 50.3 % MCV 77.2 (L) 80.0 - 99.0 FL  
 MCH 25.9 (L) 26.0 - 34.0 PG  
 MCHC 33.5 30.0 - 36.5 g/dL RDW 20.9 (H) 11.5 - 14.5 % PLATELET 181 (L) 152 - 400 K/uL NRBC 0.0 0  WBC ABSOLUTE NRBC 0.00 0.00 - 0.01 K/uL NEUTROPHILS 63 32 - 75 % LYMPHOCYTES 17 12 - 49 % MONOCYTES 15 (H) 5 - 13 % EOSINOPHILS 2 0 - 7 % BASOPHILS 1 0 - 1 % IMMATURE GRANULOCYTES 2 (H) 0.0 - 0.5 % ABS. NEUTROPHILS 4.7 1.8 - 8.0 K/UL  
 ABS. LYMPHOCYTES 1.3 0.8 - 3.5 K/UL  
 ABS. MONOCYTES 1.1 (H) 0.0 - 1.0 K/UL  
 ABS. EOSINOPHILS 0.2 0.0 - 0.4 K/UL  
 ABS. BASOPHILS 0.1 0.0 - 0.1 K/UL  
 ABS. IMM. GRANS. 0.2 (H) 0.00 - 0.04 K/UL  
 DF SMEAR SCANNED    
 RBC COMMENTS ANISOCYTOSIS 
PRESENT 
    
PROTHROMBIN TIME + INR Collection Time: 07/10/19  5:23 AM  
Result Value Ref Range INR 1.5 (H) 0.9 - 1.1 Prothrombin time 15.1 (H) 9.0 - 11.1 sec AMMONIA Collection Time: 07/10/19  5:23 AM  
Result Value Ref Range Ammonia 241 (H) <32 UMOL/L  
URINALYSIS W/MICROSCOPIC Collection Time: 07/10/19  7:21 AM  
Result Value Ref Range Color YELLOW/STRAW Appearance CLEAR CLEAR Specific gravity 1.026 1.003 - 1.030    
 pH (UA) 6.0 5.0 - 8.0 Protein NEGATIVE  NEG mg/dL Glucose NEGATIVE  NEG mg/dL Ketone NEGATIVE  NEG mg/dL Bilirubin NEGATIVE  NEG Blood NEGATIVE  NEG Urobilinogen 4.0 (H) 0.2 - 1.0 EU/dL Nitrites NEGATIVE  NEG Leukocyte Esterase NEGATIVE  NEG    
 WBC 0-4 0 - 4 /hpf  
 RBC 0-5 0 - 5 /hpf Epithelial cells FEW FEW /lpf Bacteria NEGATIVE  NEG /hpf  
SAMPLES BEING HELD Collection Time: 07/10/19  7:21 AM  
Result Value Ref Range SAMPLES BEING HELD REY TOP URINE   
 COMMENT Add-on orders for these samples will be processed based on acceptable specimen integrity and analyte stability, which may vary by analyte. ETHYL ALCOHOL Collection Time: 07/10/19  7:21 AM  
Result Value Ref Range ALCOHOL(ETHYL),SERUM <10 <10 MG/DL  
DRUG SCREEN, URINE Collection Time: 07/10/19  7:21 AM  
Result Value Ref Range AMPHETAMINES NEGATIVE  NEG    
 BARBITURATES NEGATIVE  NEG BENZODIAZEPINES NEGATIVE  NEG    
 COCAINE NEGATIVE  NEG METHADONE NEGATIVE  NEG    
 OPIATES NEGATIVE  NEG    
 PCP(PHENCYCLIDINE) NEGATIVE  NEG    
 THC (TH-CANNABINOL) NEGATIVE  NEG  Drug screen comment (NOTE) CXR: AP portable view of the chest demonstrates a normal cardiomediastinal 
silhouette. The lungs are adequately expanded. There is no edema, effusion, 
consolidation, or pneumothorax. The osseous structures are unremarkable. 
  
IMPRESSION IMPRESSION: 
No acute process. Assessment:  
AMS Hepatic encephalopathy Decompensated EtOH cirrhosis JENNIFER ? Colitis 
hypokalemia Plan: C/w rifaximin and lactulose via NGT 
C/w cefepime Follow cultures Goody bag Empiric zosyn (empiric coverage for colitis - noted on CT - and empiric coverage for SBP) Replete lytes Repeat paracentesis per GI Check ur lytes Prn haldol, follow QT Proph: heparin 
NPO, for now given MS

## 2019-07-10 NOTE — PROGRESS NOTES
Spiritual Care Assessment/Progress Note [address] 
 
 
NAME: Millicent Hand. MRN: [id number] AGE: 64 y.o. SEX: male Islam Affiliation: Non Caodaism  
Language: [place] 7/10/2019     Total Time (in minutes): 10 Spiritual Assessment begun in [facility] INTENSIVE CARE through conversation with: 
  
    [x]Patient        [] Family    [] Friend(s) Reason for Consult: Palliative Care, Initial/Spiritual Assessment Spiritual beliefs: (Please include comment if needed) 
   [] Identifies with a deanna tradition:    
   [] Supported by a deanna community:        
   [] Claims no spiritual orientation:       
   [] Seeking spiritual identity:            
   [] Adheres to an individual form of spirituality:       
   [x] Not able to assess:                   
 
    
Identified resources for coping:  
   [] Prayer                           
   [] Music                  [] Guided Imagery [x] Family/friends                 [] Pet visits [] Devotional reading                         [] Unknown 
   [] Other:                                         
 
 
Interventions offered during this visit: (See comments for more details) Patient Interventions: Other (comment)(Unable to assess) Plan of Care: 
 
 [] Support spiritual and/or cultural needs  
 [] Support AMD and/or advance care planning process    
 [] Support grieving process 
 [] Coordinate Rites and/or Rituals  
 [] Coordination with community clergy [] No spiritual needs identified at this time 
 [] Detailed Plan of Care below (See Comments)  [] Make referral to Music Therapy 
[] Make referral to Pet Therapy    
[] Make referral to Addiction services 
[] Make referral to [facility] 
[] Make referral to Spiritual Care Partner 
[] No future visits requested       
[x] Follow up visits as needed Comments:  
 visited patient, Jean-Paul Josette, for a Palliative Care initial spiritual assessment in the ICU. Maribel Thibodeaux was lying in bed and appeared to be resting comfortably when the  came to visit. There were no family at the bedside but a sitter was present. [place], expressed that family had just left.  will follow up as able and/or needed. Eladio Trujillo. Kaylee Win.  Paging Service: [phone])

## 2019-07-10 NOTE — PROGRESS NOTES
Per Palliative physician,family has decided to admit pt to [facility] hospice . Lydia Denise

## 2019-07-11 NOTE — PROGRESS NOTES
Bedside and Verbal shift change report given to Priyank (oncoming nurse) by Thelma Franklin (offgoing nurse). Report included the following information SBAR, Kardex, Procedure Summary, Intake/Output and MAR.        Aspiration with suction no reaction,  abdomen(hernia) groin(discolored)

## 2019-07-11 NOTE — PROGRESS NOTES
[facility]  Provider Death Note    Called to examine patient who has . No response to verbal and tactile stimuli. No respiratory effort. Absent heart sounds and pulses. Pupils fixed and dilated. Patient pronounced dead at [phone].

## 2019-07-11 NOTE — PROGRESS NOTES
[facility]  [phone]    Patient Name: Luca Baxter. YOB: 1960    Date of Provider Hospice Visit: 07/11/19    Level of Care:   [x] General Inpatient (GIP)    [] Routine   [] Respite    Current Location of Care:  [] [facility] [x] [facility] [] [address] [] [facility] [] Hospice House THE [facility], patient referred from:  [] [facility] [] [facility] [] [address] [] [facility] [] Home [] Other:     Date of Original Hospice Admission: 7/10/2019  Hospice Medical Director at time of admission: Scoopinion Diagnosis: End-stage liver disease  Diagnoses RELATED to the terminal prognosis: Alcoholic cirrhosis, hepatic encephalopathy, malnutrition  Other Diagnoses:      Kenton Sal is a 64y.o. year old who was admitted to [facility]. Patient is a 61-year-old gentleman with a history of cirrhosis secondary to alcoholism. He has advanced liver disease with associated esophageal varices and recurrent ascites. He has had multiple paracentesis over the last several months to the point that he has required weekly paracentesis that is becoming less effective. Less than a week ago he had almost 15 L removed and then on 7/9 he had an additional 9 L removed. Per family, he has had further decline with confusion, not tolerating his lactulose and multiple falls. In the emergency room, he was found to have an ammonia of over 200, INR of 1.5, bilirubin of 4.5  He was evaluated by GI with limited options,  considered doing a TIPS procedure but with his overall rapid decline he was not a candidate. GI requested hospice evaluation and the hospice met with the family on 7/10/19 to discuss the options moving forward. Patient requiring symptoms management for SOB and  management of his end-stage-liver disease. Pt was unresponsive with audible secretions, appears to be actively dying. Father was tearful, expressed belief that they stopped treatment too soon. Mother recalled conversation with Dr. Jenae Campos 2-3 weeks ago when they were told that pt could die at any time, stated pt had been accepting of this, has been talking recently about his late grandmother, with whom he was very close. Mother shared of pt's struggle with alcohol over the years, stated they put him in detox/rehab at least twice, stated pt had been motivated to change but was always overpowered by his addiction, would immediately resume drinking. Mother also shared she had another son who  of alcoholism and \"now they will be together, no more suffering\". Mother further shared that the pt's relationship with his brother Maria L Lara was strained because of his alcohol abuse but now Maria L Lara brother is supporting pt and family as pt now approaches end of life. Mother relies heavily on her deanna, shared that their  is like her \"third son,\" stated he is currently due will be back in town this morning and will visit this afternoon. The patient's principle diagnosis has resulted in hepatic encephalopathy. Functionally, the patient's Karnofsky and/or Palliative Performance Scale has declined over a period of weeks to months and is estimated at 10%. The patient is dependent on the following ADLs: All    Objective information that support this patients limited prognosis includes:   Ammonia greater than 200    The patient/family chose comfort measures with GIP. HOSPICE DIAGNOSES   Active Symptoms:  1. SOB   2. Fever  3. . Abdominal distention with large volume ascites  4. Alcoholic cirrhosis  5. Fever noted to 104 earlier this morning     PLAN   1. Continue GIP level care secondary to the need for aggressive symptom management of his restlessness and frequent nursing assessment. 2. Increase Ativan to 2mg IV every 4 hours scheduled and 1 mg every 15 min PRN, SOB  3. Increase Dilaudid to 2 mg IV Q 4 hrs scheduled and 1 mg Q 15 min IV PRN, pain  4. Continue Ondansetron 4 mg IV Q 4 hrs PRN N/V  5.  Continue Acetaminophen 650 mg WV Q 4 hrs PRN for fever and Ketoralac 30 mg IV Q 6 hrs PRN   6. Continue Glycopyrrolate 0.2mg IV Q 4 hr PRN secretions  7. Discussed with bedside nurse   8.  and SW to support family needs  9. Disposition: Likely will pass imminently or in hours at the hospital    Prognosis estimated based on 07/11/19 clinical assessment is:   [] Hours to Days    [] Days to Weeks    [x] Other: imminent to hours    Communicated plan of care with: Hospice Case Manager; Hospice IDT; Care Team    Symptoms management: Over the last 8 hours patient has received 2 doses of Glycopyrrolate for audible secretions, Dilaudid 2 mg  at 1300 and  Lorazepam 1 mg at 1000 received  the increased dose of 2 mg at 1200 for SOB and work of breathing, and acetaminophen 650 mg WV at 1000 and 2 doses of Ketoralac 30 mg IVP (7am, 12 noon) over the last 8 hours  for fever. GOALS OF CARE     Patient/Medical POA stated Goal of Care: Hospice/comfort care      [x] I have reviewed and/or updated ACP information in the Advance Care Planning Navigator. This information is available in the [address] [place] [address] Drive link in the patient's chart header. Primary Decision Maker (Health Care Agent):   Primary Decision Maker: Aleah Knott - Mother - [phone]    Primary Decision Maker: Nona Saenz - Father - [phone]    Resuscitation Status: DNR  If DNR is there a Durable DNR on file? : [x] Yes [] No (If no, complete Durable DNR)    HISTORY     History obtained from: Chart, family    CHIEF COMPLAINT: Confusion  The patient is:   [] Verbal  [x] Nonverbal  [] Unresponsive    HPI/SUBJECTIVE: Patient is not verbal as he remains encephalopathic related to his end-stage liver disease.        REVIEW OF SYSTEMS     The following systems were: [x] reviewed  [] unable to be reviewed    Positive ROS include:  Constitutional: fatigue, weakness,short of breath  Gastrointestinal:poor appetite, nausea, vomiting,  Neurologic:confusion, hallucinations, weakness  Psychiatric:anxiety,  Endocrine:     Adult Non-Verbal Pain Assessment Score: 5    Face  [] 0   No particular expression or smile  [x] 1   Occasional grimace, tearing, frowning, wrinkled forehead  [] 2   Frequent grimace, tearing, frowning, wrinkled forehead    Activity (movement)  [] 0   Lying quietly, normal position  [] 1   Seeking attention through movement or slow, cautious movement  [x] 2   Restless, excessive activity and/or withdrawal reflexes    Guarding  [] 0   Lying quietly, no positioning of hands over areas of body  [x] 1   Splinting areas of the body, tense  [] 2   Rigid, stiff    Physiology (vital signs)  [x] 0   Stable vital signs  [] 1   Change in any of the following: SBP > 20mm Hg; HR > 20/minute  [] 2   Change in any of the following: SBP > 30mm Hg; HR > 25/minute    Respiratory  [] 0   Baseline RR/SpO2, compliant with ventilator  [x] 1   RR > 10 above baseline, or 5% drop SpO2, mild asynchrony with ventilator  [] 2   RR > 20 above baseline, or 10% drop SpO2, asynchrony with ventilator     FUNCTIONAL ASSESSMENT     Palliative Performance Scale (PPS): 10%       PSYCHOSOCIAL/SPIRITUAL ASSESSMENT     Active Problems:    End stage liver disease (HCC) (7/10/2019)      Past Medical History:   Diagnosis Date    Adverse effect of anesthesia     pt reports waking up during colonoscopy/endoscopy    Alcohol abuse 6/1/2011    Anemia NEC     Anxiety     Ascites     has to have fluid drained occasionally    Cirrhosis of liver (HCC)     Depression     Gastric ulcer, unspecified as acute or chronic, without mention of hemorrhage, perforation, or obstruction     GERD (gastroesophageal reflux disease)     Gout     Hypertension     Liver disease     cirrhosis    Obesity, Class II, BMI 35-39.9     Pneumonia     PUD (peptic ulcer disease)     Seizures ([facility] [address] 2013    from alcohol withdrawal    Stroke ([facility] [address] 2013    per pt, possible mini stroke from alcohol withdrawal (same time as seizure) Past Surgical History:   Procedure Laterality Date    COLONOSCOPY N/A 7/2/2018    COLONOSCOPY performed by Susanna Alex MD at [facility] HX COLONOSCOPY  07/2018    HX ENDOSCOPY      also colonoscopy    HX GI  1998    rectal abscess surgery    HX GI  1998    rectal abscess surgery    HX HEENT  1980    wisdom teeth    IR PARACENTESIS ABD INIT      Pt does not remember last time but stated 5-6 times. Social History     Tobacco Use    Smoking status: Former Smoker    Smokeless tobacco: Current User     Types: Chew    Tobacco comment: currently uses snuff   Substance Use Topics    Alcohol use:  Yes     Alcohol/week: 1.2 oz     Types: 2 Shots of liquor per week     Comment: Today     Family History   Problem Relation Age of Onset    Asthma Mother     Other Father         history of fall multiple injuries    Hypertension Brother     Cancer Paternal Grandmother         uncertain if colon or stomach    Cancer Paternal Grandfather         stomach cancer- dx mid [de-identified]      Allergies   Allergen Reactions    Onion Nausea and Vomiting    Statins-Hmg-Coa Reductase Inhibitors Rash     rash      Current Facility-Administered Medications   Medication Dose Route Frequency    acetaminophen (TYLENOL) suppository 650 mg  650 mg Rectal Q4H PRN    glycopyrrolate (ROBINUL) injection 0.2 mg  0.2 mg IntraVENous Q4H    LORazepam (ATIVAN) injection 2 mg  2 mg IntraVENous Q4H    HYDROmorphone (PF) (DILAUDID) injection 2 mg  2 mg IntraVENous Q4H    HYDROmorphone (PF) (DILAUDID) injection 1 mg  1 mg IntraVENous Q15MIN PRN    haloperidol lactate (HALDOL) injection 2 mg  2 mg IntraVENous Q6H PRN    lactulose (CHRONULAC) 10 gram/15 mL solution 45 mL  45 mL Oral Q8H    bisacodyl (DULCOLAX) suppository 10 mg  10 mg Rectal DAILY PRN    diphenhydrAMINE (BENADRYL) injection 50 mg  50 mg IntraVENous Q6H PRN    LORazepam (ATIVAN) injection 1 mg  1 mg IntraVENous Q15MIN PRN    ondansetron (ZOFRAN) injection 4 mg  4 mg IntraVENous Q4H PRN    ketorolac (TORADOL) injection 30 mg  30 mg IntraVENous Q6H PRN     Current Outpatient Medications   Medication Sig    potassium chloride SR (KLOR-CON 10) 10 mEq tablet Take 10 mEq by mouth two (2) times a day.  lactulose (CHRONULAC) 10 gram/15 mL solution Take 20 g by mouth three (3) times daily. Oral solution may be mixed with fruit juice, water, or milk    diphenhydrAMINE-acetaminophen (TYLENOL PM EXTRA STRENGTH)  mg tab Take 1 Tab by mouth nightly as needed (sleep).  ondansetron (ZOFRAN ODT) 8 mg disintegrating tablet Take 1 Tab by mouth every eight (8) hours as needed for Nausea.  furosemide (LASIX) 40 mg tablet Take 1 Tab by mouth daily.  spironolactone (ALDACTONE) 100 mg tablet Take 1 Tab by mouth daily.  venlafaxine-SR (EFFEXOR-XR) 150 mg capsule Take 1 Cap by mouth daily.  hydrOXYzine HCl (ATARAX) 25 mg tablet Take 25 mg by mouth two (2) times a day.  omeprazole (PRILOSEC) 40 mg capsule TAKE 1 CAPSULE TWICE DAILY    allopurinol (ZYLOPRIM) 300 mg tablet TAKE 1 TABLET TWICE DAILY        PHYSICAL EXAM     Wt Readings from Last 3 Encounters:   07/10/19 230 lb (104.3 kg)   06/18/19 268 lb (121.6 kg)   05/31/19 232 lb (105.2 kg)       Visit Vitals  /74 (BP 1 Location: Left arm, BP Patient Position: At rest)   Pulse (!) 142   Temp (!) 104.6 °F (40.3 °C)   Resp 30   SpO2 91%       Supplemental O2  [x] Yes  [] NO  Last bowel movement:     Currently this patient has:  [x] Peripheral IV [] PICC  [] PORT [] ICD    [x] Cervantes Catheter [] NG Tube   [] PEG Tube    [] Rectal Tube [] Drain  [] Other:     Constitutional: Ill-appearing, appears much older than stated age, increased work of breathing increased noted with the use of accessory muscles  Eyes:  Icterus  ENMT: Dry  Cardiovascular: Regular rate  Respiratory: increased work of breathing increased noted with the use of accessory muscles  Gastrointestinal:moderate ascites  Musculoskeletal: Muscle wasting  Skin: Multiple areas of bruising  Neurologic: Restless and agitated at times         Pertinent Lab and or Imaging Tests:  Lab Results   Component Value Date/Time    Sodium 130 (L) 07/10/2019 05:23 AM    Potassium 3.3 (L) 07/10/2019 05:23 AM    Chloride 94 (L) 07/10/2019 05:23 AM    CO2 26 07/10/2019 05:23 AM    Anion gap 10 07/10/2019 05:23 AM    Glucose 80 07/10/2019 05:23 AM    BUN 16 07/10/2019 05:23 AM    Creatinine 1.56 (H) 07/10/2019 05:23 AM    BUN/Creatinine ratio 10 (L) 07/10/2019 05:23 AM    GFR est AA 56 (L) 07/10/2019 05:23 AM    GFR est non-AA 46 (L) 07/10/2019 05:23 AM    Calcium 8.5 07/10/2019 05:23 AM     Lab Results   Component Value Date/Time    Protein, total 6.7 07/10/2019 05:23 AM    Albumin 2.2 (L) 07/10/2019 05:23 AM           Total time: 35  Counseling / coordination time:   > 50% counseling / coordination?:

## 2019-07-11 NOTE — PROGRESS NOTES
responded to an EOL request to visit patient, Edmund Wills, on the [facility]. Surgical floor. Edmund Wills was lying in bed and appeared to be comfortable. His mother, Beatrice Miller, father, [facility], wife, Alena Enciso, and additional family members were present at the bedside. The Hospice and Palliative Care Nurse were also present during this time.  introduced herself and extended support through active listening and pastoral presence. Silviano made good eye contact with the  and requested prayer for Lokesh's peaceful passing.  provided prayer and comforting touch and stood with family until Edmund Wills passed.  was a peaceful presence and provided emotional support as Lokesh's family shared their grief through tears and shared stories of remembrance. Silviano expressed gratitude for the 's presence and expressed no additional needs at this time. She is aware of 's availability.  will follow up as able and/or needed. Eladio Trujillo. Franco Downey.      Paging Service: [phone])

## 2019-07-11 NOTE — PROGRESS NOTES
Palliative Medicine Social Work Note      SW made supportive visit to parents at bedside along with NP Brendan Lutz; RNs had shared that family was struggling with pt's decline. Pt was unresponsive with audible secretions, appears to be actively dying. Father was tearful, expressed belief that they stopped treatment too soon. Mother recalled conversation with Dr. Caleb Jimenez 2-3 weeks ago when they were told that pt could die at any time, stated pt had been accepting of this, has been talking recently about his late grandmother, with whom he was very close. Mother shared of pt's struggle with alcohol over the years, stated they put him in detox/rehab at least twice, stated pt had been motivated to change but was always overpowered by his addiction, would immediately resume drinking. Mother shared that pt's relationship with his brother Dorota Tipton was strained because of his alcohol abuse but brother has been supporting pt and family as pt now approaches end of life. Mother relies heavily on her deanna, shared that their  is like her \"third son,\" stated he is currently on vacation but will be back in town tomorrow night. Pt is now under inpt hospice care; SW will continue to be available to family for support.     [address], [facility] HospitalHOWARD-ADASM  Palliative Medicine:  [phone])

## 2019-07-11 NOTE — HOSPICE
[facility]  [phone]    Discharge/Death Nursing Note   Patient Name: Alanis Stone YOB: 1960  Age: 64 y.o.     Date of Death: 19  Admitted Date: 7/10/2019  Time of Death: [address] of Care: [facility]  Level of Care: GIP  Patient Room: 506/     Hospice Attending: April Devlin MD  Hospice Diagnosis: End stage liver disease ([facility] [address] [K72.90]    Death Pronouncement   Pronouncement of death completed by: Cherelle José NP    Agency staff was present at the time of death    At the time of death the patient was documented as absent heart sounds, pupils fixed and dilated, apneic    The pt  within [facility]    The following were notified of the patient's death: palliative NP, MD, hospice team, family at bedside, hospital     Medications were disposed of per facility protocol     Discharge Summary   Discharge Reason: Death    Summary of Care Provided    [x] Post mortem care provided by staff RN  [x] Notification of  home by nursing supervisor  [x] Referrals/Community resources provided:  yes  [] Goals completed  [] Durable Medical Equipment vendor notified     Disciplines involved: [x] RN [] SW [x]  [] PALENCIA [] Vol [] PT [] OT [] ST [] [facility]    [x] IDT communication/notification    Attending Physician, Dr. Blessing Martin, notified of death    Bereaved   MotherJesusita March [phone] - moderate risk  Father Too Hinton [phone] moderate risk  [address] ( daughter)  and Julianne Cummings ( son)     Advance Care Planning 7/10/2019   Patient's [place] is: Verbal statement (Legal Next of Kin remains as decision maker)   Primary Decision Maker Name -   Primary Decision Maker Phone Number -   Primary Decision Maker Relationship to Patient -   Confirm Advance Directive Yes, not on file   Patient Would Like to Complete Advance Directive -   Does the patient have other document types Do Not Resuscitate

## 2019-07-11 NOTE — PROGRESS NOTES
[facility]  [phone]    Patient Name: Kyrie Carmona. YOB: 1960    Date of Provider Hospice Visit: 07/11/19    Level of Care:   [x] General Inpatient (GIP)    [] Routine   [] Respite    Current Location of Care:  [] [facility] [x] [facility] [] [facility] [] [facility] [] Hospice [facility], patient referred from:  [] [facility] [] [facility] [] [facility] [] [facility] [] Home [] Other:     Date of Original Hospice Admission: 7/10/2019  Hospice Medical Director at time of admission: [employer]chmasch[employer] publishing Diagnosis: End-stage liver disease  Diagnoses RELATED to the terminal prognosis: Alcoholic cirrhosis, hepatic encephalopathy, malnutrition  Other Diagnoses:      Nickolas Jules. is a 64y.o. year old who was admitted to [facility]. Patient is a 68-year-old gentleman with a history of cirrhosis secondary to alcoholism. He has advanced liver disease with associated esophageal varices and recurrent ascites. He has had multiple paracentesis over the last several months to the point that he has required weekly paracentesis that is becoming less effective. Less than a week ago he had almost 15 L removed and then on 7/9 he had an additional 9 L removed. Per family, he has had further decline with confusion, not tolerating his lactulose and multiple falls. In the emergency room, he was found to have an ammonia of over 200, INR of 1.5, bilirubin of 4.5  He was evaluated by GI with limited options,  considered doing a TIPS procedure but with his overall rapid decline he was not a candidate. GI requested hospice evaluation and the hospice met with the family on 7/10/19 to discuss the options moving forward. Patient requiring symptoms management for SOB and  management of his end-stage-liver disease. Pt was unresponsive with audible secretions, appears to be actively dying. Father was tearful, expressed belief that they stopped treatment too soon. Mother recalled conversation with Dr. Ira Benjamin 2-3 weeks ago when they were told that pt could die at any time, stated pt had been accepting of this, has been talking recently about his late grandmother, with whom he was very close. Mother shared of pt's struggle with alcohol over the years, stated they put him in detox/rehab at least twice, stated pt had been motivated to change but was always overpowered by his addiction, would immediately resume drinking. Mother also shared she had another son who  of alcoholism and \"now they will be together, no more suffering\". Mother further shared that the pt's relationship with his brother Garth Isbell was strained because of his alcohol abuse but now Garth Isbell brother is supporting pt and family as pt now approaches end of life. Mother relies heavily on her deanna, shared that their  is like her \"third son,\" stated he is currently due will be back in town this morning and will visit this afternoon. The patient's principle diagnosis has resulted in hepatic encephalopathy. Functionally, the patient's Karnofsky and/or Palliative Performance Scale has declined over a period of weeks to months and is estimated at 10%. The patient is dependent on the following ADLs: All    Objective information that support this patients limited prognosis includes:   Ammonia greater than 200    The patient/family chose comfort measures with GIP. HOSPICE DIAGNOSES   Active Symptoms:  1. SOB   2. Fever  3. . Abdominal distention with large volume ascites  4. Alcoholic cirrhosis     PLAN   1. Continue GIP level care secondary to the need for aggressive symptom management of his restlessness and frequent nursing assessment. 2. Increase Ativan to 2mg IV every 4 hours scheduled and 1 mg every 15 min PRN, SOB  3. Increase Dilaudid to 2 mg IV Q 4 hrs scheduled and 1 mg Q 15 min IV PRN, pain  4. Continue Ondansetron 4 mg IV Q 4 hrs PRN N/V  5.  Continue Acetaminophen 650 mg VA Q 4 hrs PRN for fever and Ketoralac 30 mg IV Q 6 hrs PRN   6. Continue Glycopyrrolate 0.2mg IV Q 4 hr PRN secretions  7. Discussed with bedside nurse   8.  and SW to support family needs  9. Disposition: Likely will pass imminently or in hours at the hospital    Prognosis estimated based on 07/11/19 clinical assessment is:   [] Hours to Days    [] Days to Weeks    [x] Other: imminent to hours    Communicated plan of care with: Hospice Case Manager; Hospice IDT; Care Team    Symptoms management: Over the last 8 hours patient has received 2 doses of Glycopyrrolate for audible secretions, Dilaudid 2 mg  at 1300 and  Lorazepam 1 mg at 1000 received  the increased dose of 2 mg at 1200 for SOB and work of breathing, and acetaminophen 650 mg AL at 1000 and 2 doses of Ketoralac 30 mg IVP (7am, 12 noon) over the last 8 hours  for fever. GOALS OF CARE     Patient/Medical POA stated Goal of Care: Hospice/comfort care      [x] I have reviewed and/or updated ACP information in the Advance Care Planning Navigator. This information is available in the [address] [place] [address] Drive link in the patient's chart header. Primary Decision Maker (Health Care Agent):   Primary Decision Maker: Juhi Wallace - Mother - [phone]    Primary Decision Maker: Spencer Chandler - Father - [phone]    Resuscitation Status: DNR  If DNR is there a Durable DNR on file? : [x] Yes [] No (If no, complete Durable DNR)    HISTORY     History obtained from: Chart, family    CHIEF COMPLAINT: Confusion  The patient is:   [] Verbal  [x] Nonverbal  [] Unresponsive    HPI/SUBJECTIVE: Patient is not verbal as he remains encephalopathic related to his end-stage liver disease.        REVIEW OF SYSTEMS     The following systems were: [x] reviewed  [] unable to be reviewed    Positive ROS include:  Constitutional: fatigue, weakness,short of breath  Gastrointestinal:poor appetite, nausea, vomiting,  Neurologic:confusion, hallucinations, weakness  Psychiatric:anxiety,  Endocrine: Adult Non-Verbal Pain Assessment Score: 5    Face  [] 0   No particular expression or smile  [x] 1   Occasional grimace, tearing, frowning, wrinkled forehead  [] 2   Frequent grimace, tearing, frowning, wrinkled forehead    Activity (movement)  [] 0   Lying quietly, normal position  [] 1   Seeking attention through movement or slow, cautious movement  [x] 2   Restless, excessive activity and/or withdrawal reflexes    Guarding  [] 0   Lying quietly, no positioning of hands over areas of body  [x] 1   Splinting areas of the body, tense  [] 2   Rigid, stiff    Physiology (vital signs)  [x] 0   Stable vital signs  [] 1   Change in any of the following: SBP > 20mm Hg; HR > 20/minute  [] 2   Change in any of the following: SBP > 30mm Hg; HR > 25/minute    Respiratory  [] 0   Baseline RR/SpO2, compliant with ventilator  [x] 1   RR > 10 above baseline, or 5% drop SpO2, mild asynchrony with ventilator  [] 2   RR > 20 above baseline, or 10% drop SpO2, asynchrony with ventilator     FUNCTIONAL ASSESSMENT     Palliative Performance Scale (PPS): 10%       PSYCHOSOCIAL/SPIRITUAL ASSESSMENT     Active Problems:    End stage liver disease (HCC) (7/10/2019)      Past Medical History:   Diagnosis Date    Adverse effect of anesthesia     pt reports waking up during colonoscopy/endoscopy    Alcohol abuse 6/1/2011    Anemia NEC     Anxiety     Ascites     has to have fluid drained occasionally    Cirrhosis of liver (HCC)     Depression     Gastric ulcer, unspecified as acute or chronic, without mention of hemorrhage, perforation, or obstruction     GERD (gastroesophageal reflux disease)     Gout     Hypertension     Liver disease     cirrhosis    Obesity, Class II, BMI 35-39.9     Pneumonia     PUD (peptic ulcer disease)     Seizures ([facility] [address] 2013    from alcohol withdrawal    Stroke ([facility] [address] 2013    per pt, possible mini stroke from alcohol withdrawal (same time as seizure)      Past Surgical History:   Procedure Laterality Date    COLONOSCOPY N/A 7/2/2018    COLONOSCOPY performed by Thom [facility] at [facility] HX COLONOSCOPY  07/2018    HX ENDOSCOPY      also colonoscopy    HX GI  1998    rectal abscess surgery    HX GI  1998    rectal abscess surgery    HX HEENT  1980    wisdom teeth    IR PARACENTESIS ABD INIT      Pt does not remember last time but stated 5-6 times. Social History     Tobacco Use    Smoking status: Former Smoker    Smokeless tobacco: Current User     Types: Chew    Tobacco comment: currently uses snuff   Substance Use Topics    Alcohol use:  Yes     Alcohol/week: 1.2 oz     Types: 2 Shots of liquor per week     Comment: Today     Family History   Problem Relation Age of Onset    Asthma Mother     Other Father         history of fall multiple injuries    Hypertension Brother     Cancer Paternal Grandmother         uncertain if colon or stomach    Cancer Paternal Grandfather         stomach cancer- dx mid [de-identified]      Allergies   Allergen Reactions    Onion Nausea and Vomiting    Statins-Hmg-Coa Reductase Inhibitors Rash     rash      Current Facility-Administered Medications   Medication Dose Route Frequency    acetaminophen (TYLENOL) suppository 650 mg  650 mg Rectal Q4H PRN    glycopyrrolate (ROBINUL) injection 0.2 mg  0.2 mg IntraVENous Q4H    LORazepam (ATIVAN) injection 2 mg  2 mg IntraVENous Q4H    HYDROmorphone (PF) (DILAUDID) injection 2 mg  2 mg IntraVENous Q4H    HYDROmorphone (PF) (DILAUDID) injection 1 mg  1 mg IntraVENous Q15MIN PRN    haloperidol lactate (HALDOL) injection 2 mg  2 mg IntraVENous Q6H PRN    lactulose (CHRONULAC) 10 gram/15 mL solution 45 mL  45 mL Oral Q8H    bisacodyl (DULCOLAX) suppository 10 mg  10 mg Rectal DAILY PRN    diphenhydrAMINE (BENADRYL) injection 50 mg  50 mg IntraVENous Q6H PRN    LORazepam (ATIVAN) injection 1 mg  1 mg IntraVENous Q15MIN PRN    ondansetron (ZOFRAN) injection 4 mg  4 mg IntraVENous Q4H PRN    ketorolac (TORADOL) injection 30 mg  30 mg IntraVENous Q6H PRN        PHYSICAL EXAM     Wt Readings from Last 3 Encounters:   07/10/19 104.3 kg (230 lb)   06/18/19 121.6 kg (268 lb)   05/31/19 105.2 kg (232 lb)       Visit Vitals  /74 (BP 1 Location: Left arm, BP Patient Position: At rest)   Pulse (!) 142   Temp (!) 104.6 °F (40.3 °C)   Resp 30   SpO2 91%       Supplemental O2  [x] Yes  [] NO  Last bowel movement:     Currently this patient has:  [x] Peripheral IV [] PICC  [] PORT [] ICD    [x] Cervantes Catheter [] NG Tube   [] PEG Tube    [] Rectal Tube [] Drain  [] Other:     Constitutional: Ill-appearing, appears much older than stated age, increased work of breathing increased noted with the use of accessory muscles  Eyes:  Icterus  ENMT: Dry  Cardiovascular: Regular rate  Respiratory: increased work of breathing increased noted with the use of accessory muscles  Gastrointestinal:moderate ascites  Musculoskeletal: Muscle wasting  Skin: Multiple areas of bruising  Neurologic: Restless and agitated at times         Pertinent Lab and or Imaging Tests:  Lab Results   Component Value Date/Time    Sodium 130 (L) 07/10/2019 05:23 AM    Potassium 3.3 (L) 07/10/2019 05:23 AM    Chloride 94 (L) 07/10/2019 05:23 AM    CO2 26 07/10/2019 05:23 AM    Anion gap 10 07/10/2019 05:23 AM    Glucose 80 07/10/2019 05:23 AM    BUN 16 07/10/2019 05:23 AM    Creatinine 1.56 (H) 07/10/2019 05:23 AM    BUN/Creatinine ratio 10 (L) 07/10/2019 05:23 AM    GFR est AA 56 (L) 07/10/2019 05:23 AM    GFR est non-AA 46 (L) 07/10/2019 05:23 AM    Calcium 8.5 07/10/2019 05:23 AM     Lab Results   Component Value Date/Time    Protein, total 6.7 07/10/2019 05:23 AM    Albumin 2.2 (L) 07/10/2019 05:23 AM           Total time: 70  Counseling / coordination time:   > 50% counseling / coordination?:

## 2019-07-11 NOTE — PROGRESS NOTES
Bedside and Verbal shift change report given to BARB Cook (oncoming nurse) by Juleen Romberg, RN  (offgoing nurse). Report included the following information SBAR, Kardex and MAR.

## 2019-07-15 NOTE — DISCHARGE SUMMARY
Hospice Discharge Summary    [facility]  [facility]        Date of Admission: 7/10/2019  Date of Discharge: 7/11/2019    Ashish Manning is a 64y.o. year old who was admitted to [facility] at [facility] with a Hospice diagnosis of End stage liver disease ([facility] [address] [K72.90]. The patient's care was focused on comfort and the patient passed away on 7/11/2019.

## 2019-07-16 LAB
BACTERIA SPEC CULT: NORMAL
SERVICE CMNT-IMP: NORMAL

## 2019-12-12 NOTE — PROGRESS NOTES
Problem: Mobility Impaired (Adult and Pediatric) Goal: *Acute Goals and Plan of Care (Insert Text) Description Physical Therapy Goals Initiated 4/9/2019 1. Patient will move from supine to sit and sit to supine  and scoot up and down in bed with modified independence within 7 day(s). 2.  Patient will transfer from bed to chair and chair to bed with modified independence using the least restrictive device within 7 day(s). 3.  Patient will perform sit to stand with modified independence within 7 day(s). 4.  Patient will ambulate with modified independence for 300 feet with the least restrictive device within 7 day(s). 5.  Patient will ascend/descend 2 stairs with handrail(s) with modified independence within 7 day(s). Outcome: Progressing Towards Goal 
 PHYSICAL THERAPY TREATMENT Patient: Thomas Storm (62 y.o. male) Date: 4/12/2019 Diagnosis: ETOH abuse [F10.10] <principal problem not specified> Precautions: Fall, Bed Alarm, Seizure Chart, physical therapy assessment, plan of care and goals were reviewed. ASSESSMENT: 
Patient is received supine in bed. He is agreeable to ambulation. Patient is Mod I for supine to sit. Patient demonstrates difficulty with immediate standing balance requiring Min A. He has previously ambulated with HHA. Patient is provided HHA to [facility] requiring Min A to recover with loss of balance. Patient requires VC for hand positioning for safe sit to stand transfer. Patient is provided RW for improved safety. Patient ambulates around unit with RW and Min A. He requires VC for walker management including keeping the walker on the floor during turns. Patient demonstrates LOB, fluctuating gait speed, and path deviations requiring Min A while attempting head turns and speaking while walking.  Patient would continue to benefit from inpatient rehab on D/C secondary to PLOF and increased need for assistive device as well as need for constant support during OOB See NST under ultrasound appointment  activity. Progression toward goals: 
?    Improving appropriately and progressing toward goals ? Improving slowly and progressing toward goals ? Not making progress toward goals and plan of care will be adjusted PLAN: 
Patient continues to benefit from skilled intervention to address the above impairments. Continue treatment per established plan of care. Discharge Recommendations:  Inpatient Rehab Further Equipment Recommendations for Discharge:  None SUBJECTIVE:  
Patient stated ?yeah I can walk.? OBJECTIVE DATA SUMMARY:  
Critical Behavior: 
Neurologic State: Alert Orientation Level: Oriented X4 Cognition: Impulsive Safety/Judgement: Awareness of environment, Insight into deficits Functional Mobility Training: 
Bed Mobility: 
  
Supine to Sit: Modified independent Transfers: 
Sit to Stand: Minimum assistance Stand to Sit: Minimum assistance Balance: 
Sitting: Intact; With support Standing: Impaired Standing - Static: Fair Standing - Dynamic : Fair Ambulation/Gait Training: 
Distance (ft): 300 Feet (ft) Assistive Device: Gait belt;Walker, rolling Ambulation - Level of Assistance: Minimal assistance Gait Abnormalities: Decreased step clearance; Path deviations;Trunk sway increased Base of Support: Narrowed Speed/Gabbie: Fluctuations Step Length: Right shortened;Left shortened Stairs: 
  
  
   
 
Neuro Re-Education: 
 
Therapeutic Exercises:  
 
Pain: 
Pain Scale 1: Numeric (0 - 10) Pain Intensity 1: 0 Activity Tolerance:  
Patient demonstrates good activity tolerance Please refer to the flowsheet for vital signs taken during this treatment. After treatment:  
?    Patient left in no apparent distress sitting up in chair ? Patient left in no apparent distress in bed 
? Call bell left within reach ? Nursing notified ? Caregiver present ? Chair alarm activated COMMUNICATION/COLLABORATION:  
The patient?s plan of care was discussed with: Registered Nurse Arsenio Weir, PT Time Calculation: 18 mins

## 2020-07-17 NOTE — TELEPHONE ENCOUNTER
Attempted to contact pt at  number, no answer. Lvm for pt to return call to office at [phone]. Will continue to try to contact pt. Refer to previous telephone encounter.

## 2023-03-07 NOTE — PERIOP NOTES
Spoke with Dr. Jacquelyn Canseco regarding patient taking Lasix and Sprionolactone day of surgery. Instructed to have patient take these meds as usual on day of surgery. Patient notified during PAT appointment. Detail Level: Detailed

## 2024-02-06 NOTE — PROGRESS NOTES
[id number] [facility] at [facility]  [phone]    Hematology / Oncology Followup    Reason for Visit:   Kishor Foote is a 56 y.o. male who is seen for follow up of anemia. History of Present Illness:   He reports feeling more tired recently and states he feels more weak. He remains abstinent from alcohol. Denies bleeding. He reports nonpainful umbilical hernia. He is accompanied by his wife today. PAST HISTORY: The following sections were reviewed and updated in the EMR as appropriate: PMH, SH, FH, Medications, Allergies. Allergies   Allergen Reactions    Onion Nausea and Vomiting    Statins-Hmg-Coa Reductase Inhibitors Unknown (comments)      Review of Systems: A complete review of systems was obtained, reviewed, and scanned into the EMR. Pertinent findings reviewed above. Physical Exam:     Visit Vitals    /77 (BP 1 Location: Left arm, BP Patient Position: Sitting)  Comment: .  Pulse 87    Temp 97.8 °F (36.6 °C) (Temporal)    Resp 20    Ht 6' (1.829 m)    Wt 283 lb (128.4 kg)    SpO2 98%    BMI 38.38 kg/m2     General: No distress  Eyes: PERRLA, anicteric sclerae  HENT: Atraumatic, OP clear  Neck: Supple  Lymphatic: No cervical, supraclavicular, or inguinal adenopathy  Respiratory: CTAB, normal respiratory effort  CV: Normal rate, regular rhythm, no murmurs, no peripheral edema  GI: Soft, nontender, distended with ascites, no masses, no hepatomegaly, no splenomegaly  Skin: No rashes, ecchymoses, or petechiae. Normal temperature, turgor, and texture. Psych: Alert, oriented, appropriate affect, normal judgment/insight    Results:     Lab Results   Component Value Date/Time    WBC 4.6 09/08/2017 09:48 AM    HGB 11.3 09/08/2017 09:48 AM    HCT 32.8 09/08/2017 09:48 AM    PLATELET 86 14/02/4947 09:48 AM    MCV 79 09/08/2017 09:48 AM    ABS.  NEUTROPHILS 3.0 09/08/2017 09:48 AM    Hemoglobin (POC) 14.3 03/13/2014 08:16 PM    Hematocrit (POC) 42 03/13/2014 08:16 PM     Lab Results   Component Value Date/Time    Sodium 140 09/08/2017 09:48 AM    Potassium 4.2 09/08/2017 09:48 AM    Chloride 101 09/08/2017 09:48 AM    CO2 24 09/08/2017 09:48 AM    Glucose 86 09/08/2017 09:48 AM    BUN 5 09/08/2017 09:48 AM    Creatinine 0.66 09/08/2017 09:48 AM    GFR est  09/08/2017 09:48 AM    GFR est non- 09/08/2017 09:48 AM    Calcium 8.5 09/08/2017 09:48 AM    Sodium (POC) 141 03/13/2014 08:16 PM    Potassium (POC) 3.1 03/13/2014 08:16 PM    Chloride (POC) 102 03/13/2014 08:16 PM    Glucose (POC) 118 07/07/2016 07:11 AM    BUN (POC) <3 03/13/2014 08:16 PM    Creatinine (POC) 1.0 03/13/2014 08:16 PM    Calcium, ionized (POC) 1.06 03/13/2014 08:16 PM     Lab Results   Component Value Date/Time    Bilirubin, total 1.1 09/08/2017 09:48 AM    ALT (SGPT) 18 09/08/2017 09:48 AM    AST (SGOT) 28 09/08/2017 09:48 AM    Alk.  phosphatase 92 09/08/2017 09:48 AM    Protein, total 7.0 09/08/2017 09:48 AM    Albumin 3.4 09/08/2017 09:48 AM    Globulin 4.2 10/18/2016 11:51 AM     Lab Results   Component Value Date/Time    Reticulocyte count 1.3 05/17/2017 01:54 PM    Iron % saturation 14 09/08/2017 09:48 AM    TIBC 272 09/08/2017 09:48 AM    Ferritin 43 09/08/2017 09:48 AM    Vitamin B12 601 09/08/2017 09:48 AM    Folate 11.7 05/17/2017 01:54 PM    Haptoglobin 83 05/17/2017 01:54 PM     05/17/2017 01:54 PM    TSH 2.490 09/08/2017 09:48 AM    OTILIO, Direct None Detected 06/02/2011 04:08 AM    Lipase 206 10/18/2016 11:51 AM    HIV 1/2 Interpretation NONREACTIVE 10/16/2013 03:15 PM     Lab Results   Component Value Date/Time    INR 1.3 07/11/2017 09:45 AM    aPTT 33 07/11/2017 09:45 AM    D-dimer 0.67 10/16/2013 03:50 PM    Fibrinogen 180 10/16/2013 03:15 PM     HGB (g/dL)   Date Value   09/08/2017 11.3 (L)   07/11/2017 12.2 (L)   05/17/2017 7.3 (L)   04/06/2017 7.5 (L)   01/05/2017 8.9 (L)   10/18/2016 8.2 (L)   07/21/2016 9.7 (L)   07/14/2016 9.1 (L)   07/07/2016 10.3 (L) 07/06/2016 9.1 (L)   07/05/2016 9.3 (L)   06/28/2016 10.8 (L)   03/01/2016 10.7 (L)   01/07/2016 10.7 (L)   09/08/2015 12.0 (L)   07/08/2015 12.7   08/14/2014 14.1   07/16/2014 12.9   07/02/2014 13.5   05/16/2014 11.4 (L)   04/16/2014 13.5   03/13/2014 13.3   01/03/2014 13.6   10/16/2013 14.0   10/10/2013 12.9   09/30/2013 13.6   08/09/2013 14.7   07/24/2013 15.1   02/18/2013 16.1   04/18/2012 15.6   06/30/2011 8.6 (L)   06/29/2011 8.7 (L)   06/29/2011 7.2 (L)   06/28/2011 6.3 (LL)   06/27/2011 6.2 (LL)   06/03/2011 9.3 (L)   06/02/2011 10.2 (L)   06/01/2011 10.9 (L)           Assessment:   1) Iron deficiency anemia  S/p injectafer x2 doses with improvement in his HGB and iron levels initially but iron deficiency is now recurring with falling ferritin and recurrence of anemia. He could not tolerate oral iron previously. I provided him with information on a high iron diet again today. We will proceed with another 2 doses of injectafer and proceed with monitoring, checking labs in about 6 months. We can consider additional IV iron in the future if needed. The cause of his iron deficiency is presumably intermittent variceal bleeding related to his cirrhosis. He had a recent EGD with Dr. Mira Gaytan (7/2017). He is scheduled to follow up with GI in December. 2) Thrombocytopenia  Chronic, secondary to cirrhosis and etoh. Monitor. 3) Alcoholic cirrhosis  He denies any recent ETOH consumption.     Plan:     · Injectafer x 2, starting today  · Follow up with GI as scheduled  · Labs in 6 months: CBC, iron profile, ferritin ([facility])  · Return to see me in 6 months      Signed By: Terrie Yoder MD     October 18, 2017 Risk Statement (NON-critical care)

## 2024-03-26 NOTE — TELEPHONE ENCOUNTER
Patient ID: Niles Rodriguez is a 63 year old male.  Chief Complaint   Patient presents with    Physical        HISTORY OF PRESENT ILLNESS:   HPI  Patient presents for above.  Here for annual physical.     History of viral cardiomyopathy.  Appears resolved.  Had echocardiogram in December 2022 which showed ejection fraction of 40 to 45%.  He follows up with a cardiologist.  Has follow-up echocardiogram in May 2024.  No chest pain or shortness of breath with normal activity.  No weight gain or lower extremity edema.  No orthopnea.  Still takes several medications for this.  He is on atorvastatin currently.     History of asthma well-controlled.  Takes Advair only for this.  Has been using his rescue inhaler once a week since having COVID.  Previous to that was using very sparingly.     History of high cholesterol.  On monotherapy.  No joint pains, muscle pains.     Diagnosed with melanoma in situ.  Had wide excision with clean margins.  No further treatment is needed.  Follows with dermatology every 4 months.    Had colonoscopy in 2022 with repeat to be done in 2027.    Review of Systems   Constitutional: Negative.    HENT: Negative.     Eyes: Negative.    Respiratory: Negative.     Cardiovascular: Negative.    Gastrointestinal: Negative.    Endocrine: Negative.    Genitourinary: Negative.    Musculoskeletal: Negative.    Skin: Negative.    Allergic/Immunologic: Negative.    Neurological: Negative.    Hematological: Negative.    Psychiatric/Behavioral: Negative.       MEDICAL HISTORY:     Past Medical History:   Diagnosis Date    Actinic keratosis 01/2023    Right lower lid    Allergic rhinitis 1970    Asthma (HCC)     BCC (basal cell carcinoma of skin) 01/2023    Left central chest    Congestive heart disease (HCC)     Extrinsic asthma, unspecified     Heart failure (HCC) 2012    High blood pressure     High cholesterol     Lipid screening 06/30/2012    Melanoma in situ (HCC) 01/2023    Left forearm    Mitral  Faxed as requested. regurgitation     Visual impairment        Past Surgical History:   Procedure Laterality Date    COLONOSCOPY N/A 08/04/2022    Procedure: COLONOSCOPY;  Surgeon: Lisandro Dutton MD;  Location: [facility] ENDO    TONSILLECTOMY           Current Outpatient Medications:     albuterol 108 (90 Base) MCG/ACT Inhalation Aero Soln, Inhale 1 puff into the lungs every 6 (six) hours as needed for Wheezing., Disp: 8.5 g, Rfl: 0    ADVAIR DISKUS 100-50 MCG/ACT Inhalation Aerosol Powder, Breath Activated, INHALE 1 PUFF INTO THE LUNGS 2 TIMES A DAY, Disp: 3 each, Rfl: 3    loratadine 10 MG Oral Tab, Take 1 tablet (10 mg total) by mouth daily as needed., Disp: , Rfl:     spironolactone 25 MG Oral Tab, Take 0.5 tablets (12.5 mg total) by mouth daily., Disp: 45 tablet, Rfl: 3    atorvastatin 20 MG Oral Tab, Take 1 tablet (20 mg total) by mouth daily., Disp: 90 tablet, Rfl: 1    metoprolol succinate 100 MG Oral Tablet 24 Hr, Take 1 tablet (100 mg total) by mouth nightly., Disp: 90 tablet, Rfl: 1    hydrALAZINE 10 MG Oral Tab, Take 1 tablet (10 mg total) by mouth 3 (three) times daily., Disp: 270 tablet, Rfl: 1    enalapril 20 MG Oral Tab, Take 1 tablet (20 mg total) by mouth 2 (two) times daily., Disp: 180 tablet, Rfl: 2    isosorbide dinitrate 5 MG Oral Tab, Take 1 tablet (5 mg total) by mouth 3 (three) times daily., Disp: 270 tablet, Rfl: 2    Allergies:  Allergies   Allergen Reactions    Grass OTHER (SEE COMMENTS)     Sneezing    Pollen OTHER (SEE COMMENTS)     Sneezing       Social History     Socioeconomic History    Marital status:      Spouse name: Not on file    Number of children: Not on file    Years of education: Not on file    Highest education level: Not on file   Occupational History    Not on file   Tobacco Use    Smoking status: Never     Passive exposure: Never    Smokeless tobacco: Never   Vaping Use    Vaping Use: Never used   Substance and Sexual Activity    Alcohol use: Yes     Alcohol/week: 3.0 standard drinks of  alcohol     Types: 1 Cans of beer, 2 Standard drinks or equivalent per week     Comment: 2-3 per week    Drug use: No    Sexual activity: Yes     Partners: Female   Other Topics Concern     Service Not Asked    Blood Transfusions Not Asked    Caffeine Concern Yes     Comment: Daily; soda, 12 ounces     Occupational Exposure Not Asked    Hobby Hazards Not Asked    Sleep Concern Not Asked    Stress Concern Not Asked    Weight Concern Not Asked    Special Diet Not Asked    Back Care Not Asked    Exercise Not Asked    Bike Helmet Not Asked    Seat Belt Not Asked    Self-Exams Not Asked    Grew up on a farm No    History of tanning Yes    Outdoor occupation No    Reaction to local anesthetic No    Pt has a pacemaker No    Pt has a defibrillator No   Social History Narrative    Not on file     Social Determinants of Health     Financial Resource Strain: Not on file   Food Insecurity: Not on file   Transportation Needs: Not on file   Physical Activity: Not on file   Stress: Not on file   Social Connections: Not on file   Housing Stability: Not on file           PHYSICAL EXAM:     Vitals:    03/26/24 1121   BP: 100/62   Pulse: 74   Resp: 18   Temp: 97.2 °F (36.2 °C)   TempSrc: Temporal   SpO2: 96%   Weight: 233 lb (105.7 kg)   Height: 6' 3\" (1.905 m)       Body mass index is 29.12 kg/m².    Physical Exam  Constitutional:       Appearance: Normal appearance. He is well-developed.   HENT:      Head: Normocephalic.      Right Ear: Tympanic membrane, ear canal and external ear normal. There is no impacted cerumen.      Left Ear: Tympanic membrane, ear canal and external ear normal. There is no impacted cerumen.   Eyes:      General: No scleral icterus.     Pupils: Pupils are equal, round, and reactive to light.   Neck:      Vascular: No JVD.   Cardiovascular:      Rate and Rhythm: Normal rate and regular rhythm.      Pulses: Normal pulses.      Heart sounds: Normal heart sounds. No murmur heard.  Pulmonary:      Effort:  Pulmonary effort is normal. No respiratory distress.      Breath sounds: Normal breath sounds. No stridor. No wheezing, rhonchi or rales.   Chest:      Chest wall: No tenderness.   Abdominal:      General: Abdomen is flat. Bowel sounds are normal. There is no distension.      Palpations: Abdomen is soft.      Tenderness: There is no abdominal tenderness. There is no guarding or rebound.   Musculoskeletal:         General: Normal range of motion.      Cervical back: Normal range of motion.   Lymphadenopathy:      Cervical: No cervical adenopathy.   Skin:     General: Skin is warm.   Neurological:      General: No focal deficit present.      Mental Status: He is alert.      Cranial Nerves: No cranial nerve deficit.   Psychiatric:         Mood and Affect: Mood normal.         Behavior: Behavior normal.           ASSESSMENT/PLAN:   1. Annual physical exam  CBC With Differential With Platelet; Future  Comp Metabolic Panel (14); Future  Lipid Panel; Future  TSH W Reflex To Free T4; Future  PSA Total, Diagnostic; Future    2. Hypercholesterolemia  Comp Metabolic Panel (14); Future  Lipid Panel; Future    3. History of cardiomyopathy  Lipid Panel; Future  Echocardiogram in May 2024.    4. 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 Mild intermittent asthma without complication (HCC)  Continue Advair.    6. Colon cancer screening  Repeat colonoscopy in 2027.    Return in about 1 year (around 3/26/2025) for Complete physical.    This note was prepared using Dragon Medical voice recognition dictation software. As a result errors may occur. When identified these errors have been corrected. While every attempt is made to correct errors during dictation discrepancies may still exist.    Jeff Pizarro MD  3/26/2024

## 2024-05-12 NOTE — PROGRESS NOTES
5/12/24  1050  Patient infusions completed, Zosyn and Vancomycin. IV flushed. VS stable. No c/o pain.   Problem: Pressure Injury - Risk of 
Goal: *Prevention of pressure injury Description Document Brenden Scale and appropriate interventions in the flowsheet. Offload heels Turn approximately every 2 hours Outcome: Progressing Towards Goal 
  
Problem: Falls - Risk of 
Goal: *Absence of Falls Description Document Yoly Richmond Fall Risk and appropriate interventions in the flowsheet. Outcome: Progressing Towards Goal 
  
Problem: Alcohol Withdrawal 
Goal: *STG: Participates in treatment plan Outcome: Progressing Towards Goal 
Goal: *STG: Remains safe in hospital 
Outcome: Progressing Towards Goal 
Goal: *STG: Seeks staff when symptoms of withdrawal increase Outcome: Progressing Towards Goal 
Goal: *STG: Complies with medication therapy Outcome: Progressing Towards Goal 
Goal: *STG: Maintains appropriate nutrition and hydration Outcome: Progressing Towards Goal 
Goal: *STG: Vital signs within defined limits Outcome: Progressing Towards Goal

## 2024-09-23 NOTE — MR AVS SNAPSHOT
Jesus Luo 
 
 
 [address] [facility], [address] [place] [address] 
[phone] Patient: David Hopson. MRN: [id number] :1960 Visit Information Date & Time Provider Department Dept. Phone Encounter #  
 2018  1:30 PM MD [facility] at [address] [place] [address] [id number] Follow-up Instructions Return in about 2 months (around 2018) for meredith Ray deficiency fu. Follow-up and Disposition History Your Appointments 2018  8:30 AM  
Complete Physical with Carley Keith DO [address] [facility] ([address]) Appt Note: cpe with labs and testosterone injection N [facility] [address] [place] [address] Road [id number] [phone]  
  
   
 N [address] [place] [address] Road [address]  
  
    
 2018  8:30 AM  
ESTABLISHED PATIENT with MD [facility] at [facility] [address]) Appt Note: 2mo iron deficiency fu,  Cory [address] [facility], [facility] [id number] [phone]  
  
   
 [address] [place] [address]  
  
    
 2018 10:40 AM  
ESTABLISHED PATIENT with Sara Otero MD  
[facility] ([facility]) Appt Note: 3 mo fu appt [id number] [facility] [id number] [place] [address] Road [address]  
[phone]  
  
   
 [address] [address]  
  
    
 2018  1:00 PM  
New Patient with Fco Davis MD  
[address] ([address]) Appt Note: New Pt/ Hx Liver Cirrhosis/ Referring Provider: Dr. Sara Otero w/ [facility] N#[phone]// tlt 2018 [address] [place] [address] [facility] [place] [phone] [facility] [address]  
[facility] [address] [facility] Upcoming Health Maintenance Date Due Pneumococcal 19-64 Highest Risk (2 of 3 - PCV13) 2012  MEDICARE YEARLY EXAM 4/7/2018 COLONOSCOPY 7/13/2022 DTaP/Tdap/Td series (2 - Td) 9/5/2022 Allergies as of 4/18/2018  Review Complete On: 4/18/2018 By: Mundo Miller MD  
  
 Severity Noted Reaction Type Reactions Onion Medium 06/02/2011   Side Effect Nausea and Vomiting Statins-hmg-coa Reductase Inhibitors  07/05/2016    Unknown (comments) Current Immunizations  Reviewed on 10/18/2017 Name Date Hep A Vaccine (Adult) 6/28/2016, 12/4/2015 Hep B Vaccine (Adult) 6/28/2016, 9/8/2015, 7/8/2015 Influenza Vaccine (Quad) PF 9/8/2017  9:46 AM, 10/24/2016 12:01 PM, 11/5/2015 TDAP Vaccine 9/5/2012  3:11 PM  
 ZZZ-RETIRED (DO NOT USE) Pneumococcal Vaccine (Unspecified Type) 6/1/2011  8:25 PM  
  
 Not reviewed this visit You Were Diagnosed With   
  
 Codes Comments Iron deficiency anemia, unspecified iron deficiency anemia type    -  Primary ICD-10-CM: D50.9 ICD-9-CM: 280.9 Alcoholic cirrhosis, unspecified whether ascites present ([facility].)     ICD-10-CM: K70.30 ICD-9-CM: 571.2 Idiopathic esophageal varices without bleeding (HCC)     ICD-10-CM: I85.00 ICD-9-CM: 456.1 Thrombocytopenia ([facility]     ICD-10-CM: D69.6 ICD-9-CM: 287.5 Vitals BP Pulse Temp Resp Height(growth percentile) Weight(growth percentile) 117/54 (BP 1 Location: Right arm, BP Patient Position: Sitting) 78 97.1 °F (36.2 °C) (Oral) 20 6' (1.829 m) 288 lb (130.6 kg) SpO2 BMI Smoking Status 94% 39.06 kg/m2 Never Smoker Vitals History BMI and BSA Data Body Mass Index Body Surface Area 39.06 kg/m 2 2.58 m 2 Preferred Pharmacy Pharmacy Name Phone [facility] [address] [phone] Your Updated Medication List  
  
   
This list is accurate as of 4/18/18  2:12 PM.  Always use your most recent med list.  
  
  
  
  
 allopurinol 300 mg tablet Commonly known as:  ZYLOPRIM  
TAKE 1 TABLET TWICE DAILY  
  
 carvedilol 3.125 mg tablet Commonly known as:  Manual David Take 1 Tab by mouth two (2) times daily (with meals). cyclobenzaprine 10 mg tablet Commonly known as:  FLEXERIL  
TAKE 1 TABLET THREE TIMES DAILY AS NEEDED  
  
 furosemide 40 mg tablet Commonly known as:  LASIX Take 1 Tab by mouth daily. isosorbide mononitrate ER 30 mg tablet Commonly known as:  IMDUR Take  by mouth daily. lactulose 10 gram/15 mL solution Commonly known as:  Mabelene Sharan Take 15 mL by mouth two (2) times a day. LORazepam 1 mg tablet Commonly known as:  ATIVAN  
TAKE 1 TABLET TWICE DAILY (MAXIMUM DAILY AMOUNT 2MG) omeprazole 40 mg capsule Commonly known as:  PRILOSEC  
TAKE 1 CAPSULE TWICE DAILY potassium chloride SA 10 mEq capsule Commonly known as:  MICRO-K  
TAKE 1 CAPSULE EVERY DAY  
  
 spironolactone 25 mg tablet Commonly known as:  ALDACTONE  
TAKE 1 TABLET TWICE DAILY  
  
 traZODone 300 mg tablet Commonly known as:  Garcia Elks Take 1 Tab by mouth nightly. venlafaxine- mg capsule Commonly known as:  EFFEXOR-XR  
TAKE 1 CAPSULE EVERY DAY - 7am  
  
  
  
  
We Performed the Following CBC W/O DIFF [39575 CPT(R)] FERRITIN [06210 CPT(R)] IRON PROFILE [id number] CPT(R)] Follow-up Instructions Return in about 2 months (around 6/18/2018) for Cory iron deficiency fu. Introducing \[facility]\"" & HEALTH SERVICES! Rosangela Aguilar introduces [employer] patient portal. Now you can access parts of your medical record, email your doctor's office, and request medication refills online. 1. In your internet browser, go to [email or web address] 2. Click on the First Time User? Click Here link in the Sign In box. You will see the New Member Sign Up page. 3. Enter your [employer] Access Code exactly as it appears below. You will not need to use this code after youve completed the sign-up process.  If you do not sign up before the expiration date, you must request a new code. · [employer] Access Code: [email or web address] Expires: 6/28/2018  9:37 AM 
 
4. Enter the last four digits of your Social Security Number (xxxx) and Date of Birth (mm/dd/yyyy) as indicated and click Submit. You will be taken to the next sign-up page. 5. Create a [employer] ID. This will be your [employer] login ID and cannot be changed, so think of one that is secure and easy to remember. 6. Create a [employer] password. You can change your password at any time. 7. Enter your Password Reset Question and Answer. This can be used at a later time if you forget your password. 8. Enter your e-mail address. You will receive e-mail notification when new information is available in [address] 9. Click Sign Up. You can now view and download portions of your medical record. 10. Click the Download Summary menu link to download a portable copy of your medical information. If you have questions, please visit the Frequently Asked Questions section of the [employer] website. Remember, [employer] is NOT to be used for urgent needs. For medical emergencies, dial 911. Now available from your iPhone and Android! Please provide this summary of care documentation to your next provider. Your primary care clinician is listed as Bryan Saenz. If you have any questions after today's visit, please call [phone].  used

## 2025-04-23 NOTE — TELEPHONE ENCOUNTER
Left message on voicemail to call office back. Please notify pt T injection are no longer being given in office.  Pt can discuss alt treatment options with provider during appt on 7/27 @ 10 am. 1-2 cups/cans per day

## 2025-05-04 NOTE — PROGRESS NOTES
[facility]  Internal Medicine Residency Program  Progress Note - House Team       Patient:  Cuauhtemoc Jauregui 54 y.o. male   MRN: [id number]       Date of Service: 2025  Admission date: 2025 11:28 AM ; Hospital day: 2     CC: Altered Mental Status (Pt woke up with left sided weakness. On Eliquis last dose last night)     Overnight events: No significant overnight events    Subjective     Patient seen and evaluated at bedside today. Patient eating breakfast. No active complaints at this time. Patient denies lightheadedness, fatigue, chest pain, shortness of breath, abdominal pain. Has ambulated to restroom without difficulty.    Objective   PHYSICAL EXAM  Physical Exam  Constitutional:       Appearance: He is not ill-appearing.   HENT:      Head: Normocephalic.      Nose: Nose normal.   Cardiovascular:      Rate and Rhythm: Normal rate and regular rhythm.      Pulses: Normal pulses.      Heart sounds: Normal heart sounds.   Pulmonary:      Effort: Pulmonary effort is normal.      Breath sounds: Normal breath sounds.   Abdominal:      General: Bowel sounds are normal.      Palpations: Abdomen is soft.      Tenderness: There is no abdominal tenderness.   Musculoskeletal:         General: Normal range of motion.      Cervical back: Normal range of motion.      Right lower leg: No edema.      Left lower leg: No edema.   Skin:     General: Skin is warm.      Capillary Refill: Capillary refill takes less than 2 seconds.   Neurological:      General: No focal deficit present.      Mental Status: He is alert.         CARDIOVASCULAR  Vitals: /61   Pulse 78   Temp 98.4 °F (36.9 °C) (Temporal)   Resp 17   Ht 1.803 m (5' 11\")   Wt 70.7 kg (155 lb 13.8 oz)   SpO2 96%   BMI 21.74 kg/m²     Pulse rate range      : Pulse  Av.2  Min: 76  Max: 98  BP range                  : Systolic (24hrs), Av , Min:93 , Max:111   ; Diastolic (24hrs), Av, Min:49, Max:80      Hospitalist Progress Note Rick Garcia MD 
Answering service: [phone] OR [phone] from in house phone Date of Service:  2019 NAME:  Mitchel Alvarado :  1960 MRN:  [id number] Admission Summary:  
66 y.o M with PMH of chronic severe alcoholism,liver cirrhosis,chronic anemia,thromboctyopenia,gastric ulcer,variceal bleeding was sent to the hospital because of alcohol withdrawal symptoms from a rehab Interval history / Subjective:  
  
Noted overnight events - was transferred to ICU for precedex for increased CIWA scores He remains lethargic but was able to talk on arousal but confused. Assessment & Plan: #Alcohol withdrawal with chronic alcoholism: 
-Etoh level at admission 143. 
-continue CIWA protocol with prn Iv ativan 
-off precedex since morning 
-seizure precautions 
-advised to quit drinking alcohol 
-thiamine,folic acid #Hypokalemia and hypomagnesemia:replete. Recheck labs. #Alcoholic liver cirrhosis :Ascites: 
-heavy alcohol use -drinks 1/2 gallon of whiskey every 2 days,last drink per patient was Thursday. -CT abdomen shows liver cirrhosis 
-MELD score 18 child class C 
-s/p temporary  peritoneal cath placement for ascites - approx 11 lt removed. Clammped the catheter today 
-will give 40 mg lasix and decrease spironolactone as BP soft 
-will give IV albumin as BP soft 
-Fluids studies - white count 135. No SBP 
-hepatologist following 
-Ammonia 118 - scheduled lactulose added #Umbilical hernia: 
-reducible. No surgical intervention required per gen surgey 
-continue local wound care as he has excoriated skin # Chronic thrombocytopenia:due to cirrhosis #Chronic anemia: h/o varices - monitor Hb Code status: full DVT prophylaxis: SCD Care Plan discussed with:patient,nurse, Disposition: TBD Hospital Problems  Date Reviewed: 2019        Codes Class Noted POA  
 ETOH abuse ICD-10-CM: F10.10 ICD-9-CM: 305.00  4/5/2019 Unknown Umbilical hernia without obstruction and without gangrene ICD-10-CM: K42.9 ICD-9-CM: 553.1  1/24/2018 Unknown Overview Signed 1/24/2018  3:04 PM by Jaden Mancia MD  
  Without gangrene or obstruction. Review of Systems: As per HPI Vital Signs:  
 Last 24hrs VS reviewed since prior progress note. Most recent are: 
Visit Vitals /66 Pulse 74 Temp 97.9 °F (36.6 °C) Resp 22 Ht 6' (1.829 m) Wt 108.7 kg (239 lb 10.2 oz) SpO2 91% BMI 32.50 kg/m² Intake/Output Summary (Last 24 hours) at 4/7/2019 1621 Last data filed at 4/7/2019 0800 Gross per 24 hour Intake 209.05 ml Output 3160 ml Net -2950.95 ml Physical Examination:  
 
 
     
Constitutional:  middle age male   
ENT:  Oral mucous moist, oropharynx benign. Neck supple, Resp:  CTA bilaterally. No wheezing/rhonchi/rales. No accessory muscle use CV:  Regular rhythm, mild tachycardia, no murmurs, gallops, rubs GI:  Soft, reducible umbilical hernia with excorieated skin Musculoskeletal:  1-2+ LE jason,a Neurologic:  lethargic,arousal to voice and oriented to himself,place. moves all extremities spontaneosuly Data Review:  
 Review and/or order of clinical lab test 
Review and/or order of tests in the medicine section of [facility] Labs:  
 
Recent Labs 04/07/19 
0416 04/06/19 
0400 WBC 3.7* 4.1 HGB 9.7* 10.4* HCT 30.0* 31.3*  
PLT 55* 60* Recent Labs 04/07/19 
0709 04/06/19 2035 04/06/19 
0400  132* 134* K 2.8* 3.1* 3.1*  
 99 102 CO2 27 28 26 BUN 4* 4* 5*  
CREA 0.70 0.81 0.74 GLU 88 87 97  
CA 7.3* 7.7* 7.5* MG 1.8  --  1.6 Recent Labs 04/07/19 
0709 04/06/19 
0400 04/05/19 
0700 04/05/19 
9685 SGOT 33 48*  --  67* ALT 14 19  --  24  
 135*  --  160* TBILI 4.3* 4.2*  --  3.7* TP 6.1* 6.9  --  7.7 ALB 2.2* 2.3*  --  2.4*  
GLOB 3.9 4.6*  -- 5.3*  
LPSE  --   --  187  --   
 
Recent Labs 04/05/19 
2973 INR 1.5* PTP 14.6* No results for input(s): FE, TIBC, PSAT, FERR in the last 72 hours. Lab Results Component Value Date/Time Folate 13.7 03/22/2019 01:54 AM  
  
No results for input(s): PH, PCO2, PO2 in the last 72 hours. No results for input(s): CPK, CKNDX, TROIQ in the last 72 hours. No lab exists for component: CPKMB Lab Results Component Value Date/Time Cholesterol, total 116 09/08/2017 09:48 AM  
 HDL Cholesterol 40 09/08/2017 09:48 AM  
 LDL, calculated 64 09/08/2017 09:48 AM  
 Triglyceride 61 09/08/2017 09:48 AM  
 CHOL/HDL Ratio 7.6 (H) 07/25/2013 04:15 AM  
 
Lab Results Component Value Date/Time Glucose (POC) 118 (H) 07/07/2016 07:11 AM  
 Glucose (POC) 189 (H) 03/13/2014 08:16 PM  
 Glucose (POC) 118 (H) 08/10/2013 05:45 PM  
 Glucose (POC) 230 (H) 08/09/2013 08:18 PM  
 Glucose (POC) 143 (H) 07/24/2013 01:56 PM  
 Glucose  10/14/2014 10:50 AM  
 Glucose  08/14/2014 07:32 AM  
 
Lab Results Component Value Date/Time Color DARK YELLOW 04/05/2019 08:03 AM  
 Appearance CLEAR 04/05/2019 08:03 AM  
 Specific gravity 1.012 04/05/2019 08:03 AM  
 Specific gravity 1.025 03/21/2019 06:09 PM  
 pH (UA) 6.5 04/05/2019 08:03 AM  
 Protein NEGATIVE  04/05/2019 08:03 AM  
 Glucose NEGATIVE  04/05/2019 08:03 AM  
 Ketone TRACE (A) 04/05/2019 08:03 AM  
 Bilirubin NEGATIVE  05/16/2014 09:00 PM  
 Urobilinogen 4.0 (H) 04/05/2019 08:03 AM  
 Nitrites NEGATIVE  04/05/2019 08:03 AM  
 Leukocyte Esterase NEGATIVE  04/05/2019 08:03 AM  
 Epithelial cells FEW 04/05/2019 08:03 AM  
 Bacteria NEGATIVE  04/05/2019 08:03 AM  
 WBC 0-4 04/05/2019 08:03 AM  
 RBC 5-10 04/05/2019 08:03 AM  
 
 
 
Medications Reviewed:  
 
Current Facility-Administered Medications Medication Dose Route Frequency  0.9% sodium chloride 1,000 mL with mvi, adult no. 4 with vit K 10 mL, thiamine 544 mg, folic acid 1 mg infusion IntraVENous Q24H  
 lactulose (CHRONULAC) 10 gram/15 mL solution 20 g  20 g Oral TID  furosemide (LASIX) tablet 40 mg  40 mg Oral DAILY  [START ON 4/8/2019] spironolactone (ALDACTONE) tablet 100 mg  100 mg Oral DAILY  albumin human 25% (BUMINATE) solution 25 g  25 g IntraVENous Q12H  
 LORazepam (ATIVAN) injection 2 mg  2 mg IntraVENous Q1H PRN  
 LORazepam (ATIVAN) injection 4 mg  4 mg IntraVENous Q1H PRN  
 LORazepam (ATIVAN) injection 2 mg  2 mg IntraVENous Q1H PRN  
 ondansetron (ZOFRAN) injection 4 mg  4 mg IntraVENous Q4H PRN  
 allopurinol (ZYLOPRIM) tablet 300 mg  300 mg Oral Q12H  carvedilol (COREG) tablet 3.125 mg  3.125 mg Oral BID WITH MEALS  cyclobenzaprine (FLEXERIL) tablet 10 mg  10 mg Oral TID PRN  pantoprazole (PROTONIX) tablet 40 mg  40 mg Oral ACB  venlafaxine-SR (EFFEXOR-XR) capsule 150 mg  150 mg Oral DAILY  hydrOXYzine HCl (ATARAX) tablet 25 mg  25 mg Oral TID  hydrALAZINE (APRESOLINE) 20 mg/mL injection 20 mg  20 mg IntraVENous Q6H PRN  
 
______________________________________________________________________ EXPECTED LENGTH OF STAY: - - - 
ACTUAL LENGTH OF STAY:          2 Brenda Perkins MD

## (undated) DEVICE — SIMPLICITY FLUFF UNDERPAD 23X36, MODERATE: Brand: SIMPLICITY

## (undated) DEVICE — CANN NASAL O2 CAPNOGRAPHY AD -- FILTERLINE

## (undated) DEVICE — BASIN EMSIS 16OZ GRAPHITE PLAS KID SHP MOLD GRAD FOR ORAL

## (undated) DEVICE — NEEDLE SCLERO 23GA L4MM CATH L240CM CNTRST SHTH DIA1.8MM

## (undated) DEVICE — KENDALL RADIOLUCENT FOAM MONITORING ELECTRODE -RECTANGULAR SHAPE: Brand: KENDALL

## (undated) DEVICE — BIPOLAR ELECTROHEMOSTASIS CATHETER: Brand: INJECTION GOLD PROBE

## (undated) DEVICE — SYR 3ML LL TIP 1/10ML GRAD --

## (undated) DEVICE — SYR 5ML 1/5 GRAD LL NSAF LF --

## (undated) DEVICE — CONTAINER SPEC 20 ML LID NEUT BUFF FORMALIN 10 % POLYPR STS

## (undated) DEVICE — BITEBLOCK ENDOSCP 60FR MAXI WHT POLYETH STURDY W/ VELC WVN

## (undated) DEVICE — 1200 GUARD II KIT W/5MM TUBE W/O VAC TUBE: Brand: GUARDIAN

## (undated) DEVICE — BAG SPEC BIOHZRD 10 X 10 IN --

## (undated) DEVICE — BAG BELONG PT PERS CLEAR HANDL

## (undated) DEVICE — MULTIPLE BAND LIGATOR: Brand: SPEEDBAND SUPERVIEW SUPER 7

## (undated) DEVICE — KIT COLON W/ 1.1OZ LUB AND 2 END

## (undated) DEVICE — SET ADMIN 16ML TBNG L100IN 2 Y INJ SITE IV PIGGY BK DISP

## (undated) DEVICE — Device

## (undated) DEVICE — CATH IV AUTOGRD BC PNK 20GA 25 -- INSYTE

## (undated) DEVICE — ADULT SPO2 SENSOR: Brand: NELLCOR

## (undated) DEVICE — NDL PRT INJ NSAF BLNT 18GX1.5 --

## (undated) DEVICE — SYR 10ML LUER LOK 1/5ML GRAD --

## (undated) DEVICE — FORCEPS BX L240CM JAW DIA2.8MM L CAP W/ NDL MIC MESH TOOTH

## (undated) DEVICE — SOLIDIFIER MEDC 1200ML -- CONVERT TO 356117

## (undated) DEVICE — CUFF ADULT 1 PC 1 VINYL DISP --